# Patient Record
Sex: FEMALE | Race: WHITE | Employment: PART TIME | URBAN - METROPOLITAN AREA
[De-identification: names, ages, dates, MRNs, and addresses within clinical notes are randomized per-mention and may not be internally consistent; named-entity substitution may affect disease eponyms.]

---

## 2017-01-03 ENCOUNTER — TELEPHONE (OUTPATIENT)
Dept: FAMILY MEDICINE CLINIC | Age: 37
End: 2017-01-03

## 2017-01-04 ENCOUNTER — OFFICE VISIT (OUTPATIENT)
Dept: NEUROLOGY | Age: 37
End: 2017-01-04

## 2017-01-04 VITALS
DIASTOLIC BLOOD PRESSURE: 63 MMHG | WEIGHT: 140 LBS | RESPIRATION RATE: 18 BRPM | TEMPERATURE: 99.5 F | OXYGEN SATURATION: 98 % | HEART RATE: 93 BPM | SYSTOLIC BLOOD PRESSURE: 100 MMHG | BODY MASS INDEX: 24.03 KG/M2

## 2017-01-04 DIAGNOSIS — K31.84 GASTROPARESIS: Primary | ICD-10-CM

## 2017-01-04 DIAGNOSIS — G90.A POTS (POSTURAL ORTHOSTATIC TACHYCARDIA SYNDROME): ICD-10-CM

## 2017-01-04 RX ORDER — LANOLIN ALCOHOL/MO/W.PET/CERES
400 CREAM (GRAM) TOPICAL 2 TIMES DAILY
Qty: 60 TAB | Refills: 3 | Status: SHIPPED | OUTPATIENT
Start: 2017-01-04 | End: 2017-03-09

## 2017-01-04 NOTE — MR AVS SNAPSHOT
Visit Information Date & Time Provider Department Dept. Phone Encounter #  
 1/4/2017  3:40  Obie Morgan, [address] [facility] at [address] [id number] Follow-up Instructions Return if symptoms worsen or fail to improve. Your Appointments 1/4/2017  3:40 PM  
Follow Up with 812 Obie Morgan,  [employer] [facility] at [address] [facility] Appt Note: f/u $0cp 11/3/16 [address] [facility] [address]  
[phone]  
  
   
 [address] [place] [address] [address]  
  
    
 1/9/2017  9:00 AM  
ROUTINE CARE with Cheo Gonzalez MD  
[address] ([facility]) Appt Note: checkup discuss bp and chol, feeling shaky; physical checkup discuss bp and chol, feeling shaky [address], [address] [address] [place] [address]  
[phone]  
  
   
 [address], [place] [address] [address] Upcoming Health Maintenance Date Due INFLUENZA AGE 9 TO ADULT 8/1/2016 PAP AKA CERVICAL CYTOLOGY 10/22/2018 DTaP/Tdap/Td series (2 - Td) 11/11/2024 Allergies as of 1/4/2017  Review Complete On: 1/4/2017 By: 2 [facility] Cathy, DO Severity Noted Reaction Type Reactions Celexa [Citalopram]  12/10/2015    Nausea and Vomiting Ciprofloxacin  11/21/2012    Shortness of Breath Diflucan [Fluconazole]  05/17/2016    Rash Burning sensation to skin  
 Sulfa (Sulfonamide Antibiotics)  03/25/2010    Rash Current Immunizations  Reviewed on 9/30/2015 Name Date Influenza Vaccine 10/16/2013 Influenza Vaccine Roselynn Sanju) 11/11/2014 Influenza Vaccine (Quad) PF 9/30/2015 TB Skin Test (PPD) Intradermal 1/27/2014 Tdap 11/11/2014 Not reviewed this visit You Were Diagnosed With   
  
 Codes Comments Gastroparesis    -  Primary ICD-10-CM: Y30.97 ICD-9-CM: 536.3 POTS (postural orthostatic tachycardia syndrome)     ICD-10-CM: R00.0, I95.1 ICD-9-CM: 427.89 Vitals BP Pulse Temp Resp Weight(growth percentile) LMP  
 100/63 93 99.5 °F (37.5 °C) 18 140 lb (63.5 kg) 12/11/2016 SpO2 BMI OB Status Smoking Status 98% 24.03 kg/m2 Having regular periods Former Smoker BMI and BSA Data Body Mass Index Body Surface Area 24.03 kg/m 2 1.69 m 2 Preferred Pharmacy Pharmacy Name Phone [facility], [address] [phone] Your Updated Medication List  
  
   
This list is accurate as of: 1/4/17 11:03 AM.  Always use your most recent med list.  
  
  
  
  
 amoxicillin 500 mg capsule Commonly known as:  AMOXIL Take 1 Cap by mouth two (2) times a day for 10 days. ibuprofen 600 mg tablet Commonly known as:  MOTRIN Take 1 Tab by mouth every eight (8) hours as needed for Pain. LINZESS 290 mcg Cap capsule Generic drug:  linaclotide Take 290 mcg by mouth as needed. magnesium oxide 400 mg tablet Commonly known as:  MAG-OX Take 1 Tab by mouth two (2) times a day. metoprolol tartrate 25 mg tablet Commonly known as:  LOPRESSOR Take 0.5 Tabs by mouth two (2) times a day. multivitamin tablet Commonly known as:  ONE A DAY Take 1 Tab by mouth daily. OMEGA 3 FISH OIL PO Take 300 mg by mouth daily. OMEPRAZOLE PO Take 40 mg by mouth as needed. PEPCID PO Take  by mouth as needed. promethazine-codeine 6.25-10 mg/5 mL syrup Commonly known as:  PHENERGAN with CODEINE Take 5 mL by mouth nightly as needed for Cough. Max Daily Amount: 5 mL. VITAMIN D3 1,000 unit tablet Generic drug:  cholecalciferol Take  by mouth daily. Prescriptions Printed Refills  
 magnesium oxide (MAG-OX) 400 mg tablet 3 Sig: Take 1 Tab by mouth two (2) times a day. Class: Print  Route: Oral  
  
 Follow-up Instructions Return if symptoms worsen or fail to improve. Referral Information Referral ID Referred By Referred To  
  
 [id number] Gerry MARSHALL [address] [facility], [address] Phone: [phone] Fax: [phone] Visits Status Start Date End Date 1 New Request 1/4/17 1/4/18 If your referral has a status of pending review or denied, additional information will be sent to support the outcome of this decision. Patient Instructions PRESCRIPTION REFILL POLICY [facility] Statement to Patients April 1, 2014 In an effort to ensure the large volume of patient prescription refills is processed in the most efficient and expeditious manner, we are asking our patients to assist us by calling your Pharmacy for all prescription refills, this will include also your  Mail Order Pharmacy. The pharmacy will contact our office electronically to continue the refill process. Please do not wait until the last minute to call your pharmacy. We need at least 48 hours (2days) to fill prescriptions. We also encourage you to call your pharmacy before going to  your prescription to make sure it is ready. With regard to controlled substance prescription refill requests (narcotic refills) that need to be picked up at our office, we ask your cooperation by providing us with at least 72 hours (3days) notice that you will need a refill. We will not refill narcotic prescription refill requests after 4:00pm on any weekday, Monday through Thursday, or after 2:00pm on Fridays, or on the weekends. We encourage everyone to explore another way of getting your prescription refill request processed using [employer], our patient web portal through our electronic medical record system.  EpiEPt is an efficient and effective way to communicate your medication request directly to the office and downloadable as an basim on your smart phone . [employer] also features a review functionality that allows you to view your medication list as well as leave messages for your physician. Are you ready to get connected? If so please review the attatched instructions or speak to any of our staff to get you set up right away! Thank you so much for your cooperation. Should you have any questions please contact our Practice Administrator. The Physicians and Staff,  [facility] Thank you for choosing [facility] for your  
 
care. You may receive a survey about your visit. We appreciate you taking time  
 
to complete this survey as we use your feedback to improve our services. We  
 
realize we are not perfect, but we strive to provide excellent care. Introducing [facility] & HEALTH SERVICES! Dear Yamila Enrique: Thank you for requesting a [employer] account. Our records indicate that you already have an active [employer] account. You can access your account anytime at [email or web address] [place] [employer] Did you know that you can access your hospital and ER discharge instructions at any time in [employer]? You can also review all of your test results from your hospital stay or ER visit. Additional Information If you have questions, please visit the Frequently Asked Questions section of the [employer] website at [email or web address] Remember, [employer] is NOT to be used for urgent needs. For medical emergencies, dial 911. Now available from your iPhone and Android! Please provide this summary of care documentation to your next provider. Your primary care clinician is listed as [facility]. If you have any questions after today's visit, please call [phone].

## 2017-01-04 NOTE — PROGRESS NOTES
Chief Complaint   Patient presents with    Palpitations       HPI    Daphney Granda is a 47-year-old woman here to follow-up. I evaluated her last spring for chronic migraines and vertigo. MRI brain and MRA were normal.  She was taking magnesium for period of time. Headaches and migrainous symptoms resolved completely. She is here today because she wants to discuss new symptoms she has been struggling with for the past several months. She has been seeing a GI specialist for gastroparesis of unknown origin. She is taking Linzess for this condition. She also saw a cardiologist that gave her metoprolol for palpitations. She thinks she has POTS. She gets tachycardic with positional changes. Near syncopal.      Review of Systems   Cardiovascular: Positive for palpitations. Gastrointestinal: Positive for constipation. All other systems reviewed and are negative. Past Medical History   Diagnosis Date    Anemia      taking iron    Arrhythmia      tachycardia/palpitations    Clostridium difficile diarrhea     Gastroparesis     GERD (gastroesophageal reflux disease)      gastroparesis--h-pylori    Heart abnormality      years ago \"fast heartrate\" saw a cardiologist, not taking medication    Other ill-defined conditions(799.89) May 2012     h pylori    Other ill-defined conditions(799.89) June 2012     C-Diff    Palpitations      2010  - cardiac workup per pt.  no longer on betablockers     Family History   Problem Relation Age of Onset    Heart Disease Mother     Stroke Mother     Cancer Mother     Heart Disease Father     Stroke Father     Heart Disease Maternal Grandmother 45     Social History     Social History    Marital status: SINGLE     Spouse name: N/A    Number of children: N/A    Years of education: N/A     Occupational History    Not on file.      Social History Main Topics    Smoking status: Former Smoker     Types: Cigarettes     Quit date: 11/21/2013    Smokeless tobacco: Never Used    Alcohol use 0.6 oz/week     1 Glasses of wine, 0 Standard drinks or equivalent per week      Comment: rare1    Drug use: No    Sexual activity: Yes     Partners: Male     Birth control/ protection: None     Other Topics Concern    Not on file     Social History Narrative    3 kids (16yo - 7mo), lives with boyfriend (but he travels for work)     Allergies   Allergen Reactions    Celexa [Citalopram] Nausea and Vomiting    Ciprofloxacin Shortness of Breath    Diflucan [Fluconazole] Rash     Burning sensation to skin    Sulfa (Sulfonamide Antibiotics) Rash         Current Outpatient Prescriptions   Medication Sig    magnesium oxide (MAG-OX) 400 mg tablet Take 1 Tab by mouth two (2) times a day.  cholecalciferol (VITAMIN D3) 1,000 unit tablet Take  by mouth daily.  metoprolol tartrate (LOPRESSOR) 25 mg tablet Take 0.5 Tabs by mouth two (2) times a day.  ibuprofen (MOTRIN) 600 mg tablet Take 1 Tab by mouth every eight (8) hours as needed for Pain.  FAMOTIDINE (PEPCID PO) Take  by mouth as needed.  OMEGA-3 FATTY ACIDS/FISH OIL (OMEGA 3 FISH OIL PO) Take 300 mg by mouth daily.  OMEPRAZOLE PO Take 40 mg by mouth as needed.  multivitamin (ONE A DAY) tablet Take 1 Tab by mouth daily.  LINZESS 290 mcg cap capsule Take 290 mcg by mouth as needed.  amoxicillin (AMOXIL) 500 mg capsule Take 1 Cap by mouth two (2) times a day for 10 days.  promethazine-codeine (PHENERGAN WITH CODEINE) 6.25-10 mg/5 mL syrup Take 5 mL by mouth nightly as needed for Cough. Max Daily Amount: 5 mL. No current facility-administered medications for this visit. Neurologic Exam     Mental Status        WD/WN adult in NAD, normal grooming  VSS  A&O x 3    PERRL, nonicteric  Face is symmetric, tongue midline  Speech is fluent and clear  No limb ataxia. No abnl movements.   Moving all extemities spontaneously and symmetric  Normal gait    CVS RRR  Lungs nonlabored  Skin is warm and dry         Visit Vitals    /63    Pulse 93    Temp 99.5 °F (37.5 °C)    Resp 18    Wt 63.5 kg (140 lb)    LMP 12/11/2016    SpO2 98%    BMI 24.03 kg/m2       Assessment and Plan   Salvatore Maxwell was seen today for palpitations. Diagnoses and all orders for this visit:    Gastroparesis    POTS (postural orthostatic tachycardia syndrome)  -     REFERRAL TO CARDIOLOGY    Other orders  -     magnesium oxide (MAG-OX) 400 mg tablet; Take 1 Tab by mouth two (2) times a day. 82-year-old woman who was previously having vertiginous migraines which have fortunately improved and resolved. Now she is struggling with gastroparesis and I am going to recommend that she start taking magnesium again as this may help with slow transit constipation. Following that she can follow-up with her GI doctor for further management. She is also complaining of palpitations upon standing and tachycardia. I am going to refer her to cardiology for another opinion. Consideration for formal tilt table testing. I have no further recommendations for her at this time. She is welcome to follow-up if her migraines return. I am also referring her to a new primary care doctor who has experience with POTS.           [address] [place] [address], [facility] [address]  Diplomate KARLA

## 2017-01-04 NOTE — PATIENT INSTRUCTIONS
[facility]   Statement to Patients  April 1, 2014      In an effort to ensure the large volume of patient prescription refills is processed in the most efficient and expeditious manner, we are asking our patients to assist us by calling your Pharmacy for all prescription refills, this will include also your  Mail Order Pharmacy. The pharmacy will contact our office electronically to continue the refill process. Please do not wait until the last minute to call your pharmacy. We need at least 48 hours (2days) to fill prescriptions. We also encourage you to call your pharmacy before going to  your prescription to make sure it is ready. With regard to controlled substance prescription refill requests (narcotic refills) that need to be picked up at our office, we ask your cooperation by providing us with at least 72 hours (3days) notice that you will need a refill. We will not refill narcotic prescription refill requests after 4:00pm on any weekday, Monday through Thursday, or after 2:00pm on Fridays, or on the weekends. We encourage everyone to explore another way of getting your prescription refill request processed using [employer], our patient web portal through our electronic medical record system. [employer] is an efficient and effective way to communicate your medication request directly to the office and  downloadable as an basim on your smart phone . [employer] also features a review functionality that allows you to view your medication list as well as leave messages for your physician. Are you ready to get connected? If so please review the attatched instructions or speak to any of our staff to get you set up right away! Thank you so much for your cooperation. Should you have any questions please contact our Practice Administrator.     The Physicians and Staff,  [facility]     Thank you for choosing [facility] for your     care. You may receive a survey about your visit. We appreciate you taking time     to complete this survey as we use your feedback to improve our services. We     realize we are not perfect, but we strive to provide excellent care.

## 2017-01-09 ENCOUNTER — TELEPHONE (OUTPATIENT)
Dept: CARDIOLOGY CLINIC | Age: 37
End: 2017-01-09

## 2017-01-09 ENCOUNTER — OFFICE VISIT (OUTPATIENT)
Dept: CARDIOLOGY CLINIC | Age: 37
End: 2017-01-09

## 2017-01-09 VITALS
HEART RATE: 104 BPM | SYSTOLIC BLOOD PRESSURE: 122 MMHG | RESPIRATION RATE: 17 BRPM | HEIGHT: 64 IN | WEIGHT: 136.8 LBS | DIASTOLIC BLOOD PRESSURE: 74 MMHG | BODY MASS INDEX: 23.35 KG/M2

## 2017-01-09 DIAGNOSIS — R06.02 SHORTNESS OF BREATH: ICD-10-CM

## 2017-01-09 DIAGNOSIS — R07.9 CHEST PAIN, UNSPECIFIED TYPE: ICD-10-CM

## 2017-01-09 DIAGNOSIS — R42 DIZZINESS: ICD-10-CM

## 2017-01-09 DIAGNOSIS — R00.2 PALPITATIONS: Primary | ICD-10-CM

## 2017-01-09 RX ORDER — METOPROLOL SUCCINATE 50 MG/1
50 TABLET, EXTENDED RELEASE ORAL DAILY
Qty: 30 TAB | Refills: 2 | Status: SHIPPED | OUTPATIENT
Start: 2017-01-09 | End: 2017-01-20

## 2017-01-09 NOTE — MR AVS SNAPSHOT
Visit Information Date & Time Provider Department Dept. Phone Encounter #  
 1/9/2017 10:00 AM Neda Hughes MD [facility] [address] [facility] [phone] [id number] Your Appointments 1/11/2017 10:00 AM  
ROUTINE CARE with MD Iram CullenMireya Clements [address] ([facility]) Appt Note: checkup discuss bp and chol, feeling shaky; physical checkup discuss bp and chol, feeling shaky; checkup discuss bp and chol, feeling shaky [address], [address] [place] [address]  
[phone]  
  
   
 [address], [place] [address] [address] Upcoming Health Maintenance Date Due INFLUENZA AGE 9 TO ADULT 8/1/2016 PAP AKA CERVICAL CYTOLOGY 10/22/2018 DTaP/Tdap/Td series (2 - Td) 11/11/2024 Allergies as of 1/9/2017  Review Complete On: 1/9/2017 By: Alexandria Smith Severity Noted Reaction Type Reactions Celexa [Citalopram]  12/10/2015    Nausea and Vomiting Ciprofloxacin  11/21/2012    Shortness of Breath Diflucan [Fluconazole]  05/17/2016    Rash Burning sensation to skin  
 Sulfa (Sulfonamide Antibiotics)  03/25/2010    Rash Current Immunizations  Reviewed on 9/30/2015 Name Date Influenza Vaccine 10/16/2013 Influenza Vaccine Néstor Ross) 11/11/2014 Influenza Vaccine (Quad) PF 9/30/2015 TB Skin Test (PPD) Intradermal 1/27/2014 Tdap 11/11/2014 Not reviewed this visit You Were Diagnosed With   
  
 Codes Comments Palpitations    -  Primary ICD-10-CM: R00.2 ICD-9-CM: 785.1 Chest pain, unspecified type     ICD-10-CM: R07.9 ICD-9-CM: 786.50 Shortness of breath     ICD-10-CM: R06.02 
ICD-9-CM: 786.05 Dizziness     ICD-10-CM: R30 ICD-9-CM: 780.4 Vitals BP Pulse Resp Height(growth percentile) Weight(growth percentile) LMP  
 122/74 (BP 1 Location: Right arm, BP Patient Position: Standing) (!) 104 17 5' 4\" (1.626 m) 136 lb 12.8 oz (62.1 kg) 12/11/2016 BMI OB Status Smoking Status 23.48 kg/m2 Having regular periods Former Smoker Vitals History BMI and BSA Data Body Mass Index Body Surface Area  
 23.48 kg/m 2 1.67 m 2 Preferred Pharmacy Pharmacy Name Phone [facility], [address] [phone] Your Updated Medication List  
  
   
This list is accurate as of: 1/9/17 11:11 AM.  Always use your most recent med list.  
  
  
  
  
 ibuprofen 600 mg tablet Commonly known as:  MOTRIN Take 1 Tab by mouth every eight (8) hours as needed for Pain. LINZESS 290 mcg Cap capsule Generic drug:  linaclotide Take 290 mcg by mouth as needed. magnesium oxide 400 mg tablet Commonly known as:  MAG-OX Take 1 Tab by mouth two (2) times a day. metoprolol succinate 50 mg XL tablet Commonly known as:  TOPROL-XL Take 1 Tab by mouth daily. multivitamin tablet Commonly known as:  ONE A DAY Take 1 Tab by mouth daily. OMEGA 3 FISH OIL PO Take 300 mg by mouth daily. OMEPRAZOLE PO Take 40 mg by mouth as needed. PEPCID PO Take  by mouth as needed. VITAMIN D3 1,000 unit tablet Generic drug:  cholecalciferol Take  by mouth daily. Prescriptions Sent to Pharmacy Refills  
 metoprolol succinate (TOPROL-XL) 50 mg XL tablet 2 Sig: Take 1 Tab by mouth daily. Class: Normal  
 Pharmacy: [facility], [address] [facility] #: [phone] Route: Oral  
  
We Performed the Following AMB POC EKG ROUTINE W/ 12 LEADS, INTER & REP [58506 CPT(R)] Patient Instructions Start Toprol XL 50 mg every day STOP your Lopressor You will be scheduled for a Tilt Table test, you will be notified of instructions and a date Follow up with Dr. Nedra Ortiz in 3 months Introducing [facility] & HEALTH SERVICES! Dear Bette Tejeda: Thank you for requesting a [employer] account. Our records indicate that you already have an active [employer] account. You can access your account anytime at [email or web address]/"[employer]" Did you know that you can access your hospital and ER discharge instructions at any time in [employer]? You can also review all of your test results from your hospital stay or ER visit. Additional Information If you have questions, please visit the Frequently Asked Questions section of the [employer] website at [email or web address]"/. Remember, [employer] is NOT to be used for urgent needs. For medical emergencies, dial 911. Now available from your iPhone and Android! Please provide this summary of care documentation to your next provider. Your primary care clinician is listed as Bhupinder Walker. If you have any questions after today's visit, please call [phone].

## 2017-01-09 NOTE — PATIENT INSTRUCTIONS
Start Toprol XL 50 mg every day    STOP your Lopressor    You will be scheduled for a Tilt Table test, you will be notified of instructions and a date    Follow up with Dr. Tabitha Jacobo in 3 months

## 2017-01-09 NOTE — TELEPHONE ENCOUNTER
----- Message from Nicolas Hunt RN sent at 1/9/2017 11:13 AM EST -----  Regarding: Tilt Table  Please call patient and schedule a Tilt Table test per Dr. Miles Mccord. Please provide her with medication instructions in regards to if she needs to hold the Toprol prior.

## 2017-01-10 NOTE — TELEPHONE ENCOUNTER
Tilt table test scheduled for 1/20/17 @ 1:00pm.  Labs done 12/30/17. Notified patient of time and date. Will check to see how long or if Dr Ez Benjamin would like for her to hold her metoprolol and call her back with prep along with metoprolol orders.

## 2017-01-11 ENCOUNTER — OFFICE VISIT (OUTPATIENT)
Dept: FAMILY MEDICINE CLINIC | Age: 37
End: 2017-01-11

## 2017-01-11 VITALS
HEIGHT: 64 IN | OXYGEN SATURATION: 99 % | RESPIRATION RATE: 18 BRPM | BODY MASS INDEX: 23.63 KG/M2 | WEIGHT: 138.4 LBS | TEMPERATURE: 98 F | HEART RATE: 73 BPM | SYSTOLIC BLOOD PRESSURE: 122 MMHG | DIASTOLIC BLOOD PRESSURE: 79 MMHG

## 2017-01-11 DIAGNOSIS — F41.9 ANXIETY: Primary | ICD-10-CM

## 2017-01-11 DIAGNOSIS — F41.8 ANXIETY ABOUT HEALTH: ICD-10-CM

## 2017-01-11 DIAGNOSIS — R45.0 JITTERY: ICD-10-CM

## 2017-01-11 DIAGNOSIS — R00.2 PALPITATIONS: ICD-10-CM

## 2017-01-11 DIAGNOSIS — R53.83 FATIGUE, UNSPECIFIED TYPE: ICD-10-CM

## 2017-01-11 NOTE — PROGRESS NOTES
HPI  Domonique Rodriges is a 31 y.o. female who presents to discuss the strange feelings that she is having. Describes internal shaking of her core and her hands. Feels shaky when standing. Feels like her entire body is \"vibrating\". In general she just does not feel good and some days just wants to lay around. The vibrating sensation is been going on for the past few months. About a year ago she had migraines and vertigo for a year which she saw a neurologist for. Has been seeing a gastroenterologist for gastroparesis for the past 5 years. About 9 years ago had an EKG at this office and was diagnosed with \"sinus tachycardia\" for which she sees a cardiologist.    Cardiology is currently considering a tilt table test which will be done next week. She was asked to hold her medication (metoprolol). She is concerned that she could have a variety of things including Parkinson's, pots, sugar issues, thyroid issues    PMHx:  Past Medical History   Diagnosis Date    Anemia      taking iron    Arrhythmia      tachycardia/palpitations    Clostridium difficile diarrhea     Gastroparesis     GERD (gastroesophageal reflux disease)      gastroparesis--h-pylori    Heart abnormality      years ago \"fast heartrate\" saw a cardiologist, not taking medication    Other ill-defined conditions(799.89) May 2012     h pylori    Other ill-defined conditions(799.89) June 2012     C-Diff    Palpitations      2010  - cardiac workup per pt.  no longer on betablockers       Meds:   Current Outpatient Prescriptions   Medication Sig Dispense Refill    magnesium oxide (MAG-OX) 400 mg tablet Take 1 Tab by mouth two (2) times a day. 60 Tab 3    cholecalciferol (VITAMIN D3) 1,000 unit tablet Take  by mouth daily.  ibuprofen (MOTRIN) 600 mg tablet Take 1 Tab by mouth every eight (8) hours as needed for Pain. 30 Tab 0    FAMOTIDINE (PEPCID PO) Take  by mouth as needed.       OMEGA-3 FATTY ACIDS/FISH OIL (OMEGA 3 FISH OIL PO) Take 300 mg by mouth daily.  OMEPRAZOLE PO Take 40 mg by mouth as needed.  multivitamin (ONE A DAY) tablet Take 1 Tab by mouth daily.  LINZESS 290 mcg cap capsule Take 290 mcg by mouth as needed. 0    metoprolol succinate (TOPROL-XL) 50 mg XL tablet Take 1 Tab by mouth daily. 30 Tab 2       Allergies: Allergies   Allergen Reactions    Celexa [Citalopram] Nausea and Vomiting    Ciprofloxacin Shortness of Breath    Diflucan [Fluconazole] Rash     Burning sensation to skin    Sulfa (Sulfonamide Antibiotics) Rash       Smoker:  History   Smoking Status    Former Smoker    Types: Cigarettes    Quit date: 11/21/2013   Smokeless Tobacco    Never Used       ETOH:   History   Alcohol Use    0.6 oz/week    1 Glasses of wine, 0 Standard drinks or equivalent per week     Comment: rare1       FH:   Family History   Problem Relation Age of Onset    Heart Disease Mother     Stroke Mother     Cancer Mother     Heart Disease Father     Stroke Father     Heart Disease Maternal Grandmother 38       ROS:  As listed in HPI. In addition:  Constitutional:   No headache, fever, weight loss or weight gain      Eyes:   No redness, pruritis, pain, visual changes, swelling, or discharge      Ears:    No pain, loss or changes in hearing     Cardiac:    No chest pain      Resp:   No cough or shortness of breath      Neuro   No loss of consciousness, seizures      Physical Exam:  Blood pressure 122/79, pulse 73, temperature 98 °F (36.7 °C), resp. rate 18, height 5' 4\" (1.626 m), weight 138 lb 6.4 oz (62.8 kg), last menstrual period 12/11/2016, SpO2 99 %, currently breastfeeding. GEN: No apparent distress. Alert and oriented and responds to all questions appropriately. NEUROLOGIC:  No focal neurologic deficits. Strength and sensation grossly intact. Coordination and gait grossly intact. EXT: Well perfused. No edema. SKIN: No obvious rashes.        Assessment and Plan     She listed a whole series of concerns and the workup that has been done for them. Nothing definitive has been found. She is very anxious about her potential medical diagnoses. My impression is that she is anxious and following from this I could explain most of the somatic complaints that she is having as manifestations of anxiety (sinus tachycardia, jittery, headache, gastroparesis). I reviewed biological basis of anxiety with her. She seemed receptive to this idea. Made an effort to validate her medical concerns but tried to point out that it is unlikely she has 4 different rare conditions to explain the different somatic symptoms she is havingbetter to look for a unifying diagnosis    I would like to start on trying an exercise program of cardiovascular exercises for 2030 minutes a day the majority of days per week (she has an elliptical at home). May also try yoga and/or relaxation exercises. No medication at this point. Continue the cardiology workup. See me back in 1 month to report on improvements if any      ICD-10-CM ICD-9-CM    1. Anxiety F41.9 300.00    2. Jittery R45.0 799.21    3. Palpitations R00.2 785.1    4. Fatigue, unspecified type R53.83 780.79    5. Anxiety about health F41.8 300.09        AVS given.  Pt expressed understanding of instructions

## 2017-01-11 NOTE — TELEPHONE ENCOUNTER
Verified patient with two types of identifiers. Notified patient to continue all medications day of procedure.

## 2017-01-11 NOTE — MR AVS SNAPSHOT
Visit Information Date & Time Provider Department Dept. Phone Encounter #  
 1/11/2017 10:00 AM Mirian Grififn MD [address] [facility] [phone] [id number] Your Appointments 4/3/2017 11:40 AM  
ESTABLISHED PATIENT with Silver Turnerr, MD  
[facility] ([facility]) Appt Note: 3 months f/u  
 [address] [place] [address]  
[address] [id number] Upcoming Health Maintenance Date Due  
 PAP AKA CERVICAL CYTOLOGY 10/22/2018 DTaP/Tdap/Td series (2 - Td) 11/11/2024 Allergies as of 1/11/2017  Review Complete On: 1/11/2017 By: Mirian Griffin MD  
  
 Severity Noted Reaction Type Reactions Celexa [Citalopram]  12/10/2015    Nausea and Vomiting Ciprofloxacin  11/21/2012    Shortness of Breath Diflucan [Fluconazole]  05/17/2016    Rash Burning sensation to skin  
 Sulfa (Sulfonamide Antibiotics)  03/25/2010    Rash Current Immunizations  Reviewed on 9/30/2015 Name Date Influenza Vaccine 10/16/2013 Influenza Vaccine Loletha Ringer) 11/11/2014 Influenza Vaccine (Quad) PF 9/30/2015 TB Skin Test (PPD) Intradermal 1/27/2014 Tdap 11/11/2014 Not reviewed this visit You Were Diagnosed With   
  
 Codes Comments Anxiety    -  Primary ICD-10-CM: F41.9 ICD-9-CM: 300.00 Jittery     ICD-10-CM: R45.0 ICD-9-CM: 799.21 Palpitations     ICD-10-CM: R00.2 ICD-9-CM: 785.1 Fatigue, unspecified type     ICD-10-CM: R53.83 ICD-9-CM: 780.79 Anxiety about health     ICD-10-CM: F41.8 ICD-9-CM: 300.09 Vitals BP Pulse Temp Resp Height(growth percentile) Weight(growth percentile) 122/79 (BP 1 Location: Left arm, BP Patient Position: Sitting) 73 98 °F (36.7 °C) 18 5' 4\" (1.626 m) 138 lb 6.4 oz (62.8 kg) LMP SpO2 BMI OB Status Smoking Status 12/11/2016 99% 23.76 kg/m2 Having regular periods Former Smoker BMI and BSA Data Body Mass Index Body Surface Area  
 23.76 kg/m 2 1.68 m 2 Preferred Pharmacy Pharmacy Name Phone [facility] [phone] Your Updated Medication List  
  
   
This list is accurate as of: 1/11/17 12:51 PM.  Always use your most recent med list.  
  
  
  
  
 ibuprofen 600 mg tablet Commonly known as:  MOTRIN Take 1 Tab by mouth every eight (8) hours as needed for Pain. LINZESS 290 mcg Cap capsule Generic drug:  linaclotide Take 290 mcg by mouth as needed. magnesium oxide 400 mg tablet Commonly known as:  MAG-OX Take 1 Tab by mouth two (2) times a day. metoprolol succinate 50 mg XL tablet Commonly known as:  TOPROL-XL Take 1 Tab by mouth daily. multivitamin tablet Commonly known as:  ONE A DAY Take 1 Tab by mouth daily. OMEGA 3 FISH OIL PO Take 300 mg by mouth daily. OMEPRAZOLE PO Take 40 mg by mouth as needed. PEPCID PO Take  by mouth as needed. VITAMIN D3 1,000 unit tablet Generic drug:  cholecalciferol Take  by mouth daily. To-Do List   
 01/20/2017 1:15 PM  
  Appointment with CATH ROOM 3 [facility] at [address] [place] [address] ([phone]) NPO AFTER MIDNIGHT! ROUTINE CASES:  Please arrive 2 hour prior to your scheduled appointment time. If your scheduled appointment is for 0730, 0800, 0815, please arrive by 0645. NON ROUTINE CASES:  PATIENTS WHO REQUIRE LABS, X-RAY, EKG, or MEDS:  PLEASE ARRIVE 3 HOURS PRIOR TO YOUR SCHEDULED APPOINTMENT. If you require hydration prior to your procedure, PLEASE ARRIVE 4 HOURS PRIOR TO YOUR APPOINTMENT  **** IT IS THE OFFICE SCHEDULARS RESPONSBILITY TO NOTIFY THE CATH LAB SCHEDULAR IF THE PATIENT WILL REQUIRE ANY ADDITIONAL TIME FOR PREP FROM ROUTINE CASE ***** Introducing [facility]! Dear Geovanny Marquez: Thank you for requesting a Maxtahart account.   Our records indicate that you already have an active charming charlie account. You can access your account anytime at [email or web address] Did you know that you can access your hospital and ER discharge instructions at any time in charming charlie? You can also review all of your test results from your hospital stay or ER visit. Additional Information If you have questions, please visit the Frequently Asked Questions section of the charming charlie website at [email or web address] Remember, charming charlie is NOT to be used for urgent needs. For medical emergencies, dial 911. Now available from your iPhone and Android! Please provide this summary of care documentation to your next provider. Your primary care clinician is listed as Jono Lucero. If you have any questions after today's visit, please call [phone].

## 2017-01-12 NOTE — PROGRESS NOTES
Stacia Ibarra     1980       Seth Hopson MD, [place] [place]  Date of Visit-1/08/2017   PCP is Nathanael Trinidad MD   [address] [facility] [place]  [facility]  HPI:  Stacia Ibarra is a 31 y.o. female   Here for second opinion on her heart palpitations  And possible POTS   Subjective:  Ms. Antoinette Castelan comes to see us. She has been previously diagnosed with POTS syndrome with orthostasis and high pulse rate. She has seen her neurologist, Dr. Ephraim Moon, who has recommended cardiac evaluation. She had been seen previously for chronic migraines and vertigo with MRI of the brain and MRA. She is also having gastroparesis, which is her main problem. She says it limits her quite a bit. She has increasing palpitations and tachycardia. She takes Metoprolol and magnesium as her cardiac meds. Her EKG 01/09/17 shows sinus rhythm with an otherwise flat ST segment, nonspecific. Allergies:  Sulfa, Cipro and Macrobid. Medical History:  Gastroparesis and sinus tachycardia, seen at [facility]. She had seen a previous cardiologist, who wanted to make a change, said she's had a full workup. No prior cath. Blood transfusions, GI bleeding. Social History:  Quit smoking 2 1/2 years ago. Smoked for four years. Has two cups of caffeine a day at most.  Is a G5, P3. Not working at the current time. Is staying with her children. Is physically active taking care of her children. Family History: Mother is 64 with thyroid cancer, high cholesterol, diabetes and hypertension. Father is 61 with high cholesterol. Sister is 40 with thyroid issues. Stress echo 12/08/16. Walked 9 minutes and 0 seconds. Within normal limits. Loop monitor December of 2015 showed periods of sinus tachycardia. No other arrhythmias were seen. Echocardiogram December, 2015 within normal limits, EF 60%. Assessment/Plan:      It is not clear to me if she has fully an established diagnosis of POTS syndrome and what her response to beta blocker has been for palpitations. Some of this could be anxiety and somatization, but certainly with her history of GI issues I also wonder if there is a component that contributes to that sensation of orthostasis. I think a tilt table test may be helpful, keep hydration, continue the beta blocker. Will get the tilt table test and see whether Midodrine, Florinef, beta blocker or other therapy might be appropriate for her. Start Toprol XL 50 mg every day  STOP your Lopressor  You will be scheduled for a Tilt Table test, you will be notified of instructions and a date  Follow up with Dr. Christa Cherry in 3 months  Future Appointments  Date Time Provider Addis Velazquez   1/20/2017 1:15 PM CATH ROOM 3 [facility]   4/3/2017 11:40 AM MD SHAISTA Ambrocio SCHED         Impression:   1. Palpitations    2. Chest pain, unspecified type    3. Shortness of breath    4. Dizziness       Past Medical History   Diagnosis Date    Anemia      taking iron    Clostridium difficile diarrhea     Gastroparesis     GERD (gastroesophageal reflux disease)      gastroparesis--h-pylori    H/O Clostridium difficile infection 06/2012     C-Diff    Helicobacter pylori (H. pylori) 05/2012     h pylori    Palpitations      2010  - cardiac workup per pt. Review of Systems   Constitutional: Negative for diaphoresis, fever and malaise/fatigue. HENT: Positive for tinnitus. Negative for ear pain, hearing loss and nosebleeds. Eyes: Positive for blurred vision. Negative for double vision and pain. Respiratory: Positive for shortness of breath. Negative for cough, hemoptysis, sputum production, wheezing and stridor. Cardiovascular: Positive for chest pain and palpitations. Negative for orthopnea, claudication and leg swelling. Gastrointestinal: Positive for abdominal pain, constipation, heartburn and nausea.  Negative for blood in stool, diarrhea, melena and vomiting. Genitourinary: Negative for dysuria, frequency and urgency. Musculoskeletal: Negative for back pain, falls, joint pain, myalgias and neck pain. Skin: Negative for rash. Neurological: Positive for dizziness and tremors. Negative for sensory change, seizures, loss of consciousness, weakness and headaches. Endo/Heme/Allergies: Does not bruise/bleed easily. Psychiatric/Behavioral: Negative for depression, hallucinations and memory loss. The patient is not nervous/anxious and does not have insomnia. Exam and Labs:    Visit Vitals    /74 (BP 1 Location: Right arm, BP Patient Position: Standing)    Pulse (!) 104    Resp 17    Ht 5' 4\" (1.626 m)    Wt 136 lb 12.8 oz (62.1 kg)    LMP 12/11/2016    BMI 23.48 kg/m2      Constitutional:  NAD, comfortable  Head: NC,AT. Eyes: No scleral icterus. Neck:  Neck supple. No JVD present. Throat: moist mucous membranes. Chest: Effort normal & normal respiratory excursion . Neurological: alert, conversant and oriented . Skin: Skin is not cold. No obvious systemic rash noted. Not diaphoretic. No erythema. Psychiatric:  Grossly normal mood and affect. Behavior appears normal. Extremities:  no clubbing or cyanosis. Abdomen: non distended    Lungs:breath sounds normal. No stridor. distress, wheezes or  Rales. Heart:    normal rate, regular rhythm, normal S1, S2, no murmurs, rubs, clicks or gallops , PMI non displaced. Edema: Edema is none. Lab Results   Component Value Date/Time    Cholesterol, total 267 01/27/2014 08:41 AM    HDL Cholesterol 83 01/27/2014 08:41 AM    LDL, calculated 169 01/27/2014 08:41 AM    Triglyceride 73 01/27/2014 08:41 AM     No results found for this or any previous visit.    Lab Results   Component Value Date/Time    Sodium 140 12/30/2016 09:35 AM    Potassium 3.9 12/30/2016 09:35 AM    Chloride 102 12/30/2016 09:35 AM    CO2 24 12/30/2016 09:35 AM    Anion gap 6 10/18/2016 08:56 AM    Glucose 88 12/30/2016 09:35 AM    BUN 7 12/30/2016 09:35 AM    Creatinine 0.58 12/30/2016 09:35 AM    BUN/Creatinine ratio 12 12/30/2016 09:35 AM    GFR est  12/30/2016 09:35 AM    GFR est non- 12/30/2016 09:35 AM    Calcium 9.5 12/30/2016 09:35 AM      Wt Readings from Last 3 Encounters:   01/11/17 138 lb 6.4 oz (62.8 kg)   01/09/17 136 lb 12.8 oz (62.1 kg)   01/04/17 140 lb (63.5 kg)      BP Readings from Last 3 Encounters:   01/11/17 122/79   01/09/17 122/74   01/04/17 100/63        Current Outpatient Prescriptions   Medication Sig    metoprolol succinate (TOPROL-XL) 50 mg XL tablet Take 1 Tab by mouth daily.  magnesium oxide (MAG-OX) 400 mg tablet Take 1 Tab by mouth two (2) times a day.  cholecalciferol (VITAMIN D3) 1,000 unit tablet Take  by mouth daily.  ibuprofen (MOTRIN) 600 mg tablet Take 1 Tab by mouth every eight (8) hours as needed for Pain.  FAMOTIDINE (PEPCID PO) Take  by mouth as needed.  OMEGA-3 FATTY ACIDS/FISH OIL (OMEGA 3 FISH OIL PO) Take 300 mg by mouth daily.  OMEPRAZOLE PO Take 40 mg by mouth as needed.  multivitamin (ONE A DAY) tablet Take 1 Tab by mouth daily.  LINZESS 290 mcg cap capsule Take 290 mcg by mouth as needed. No current facility-administered medications for this visit. Impression see above.

## 2017-01-13 RX ORDER — SODIUM CHLORIDE 0.9 % (FLUSH) 0.9 %
5-10 SYRINGE (ML) INJECTION AS NEEDED
Status: CANCELLED | OUTPATIENT
Start: 2017-01-13

## 2017-01-13 RX ORDER — SODIUM CHLORIDE 0.9 % (FLUSH) 0.9 %
5-10 SYRINGE (ML) INJECTION EVERY 8 HOURS
Status: CANCELLED | OUTPATIENT
Start: 2017-01-13

## 2017-01-20 ENCOUNTER — HOSPITAL ENCOUNTER (OUTPATIENT)
Dept: NON INVASIVE DIAGNOSTICS | Age: 37
Discharge: HOME | End: 2017-01-20
Attending: INTERNAL MEDICINE | Admitting: INTERNAL MEDICINE
Payer: MEDICAID

## 2017-01-20 VITALS
TEMPERATURE: 99.2 F | HEART RATE: 93 BPM | WEIGHT: 140 LBS | OXYGEN SATURATION: 100 % | HEIGHT: 65 IN | DIASTOLIC BLOOD PRESSURE: 68 MMHG | BODY MASS INDEX: 23.32 KG/M2 | RESPIRATION RATE: 21 BRPM | SYSTOLIC BLOOD PRESSURE: 116 MMHG

## 2017-01-20 PROBLEM — G90.A POTS (POSTURAL ORTHOSTATIC TACHYCARDIA SYNDROME): Status: ACTIVE | Noted: 2017-01-20

## 2017-01-20 PROBLEM — R94.09 ABNORMAL TILT TABLE TEST: Status: ACTIVE | Noted: 2017-01-20

## 2017-01-20 PROCEDURE — 93660 TILT TABLE EVALUATION: CPT

## 2017-01-20 PROCEDURE — 74011250637 HC RX REV CODE- 250/637: Performed by: INTERNAL MEDICINE

## 2017-01-20 RX ORDER — SODIUM CHLORIDE 0.9 % (FLUSH) 0.9 %
10 SYRINGE (ML) INJECTION AS NEEDED
Status: DISCONTINUED | OUTPATIENT
Start: 2017-01-20 | End: 2017-01-20 | Stop reason: ALTCHOICE

## 2017-01-20 RX ORDER — METOPROLOL TARTRATE 25 MG/1
25 TABLET, FILM COATED ORAL 2 TIMES DAILY
Qty: 60 TAB | Refills: 5 | Status: SHIPPED | OUTPATIENT
Start: 2017-01-20 | End: 2017-03-09 | Stop reason: ALTCHOICE

## 2017-01-20 RX ORDER — NITROGLYCERIN 0.4 MG/1
0.4 TABLET SUBLINGUAL AS NEEDED
Status: DISCONTINUED | OUTPATIENT
Start: 2017-01-20 | End: 2017-01-20 | Stop reason: ALTCHOICE

## 2017-01-20 RX ORDER — ATROPINE SULFATE 0.1 MG/ML
1 INJECTION INTRAVENOUS AS NEEDED
Status: DISCONTINUED | OUTPATIENT
Start: 2017-01-20 | End: 2017-01-20 | Stop reason: ALTCHOICE

## 2017-01-20 RX ADMIN — NITROGLYCERIN 0.4 MG: 0.4 TABLET SUBLINGUAL at 14:20

## 2017-01-20 NOTE — PROGRESS NOTES
Cardiac Cath Lab Procedure Area Arrival Note:    Lidia Lewis arrived to Cardiac Cath Lab, Procedure Area. Patient identifiers verified with NAME and DATE OF BIRTH. Procedure verified with patient. Consent forms verified. Allergies verified. Patient informed of procedure and plan of care. Questions answered with review. Patient voiced understanding of procedure and plan of care. Patient on cardiac monitor, non-invasive blood pressure, SPO2 monitor. On RA. IV of NS on pump at 25 ml/hr. Patient status doing well without problems. Patient is A&Ox 4. Patient reports no pain. Patient medicated during procedure with orders obtained and verified by Dr. Gee Dominguez. Refer to patients Cardiac Cath Lab PROCEDURE REPORT for vital signs, assessment, status, and response during procedure, printed at end of case. Printed report on chart or scanned into chart. TRANSFER - OUT REPORT:    Verbal report given to JOCELINE Escobar on Lidia Lewis being transferred to cath lab recovery for routine progression of care       Report consisted of patients Situation, Background, Assessment and   Recommendations(SBAR). Information from the following report(s) Procedure Summary was reviewed with the receiving nurse. Opportunity for questions and clarification was provided.

## 2017-01-20 NOTE — DISCHARGE INSTRUCTIONS
Follow up with Dr Brennon Flores  Date Time Provider Addis Velazquez   2/3/2017 8:00 AM Qian Bower  [address]   4/3/2017 11:40 AM Tony Faye  [address]         Start metoprolol 25 mg Twice a day        Learning About Postural Orthostatic Tachycardia Syndrome (POTS)  What is POTS? Postural orthostatic tachycardia syndrome (POTS) is a fast heart rate (tachycardia) that starts after you stand up. This can suddenly happen as long as 10 minutes after you stand. What happens when you have POTS? With POTS, the body does not control blood pressure or heart rate as it should after you stand up. So for a brief time, you may not get enough blood to your brain. People with severe fatigue and dizziness may find it hard to keep up with daily living. But treatment can help. What are the symptoms? POTS can make you feel dizzy and lightheaded. You may faint. You may also feel tired. Blurred vision and feeling anxious are also symptoms. And you may have trouble with keeping your attention focused. Symptoms can range from mild to severe. Some things can make symptoms worse. These include heat, eating, exercise, showering, sitting too long, and menstrual cycle changes. When you first notice symptoms, sitting or lying down may help you feel better. What causes it? POTS may follow a viral illness, a surgery, pregnancy, bed rest, or a severe trauma. Experts don't understand what causes it, but different body systems seem to be out of balance. How is POTS diagnosed? To learn what is causing your symptoms, your doctor may:  · Ask about your symptoms, including when and how they started. · Check how your blood pressure and heart rate change when you move from lying down to sitting to standing. · Do a tilt table test. The test uses a special table that slowly tilts you to an upright position. It checks how your body responds when you change positions. How is POTS treated?   Work with your doctor to find the right mix of treatments. These treatments may include:  · Taking medicine prescribed by your doctor. For some people, taking medicine that's normally used for high blood pressure can help. Taking medicine that keeps the body's fluids balanced may also help. · Everyday self-care. These practices can be a her part of helping the body get back in balance. ¨ Drink plenty of fluids. For many people, low body fluid is part of what makes POTS symptoms worse. ¨ Eat the amount of salt your doctor tells you to. Salt helps keep up the body's fluid level. ¨ Try a special exercise program. Your doctor may give you a program of specific exercises. You start short and slow, especially if fatigue is a problem. Add a little at a time. At first, you only do exercise when you're reclined. After a few weeks, you start to add upright exercise. ¨ Keep track of your symptoms and what makes them better and worse. When should you call for help? Call 911 anytime you think you may need emergency care. For example, call if:  · You passed out (lost consciousness), and it feels different than your typical episode or you don't recover as quickly as you have in the past.  Call your doctor now or seek immediate medical care if:  · Your symptoms are getting worse. For example, you are more dizzy or lightheaded. Watch closely for changes in your health, and be sure to contact your doctor if:  · You do not get better as expected. Follow-up care is a key part of your treatment and safety. Be sure to make and go to all appointments, and call your doctor if you are having problems. It's also a good idea to know your test results and keep a list of the medicines you take. Where can you learn more? Go to http://quintin-oh.info/. Enter J198 in the search box to learn more about \"Learning About Postural Orthostatic Tachycardia Syndrome (POTS). \"  Current as of: January 27, 2016  Content Version: 11.1  © 3070-0127 [employer]wise, [employer]. Care instructions adapted under license by [employer] (which disclaims liability or warranty for this information). If you have questions about a medical condition or this instruction, always ask your healthcare professional. [address] any warranty or liability for your use of this information.

## 2017-01-20 NOTE — PROCEDURES
Cardiac Procedure Note   Patient: Huyen Abraham  MRN: [id number]  SSN: [id number]   YOB: 1980 Age: 31 y.o.   Sex: female    Date of Procedure: 1/20/2017   Pre-procedure Diagnosis: dizziness, palpitation with sinus tachycardia  Post-procedure Diagnosis: POTS  Procedure: Tilt Table Study  :  Dr. Dahlia Bridges MD    Assistant(s):  None  Anesthesia: Moderate Sedation   Estimated Blood Loss: Less than 10 mL   Specimens Removed: None  Findings: sinus tach immediately with upright posture 138 bpm and no major change in BP with SL nitro at first while sinus tach to 997 bpm  Complications: None   Implants:  None  Signed by:  Dahlia Bridges MD  1/20/2017  3:15 PM

## 2017-01-20 NOTE — PROGRESS NOTES
TRANSFER - IN REPORT:    Verbal report received from Chanell Robb RN on Eliza Mahmood, Procedure Tilt Study , from the Cardiac Cath lab, for routine progression of care. Report consisted of patients Situation, Background, Assessment and Recommendations(SBAR). Information from the following report(s) Procedure Summary, MAR and Recent Results was reviewed with the receiving clinician. Opportunity for questions and clarification was provided. Assessment completed upon patients arrival to [address] [place] [address] and care assumed. Cardiac Cath Lab Recovery Arrival Note:     Eliza Mahmood arrived to [facility] recovery area. Patient procedure= Tilt Study. Patient on cardiac monitor, non-invasive blood pressure, Patient status doing well without problems. Patient is A&Ox 4. Patient reports no pain, no chest pain, no n/v. Procedure site without any bleeding and no hematoma.

## 2017-01-20 NOTE — IP AVS SNAPSHOT
[address] 
[phone] Patient: Mary Andersen MRN: [id number] You are allergic to the following Allergen Reactions Celexa (Citalopram) Nausea and Vomiting Ciprofloxacin Shortness of Breath Diflucan (Fluconazole) Rash Burning sensation to skin  
    
 Sulfa (Sulfonamide Antibiotics) Rash Recent Documentation Height Weight Breastfeeding? BMI OB Status Smoking Status 1.651 m 63.5 kg No 23.3 kg/m2 Having regular periods Former Smoker Emergency Contacts Name Discharge Info Relation Home Work Mobile [facility] CAREGIVER [3] Mother [14] [phone] About your hospitalization You were admitted on:  2017 You last received care in the:  [facility] You were discharged on:  2017 Unit phone number:  [phone] Why you were hospitalized Your primary diagnosis was:  Not on File Your diagnoses also included:  Abnormal Tilt Table Test, Pots (Postural Orthostatic Tachycardia Syndrome) Providers Seen During Your Hospitalizations Provider Role Specialty Primary office phone Elpidio Turner MD Attending Provider Cardiology [phone] Your Primary Care Physician (PCP) Primary Care Physician Office Phone Office Fax Ran Elkins [phone] Follow-up Information Follow up With Details Comments Contact Info Elpidio Turner MD On 2/3/2017 [facility] [address] [facility] 
[phone] Marlen Lazo MD   [address] [address] [place] [address] [phone] Your Appointments 2017  8:00 AM EST  
ESTABLISHED PATIENT with Elpidio Turner MD  
[facility] ([facility]) [address] [place] [address] [address] [facility]  
[phone] Current Discharge Medication List  
  
START taking these medications Dose & Instructions Dispensing Information Comments Morning Noon Evening Bedtime  
 metoprolol tartrate 25 mg tablet Commonly known as:  LOPRESSOR Your next dose is: Today, Tomorrow Other:  _________ Dose:  25 mg Take 1 Tab by mouth two (2) times a day. Quantity:  60 Tab Refills:  5 CONTINUE these medications which have NOT CHANGED Dose & Instructions Dispensing Information Comments Morning Noon Evening Bedtime  
 ibuprofen 600 mg tablet Commonly known as:  MOTRIN Your next dose is: Today, Tomorrow Other:  _________ Dose:  600 mg Take 1 Tab by mouth every eight (8) hours as needed for Pain. Quantity:  30 Tab Refills:  0 LINZESS 290 mcg Cap capsule Generic drug:  linaclotide Your next dose is: Today, Tomorrow Other:  _________ Dose:  290 mcg Take 290 mcg by mouth as needed. Refills:  0  
     
   
   
   
  
 magnesium oxide 400 mg tablet Commonly known as:  MAG-OX Your next dose is: Today, Tomorrow Other:  _________ Dose:  400 mg Take 1 Tab by mouth two (2) times a day. Quantity:  60 Tab Refills:  3  
     
   
   
   
  
 multivitamin tablet Commonly known as:  ONE A DAY Your next dose is: Today, Tomorrow Other:  _________ Dose:  1 Tab Take 1 Tab by mouth daily. Refills:  0 OMEGA 3 FISH OIL PO Your next dose is: Today, Tomorrow Other:  _________ Dose:  300 mg Take 300 mg by mouth daily. Refills:  0  
     
   
   
   
  
 OMEPRAZOLE PO Your next dose is: Today, Tomorrow Other:  _________ Dose:  40 mg Take 40 mg by mouth as needed. Refills:  0 PEPCID PO Your next dose is: Today, Tomorrow Other:  _________ Take  by mouth as needed. Refills:  0  
     
   
   
   
  
 VITAMIN D3 1,000 unit tablet Generic drug:  cholecalciferol Your next dose is: Today, Tomorrow Other:  _________ Take  by mouth daily. Refills:  0 STOP taking these medications   
 metoprolol succinate 50 mg XL tablet Commonly known as:  TOPROL-XL [facility] [address] [place]  [address], 355 [facility] Phone:  [phone]  
  metoprolol tartrate 25 mg tablet Discharge Instructions Follow up with Dr Jg Quijano Future Appointments Date Time Provider Addis Velazquez 2/3/2017 8:00 AM Beny Malave  [address]  
4/3/2017 11:40 AM Soo Mi  [address] Start metoprolol 25 mg Twice a day Learning About Postural Orthostatic Tachycardia Syndrome (POTS) What is POTS? Postural orthostatic tachycardia syndrome (POTS) is a fast heart rate (tachycardia) that starts after you stand up. This can suddenly happen as long as 10 minutes after you stand. What happens when you have POTS? With POTS, the body does not control blood pressure or heart rate as it should after you stand up. So for a brief time, you may not get enough blood to your brain. People with severe fatigue and dizziness may find it hard to keep up with daily living. But treatment can help. What are the symptoms? POTS can make you feel dizzy and lightheaded. You may faint. You may also feel tired. Blurred vision and feeling anxious are also symptoms. And you may have trouble with keeping your attention focused. Symptoms can range from mild to severe. Some things can make symptoms worse. These include heat, eating, exercise, showering, sitting too long, and menstrual cycle changes. When you first notice symptoms, sitting or lying down may help you feel better. What causes it? POTS may follow a viral illness, a surgery, pregnancy, bed rest, or a severe trauma. Experts don't understand what causes it, but different body systems seem to be out of balance. How is POTS diagnosed? To learn what is causing your symptoms, your doctor may: · Ask about your symptoms, including when and how they started. · Check how your blood pressure and heart rate change when you move from lying down to sitting to standing. · Do a tilt table test. The test uses a special table that slowly tilts you to an upright position. It checks how your body responds when you change positions. How is POTS treated? Work with your doctor to find the right mix of treatments. These treatments may include: · Taking medicine prescribed by your doctor. For some people, taking medicine that's normally used for high blood pressure can help. Taking medicine that keeps the body's fluids balanced may also help. · Everyday self-care. These practices can be a her part of helping the body get back in balance. ¨ Drink plenty of fluids. For many people, low body fluid is part of what makes POTS symptoms worse. ¨ Eat the amount of salt your doctor tells you to. Salt helps keep up the body's fluid level. ¨ Try a special exercise program. Your doctor may give you a program of specific exercises. You start short and slow, especially if fatigue is a problem. Add a little at a time. At first, you only do exercise when you're reclined. After a few weeks, you start to add upright exercise. ¨ Keep track of your symptoms and what makes them better and worse. When should you call for help? Call 911 anytime you think you may need emergency care.  For example, call if: 
· You passed out (lost consciousness), and it feels different than your typical episode or you don't recover as quickly as you have in the past. 
 Call your doctor now or seek immediate medical care if: 
· Your symptoms are getting worse. For example, you are more dizzy or lightheaded. Watch closely for changes in your health, and be sure to contact your doctor if: 
· You do not get better as expected. Follow-up care is a key part of your treatment and safety. Be sure to make and go to all appointments, and call your doctor if you are having problems. It's also a good idea to know your test results and keep a list of the medicines you take. Where can you learn more? Go to http://quintin-oh.info/. Enter H767 in the search box to learn more about \"Learning About Postural Orthostatic Tachycardia Syndrome (POTS). \" 
Current as of: January 27, 2016 Content Version: 11.1 © 3700-8678 [employer]. Care instructions adapted under license by [employer] (which disclaims liability or warranty for this information). If you have questions about a medical condition or this instruction, always ask your healthcare professional. [place] [address] any warranty or liability for your use of this information. Discharge Orders None Introducing [facility]! Dear Humberto Given: Thank you for requesting a hi5 account. Our records indicate that you already have an active hi5 account. You can access your account anytime at [email or web address] Did you know that you can access your hospital and ER discharge instructions at any time in hi5? You can also review all of your test results from your hospital stay or ER visit. Additional Information If you have questions, please visit the Frequently Asked Questions section of the hi5 website at [email or web address] Remember, hi5 is NOT to be used for urgent needs. For medical emergencies, dial 911. Now available from your iPhone and Android! General Information Please provide this summary of care documentation to your next provider. Patient Signature:  ____________________________________________________________ Date:  ____________________________________________________________  
  
Suzon Mems Provider Signature:  ____________________________________________________________ Date:  ____________________________________________________________

## 2017-01-20 NOTE — PROGRESS NOTES
I have reviewed discharge instructions with the patient. The patient verbalized understanding. Copy of discharge instructions given to patient.

## 2017-01-20 NOTE — IP AVS SNAPSHOT
Current Discharge Medication List  
  
Take these medications at their scheduled times Dose & Instructions Dispensing Information Comments Morning Noon Evening Bedtime  
 magnesium oxide 400 mg tablet Commonly known as:  MAG-OX Your next dose is: Today, Tomorrow Other:  ____________ Dose:  400 mg Take 1 Tab by mouth two (2) times a day. Quantity:  60 Tab Refills:  3  
     
   
   
   
  
 metoprolol tartrate 25 mg tablet Commonly known as:  LOPRESSOR Your next dose is: Today, Tomorrow Other:  ____________ Dose:  25 mg Take 1 Tab by mouth two (2) times a day. Quantity:  60 Tab Refills:  5  
     
   
   
   
  
 multivitamin tablet Commonly known as:  ONE A DAY Your next dose is: Today, Tomorrow Other:  ____________ Dose:  1 Tab Take 1 Tab by mouth daily. Refills:  0 OMEGA 3 FISH OIL PO Your next dose is: Today, Tomorrow Other:  ____________ Dose:  300 mg Take 300 mg by mouth daily. Refills:  0  
     
   
   
   
  
 VITAMIN D3 1,000 unit tablet Generic drug:  cholecalciferol Your next dose is: Today, Tomorrow Other:  ____________ Take  by mouth daily. Refills:  0 Take these medications as needed Dose & Instructions Dispensing Information Comments Morning Noon Evening Bedtime  
 ibuprofen 600 mg tablet Commonly known as:  MOTRIN Your next dose is: Today, Tomorrow Other:  ____________ Dose:  600 mg Take 1 Tab by mouth every eight (8) hours as needed for Pain. Quantity:  30 Tab Refills:  0 LINZESS 290 mcg Cap capsule Generic drug:  linaclotide Your next dose is: Today, Tomorrow Other:  ____________ Dose:  290 mcg Take 290 mcg by mouth as needed.   
 Refills:  0  
     
   
   
   
  
 OMEPRAZOLE PO  
   
 Your next dose is: Today, Tomorrow Other:  ____________ Dose:  40 mg Take 40 mg by mouth as needed. Refills:  0 PEPCID PO Your next dose is: Today, Tomorrow Other:  ____________ Take  by mouth as needed. Refills:  0 Where to Get Your Medications These medications were sent to [facility], [address]  [address], [address] [facility] Phone:  [phone]  
  metoprolol tartrate 25 mg tablet

## 2017-01-20 NOTE — PROGRESS NOTES
Cardiac Cath Lab Recovery Arrival Note:      Sarwat Frias arrived to Cardiac Cath Lab, Recovery Area. Staff introduced to patient. Patient identifiers verified with NAME and DATE OF BIRTH. Procedure verified with patient. Consent forms reviewed and signed by patient or authorized representative and verified. Allergies verified. Patient informed of procedure and plan of care. Questions answered with review. Patient prepped for procedure, per orders from physician, prior to arrival.    Patient on cardiac monitor, non-invasive blood pressure, SPO2 monitor. Patient is A&Ox 4. Patient reports no pain, no chest pain,. No n/v. Patient in stretcher, in low position, with side rails up, call bell within reach, patient instructed to call of assistance as needed. Patient prep in: [facility] Recovery Area, Bed# 8. Family in: Son: Juan José Worley.    Prep by: Rolo Colbert RN

## 2017-01-20 NOTE — H&P
Cardiac Electrophysiology H&P Note     Subjective:      Sylvie Hodge is a 31 y.o. female patient who is seen for evaluation of possible POTS kindly referred to Dr. Lidia Shepard for a tilt table test.   She has been having tachycardia, palpitations & shakiness that started about 8 month ago. She has seen Dr Kira Davis in the past and now Dr Augustus Mart.  She was taking metoprolol PRN for tachycardia. Then Dr. Augustus Mart started her on Toprol, but she was unable to tolerate this. She said it made her symptoms worse. She denies hx of syncope, but she has had many near -syncopal episodes. The last one was two weeks, she was sitting and when she stood up she became very lightheaded, dizzy and SOB. She then had to sit back down for symptoms to resolve. She says activity and exercise are triggers for the tachycardia episodes. After exercising on the elliptical for about 20 minutes she is very shaky, lightheaded and feels awful. She says that she was on atenolol about 5 years ago for tachycardia, a cardiologists took her off and she did not have any problems for about 4 years. Then all of a sudden she started having these problems. Neurologists Dr Bautista Stanton  PMHx includes chronic migraines, vertigo, gastroparesis     Echo 12/2015 shows LVEF 60% no RWMA, no significant valve abnormalities. Family hx: maternal grand mother: MI at 27years old. Maternal grandfather hx of CVA. Social hx : she denies tobacco or ETOH use. She is single mother of 3 children.      Patient Active Problem List    Diagnosis Date Noted    Palpitations 12/10/2015    Abdominal adhesions 05/04/2012       Allergies   Allergen Reactions    Celexa [Citalopram] Nausea and Vomiting    Ciprofloxacin Shortness of Breath    Diflucan [Fluconazole] Rash     Burning sensation to skin    Sulfa (Sulfonamide Antibiotics) Rash     Past Medical History   Diagnosis Date    Anemia      taking iron    Gastroparesis     GERD (gastroesophageal reflux disease) gastroparesis--h-pylori    H/O Clostridium difficile infection 06/2012     C-Diff    Helicobacter pylori (H. pylori) 05/2012     h pylori    Palpitations      2010  - cardiac workup per pt. Past Surgical History   Procedure Laterality Date    Hx gi       EGD and colonoscopy    Hx other surgical       D&C x 2,  lap    Hx gyn  5/12     D&C    Hx gyn       tubal reversal     Family History   Problem Relation Age of Onset    Heart Disease Mother     Stroke Mother     Cancer Mother     Heart Disease Father     Stroke Father     Heart Disease Maternal Grandmother 45     Social History   Substance Use Topics    Smoking status: Former Smoker     Types: Cigarettes     Quit date: 11/21/2013    Smokeless tobacco: Never Used    Alcohol use 0.6 oz/week     1 Glasses of wine, 0 Standard drinks or equivalent per week      Comment: rare1        Review of Systems:   Constitutional: Negative for fever, chills, weight loss, +malaise/fatigue. HEENT: Negative for nosebleeds, vision changes. Respiratory: Negative for cough, hemoptysis, sputum production, and wheezing. Cardiovascular: Negative for chest pain, +palpitations, no orthopnea, claudication, leg swelling, +near-syncope, and no PND. +lightheadedess, dizziness & SOB  Gastrointestinal: Negative for nausea, vomiting, diarrhea, constipation, blood in stool and melena. Genitourinary: Negative for dysuria, and hematuria. Musculoskeletal: Negative for myalgias, arthralgia. Skin: Negative for rash. Heme: Does not bleed or bruise easily. Neurological: Negative for speech change and focal weakness     Objective:     Visit Vitals    /59 (BP 1 Location: Left arm, BP Patient Position: At rest)    Pulse 88    Temp 99.2 °F (37.3 °C)    Resp 29    Ht 5' 5\" (1.651 m)    Wt 140 lb (63.5 kg)    SpO2 100%    Breastfeeding No    BMI 23.3 kg/m2      Physical Exam:   Constitutional: well-developed and well-nourished. No distress.    Head: Normocephalic and atraumatic. Eyes: Pupils are equal, round  Neck: supple. No JVD present. Cardiovascular: Normal rate, regular rhythm. Exam reveals no gallop and no friction rub. No murmur heard. Pulmonary/Chest: Effort normal and breath sounds normal. No wheezes. Abdominal: Soft, no tenderness. Musculoskeletal: no edema. Neurological: alert,oriented. Skin: Skin is warm and dry  Psychiatric: normal mood and affect. Behavior is normal. Judgment and thought content normal.      EKG: normal sinus rhythm HR 94 bpm      Assessment/Plan:   Sinus Tachycardia  Palpitations  Near Syncope  Lightheadedness/Dizziness    Reviewed tilt table test and indication. She is agreeable to proceed. Pre procedure labs reviewed- WNL. Thank you for involving me in this patient's care and please call with further concerns or questions.     Amairani Barton NP  [phone]  Addendum from EP attending:   I have seen, examined patient, and discussed with nurse practitioner, registered nurse, reviewed, updated note and agree with the assessment and plan    I have talked to her and she said toprol XL did not help but felt worse whereas metoprolol PRN was  Vital signs with baseline sinus tachycardia 100 bpm  Exam shows regular rhythm and no rub   Assessment and Plan:  Inappropriate sinus tach vs POTS  She agrees to proceed with tilt test

## 2017-01-21 NOTE — PROCEDURES
DATE of PROCEDURE: 1/20/2017   Head-up tilt-table test with and without sublingual nitroglycerin . HISTORY:  This is a 31 y. o. woman with a history of palpitation and near syncope and she is referred from tilt table test.  The patient was explained the risks and benefits of the head-up tilt-table test and she agreed to proceed. PROCEDURE IN DETAIL:  After informed written consent was obtained, the patient was brought to the Electrophysiology Suite where she was laid on the tilt table. The patient was strapped down for her safety and she had the baseline blood pressure of 123/48, heart rate of 100 beats per minute. The patient was then tilted up to 70 degrees. Her heart rate rapidly increased to 134 beats per minute and blood pressure was higher 149/74 from the baseline. She was more dizzy and felt rapid rate. Nitroglycerin 0.4 mg sublingually was given and her heart rate increased more rapidly to 169 beats per minute, sinus tachycardia. BP was 143/61 mmHg. When she was back to supine position her blood pressure was 119/60 and heart rate was 134 beats per minute.   Specimen removed: NONE  Complication: none  ASSESSMENT AND PLAN:  The patient has postural orthostatic tachycardia syndrome  She is already drinking plenty of fluid and will try different version of beta blocker, metoprolol bid and follow up in office

## 2017-03-09 ENCOUNTER — OFFICE VISIT (OUTPATIENT)
Dept: CARDIOLOGY CLINIC | Age: 37
End: 2017-03-09

## 2017-03-09 VITALS
HEART RATE: 85 BPM | SYSTOLIC BLOOD PRESSURE: 132 MMHG | WEIGHT: 138 LBS | HEIGHT: 65 IN | BODY MASS INDEX: 22.99 KG/M2 | DIASTOLIC BLOOD PRESSURE: 70 MMHG

## 2017-03-09 DIAGNOSIS — G90.A POTS (POSTURAL ORTHOSTATIC TACHYCARDIA SYNDROME): Primary | ICD-10-CM

## 2017-03-09 DIAGNOSIS — R00.2 PALPITATIONS: ICD-10-CM

## 2017-03-09 RX ORDER — NADOLOL 40 MG/1
40 TABLET ORAL DAILY
Qty: 30 TAB | Refills: 5 | Status: SHIPPED | OUTPATIENT
Start: 2017-03-09 | End: 2017-04-17 | Stop reason: ALTCHOICE

## 2017-03-09 NOTE — PROGRESS NOTES
Chief Complaint   Patient presents with    Irregular Heart Beat     POTS    Follow-up     from tilt table test     Verified medications with the patient. Verified pharmacy with patient. Per Dr Tim Gould discontinued all medications not taken.

## 2017-03-09 NOTE — PROGRESS NOTES
Cardiac Electrophysiology Consultation Note     Subjective:      Bennett Kaplan is a 31 y.o. female patient who is seen s/p tilt table test.  + postural orthostatic tachycardia syndrome  She is already drinking plenty of fluid and will try different beta blocker, metoprolol bid and follow up. She says the beta blocker make her very tired. Her biggest compliant is she feels very shaky all of the time. She has good and bad weeks, she still has some episodes of heart racing and dizziness. She has an appt with the neurologists next month to follow up. She mentions that the neurologists recommended propanolol. She has been having a lot of pain lately with the gastroparesis, but she is not ready for surgery for this. She had used inderal before and did not like it  Past hx   She had the tilt  evaluation of possible POTS kindly referred to Dr. Mat Ramos for a tilt table test.   She has been having tachycardia, palpitations & shakiness that started about 8 month ago. She has seen Dr Tayler Marsh in the past and now Dr Christa Cherry.  She was taking metoprolol PRN for tachycardia. Then Dr. Christa Cherry started her on Toprol, but she was unable to tolerate this. She said it made her symptoms worse. She denies hx of syncope, but she has had many near -syncopal episodes. The last one was two weeks, she was sitting and when she stood up she became very lightheaded, dizzy and SOB. She then had to sit back down for symptoms to resolve. She says activity and exercise are triggers for the tachycardia episodes. After exercising on the elliptical for about 20 minutes she is very shaky, lightheaded and feels awful. She says that she was on atenolol about 5 years ago for tachycardia, a cardiologists took her off and she did not have any problems for about 4 years. Then all of a sudden she started having these problems.    Neurologists Dr Betty Sánchez  PMHx includes chronic migraines, vertigo, gastroparesis      Echo 12/2015 shows LVEF 60% no RWMA, no significant valve abnormalities. Family hx: maternal grand mother: MI at 27years old. Maternal grandfather hx of CVA. Social hx : she denies tobacco or ETOH use. She is single mother of 3 children      Patient Active Problem List    Diagnosis Date Noted    Abnormal tilt table test 01/20/2017    POTS (postural orthostatic tachycardia syndrome) 01/20/2017    Palpitations 12/10/2015    Abdominal adhesions 05/04/2012     Current Outpatient Prescriptions   Medication Sig Dispense Refill    nadolol (CORGARD) 40 mg tablet Take 1 Tab by mouth daily. 30 Tab 5    cholecalciferol (VITAMIN D3) 1,000 unit tablet Take  by mouth daily.  ibuprofen (MOTRIN) 600 mg tablet Take 1 Tab by mouth every eight (8) hours as needed for Pain. 30 Tab 0    FAMOTIDINE (PEPCID PO) Take  by mouth as needed.  OMEGA-3 FATTY ACIDS/FISH OIL (OMEGA 3 FISH OIL PO) Take 300 mg by mouth daily.  OMEPRAZOLE PO Take 40 mg by mouth as needed.  multivitamin (ONE A DAY) tablet Take 1 Tab by mouth daily.  LINZESS 290 mcg cap capsule Take 290 mcg by mouth as needed. 0     Allergies   Allergen Reactions    Celexa [Citalopram] Nausea and Vomiting    Ciprofloxacin Shortness of Breath    Diflucan [Fluconazole] Rash     Burning sensation to skin    Sulfa (Sulfonamide Antibiotics) Rash     Past Medical History:   Diagnosis Date    Anemia     taking iron    Gastroparesis     GERD (gastroesophageal reflux disease)     gastroparesis--h-pylori    H/O Clostridium difficile infection 06/2012    C-Diff    Helicobacter pylori (H. pylori) 05/2012    h pylori    Palpitations     2010  - cardiac workup per pt.       Past Surgical History:   Procedure Laterality Date    HX GI      EGD and colonoscopy    HX GYN  5/12    D&C    HX GYN      tubal reversal    HX OTHER SURGICAL      D&C x 2,  lap    TILT TABLE EVAL W/WO DRUG  1/21/2017          Family History   Problem Relation Age of Onset    Heart Disease Mother    Vineet Feldman Stroke Mother     Cancer Mother     Heart Disease Father     Stroke Father     Heart Disease Maternal Grandmother 45     Social History   Substance Use Topics    Smoking status: Former Smoker     Types: Cigarettes     Quit date: 11/21/2013    Smokeless tobacco: Never Used    Alcohol use 0.6 oz/week     1 Glasses of wine, 0 Standard drinks or equivalent per week      Comment: rare1        Review of Systems:   Constitutional: Negative for fever, chills, weight loss, +malaise/fatigue. + jitteriness   HEENT: Negative for nosebleeds, vision changes. Respiratory: Negative for cough, hemoptysis, sputum production, and wheezing. Cardiovascular: Negative for chest pain,+ palpitations, no orthopnea, claudication, leg swelling, syncope, and PND. + dizziness  Gastrointestinal: Negative for nausea, vomiting, diarrhea, constipation, blood in stool and melena. + gastroparesis   Genitourinary: Negative for dysuria, and hematuria. Musculoskeletal: Negative for myalgias, arthralgia. Skin: Negative for rash. Heme: Does not bleed or bruise easily. Neurological: Negative for speech change and focal weakness     Objective:     Visit Vitals    /70 (BP 1 Location: Right arm, BP Patient Position: Sitting)    Pulse 85    Ht 5' 5\" (1.651 m)    Wt 138 lb (62.6 kg)    BMI 22.96 kg/m2      Physical Exam:   Constitutional: Well-nourished. No distress. Head: Normocephalic and atraumatic. Eyes: Pupils are equal, round  Neck: supple. No JVD present. Cardiovascular: Normal rate, regular rhythm. Exam reveals no gallop and no friction rub. No murmur heard. Pulmonary/Chest: Effort normal and breath sounds normal. No wheezes. Abdominal: Soft, no tenderness. + obesity  Musculoskeletal: no edema. Neurological: alert,oriented. Skin: Skin is warm and dry  Psychiatric: normal mood and affect. Behavior is normal. Judgment and thought content normal.        Assessment/Plan:       ICD-10-CM ICD-9-CM    1.  POTS (postural orthostatic tachycardia syndrome) R00.0 427.89     I95.1     2. Palpitations R00.2 785.1      Switch to nadolol 40 mg daily, stop metoprolol. BP stable. She will check BP at home and call if need to be when BP is not right  If rate is not better controlled I will increase nadolol  Encouraged he to continue to drink at least 64 oz of water daily and continue LE exercises to strengthen her calve muscle. Follow-up Disposition:  Return in about 3 months (around 6/9/2017). Thank you for involving me in this patient's care and please call with further concerns or questions. Pedro Gee M.D.   Electrophysiology/Cardiology  [facility] [place]  [address], [facility] [address] [place] [address] [address] [place] [address]  [phone]

## 2017-03-09 NOTE — PROGRESS NOTES
Cardiac Electrophysiology Consultation Note     Subjective:      Amparo Dupont is a 31 y.o. female patient who is seen s/p tilt table test.  postural orthostatic tachycardia syndrome  She is already drinking plenty of fluid and will try different beta blocker, metoprolol bid and follow up. She says the beta blocker make her very tired. Her biggest compliant is she feels very shaky all of the time. She has good and bad weeks, she still has some episodes of heart racing and dizziness. She has an appt with the neurologists next month to follow up. She mentions that the neurologists recommended propanolol. She has been having a lot of pain lately with the gastroparesis, but she is not ready for surgery for this.       Past hx   She had the tilt  evaluation of possible POTS kindly referred to Dr. Nava Thompson for a tilt table test.   She has been having tachycardia, palpitations & shakiness that started about 8 month ago. She has seen Dr Ally Rodriguez in the past and now Dr Yeni Brock.  She was taking metoprolol PRN for tachycardia. Then Dr. Yeni Brock started her on Toprol, but she was unable to tolerate this. She said it made her symptoms worse. She denies hx of syncope, but she has had many near -syncopal episodes. The last one was two weeks, she was sitting and when she stood up she became very lightheaded, dizzy and SOB. She then had to sit back down for symptoms to resolve. She says activity and exercise are triggers for the tachycardia episodes. After exercising on the elliptical for about 20 minutes she is very shaky, lightheaded and feels awful. She says that she was on atenolol about 5 years ago for tachycardia, a cardiologists took her off and she did not have any problems for about 4 years. Then all of a sudden she started having these problems. Neurologists Dr Arin Amezcua  PMHx includes chronic migraines, vertigo, gastroparesis      Echo 12/2015 shows LVEF 60% no RWMA, no significant valve abnormalities. Family hx: maternal grand mother: MI at 27years old. Maternal grandfather hx of CVA. Social hx : she denies tobacco or ETOH use. She is single mother of 3 children      Patient Active Problem List    Diagnosis Date Noted    Abnormal tilt table test 01/20/2017    POTS (postural orthostatic tachycardia syndrome) 01/20/2017    Palpitations 12/10/2015    Abdominal adhesions 05/04/2012     Current Outpatient Prescriptions   Medication Sig Dispense Refill    metoprolol tartrate (LOPRESSOR) 25 mg tablet Take 1 Tab by mouth two (2) times a day. 60 Tab 5    cholecalciferol (VITAMIN D3) 1,000 unit tablet Take  by mouth daily.  ibuprofen (MOTRIN) 600 mg tablet Take 1 Tab by mouth every eight (8) hours as needed for Pain. 30 Tab 0    FAMOTIDINE (PEPCID PO) Take  by mouth as needed.  OMEGA-3 FATTY ACIDS/FISH OIL (OMEGA 3 FISH OIL PO) Take 300 mg by mouth daily.  OMEPRAZOLE PO Take 40 mg by mouth as needed.  multivitamin (ONE A DAY) tablet Take 1 Tab by mouth daily.  LINZESS 290 mcg cap capsule Take 290 mcg by mouth as needed. 0    magnesium oxide (MAG-OX) 400 mg tablet Take 1 Tab by mouth two (2) times a day. 60 Tab 3     Allergies   Allergen Reactions    Celexa [Citalopram] Nausea and Vomiting    Ciprofloxacin Shortness of Breath    Diflucan [Fluconazole] Rash     Burning sensation to skin    Sulfa (Sulfonamide Antibiotics) Rash     Past Medical History:   Diagnosis Date    Anemia     taking iron    Gastroparesis     GERD (gastroesophageal reflux disease)     gastroparesis--h-pylori    H/O Clostridium difficile infection 06/2012    C-Diff    Helicobacter pylori (H. pylori) 05/2012    h pylori    Palpitations     2010  - cardiac workup per pt.       Past Surgical History:   Procedure Laterality Date    HX GI      EGD and colonoscopy    HX GYN  5/12    D&C    HX GYN      tubal reversal    HX OTHER SURGICAL      D&C x 2,  lap    TILT TABLE EVAL W/WO DRUG  1/21/2017 Family History   Problem Relation Age of Onset    Heart Disease Mother     Stroke Mother     Cancer Mother     Heart Disease Father     Stroke Father     Heart Disease Maternal Grandmother 45     Social History   Substance Use Topics    Smoking status: Former Smoker     Types: Cigarettes     Quit date: 11/21/2013    Smokeless tobacco: Never Used    Alcohol use 0.6 oz/week     1 Glasses of wine, 0 Standard drinks or equivalent per week      Comment: rare1        Review of Systems:   Constitutional: Negative for fever, chills, weight loss, +malaise/fatigue. + jitteriness   HEENT: Negative for nosebleeds, vision changes. Respiratory: Negative for cough, hemoptysis, sputum production, and wheezing. Cardiovascular: Negative for chest pain,+ palpitations,no orthopnea, claudication, leg swelling, syncope, and PND. + dizziness  Gastrointestinal: Negative for nausea, vomiting, diarrhea, constipation, blood in stool and melena. + gastroparesis   Genitourinary: Negative for dysuria, and hematuria. Musculoskeletal: Negative for myalgias, arthralgia. Skin: Negative for rash. Heme: Does not bleed or bruise easily. Neurological: Negative for speech change and focal weakness     Objective:     Visit Vitals    /70 (BP 1 Location: Right arm, BP Patient Position: Sitting)    Pulse 85    Ht 5' 5\" (1.651 m)    Wt 138 lb (62.6 kg)    BMI 22.96 kg/m2      Physical Exam:   Constitutional: Well-nourished. No distress. Head: Normocephalic and atraumatic. Eyes: Pupils are equal, round  Neck: supple. No JVD present. Cardiovascular: Normal rate, regular rhythm. Exam reveals no gallop and no friction rub. No murmur heard. Pulmonary/Chest: Effort normal and breath sounds normal. No wheezes. Abdominal: Soft, no tenderness. Musculoskeletal: no edema. Neurological: alert,oriented. Skin: Skin is warm and dry  Psychiatric: normal mood and affect.  Behavior is normal. Judgment and thought content normal.        Assessment/Plan:       ICD-10-CM ICD-9-CM    1. POTS (postural orthostatic tachycardia syndrome) R00.0 427.89     I95.1     2. Palpitations R00.2 785.1      Switch to nadolol 40 mg daily, stop metoprolol. BP stable. Encouraged he to continue to drink at least 64 oz of water daily and continue LE exercises to strength her calve muscle. Thank you for involving me in this patient's care and please call with further concerns or questions. Balaji Rouse M.D.   Electrophysiology/Cardiology  [facility] [place]  [address], [place] [address] [place] [address]  [phone]

## 2017-03-09 NOTE — PATIENT INSTRUCTIONS
You will need to follow up in clinic with Dr. Augustin Nolan in 3 months. Stop metoprolol and start Nadolol 40mg every day.

## 2017-03-09 NOTE — MR AVS SNAPSHOT
Visit Information Date & Time Provider Department Dept. Phone Encounter #  
 3/9/2017  1:00 PM Beny Malave MD [facility] [place] [phone] [id number] Follow-up Instructions Return in about 3 months (around 6/9/2017). Your Appointments 4/3/2017 11:40 AM  
ESTABLISHED PATIENT with Soo Mi MD  
[facility] ([facility] SCHEDULING) Appt Note: 3 months f/u  
 [address]  
[address] [address] Upcoming Health Maintenance Date Due  
 PAP AKA CERVICAL CYTOLOGY 10/22/2018 DTaP/Tdap/Td series (2 - Td) 11/11/2024 Allergies as of 3/9/2017  Review Complete On: 3/9/2017 By: Beny Malave MD  
  
 Severity Noted Reaction Type Reactions Celexa [Citalopram]  12/10/2015    Nausea and Vomiting Ciprofloxacin  11/21/2012    Shortness of Breath Diflucan [Fluconazole]  05/17/2016    Rash Burning sensation to skin  
 Sulfa (Sulfonamide Antibiotics)  03/25/2010    Rash Current Immunizations  Reviewed on 9/30/2015 Name Date Influenza Vaccine 10/16/2013 Influenza Vaccine Karole Burows) 11/11/2014 Influenza Vaccine (Quad) PF 9/30/2015 TB Skin Test (PPD) Intradermal 1/27/2014 Tdap 11/11/2014 Not reviewed this visit You Were Diagnosed With   
  
 Codes Comments POTS (postural orthostatic tachycardia syndrome)    -  Primary ICD-10-CM: R00.0, I95.1 ICD-9-CM: 427.89 Palpitations     ICD-10-CM: R00.2 ICD-9-CM: 785.1 Vitals BP Pulse Height(growth percentile) Weight(growth percentile) BMI OB Status 132/70 (BP 1 Location: Right arm, BP Patient Position: Sitting) 85 5' 5\" (1.651 m) 138 lb (62.6 kg) 22.96 kg/m2 Having regular periods Smoking Status Former Smoker Vitals History BMI and BSA Data  Body Mass Index Body Surface Area  
 22.96 kg/m 2 1.69 m 2  
  
  
 Preferred Pharmacy Pharmacy Name Phone [address] [phone] Your Updated Medication List  
  
   
This list is accurate as of: 3/9/17  1:27 PM.  Always use your most recent med list.  
  
  
  
  
 ibuprofen 600 mg tablet Commonly known as:  MOTRIN Take 1 Tab by mouth every eight (8) hours as needed for Pain. LINZESS 290 mcg Cap capsule Generic drug:  linaclotide Take 290 mcg by mouth as needed. multivitamin tablet Commonly known as:  ONE A DAY Take 1 Tab by mouth daily. nadolol 40 mg tablet Commonly known as:  CORGARD Take 1 Tab by mouth daily. OMEGA 3 FISH OIL PO Take 300 mg by mouth daily. OMEPRAZOLE PO Take 40 mg by mouth as needed. PEPCID PO Take  by mouth as needed. VITAMIN D3 1,000 unit tablet Generic drug:  cholecalciferol Take  by mouth daily. Prescriptions Sent to Pharmacy Refills  
 nadolol (CORGARD) 40 mg tablet 5 Sig: Take 1 Tab by mouth daily. Class: Normal  
 [facility] [address] [facility] #: [phone] Route: Oral  
  
Follow-up Instructions Return in about 3 months (around 6/9/2017). Patient Instructions You will need to follow up in clinic with Dr. Adams Bhatti in 3 months. Stop metoprolol and start Nadolol 40mg every day. Introducing [facility]! Dear Yamila Enrique: Thank you for requesting a [employer] account. Our records indicate that you already have an active [employer] account. You can access your account anytime at [email or web address] Did you know that you can access your hospital and ER discharge instructions at any time in [employer]? You can also review all of your test results from your hospital stay or ER visit. Additional Information If you have questions, please visit the Frequently Asked Questions section of the [employer] website at [email or web address] Remember, [employer] is NOT to be used for urgent needs. For medical emergencies, dial 911. Now available from your iPhone and Android! Please provide this summary of care documentation to your next provider. Your primary care clinician is listed as Wesly Males. If you have any questions after today's visit, please call [phone].

## 2017-03-17 ENCOUNTER — OFFICE VISIT (OUTPATIENT)
Dept: FAMILY MEDICINE CLINIC | Age: 37
End: 2017-03-17

## 2017-03-17 VITALS
SYSTOLIC BLOOD PRESSURE: 119 MMHG | RESPIRATION RATE: 18 BRPM | HEIGHT: 65 IN | HEART RATE: 106 BPM | DIASTOLIC BLOOD PRESSURE: 86 MMHG | OXYGEN SATURATION: 99 % | TEMPERATURE: 98.2 F | BODY MASS INDEX: 22.82 KG/M2 | WEIGHT: 137 LBS

## 2017-03-17 DIAGNOSIS — R05.9 COUGH: Primary | ICD-10-CM

## 2017-03-17 DIAGNOSIS — R68.89 FLU-LIKE SYMPTOMS: ICD-10-CM

## 2017-03-17 LAB
FLUAV+FLUBV AG NOSE QL IA.RAPID: NEGATIVE POS/NEG
FLUAV+FLUBV AG NOSE QL IA.RAPID: NEGATIVE POS/NEG
VALID INTERNAL CONTROL?: YES

## 2017-03-17 RX ORDER — ALBUTEROL SULFATE 90 UG/1
2 AEROSOL, METERED RESPIRATORY (INHALATION)
Qty: 1 INHALER | Refills: 0 | Status: SHIPPED | OUTPATIENT
Start: 2017-03-17 | End: 2017-04-17 | Stop reason: ALTCHOICE

## 2017-03-17 NOTE — PROGRESS NOTES
Chief Complaint   Patient presents with    Cough    Fatigue    Nasal Congestion     Patient is here to be seen for cough and congestion.

## 2017-03-17 NOTE — PROGRESS NOTES
SUBJECTIVE:   Sarwat Frias is a 31 y.o. female who complains of congestion, sore throat, productive cough and chills for 4 days. She denies a history of chest pain, dizziness, nausea and vomiting and denies a history of asthma. Patient does not smoke cigarettes. Cough is worst part of illness for patient; states she is coughing so much that she finds it difficult to catch her breath. ROS:  Per HPI    Allergies   Allergen Reactions    Celexa [Citalopram] Nausea and Vomiting    Ciprofloxacin Shortness of Breath    Diflucan [Fluconazole] Rash     Burning sensation to skin    Sulfa (Sulfonamide Antibiotics) Rash       Outpatient Prescriptions Marked as Taking for the 3/17/17 encounter (Office Visit) with Ryanne Ch MD   Medication Sig Dispense Refill    albuterol (PROVENTIL HFA, VENTOLIN HFA, PROAIR HFA) 90 mcg/actuation inhaler Take 2 Puffs by inhalation every four (4) hours as needed for Wheezing (cough). 1 Inhaler 0    nadolol (CORGARD) 40 mg tablet Take 1 Tab by mouth daily. 30 Tab 5    cholecalciferol (VITAMIN D3) 1,000 unit tablet Take  by mouth daily.  ibuprofen (MOTRIN) 600 mg tablet Take 1 Tab by mouth every eight (8) hours as needed for Pain. 30 Tab 0    FAMOTIDINE (PEPCID PO) Take  by mouth as needed.  OMEGA-3 FATTY ACIDS/FISH OIL (OMEGA 3 FISH OIL PO) Take 300 mg by mouth daily.  OMEPRAZOLE PO Take 40 mg by mouth as needed.  multivitamin (ONE A DAY) tablet Take 1 Tab by mouth daily.  LINZESS 290 mcg cap capsule Take 290 mcg by mouth as needed. 0       Past Medical History:   Diagnosis Date    Anemia     taking iron    Gastroparesis     GERD (gastroesophageal reflux disease)     gastroparesis--h-pylori    H/O Clostridium difficile infection 06/2012    C-Diff    Helicobacter pylori (H. pylori) 05/2012    h pylori    Palpitations     2010  - cardiac workup per pt.         History   Smoking Status    Former Smoker    Types: Cigarettes    Quit date: 11/21/2013   Smokeless Tobacco    Never Used       Social History     Social History    Marital status: SINGLE     Spouse name: N/A    Number of children: N/A    Years of education: N/A     Social History Main Topics    Smoking status: Former Smoker     Types: Cigarettes     Quit date: 11/21/2013    Smokeless tobacco: Never Used    Alcohol use 0.6 oz/week     1 Glasses of wine, 0 Standard drinks or equivalent per week      Comment: rare1    Drug use: No    Sexual activity: Yes     Partners: Male     Birth control/ protection: None     Other Topics Concern    None     Social History Narrative    3 kids (16yo - 7mo), lives with boyfriend (but he travels for work)       Family History   Problem Relation Age of Onset    Heart Disease Mother     Stroke Mother     Cancer Mother     Heart Disease Father     Stroke Father     Heart Disease Maternal Grandmother 45         PE: Visit Vitals    /86 (BP 1 Location: Left arm, BP Patient Position: Sitting)    Pulse (!) 106    Temp 98.2 °F (36.8 °C) (Oral)    Resp 18    Ht 5' 5\" (1.651 m)    Wt 137 lb (62.1 kg)    LMP 03/01/2017    SpO2 99%    BMI 22.8 kg/m2     Gen: alert, oriented, no acute distress  Head: normocephalic, atraumatic  Eyes:  sclera clear, conjunctiva clear  Nose:  Normal turbinates. No nasal drainage. Oral: moist mucus membranes, no oral lesions, no pharyngeal exudate or erythema  Neck:  No LAD  Resp: Normal work of breathing, lungs CTAB, no w/r/r  CV: S1, S2 normal.  No murmurs, rubs, or gallops. Results for orders placed or performed in visit on 03/17/17   AMB POC ANTHONY INFLUENZA A/B TEST   Result Value Ref Range    VALID INTERNAL CONTROL POC Yes     Influenza A Ag POC Negative Negative Pos/Neg    Influenza B Ag POC Negative Negative Pos/Neg       ASSESSMENT:   1. Cough    2. Flu-like symptoms        PLAN:  Symptomatic therapy suggested: push fluids, rest and return office visit prn if symptoms persist or worsen.  Lack of antibiotic effectiveness discussed with her. Call or return to clinic prn if these symptoms worsen or fail to improve as anticipated. Orders Placed This Encounter    AMB POC ANTHONY INFLUENZA A/B TEST    albuterol (PROVENTIL HFA, VENTOLIN HFA, PROAIR HFA) 90 mcg/actuation inhaler     Sig: Take 2 Puffs by inhalation every four (4) hours as needed for Wheezing (cough). Dispense:  1 Inhaler     Refill:  0     Try albuterol prn cough for short term use only. Verbal and written instructions (see AVS) provided.  Patient expresses understanding of diagnosis and treatment plan.     Michelle Mahmood MD

## 2017-03-20 ENCOUNTER — TELEPHONE (OUTPATIENT)
Dept: FAMILY MEDICINE CLINIC | Age: 37
End: 2017-03-20

## 2017-03-20 DIAGNOSIS — J06.9 UPPER RESPIRATORY TRACT INFECTION, UNSPECIFIED TYPE: Primary | ICD-10-CM

## 2017-03-20 RX ORDER — BENZONATATE 100 MG/1
100 CAPSULE ORAL
Qty: 21 CAP | Refills: 0 | Status: SHIPPED | OUTPATIENT
Start: 2017-03-20 | End: 2017-03-27

## 2017-03-20 RX ORDER — AZITHROMYCIN 250 MG/1
TABLET, FILM COATED ORAL
Qty: 6 TAB | Refills: 0 | Status: SHIPPED | OUTPATIENT
Start: 2017-03-20 | End: 2017-03-25

## 2017-03-20 NOTE — TELEPHONE ENCOUNTER
Patient said Dr Nakul Pierre would be able to call something in if she wasn't feeling better.  She can be reached at [phone]

## 2017-04-17 ENCOUNTER — OFFICE VISIT (OUTPATIENT)
Dept: FAMILY MEDICINE CLINIC | Age: 37
End: 2017-04-17

## 2017-04-17 ENCOUNTER — TELEPHONE (OUTPATIENT)
Dept: FAMILY MEDICINE CLINIC | Age: 37
End: 2017-04-17

## 2017-04-17 VITALS
OXYGEN SATURATION: 99 % | RESPIRATION RATE: 17 BRPM | TEMPERATURE: 98.2 F | HEIGHT: 65 IN | HEART RATE: 96 BPM | SYSTOLIC BLOOD PRESSURE: 127 MMHG | WEIGHT: 137 LBS | BODY MASS INDEX: 22.82 KG/M2 | DIASTOLIC BLOOD PRESSURE: 83 MMHG

## 2017-04-17 DIAGNOSIS — J01.10 ACUTE NON-RECURRENT FRONTAL SINUSITIS: Primary | ICD-10-CM

## 2017-04-17 RX ORDER — METOPROLOL TARTRATE 25 MG/1
25 TABLET, FILM COATED ORAL 2 TIMES DAILY
COMMUNITY
Start: 2017-01-20 | End: 2017-04-19

## 2017-04-17 RX ORDER — AMOXICILLIN AND CLAVULANATE POTASSIUM 875; 125 MG/1; MG/1
1 TABLET, FILM COATED ORAL DAILY
Qty: 10 TAB | Refills: 0 | Status: SHIPPED | OUTPATIENT
Start: 2017-04-17 | End: 2017-04-17 | Stop reason: ALTCHOICE

## 2017-04-17 RX ORDER — CEPHALEXIN 500 MG/1
500 CAPSULE ORAL 2 TIMES DAILY
Qty: 20 CAP | Refills: 0 | Status: SHIPPED | OUTPATIENT
Start: 2017-04-17 | End: 2017-04-18 | Stop reason: ALTCHOICE

## 2017-04-17 RX ORDER — AMOXICILLIN AND CLAVULANATE POTASSIUM 875; 125 MG/1; MG/1
1 TABLET, FILM COATED ORAL DAILY
Qty: 10 TAB | Refills: 0 | Status: CANCELLED | OUTPATIENT
Start: 2017-04-17 | End: 2017-04-27

## 2017-04-17 NOTE — PATIENT INSTRUCTIONS
OK to take claritin or zyrtec, but not  claritin D or zyrtec D.    OK to take nasonex or flonase nasal sprays. Sinusitis: Care Instructions  Your Care Instructions    Sinusitis is an infection of the lining of the sinus cavities in your head. Sinusitis often follows a cold. It causes pain and pressure in your head and face. In most cases, sinusitis gets better on its own in 1 to 2 weeks. But some mild symptoms may last for several weeks. Sometimes antibiotics are needed. Follow-up care is a key part of your treatment and safety. Be sure to make and go to all appointments, and call your doctor if you are having problems. It's also a good idea to know your test results and keep a list of the medicines you take. How can you care for yourself at home? · Take an over-the-counter pain medicine, such as acetaminophen (Tylenol), ibuprofen (Advil, Motrin), or naproxen (Aleve). Read and follow all instructions on the label. · If the doctor prescribed antibiotics, take them as directed. Do not stop taking them just because you feel better. You need to take the full course of antibiotics. · Be careful when taking over-the-counter cold or flu medicines and Tylenol at the same time. Many of these medicines have acetaminophen, which is Tylenol. Read the labels to make sure that you are not taking more than the recommended dose. Too much acetaminophen (Tylenol) can be harmful. · Breathe warm, moist air from a steamy shower, a hot bath, or a sink filled with hot water. Avoid cold, dry air. Using a humidifier in your home may help. Follow the directions for cleaning the machine. · Use saline (saltwater) nasal washes to help keep your nasal passages open and wash out mucus and bacteria. You can buy saline nose drops at a grocery store or drugstore. Or you can make your own at home by adding 1 teaspoon of salt and 1 teaspoon of baking soda to 2 cups of distilled water.  If you make your own, fill a bulb syringe with the solution, insert the tip into your nostril, and squeeze gently. Alto Ode your nose. · Put a hot, wet towel or a warm gel pack on your face 3 or 4 times a day for 5 to 10 minutes each time. · Try a decongestant nasal spray like oxymetazoline (Afrin). Do not use it for more than 3 days in a row. Using it for more than 3 days can make your congestion worse. When should you call for help? Call your doctor now or seek immediate medical care if:  · You have new or worse swelling or redness in your face or around your eyes. · You have a new or higher fever. Watch closely for changes in your health, and be sure to contact your doctor if:  · You have new or worse facial pain. · The mucus from your nose becomes thicker (like pus) or has new blood in it. · You are not getting better as expected. Where can you learn more? Go to http://quintin-oh.info/. Enter B673 in the search box to learn more about \"Sinusitis: Care Instructions. \"  Current as of: July 29, 2016  Content Version: 11.2  © 0199-4111 [employer]. Care instructions adapted under license by [employer] (which disclaims liability or warranty for this information). If you have questions about a medical condition or this instruction, always ask your healthcare professional. [address] any warranty or liability for your use of this information.

## 2017-04-17 NOTE — TELEPHONE ENCOUNTER
Patient says that the pills are too big to swallow, even when she breaks them up. She would like something else to be called in to [facility] (formerly [facility])[3].  She can be reached at [phone]

## 2017-04-17 NOTE — PROGRESS NOTES
Subjective:   Antwan Andersno is a 31 y.o. female who complains of congestion and bilateral sinus pain for 3 days, gradually worsening since that time. She denies a history of shortness of breath and wheezing. Tried OTC cold remedies with temporary relief. No evaluation to date. Past Medical History:   Diagnosis Date    Anemia     taking iron    Gastroparesis     GERD (gastroesophageal reflux disease)     gastroparesis--h-pylori    H/O Clostridium difficile infection 06/2012    C-Diff    Helicobacter pylori (H. pylori) 05/2012    h pylori    Palpitations     2010  - cardiac workup per pt. Social History   Substance Use Topics    Smoking status: Former Smoker     Types: Cigarettes     Quit date: 11/21/2013    Smokeless tobacco: Never Used    Alcohol use 0.6 oz/week     1 Glasses of wine, 0 Standard drinks or equivalent per week      Comment: rare1     Outpatient Prescriptions Marked as Taking for the 4/17/17 encounter (Office Visit) with Imelda Najjar, MD   Medication Sig Dispense Refill    metoprolol tartrate (LOPRESSOR) 25 mg tablet Take 25 mg by mouth two (2) times a day.  cholecalciferol (VITAMIN D3) 1,000 unit tablet Take  by mouth daily.  FAMOTIDINE (PEPCID PO) Take  by mouth as needed.  OMEGA-3 FATTY ACIDS/FISH OIL (OMEGA 3 FISH OIL PO) Take 300 mg by mouth daily.  OMEPRAZOLE PO Take 40 mg by mouth as needed.  multivitamin (ONE A DAY) tablet Take 1 Tab by mouth daily.  LINZESS 290 mcg cap capsule Take 290 mcg by mouth as needed. 0     Allergies   Allergen Reactions    Celexa [Citalopram] Nausea and Vomiting    Ciprofloxacin Shortness of Breath    Diflucan [Fluconazole] Rash     Burning sensation to skin    Sulfa (Sulfonamide Antibiotics) Rash        Review of Systems  A comprehensive review of systems was negative except for that written in the HPI.     Objective:     Visit Vitals    /83 (BP 1 Location: Left arm, BP Patient Position: Sitting)    Pulse 96    Temp 98.2 °F (36.8 °C) (Oral)    Resp 17    Ht 5' 5\" (1.651 m)    Wt 137 lb (62.1 kg)    SpO2 99%    BMI 22.8 kg/m2     General:   alert, cooperative and no distress   Eyes: conjunctivae/scleras clear. PERRL, EOM's intact   Ears: External auditory canals clear, tympanic membranes clear   Sinuses/Nose: Bilateral maxillary or frontal tenderness. purulent rhinorrhea present. Mouth:  No oral lesions, mild pharyngeal erythema, no exudates   Neck: Supple, trachea midline   Heart: S1 and S2 normal,no murmurs noted    Lungs: Clear to auscultation bilaterally, no increased work of breathing   Abdomen: Soft, nontender. Normal bowel sounds   Extremities: No edema or cyanosis              Assessment/Plan:   1. Acute non-recurrent frontal sinusitis        Orders Placed This Encounter    metoprolol tartrate (LOPRESSOR) 25 mg tablet     Sig: Take 25 mg by mouth two (2) times a day.  amoxicillin-clavulanate (AUGMENTIN) 875-125 mg per tablet     Sig: Take 1 Tab by mouth daily for 10 days. Dispense:  10 Tab     Refill:  0       Verbal and written instructions (see AVS) provided. Patient expresses understanding of diagnosis and treatment plan.

## 2017-04-17 NOTE — PROGRESS NOTES
Chief Complaint   Patient presents with    Sinus Infection     possible     Health Maintenance reviewed

## 2017-04-17 NOTE — MR AVS SNAPSHOT
Visit Information Date & Time Provider Department Dept. Phone Encounter #  
 4/17/2017 10:45 AM Iesha Senior, [address] [facility] [phone] [id number] Your Appointments 5/11/2017  4:20 PM  
ESTABLISHED PATIENT with Kemar Cooper MD  
[facility] ([facility] SCHEDUL[facility]) Appt Note: 3 months f/u; 3 months f/u pt rs'd 4/3 to 5/11 km[address] [place] [address]  
[address] [facility] [address]  
  
    
 6/22/2017 11:20 AM  
ESTABLISHED PATIENT with Donna Gross MD  
[facility] Appt Note: 3mo fu  
 [address] [facility] [address] [place] [address]  
[id number] Upcoming Health Maintenance Date Due  
 PAP AKA CERVICAL CYTOLOGY 10/22/2018 DTaP/Tdap/Td series (2 - Td) 11/11/2024 Allergies as of 4/17/2017  Review Complete On: 4/17/2017 By: Iesha Senior MD  
  
 Severity Noted Reaction Type Reactions Celexa [Citalopram]  12/10/2015    Nausea and Vomiting Ciprofloxacin  11/21/2012    Shortness of Breath Diflucan [Fluconazole]  05/17/2016    Rash Burning sensation to skin  
 Sulfa (Sulfonamide Antibiotics)  03/25/2010    Rash Current Immunizations  Reviewed on 9/30/2015 Name Date Influenza Vaccine 10/16/2013 Influenza Vaccine Stephanie Edison) 11/11/2014 Influenza Vaccine (Quad) PF 9/30/2015 TB Skin Test (PPD) Intradermal 1/27/2014 Tdap 11/11/2014 Not reviewed this visit You Were Diagnosed With   
  
 Codes Comments Acute non-recurrent frontal sinusitis    -  Primary ICD-10-CM: J01.10 ICD-9-CM: 397.7 Vitals BP Pulse Temp Resp Height(growth percentile) Weight(growth percentile) 127/83 (BP 1 Location: Left arm, BP Patient Position: Sitting) 96 98.2 °F (36.8 °C) (Oral) 17 5' 5\" (1.651 m) 137 lb (62.1 kg) SpO2 BMI OB Status Smoking Status 99% 22.8 kg/m2 Having regular periods Former Smoker BMI and BSA Data Body Mass Index Body Surface Area  
 22.8 kg/m 2 1.69 m 2 Preferred Pharmacy Pharmacy Name Phone [facility] [phone] Your Updated Medication List  
  
   
This list is accurate as of: 4/17/17 11:50 AM.  Always use your most recent med list.  
  
  
  
  
 amoxicillin-clavulanate 875-125 mg per tablet Commonly known as:  AUGMENTIN Take 1 Tab by mouth daily for 10 days. LINZESS 290 mcg Cap capsule Generic drug:  linaclotide Take 290 mcg by mouth as needed. metoprolol tartrate 25 mg tablet Commonly known as:  LOPRESSOR Take 25 mg by mouth two (2) times a day. multivitamin tablet Commonly known as:  ONE A DAY Take 1 Tab by mouth daily. OMEGA 3 FISH OIL PO Take 300 mg by mouth daily. OMEPRAZOLE PO Take 40 mg by mouth as needed. PEPCID PO Take  by mouth as needed. VITAMIN D3 1,000 unit tablet Generic drug:  cholecalciferol Take  by mouth daily. Prescriptions Sent to Pharmacy Refills  
 amoxicillin-clavulanate (AUGMENTIN) 875-125 mg per tablet 0 Sig: Take 1 Tab by mouth daily for 10 days. Class: Normal  
 Pharmacy: [address] Ph #: [phone] Route: Oral  
  
Patient Instructions OK to take claritin or zyrtec, but not  claritin D or zyrtec D.   
OK to take nasonex or flonase nasal sprays. Sinusitis: Care Instructions Your Care Instructions Sinusitis is an infection of the lining of the sinus cavities in your head. Sinusitis often follows a cold. It causes pain and pressure in your head and face. In most cases, sinusitis gets better on its own in 1 to 2 weeks. But some mild symptoms may last for several weeks.  Sometimes antibiotics are needed. Follow-up care is a key part of your treatment and safety. Be sure to make and go to all appointments, and call your doctor if you are having problems. It's also a good idea to know your test results and keep a list of the medicines you take. How can you care for yourself at home? · Take an over-the-counter pain medicine, such as acetaminophen (Tylenol), ibuprofen (Advil, Motrin), or naproxen (Aleve). Read and follow all instructions on the label. · If the doctor prescribed antibiotics, take them as directed. Do not stop taking them just because you feel better. You need to take the full course of antibiotics. · Be careful when taking over-the-counter cold or flu medicines and Tylenol at the same time. Many of these medicines have acetaminophen, which is Tylenol. Read the labels to make sure that you are not taking more than the recommended dose. Too much acetaminophen (Tylenol) can be harmful. · Breathe warm, moist air from a steamy shower, a hot bath, or a sink filled with hot water. Avoid cold, dry air. Using a humidifier in your home may help. Follow the directions for cleaning the machine. · Use saline (saltwater) nasal washes to help keep your nasal passages open and wash out mucus and bacteria. You can buy saline nose drops at a grocery store or drugstore. Or you can make your own at home by adding 1 teaspoon of salt and 1 teaspoon of baking soda to 2 cups of distilled water. If you make your own, fill a bulb syringe with the solution, insert the tip into your nostril, and squeeze gently. Ethelda Renetta your nose. · Put a hot, wet towel or a warm gel pack on your face 3 or 4 times a day for 5 to 10 minutes each time. · Try a decongestant nasal spray like oxymetazoline (Afrin). Do not use it for more than 3 days in a row. Using it for more than 3 days can make your congestion worse. When should you call for help? Call your doctor now or seek immediate medical care if: · You have new or worse swelling or redness in your face or around your eyes. · You have a new or higher fever. Watch closely for changes in your health, and be sure to contact your doctor if: 
· You have new or worse facial pain. · The mucus from your nose becomes thicker (like pus) or has new blood in it. · You are not getting better as expected. Where can you learn more? Go to http://quintin-oh.info/. Enter L779 in the search box to learn more about \"Sinusitis: Care Instructions. \" Current as of: July 29, 2016 Content Version: 11.2 © 0934-8236 [employer]topia. Care instructions adapted under license by [employer] (which disclaims liability or warranty for this information). If you have questions about a medical condition or this instruction, always ask your healthcare professional. [address] any warranty or liability for your use of this information. Introducing [facility]! Dear Ruddy Baca: Thank you for requesting a allyDVM account. Our records indicate that you already have an active allyDVM account. You can access your account anytime at [email or web address] Did you know that you can access your hospital and ER discharge instructions at any time in allyDVM? You can also review all of your test results from your hospital stay or ER visit. Additional Information If you have questions, please visit the Frequently Asked Questions section of the allyDVM website at [email or web address] Remember, allyDVM is NOT to be used for urgent needs. For medical emergencies, dial 911. Now available from your iPhone and Android! Please provide this summary of care documentation to your next provider. Your primary care clinician is listed as Rivka Blizzard. If you have any questions after today's visit, please call [phone].

## 2017-04-18 ENCOUNTER — TELEPHONE (OUTPATIENT)
Dept: FAMILY MEDICINE CLINIC | Age: 37
End: 2017-04-18

## 2017-04-18 RX ORDER — AMOXICILLIN 500 MG/1
500 CAPSULE ORAL 3 TIMES DAILY
Qty: 21 CAP | Refills: 0 | Status: SHIPPED | OUTPATIENT
Start: 2017-04-18 | End: 2017-09-01 | Stop reason: ALTCHOICE

## 2017-04-18 NOTE — TELEPHONE ENCOUNTER
Pt states that she knows that the keflex is at the pharmacy. She has not picked it up because she says that she would rather just have Amoxicillin 500 mg because she knows that she can tolerate it and it doesn't upset her stomach.

## 2017-04-18 NOTE — TELEPHONE ENCOUNTER
She said her stomach can't tolerate the Augmentin with her stomach issues.  She said she does well with amoxicillin

## 2017-04-19 ENCOUNTER — TELEPHONE (OUTPATIENT)
Dept: CARDIOLOGY CLINIC | Age: 37
End: 2017-04-19

## 2017-04-19 RX ORDER — NADOLOL 40 MG/1
40 TABLET ORAL DAILY
COMMUNITY
End: 2017-04-19 | Stop reason: SDUPTHER

## 2017-04-19 RX ORDER — NADOLOL 40 MG/1
TABLET ORAL
Qty: 45 TAB | Refills: 0 | Status: SHIPPED | OUTPATIENT
Start: 2017-04-19 | End: 2017-09-01 | Stop reason: ALTCHOICE

## 2017-04-19 NOTE — TELEPHONE ENCOUNTER
Verified patient with two types of identifiers. Notified of change in medication and told to monitor BP. Changes made to med list and new script sent into pharmacy. Patient verbalizes understanding. And will call with any questions or concerns.

## 2017-04-19 NOTE — TELEPHONE ENCOUNTER
The patient requested a call back from a nurse regarding palp's. She can be reached at [phone]. Thanks!

## 2017-04-19 NOTE — TELEPHONE ENCOUNTER
Verified patient with two types of identifiers. C/o feeling her heart fluttering that takes her breath away. Advised her that Dr Jose Arnold wanted to increase the nadolol if she was still having these issues and she states that she is ok with this.

## 2017-04-19 NOTE — TELEPHONE ENCOUNTER
She can increase the dose to 60 mg daily   Monitor BP for a couple days, if able to tolerate this dose and she is still having palpitations she may increase the dose to 80 mg.

## 2017-06-22 ENCOUNTER — OFFICE VISIT (OUTPATIENT)
Dept: CARDIOLOGY CLINIC | Age: 37
End: 2017-06-22

## 2017-06-22 DIAGNOSIS — G90.A POTS (POSTURAL ORTHOSTATIC TACHYCARDIA SYNDROME): Primary | ICD-10-CM

## 2017-08-24 ENCOUNTER — LAB ONLY (OUTPATIENT)
Dept: FAMILY MEDICINE CLINIC | Age: 37
End: 2017-08-24

## 2017-08-24 DIAGNOSIS — R00.2 PALPITATIONS: ICD-10-CM

## 2017-08-24 DIAGNOSIS — G90.A POTS (POSTURAL ORTHOSTATIC TACHYCARDIA SYNDROME): Primary | ICD-10-CM

## 2017-08-25 LAB
ALBUMIN SERPL-MCNC: 4.5 G/DL (ref 3.5–5.5)
ALBUMIN/GLOB SERPL: 2 {RATIO} (ref 1.2–2.2)
ALP SERPL-CCNC: 39 IU/L (ref 39–117)
ALT SERPL-CCNC: 15 IU/L (ref 0–32)
AST SERPL-CCNC: 17 IU/L (ref 0–40)
BASOPHILS # BLD AUTO: 0 X10E3/UL (ref 0–0.2)
BASOPHILS NFR BLD AUTO: 1 %
BILIRUB SERPL-MCNC: 0.3 MG/DL (ref 0–1.2)
BUN SERPL-MCNC: 12 MG/DL (ref 6–20)
BUN/CREAT SERPL: 22 (ref 9–23)
CALCIUM SERPL-MCNC: 9.6 MG/DL (ref 8.7–10.2)
CHLORIDE SERPL-SCNC: 98 MMOL/L (ref 96–106)
CHOLEST SERPL-MCNC: 241 MG/DL (ref 100–199)
CO2 SERPL-SCNC: 25 MMOL/L (ref 18–29)
CREAT SERPL-MCNC: 0.54 MG/DL (ref 0.57–1)
EOSINOPHIL # BLD AUTO: 0.1 X10E3/UL (ref 0–0.4)
EOSINOPHIL NFR BLD AUTO: 1 %
ERYTHROCYTE [DISTWIDTH] IN BLOOD BY AUTOMATED COUNT: 13.2 % (ref 12.3–15.4)
GLOBULIN SER CALC-MCNC: 2.3 G/DL (ref 1.5–4.5)
GLUCOSE SERPL-MCNC: 84 MG/DL (ref 65–99)
HCT VFR BLD AUTO: 39 % (ref 34–46.6)
HDLC SERPL-MCNC: 74 MG/DL
HGB BLD-MCNC: 12.9 G/DL (ref 11.1–15.9)
IMM GRANULOCYTES # BLD: 0 X10E3/UL (ref 0–0.1)
IMM GRANULOCYTES NFR BLD: 0 %
INTERPRETATION, 910389: NORMAL
LDLC SERPL CALC-MCNC: 153 MG/DL (ref 0–99)
LYMPHOCYTES # BLD AUTO: 1.8 X10E3/UL (ref 0.7–3.1)
LYMPHOCYTES NFR BLD AUTO: 42 %
MCH RBC QN AUTO: 32.3 PG (ref 26.6–33)
MCHC RBC AUTO-ENTMCNC: 33.1 G/DL (ref 31.5–35.7)
MCV RBC AUTO: 98 FL (ref 79–97)
MONOCYTES # BLD AUTO: 0.4 X10E3/UL (ref 0.1–0.9)
MONOCYTES NFR BLD AUTO: 9 %
NEUTROPHILS # BLD AUTO: 2 X10E3/UL (ref 1.4–7)
NEUTROPHILS NFR BLD AUTO: 47 %
PLATELET # BLD AUTO: 285 X10E3/UL (ref 150–379)
POTASSIUM SERPL-SCNC: 3.7 MMOL/L (ref 3.5–5.2)
PROT SERPL-MCNC: 6.8 G/DL (ref 6–8.5)
RBC # BLD AUTO: 3.99 X10E6/UL (ref 3.77–5.28)
SODIUM SERPL-SCNC: 139 MMOL/L (ref 134–144)
TRIGL SERPL-MCNC: 69 MG/DL (ref 0–149)
TSH SERPL DL<=0.005 MIU/L-ACNC: 1.38 UIU/ML (ref 0.45–4.5)
VLDLC SERPL CALC-MCNC: 14 MG/DL (ref 5–40)
WBC # BLD AUTO: 4.3 X10E3/UL (ref 3.4–10.8)

## 2017-09-01 ENCOUNTER — OFFICE VISIT (OUTPATIENT)
Dept: FAMILY MEDICINE CLINIC | Age: 37
End: 2017-09-01

## 2017-09-01 VITALS
RESPIRATION RATE: 18 BRPM | WEIGHT: 142 LBS | TEMPERATURE: 98.5 F | SYSTOLIC BLOOD PRESSURE: 124 MMHG | BODY MASS INDEX: 23.66 KG/M2 | HEIGHT: 65 IN | OXYGEN SATURATION: 100 % | HEART RATE: 95 BPM | DIASTOLIC BLOOD PRESSURE: 82 MMHG

## 2017-09-01 DIAGNOSIS — K59.09 CHRONIC CONSTIPATION: ICD-10-CM

## 2017-09-01 DIAGNOSIS — J30.2 SEASONAL ALLERGIC RHINITIS, UNSPECIFIED ALLERGIC RHINITIS TRIGGER: ICD-10-CM

## 2017-09-01 DIAGNOSIS — Z00.00 GENERAL MEDICAL EXAM: Primary | ICD-10-CM

## 2017-09-01 DIAGNOSIS — K21.9 GASTROESOPHAGEAL REFLUX DISEASE, ESOPHAGITIS PRESENCE NOT SPECIFIED: ICD-10-CM

## 2017-09-01 DIAGNOSIS — G90.A POTS (POSTURAL ORTHOSTATIC TACHYCARDIA SYNDROME): ICD-10-CM

## 2017-09-01 PROBLEM — R94.09 ABNORMAL TILT TABLE TEST: Status: RESOLVED | Noted: 2017-01-20 | Resolved: 2017-09-01

## 2017-09-01 RX ORDER — FLUTICASONE PROPIONATE 50 MCG
SPRAY, SUSPENSION (ML) NASAL
Qty: 1 BOTTLE | Refills: 5 | Status: SHIPPED | OUTPATIENT
Start: 2017-09-01 | End: 2017-10-03

## 2017-09-01 RX ORDER — NADOLOL 40 MG/1
TABLET ORAL DAILY
COMMUNITY
End: 2017-10-03 | Stop reason: SDUPTHER

## 2017-09-01 NOTE — MR AVS SNAPSHOT
Visit Information Date & Time Provider Department Dept. Phone Encounter #  
 9/1/2017  9:30 AM [phone] [id number] Your Appointments 10/3/2017  4:00 PM  
ESTABLISHED PATIENT with Rafaela Cavazos MD  
[facility] ([facility]) Appt Note: 3mo fu; 3mo fu pt rs'd 6/22 to 8/17 kmr; 3mo fu pt r/s from 8/17  
 [address] [facility] [phone] Upcoming Health Maintenance Date Due INFLUENZA AGE 9 TO ADULT 8/1/2017 PAP AKA CERVICAL CYTOLOGY 10/22/2018 DTaP/Tdap/Td series (2 - Td) 11/11/2024 Allergies as of 9/1/2017  Review Complete On: 9/1/2017 By: Tere Mendosa MD  
  
 Severity Noted Reaction Type Reactions Celexa [Citalopram]  12/10/2015    Nausea and Vomiting Ciprofloxacin  11/21/2012    Shortness of Breath Diflucan [Fluconazole]  05/17/2016    Rash Burning sensation to skin  
 Sulfa (Sulfonamide Antibiotics)  03/25/2010    Rash Current Immunizations  Reviewed on 9/30/2015 Name Date Influenza Vaccine 10/16/2013 Influenza Vaccine Joceline Joaquin) 11/11/2014 Influenza Vaccine (Quad) PF 9/30/2015 TB Skin Test (PPD) Intradermal 1/27/2014 Tdap 11/11/2014 Not reviewed this visit You Were Diagnosed With   
  
 Codes Comments General medical exam    -  Primary ICD-10-CM: Z00.00 ICD-9-CM: V70.9 POTS (postural orthostatic tachycardia syndrome)     ICD-10-CM: R00.0, I95.1 ICD-9-CM: 427.89 Gastroesophageal reflux disease, esophagitis presence not specified     ICD-10-CM: K21.9 ICD-9-CM: 530.81 Chronic constipation     ICD-10-CM: K59.09 
ICD-9-CM: 564.00 Seasonal allergic rhinitis, unspecified allergic rhinitis trigger     ICD-10-CM: J30.2 ICD-9-CM: 477.9 Vitals BP Pulse Temp Resp Height(growth percentile) Weight(growth percentile) 124/82 (BP 1 Location: Left arm, BP Patient Position: Sitting) 95 98.5 °F (36.9 °C) (Oral) 18 5' 5\" (1.651 m) 142 lb (64.4 kg) SpO2 BMI OB Status Smoking Status 100% 23.63 kg/m2 Having regular periods Former Smoker BMI and BSA Data Body Mass Index Body Surface Area  
 23.63 kg/m 2 1.72 m 2 Preferred Pharmacy Pharmacy Name Phone [facility] [address] [place] [phone] Your Updated Medication List  
  
   
This list is accurate as of: 17 10:19 AM.  Always use your most recent med list.  
  
  
  
  
 fluticasone 50 mcg/actuation nasal spray Commonly known as:  FLONASE  
1 spray each nostril LINZESS 290 mcg Cap capsule Generic drug:  linaclotide Take 290 mcg by mouth as needed. multivitamin tablet Commonly known as:  ONE A DAY Take 1 Tab by mouth daily. nadolol 40 mg tablet Commonly known as:  CORGARD Take  by mouth daily. OMEGA 3 FISH OIL PO Take 300 mg by mouth daily. OMEPRAZOLE PO Take 40 mg by mouth as needed. PEPCID PO Take  by mouth as needed. VITAMIN D3 1,000 unit tablet Generic drug:  cholecalciferol Take  by mouth daily. Prescriptions Sent to Pharmacy Refills  
 fluticasone (FLONASE) 50 mcg/actuation nasal spray 5 Si spray each nostril Class: Normal  
 Pharmacy: [facility] [address] Ph #: [phone] Patient Instructions Results for orders placed or performed in visit on 17 METABOLIC PANEL, COMPREHENSIVE Result Value Ref Range Glucose 84 65 - 99 mg/dL BUN 12 6 - 20 mg/dL Creatinine 0.54 (L) 0.57 - 1.00 mg/dL GFR est non- >59 mL/min/1.73 GFR est  >59 mL/min/1.73  
 BUN/Creatinine ratio 22 9 - 23 Sodium 139 134 - 144 mmol/L Potassium 3.7 3.5 - 5.2 mmol/L  Chloride 98 96 - 106 mmol/L  
 CO2 25 18 - 29 mmol/L  
 Calcium 9.6 8.7 - 10.2 mg/dL Protein, total 6.8 6.0 - 8.5 g/dL Albumin 4.5 3.5 - 5.5 g/dL GLOBULIN, TOTAL 2.3 1.5 - 4.5 g/dL A-G Ratio 2.0 1.2 - 2.2 Bilirubin, total 0.3 0.0 - 1.2 mg/dL Alk. phosphatase 39 39 - 117 IU/L  
 AST (SGOT) 17 0 - 40 IU/L  
 ALT (SGPT) 15 0 - 32 IU/L  
TSH 3RD GENERATION Result Value Ref Range TSH 1.380 0.450 - 4.500 uIU/mL CBC WITH AUTOMATED DIFF Result Value Ref Range WBC 4.3 3.4 - 10.8 x10E3/uL  
 RBC 3.99 3.77 - 5.28 x10E6/uL HGB 12.9 11.1 - 15.9 g/dL HCT 39.0 34.0 - 46.6 % MCV 98 (H) 79 - 97 fL  
 MCH 32.3 26.6 - 33.0 pg  
 MCHC 33.1 31.5 - 35.7 g/dL  
 RDW 13.2 12.3 - 15.4 % PLATELET 190 635 - 471 x10E3/uL NEUTROPHILS 47 % Lymphocytes 42 % MONOCYTES 9 % EOSINOPHILS 1 % BASOPHILS 1 %  
 ABS. NEUTROPHILS 2.0 1.4 - 7.0 x10E3/uL Abs Lymphocytes 1.8 0.7 - 3.1 x10E3/uL  
 ABS. MONOCYTES 0.4 0.1 - 0.9 x10E3/uL  
 ABS. EOSINOPHILS 0.1 0.0 - 0.4 x10E3/uL  
 ABS. BASOPHILS 0.0 0.0 - 0.2 x10E3/uL IMMATURE GRANULOCYTES 0 %  
 ABS. IMM. GRANS. 0.0 0.0 - 0.1 x10E3/uL LIPID PANEL Result Value Ref Range Cholesterol, total 241 (H) 100 - 199 mg/dL Triglyceride 69 0 - 149 mg/dL HDL Cholesterol 74 >39 mg/dL VLDL, calculated 14 5 - 40 mg/dL LDL, calculated 153 (H) 0 - 99 mg/dL CVD REPORT Result Value Ref Range INTERPRETATION Note Well Visit, Ages 25 to 48: Care Instructions Your Care Instructions Physical exams can help you stay healthy. Your doctor has checked your overall health and may have suggested ways to take good care of yourself. He or she also may have recommended tests. At home, you can help prevent illness with healthy eating, regular exercise, and other steps. Follow-up care is a key part of your treatment and safety. Be sure to make and go to all appointments, and call your doctor if you are having problems.  It's also a good idea to know your test results and keep a list of the medicines you take. How can you care for yourself at home? · Reach and stay at a healthy weight. This will lower your risk for many problems, such as obesity, diabetes, heart disease, and high blood pressure. · Get at least 30 minutes of physical activity on most days of the week. Walking is a good choice. You also may want to do other activities, such as running, swimming, cycling, or playing tennis or team sports. Discuss any changes in your exercise program with your doctor. · Do not smoke or allow others to smoke around you. If you need help quitting, talk to your doctor about stop-smoking programs and medicines. These can increase your chances of quitting for good. · Talk to your doctor about whether you have any risk factors for sexually transmitted infections (STIs). Having one sex partner (who does not have STIs and does not have sex with anyone else) is a good way to avoid these infections. · Use birth control if you do not want to have children at this time. Talk with your doctor about the choices available and what might be best for you. · Protect your skin from too much sun. When you're outdoors from 10 a.m. to 4 p.m., stay in the shade or cover up with clothing and a hat with a wide brim. Wear sunglasses that block UV rays. Even when it's cloudy, put broad-spectrum sunscreen (SPF 30 or higher) on any exposed skin. · See a dentist one or two times a year for checkups and to have your teeth cleaned. · Wear a seat belt in the car. · Drink alcohol in moderation, if at all. That means no more than 2 drinks a day for men and 1 drink a day for women. Follow your doctor's advice about when to have certain tests. These tests can spot problems early. For everyone · Cholesterol. Have the fat (cholesterol) in your blood tested after age 21. Your doctor will tell you how often to have this done based on your age, family history, or other things that can increase your risk for heart disease. · Blood pressure. Have your blood pressure checked during a routine doctor visit. Your doctor will tell you how often to check your blood pressure based on your age, your blood pressure results, and other factors. · Vision. Talk with your doctor about how often to have a glaucoma test. 
· Diabetes. Ask your doctor whether you should have tests for diabetes. · Colon cancer. Have a test for colon cancer at age 48. You may have one of several tests. If you are younger than 48, you may need a test earlier if you have any risk factors. Risk factors include whether you already had a precancerous polyp removed from your colon or whether your parent, brother, sister, or child has had colon cancer. For women · Breast exam and mammogram. Talk to your doctor about when you should have a clinical breast exam and a mammogram. Medical experts differ on whether and how often women under 50 should have these tests. Your doctor can help you decide what is right for you. · Pap test and pelvic exam. Begin Pap tests at age 24. A Pap test is the best way to find cervical cancer. The test often is part of a pelvic exam. Ask how often to have this test. 
· Tests for sexually transmitted infections (STIs). Ask whether you should have tests for STIs. You may be at risk if you have sex with more than one person, especially if your partners do not wear condoms. For men · Tests for sexually transmitted infections (STIs). Ask whether you should have tests for STIs. You may be at risk if you have sex with more than one person, especially if you do not wear a condom. · Testicular cancer exam. Ask your doctor whether you should check your testicles regularly. · Prostate exam. Talk to your doctor about whether you should have a blood test (called a PSA test) for prostate cancer.  Experts differ on whether and when men should have this test. Some experts suggest it if you are older than 39 and are -American or have a father or brother who got prostate cancer when he was younger than 72. When should you call for help? Watch closely for changes in your health, and be sure to contact your doctor if you have any problems or symptoms that concern you. Where can you learn more? Go to http://quintin-oh.info/. Enter P072 in the search box to learn more about \"Well Visit, Ages 25 to 48: Care Instructions. \" Current as of: July 19, 2016 Content Version: 11.3 © 6814-1644 [employer]. Care instructions adapted under license by [employer] (which disclaims liability or warranty for this information). If you have questions about a medical condition or this instruction, always ask your healthcare professional. [address] any warranty or liability for your use of this information. Introducing [facility]! Dear Madeline Bauman: Thank you for requesting a Visedo account. Our records indicate that you already have an active Visedo account. You can access your account anytime at [email or web address] Did you know that you can access your hospital and ER discharge instructions at any time in Visedo? You can also review all of your test results from your hospital stay or ER visit. Additional Information If you have questions, please visit the Frequently Asked Questions section of the [employer] website at [email or web address] Remember, Visedo is NOT to be used for urgent needs. For medical emergencies, dial 911. Now available from your iPhone and Android! Please provide this summary of care documentation to your next provider. Your primary care clinician is listed as Seymour Hannah. If you have any questions after today's visit, please call [phone].

## 2017-09-01 NOTE — PROGRESS NOTES
30 y.o. female presents for a physical.    Since last visit diagnosed with POTS. Also cut right foot a couple of weeks ago and got cellulitis which was treated with keflex at ER. Patient Active Problem List    Diagnosis    Chronic constipation     Prn linzess works well.  GERD (gastroesophageal reflux disease)     Daily pepcid, prn prilosec.  POTS (postural orthostatic tachycardia syndrome)     Dx Tilt table test 1/20/17, intermittent tachycardia and hypotension. Trying to work on salt and water intake. Seeing Dr Sly George and Dr. Eli Mccullough (neuro [facility])      Abdominal adhesions         Past Medical History:   Diagnosis Date    Anemia     taking iron    Gastroparesis     GERD (gastroesophageal reflux disease)     gastroparesis--h-pylori    H/O Clostridium difficile infection 06/2012    C-Diff    Helicobacter pylori (H. pylori) 05/2012    h pylori    Palpitations     2010  - cardiac workup per pt.         Past Surgical History:   Procedure Laterality Date    HX GI      EGD and colonoscopy    HX GYN  5/12    D&C    HX GYN      tubal reversal    HX OTHER SURGICAL      D&C x 2,  lap    TILT TABLE EVAL W/WO DRUG  1/21/2017            Family History   Problem Relation Age of Onset    Heart Disease Mother     Stroke Mother     Cancer Mother     Heart Disease Father     Stroke Father     Heart Disease Maternal Grandmother 45       Social History   Substance Use Topics    Smoking status: Former Smoker     Types: Cigarettes     Quit date: 11/21/2013    Smokeless tobacco: Never Used    Alcohol use 0.6 oz/week     1 Glasses of wine, 0 Standard drinks or equivalent per week      Comment: rare1       Social History     Social History Narrative    3 kids (16yo - 7mo), lives with boyfriend (but he travels for work)       Health Maintenance Due   Topic Date Due    INFLUENZA AGE 9 TO ADULT  08/01/2017       Outpatient Prescriptions Marked as Taking for the 9/1/17 encounter (Office Visit) with Kandice Patel Katarina Rich MD   Medication Sig Dispense Refill    nadolol (CORGARD) 40 mg tablet Take  by mouth daily.  cholecalciferol (VITAMIN D3) 1,000 unit tablet Take  by mouth daily.  FAMOTIDINE (PEPCID PO) Take  by mouth as needed.  OMEGA-3 FATTY ACIDS/FISH OIL (OMEGA 3 FISH OIL PO) Take 300 mg by mouth daily.  OMEPRAZOLE PO Take 40 mg by mouth as needed.  multivitamin (ONE A DAY) tablet Take 1 Tab by mouth daily.  LINZESS 290 mcg cap capsule Take 290 mcg by mouth as needed.   0       Allergies   Allergen Reactions    Celexa [Citalopram] Nausea and Vomiting    Ciprofloxacin Shortness of Breath    Diflucan [Fluconazole] Rash     Burning sensation to skin    Sulfa (Sulfonamide Antibiotics) Rash       Review of Systems:  Gen: no fatigue, fever, chills, weight loss or weight gain  Eyes: no excessive tearing, itching, or discharge  Nose: no rhinorrhea, no sinus pain  Mouth: no oral lesions, no sore throat  Resp: no shortness of breath, no wheezing, no cough  CV: no chest pain, no orthopnea, no paroxysmal nocturnal dyspnea, no lower extremity edema, no palpitations  Abd: no nausea, no heartburn, no diarrhea, no constipation, no abdominal pain  Neuro: no headaches, no syncope or presyncopal episodes  Endo: no polyuria, no polydipsia  Heme: no lymphadenopathy, no easy bruising or bleeding    Visit Vitals    /82 (BP 1 Location: Left arm, BP Patient Position: Sitting)    Pulse 95    Temp 98.5 °F (36.9 °C) (Oral)    Resp 18    Ht 5' 5\" (1.651 m)    Wt 142 lb (64.4 kg)    SpO2 100%    BMI 23.63 kg/m2     Gen: alert, oriented, no acute distress  Ears: external auditory canals clear, TMs without erythema or effusion  Eyes: pupils equal round reactive to light, sclera clear, conjunctiva clear  Oral: moist mucus membranes, no oral lesions, no pharyngeal inflammation or exudate  Neck: thyroid symmetric and not enlarged, no carotid bruits, no jugular vein distention  Resp: no increase work of breathing, lungs clear to ausculation bilaterally, no wheezing, rales or rhonchi  CV: S1, S2 normal. No murmurs, rubs, or gallops. Abd: soft, not tender, not distended. No hepatosplenomegaly. Normal bowel sounds. No hernias. Neuro: cranial nerves intact, normal strength and movement in all extremities, reflexes and sensation intact and symmetric. Skin: no lesion or rash  Extremities: no cyanosis or edema    Results for orders placed or performed in visit on 34/55/47   METABOLIC PANEL, COMPREHENSIVE   Result Value Ref Range    Glucose 84 65 - 99 mg/dL    BUN 12 6 - 20 mg/dL    Creatinine 0.54 (L) 0.57 - 1.00 mg/dL    GFR est non- >59 mL/min/1.73    GFR est  >59 mL/min/1.73    BUN/Creatinine ratio 22 9 - 23    Sodium 139 134 - 144 mmol/L    Potassium 3.7 3.5 - 5.2 mmol/L    Chloride 98 96 - 106 mmol/L    CO2 25 18 - 29 mmol/L    Calcium 9.6 8.7 - 10.2 mg/dL    Protein, total 6.8 6.0 - 8.5 g/dL    Albumin 4.5 3.5 - 5.5 g/dL    GLOBULIN, TOTAL 2.3 1.5 - 4.5 g/dL    A-G Ratio 2.0 1.2 - 2.2    Bilirubin, total 0.3 0.0 - 1.2 mg/dL    Alk. phosphatase 39 39 - 117 IU/L    AST (SGOT) 17 0 - 40 IU/L    ALT (SGPT) 15 0 - 32 IU/L   TSH 3RD GENERATION   Result Value Ref Range    TSH 1.380 0.450 - 4.500 uIU/mL   CBC WITH AUTOMATED DIFF   Result Value Ref Range    WBC 4.3 3.4 - 10.8 x10E3/uL    RBC 3.99 3.77 - 5.28 x10E6/uL    HGB 12.9 11.1 - 15.9 g/dL    HCT 39.0 34.0 - 46.6 %    MCV 98 (H) 79 - 97 fL    MCH 32.3 26.6 - 33.0 pg    MCHC 33.1 31.5 - 35.7 g/dL    RDW 13.2 12.3 - 15.4 %    PLATELET 369 468 - 071 x10E3/uL    NEUTROPHILS 47 %    Lymphocytes 42 %    MONOCYTES 9 %    EOSINOPHILS 1 %    BASOPHILS 1 %    ABS. NEUTROPHILS 2.0 1.4 - 7.0 x10E3/uL    Abs Lymphocytes 1.8 0.7 - 3.1 x10E3/uL    ABS. MONOCYTES 0.4 0.1 - 0.9 x10E3/uL    ABS. EOSINOPHILS 0.1 0.0 - 0.4 x10E3/uL    ABS. BASOPHILS 0.0 0.0 - 0.2 x10E3/uL    IMMATURE GRANULOCYTES 0 %    ABS. IMM.  GRANS. 0.0 0.0 - 0.1 x10E3/uL   LIPID PANEL   Result Value Ref Range    Cholesterol, total 241 (H) 100 - 199 mg/dL    Triglyceride 69 0 - 149 mg/dL    HDL Cholesterol 74 >39 mg/dL    VLDL, calculated 14 5 - 40 mg/dL    LDL, calculated 153 (H) 0 - 99 mg/dL   CVD REPORT   Result Value Ref Range    INTERPRETATION Note          Assessment/Plan:    1. General medical exam  Age appropriate health maintenance measures discussed with patient and ordered. 2. POTS (postural orthostatic tachycardia syndrome)  Continue follow-up with specialist at [address] [place] [address]. 3. Gastroesophageal reflux disease, esophagitis presence not specified  Continue medications as needed    4. Chronic constipation  Continue as needed treatment    5. Seasonal allergic rhinitis, unspecified allergic rhinitis trigger  Advised to use topical therapy like Flonase and saline washes to avoid side effects of antihistamines. Health Maintenance reviewed - updated    Orders Placed This Encounter    nadolol (CORGARD) 40 mg tablet     Sig: Take  by mouth daily. Medications Discontinued During This Encounter   Medication Reason    amoxicillin (AMOXIL) 500 mg capsule Therapy Completed    nadolol (CORGARD) 40 mg tablet Therapy Completed       Current Outpatient Prescriptions   Medication Sig Dispense Refill    nadolol (CORGARD) 40 mg tablet Take  by mouth daily.  cholecalciferol (VITAMIN D3) 1,000 unit tablet Take  by mouth daily.  FAMOTIDINE (PEPCID PO) Take  by mouth as needed.  OMEGA-3 FATTY ACIDS/FISH OIL (OMEGA 3 FISH OIL PO) Take 300 mg by mouth daily.  OMEPRAZOLE PO Take 40 mg by mouth as needed.  multivitamin (ONE A DAY) tablet Take 1 Tab by mouth daily.  LINZESS 290 mcg cap capsule Take 290 mcg by mouth as needed. 0         Recommended healthy diet low in carbohydrates, fats, sodium and cholesterol. Recommended regular cardiovascular exercise 3-6 times per week for 30-60 minutes daily. Verbal and written instructions (see AVS) provided.   Patient expresses understanding of diagnosis and treatment plan.

## 2017-09-01 NOTE — PATIENT INSTRUCTIONS
Results for orders placed or performed in visit on 17/31/87   METABOLIC PANEL, COMPREHENSIVE   Result Value Ref Range    Glucose 84 65 - 99 mg/dL    BUN 12 6 - 20 mg/dL    Creatinine 0.54 (L) 0.57 - 1.00 mg/dL    GFR est non- >59 mL/min/1.73    GFR est  >59 mL/min/1.73    BUN/Creatinine ratio 22 9 - 23    Sodium 139 134 - 144 mmol/L    Potassium 3.7 3.5 - 5.2 mmol/L    Chloride 98 96 - 106 mmol/L    CO2 25 18 - 29 mmol/L    Calcium 9.6 8.7 - 10.2 mg/dL    Protein, total 6.8 6.0 - 8.5 g/dL    Albumin 4.5 3.5 - 5.5 g/dL    GLOBULIN, TOTAL 2.3 1.5 - 4.5 g/dL    A-G Ratio 2.0 1.2 - 2.2    Bilirubin, total 0.3 0.0 - 1.2 mg/dL    Alk. phosphatase 39 39 - 117 IU/L    AST (SGOT) 17 0 - 40 IU/L    ALT (SGPT) 15 0 - 32 IU/L   TSH 3RD GENERATION   Result Value Ref Range    TSH 1.380 0.450 - 4.500 uIU/mL   CBC WITH AUTOMATED DIFF   Result Value Ref Range    WBC 4.3 3.4 - 10.8 x10E3/uL    RBC 3.99 3.77 - 5.28 x10E6/uL    HGB 12.9 11.1 - 15.9 g/dL    HCT 39.0 34.0 - 46.6 %    MCV 98 (H) 79 - 97 fL    MCH 32.3 26.6 - 33.0 pg    MCHC 33.1 31.5 - 35.7 g/dL    RDW 13.2 12.3 - 15.4 %    PLATELET 260 795 - 376 x10E3/uL    NEUTROPHILS 47 %    Lymphocytes 42 %    MONOCYTES 9 %    EOSINOPHILS 1 %    BASOPHILS 1 %    ABS. NEUTROPHILS 2.0 1.4 - 7.0 x10E3/uL    Abs Lymphocytes 1.8 0.7 - 3.1 x10E3/uL    ABS. MONOCYTES 0.4 0.1 - 0.9 x10E3/uL    ABS. EOSINOPHILS 0.1 0.0 - 0.4 x10E3/uL    ABS. BASOPHILS 0.0 0.0 - 0.2 x10E3/uL    IMMATURE GRANULOCYTES 0 %    ABS. IMM. GRANS. 0.0 0.0 - 0.1 x10E3/uL   LIPID PANEL   Result Value Ref Range    Cholesterol, total 241 (H) 100 - 199 mg/dL    Triglyceride 69 0 - 149 mg/dL    HDL Cholesterol 74 >39 mg/dL    VLDL, calculated 14 5 - 40 mg/dL    LDL, calculated 153 (H) 0 - 99 mg/dL   CVD REPORT   Result Value Ref Range    INTERPRETATION Note             Well Visit, Ages 25 to 48: Care Instructions  Your Care Instructions  Physical exams can help you stay healthy.  Your doctor has checked your overall health and may have suggested ways to take good care of yourself. He or she also may have recommended tests. At home, you can help prevent illness with healthy eating, regular exercise, and other steps. Follow-up care is a key part of your treatment and safety. Be sure to make and go to all appointments, and call your doctor if you are having problems. It's also a good idea to know your test results and keep a list of the medicines you take. How can you care for yourself at home? · Reach and stay at a healthy weight. This will lower your risk for many problems, such as obesity, diabetes, heart disease, and high blood pressure. · Get at least 30 minutes of physical activity on most days of the week. Walking is a good choice. You also may want to do other activities, such as running, swimming, cycling, or playing tennis or team sports. Discuss any changes in your exercise program with your doctor. · Do not smoke or allow others to smoke around you. If you need help quitting, talk to your doctor about stop-smoking programs and medicines. These can increase your chances of quitting for good. · Talk to your doctor about whether you have any risk factors for sexually transmitted infections (STIs). Having one sex partner (who does not have STIs and does not have sex with anyone else) is a good way to avoid these infections. · Use birth control if you do not want to have children at this time. Talk with your doctor about the choices available and what might be best for you. · Protect your skin from too much sun. When you're outdoors from 10 a.m. to 4 p.m., stay in the shade or cover up with clothing and a hat with a wide brim. Wear sunglasses that block UV rays. Even when it's cloudy, put broad-spectrum sunscreen (SPF 30 or higher) on any exposed skin. · See a dentist one or two times a year for checkups and to have your teeth cleaned. · Wear a seat belt in the car.   · Drink alcohol in moderation, if at all. That means no more than 2 drinks a day for men and 1 drink a day for women. Follow your doctor's advice about when to have certain tests. These tests can spot problems early. For everyone  · Cholesterol. Have the fat (cholesterol) in your blood tested after age 21. Your doctor will tell you how often to have this done based on your age, family history, or other things that can increase your risk for heart disease. · Blood pressure. Have your blood pressure checked during a routine doctor visit. Your doctor will tell you how often to check your blood pressure based on your age, your blood pressure results, and other factors. · Vision. Talk with your doctor about how often to have a glaucoma test.  · Diabetes. Ask your doctor whether you should have tests for diabetes. · Colon cancer. Have a test for colon cancer at age 48. You may have one of several tests. If you are younger than 48, you may need a test earlier if you have any risk factors. Risk factors include whether you already had a precancerous polyp removed from your colon or whether your parent, brother, sister, or child has had colon cancer. For women  · Breast exam and mammogram. Talk to your doctor about when you should have a clinical breast exam and a mammogram. Medical experts differ on whether and how often women under 50 should have these tests. Your doctor can help you decide what is right for you. · Pap test and pelvic exam. Begin Pap tests at age 24. A Pap test is the best way to find cervical cancer. The test often is part of a pelvic exam. Ask how often to have this test.  · Tests for sexually transmitted infections (STIs). Ask whether you should have tests for STIs. You may be at risk if you have sex with more than one person, especially if your partners do not wear condoms. For men  · Tests for sexually transmitted infections (STIs). Ask whether you should have tests for STIs.  You may be at risk if you have sex with more than one person, especially if you do not wear a condom. · Testicular cancer exam. Ask your doctor whether you should check your testicles regularly. · Prostate exam. Talk to your doctor about whether you should have a blood test (called a PSA test) for prostate cancer. Experts differ on whether and when men should have this test. Some experts suggest it if you are older than 39 and are -American or have a father or brother who got prostate cancer when he was younger than 72. When should you call for help? Watch closely for changes in your health, and be sure to contact your doctor if you have any problems or symptoms that concern you. Where can you learn more? Go to http://quintin-ho.info/. Enter P072 in the search box to learn more about \"Well Visit, Ages 25 to 48: Care Instructions. \"  Current as of: July 19, 2016  Content Version: 11.3  © 1616-3729 [employer]. Care instructions adapted under license by [employer] (which disclaims liability or warranty for this information). If you have questions about a medical condition or this instruction, always ask your healthcare professional. [address] any warranty or liability for your use of this information.

## 2017-09-01 NOTE — PROGRESS NOTES
Chief Complaint   Patient presents with   [facility]     Health Maintenance reviewed

## 2017-09-28 RX ORDER — NADOLOL 40 MG/1
TABLET ORAL
Qty: 45 TAB | Refills: 6 | Status: SHIPPED | OUTPATIENT
Start: 2017-09-28 | End: 2017-10-03 | Stop reason: ALTCHOICE

## 2017-09-28 NOTE — TELEPHONE ENCOUNTER
Request for nadolol 40mg 1 and 1/2 every day. Last office visit 1/9/17, next office visit 10/3/17. Refills per verbal order from Dr. Albino Ibarra.

## 2017-10-03 ENCOUNTER — OFFICE VISIT (OUTPATIENT)
Dept: CARDIOLOGY CLINIC | Age: 37
End: 2017-10-03

## 2017-10-03 VITALS
HEIGHT: 65 IN | RESPIRATION RATE: 18 BRPM | HEART RATE: 92 BPM | BODY MASS INDEX: 23.66 KG/M2 | SYSTOLIC BLOOD PRESSURE: 120 MMHG | WEIGHT: 142 LBS | DIASTOLIC BLOOD PRESSURE: 74 MMHG

## 2017-10-03 DIAGNOSIS — R42 DIZZINESS: ICD-10-CM

## 2017-10-03 DIAGNOSIS — G90.A POTS (POSTURAL ORTHOSTATIC TACHYCARDIA SYNDROME): Primary | ICD-10-CM

## 2017-10-03 RX ORDER — NADOLOL 40 MG/1
40 TABLET ORAL DAILY
Qty: 90 TAB | Refills: 1 | Status: SHIPPED | OUTPATIENT
Start: 2017-10-03 | End: 2018-11-14 | Stop reason: SDUPTHER

## 2017-10-03 NOTE — PATIENT INSTRUCTIONS
[email or web address]    Thigh high compression stockings, 30-40 mmhg- If not better in 2 weeks please call our office. You will need to follow up in clinic with Dr. Riley Genao in 6 months.

## 2017-10-03 NOTE — MR AVS SNAPSHOT
Visit Information Date & Time Provider Department Dept. Phone Encounter #  
 10/3/2017  4:00 PM Dong Gold MD [facility] [phone] [id number] Follow-up Instructions Return in about 6 months (around 4/3/2018). Your Appointments 12/4/2017  1:30 PM  
New Patient with MD [facility]) Appt Note: New Pt/Pt is feeling shaky [address] [id number] [address] Upcoming Health Maintenance Date Due  
 PAP AKA CERVICAL CYTOLOGY 10/22/2018 DTaP/Tdap/Td series (2 - Td) 11/11/2024 Allergies as of 10/3/2017  Review Complete On: 10/3/2017 By: Dong Gold MD  
  
 Severity Noted Reaction Type Reactions Celexa [Citalopram]  12/10/2015    Nausea and Vomiting Ciprofloxacin  11/21/2012    Shortness of Breath Diflucan [Fluconazole]  05/17/2016    Rash Burning sensation to skin  
 Sulfa (Sulfonamide Antibiotics)  03/25/2010    Rash Current Immunizations  Reviewed on 9/30/2015 Name Date Influenza Vaccine 10/16/2013 Influenza Vaccine Lupillo Elmo) 11/11/2014 Influenza Vaccine (Quad) PF 9/30/2015 TB Skin Test (PPD) Intradermal 1/27/2014 Tdap 11/11/2014 Not reviewed this visit You Were Diagnosed With   
  
 Codes Comments POTS (postural orthostatic tachycardia syndrome)    -  Primary ICD-10-CM: R00.0, I95.1 ICD-9-CM: 427.89 Vitals BP Pulse Resp Height(growth percentile) Weight(growth percentile) LMP  
 120/74 (BP 1 Location: Left arm, BP Patient Position: Sitting) 92 18 5' 5\" (1.651 m) 142 lb (64.4 kg) 10/01/2017 BMI OB Status Smoking Status 23.63 kg/m2 Having regular periods Former Smoker Vitals History BMI and BSA Data  Body Mass Index Body Surface Area  
 23.63 kg/m 2 1.72 m 2  
  
  
 Preferred Pharmacy Pharmacy Name Phone [facility] [address] [phone] Your Updated Medication List  
  
   
This list is accurate as of: 10/3/17  4:58 PM.  Always use your most recent med list.  
  
  
  
  
 LINZESS 290 mcg Cap capsule Generic drug:  linaclotide Take 290 mcg by mouth as needed. multivitamin tablet Commonly known as:  ONE A DAY Take 1 Tab by mouth daily. nadolol 40 mg tablet Commonly known as:  CORGARD Take 1 Tab by mouth daily. OMEGA 3 FISH OIL PO Take 300 mg by mouth daily. OMEPRAZOLE PO Take 40 mg by mouth as needed. PEPCID PO Take  by mouth as needed. VITAMIN D3 1,000 unit tablet Generic drug:  cholecalciferol Take  by mouth daily. Prescriptions Sent to Pharmacy Refills  
 nadolol (CORGARD) 40 mg tablet 1 Sig: Take 1 Tab by mouth daily. Class: Normal  
 Pharmacy: [facility] [place] [address] [address] Ph #: [phone] Route: Oral  
  
Follow-up Instructions Return in about 6 months (around 4/3/2018). Patient Instructions [email or web address] Thigh high compression stockings, 30-40 mmhg- If not better in 2 weeks please call our office. You will need to follow up in clinic with Dr. Susan Erickson in 6 months. Introducing hospitals & HEALTH SERVICES! Dear Luisana BARKER: Thank you for requesting a Chat[employer] account. Our records indicate that you already have an active Chat[employer] account. You can access your account anytime at [email or web address] [email or web address] Did you know that you can access your hospital and ER discharge instructions at any time in [employer]fly? You can also review all of your test results from your hospital stay or ER visit. Additional Information If you have questions, please visit the Frequently Asked Questions section of the [email or web address] website at [email or web address] Remember, [email or web address] is NOT to be used for urgent needs. For medical emergencies, dial 911. Now available from your iPhone and Android! Please provide this summary of care documentation to your next provider. Your primary care clinician is listed as Ita Coates. If you have any questions after today's visit, please call [phone].

## 2017-10-03 NOTE — PROGRESS NOTES
Cardiac Electrophysiology Office Note     Subjective:      Donna Salas is a 30 y.o. female patient who had tilt table test.  + postural orthostatic tachycardia syndrome. She is here today for follow up. She reports she has been doing mostly well. She still has occasional dizziness, mostly when she is on her feet all day. Dizziness also associated with her menstrual cycle and when she does not digest her food  She says she is trying to drink water through out the day and add salt to her food. She still has periods of shakiness, but this has improved. In the past:  She says the metoprolol make her very tired. She had used inderal before and did not like it  Past hx   She has been having tachycardia, palpitations & shakiness that started about 8 month ago. She had seen Dr Rosa Joyce in the past and then Dr Taz Srinivasan.  She was taking metoprolol PRN for tachycardia. Then Dr. Taz Srinivasan started her on Toprol, but she was unable to tolerate this. She said it made her symptoms worse. She denies hx of syncope, but she has had many near -syncopal episodes. She says activity and exercise are triggers for the tachycardia episodes. After exercising on the elliptical for about 20 minutes she is very shaky, lightheaded and feels awful. She says that she was on atenolol about 5 years ago for tachycardia, a cardiologists took her off and she did not have any problems for about 4 years. Then all of a sudden she started having these problems. Neurologist: Dr Angelina Giles  PMHx includes chronic migraines, vertigo, gastroparesis      Echo 12/2015 shows LVEF 60% no RWMA, no significant valve abnormalities. Family hx: maternal grand mother: MI at [address] [address] old. Maternal grandfather hx of CVA. Social hx : she denies tobacco or ETOH use.  She is single mother of 3 children      Patient Active Problem List    Diagnosis Date Noted    Chronic constipation 09/01/2017    GERD (gastroesophageal reflux disease) 09/01/2017  POTS (postural orthostatic tachycardia syndrome) 01/20/2017    Abdominal adhesions 05/04/2012     Current Outpatient Prescriptions   Medication Sig Dispense Refill    nadolol (CORGARD) 40 mg tablet Take 1 Tab by mouth daily. 90 Tab 1    cholecalciferol (VITAMIN D3) 1,000 unit tablet Take  by mouth daily.  FAMOTIDINE (PEPCID PO) Take  by mouth as needed.  OMEGA-3 FATTY ACIDS/FISH OIL (OMEGA 3 FISH OIL PO) Take 300 mg by mouth daily.  OMEPRAZOLE PO Take 40 mg by mouth as needed.  multivitamin (ONE A DAY) tablet Take 1 Tab by mouth daily.  LINZESS 290 mcg cap capsule Take 290 mcg by mouth as needed. 0     Allergies   Allergen Reactions    Celexa [Citalopram] Nausea and Vomiting    Ciprofloxacin Shortness of Breath    Diflucan [Fluconazole] Rash     Burning sensation to skin    Sulfa (Sulfonamide Antibiotics) Rash     Past Medical History:   Diagnosis Date    Anemia     taking iron    Gastroparesis     GERD (gastroesophageal reflux disease)     gastroparesis--h-pylori    H/O Clostridium difficile infection 06/2012    C-Diff    Helicobacter pylori (H. pylori) 05/2012    h pylori    Palpitations     2010  - cardiac workup per pt.       Past Surgical History:   Procedure Laterality Date    HX GI      EGD and colonoscopy    HX GYN  5/12    D&C    HX GYN      tubal reversal    HX OTHER SURGICAL      D&C x 2,  lap    TILT TABLE EVAL W/WO DRUG  1/21/2017          Family History   Problem Relation Age of Onset    Heart Disease Mother     Stroke Mother     Cancer Mother     Heart Disease Father     Stroke Father     Heart Disease Maternal Grandmother 45     Social History   Substance Use Topics    Smoking status: Former Smoker     Types: Cigarettes     Quit date: 11/21/2013    Smokeless tobacco: Never Used    Alcohol use 0.6 oz/week     1 Glasses of wine, 0 Standard drinks or equivalent per week      Comment: rare1        Review of Systems:   Constitutional: Negative for fever, chills, weight loss  HEENT: Negative for nosebleeds, vision changes. Respiratory: Negative for cough, hemoptysis, sputum production, and wheezing. Cardiovascular: Negative for chest pain,+ palpitations, no orthopnea, claudication, leg swelling, syncope, and PND. + dizziness  Gastrointestinal: Negative for nausea, vomiting, diarrhea, constipation, blood in stool and melena. + gastroparesis   Genitourinary: Negative for dysuria, and hematuria. Musculoskeletal: Negative for myalgias, arthralgia. Skin: Negative for rash. Heme: Does not bleed or bruise easily. Neurological: Negative for speech change and focal weakness     Objective:     Visit Vitals    /74 (BP 1 Location: Left arm, BP Patient Position: Sitting)    Pulse 92    Resp 18    Ht 5' 5\" (1.651 m)    Wt 142 lb (64.4 kg)    LMP 10/01/2017    BMI 23.63 kg/m2      Physical Exam:   Constitutional: Well-nourished. No distress. Head: Normocephalic and atraumatic. Eyes: Pupils are equal, round  Neck: supple. No JVD present. Cardiovascular: Normal rate, regular rhythm. Exam reveals no gallop and no friction rub. No murmur heard. Pulmonary/Chest: Effort normal and breath sounds normal. No wheezes. Abdominal: Soft, no tenderness. Musculoskeletal: no edema. Neurological: alert,oriented. Skin: Skin is warm and dry  Psychiatric: normal mood and affect. Behavior is normal. Judgment and thought content normal.        Assessment/Plan:       ICD-10-CM ICD-9-CM    1. POTS (postural orthostatic tachycardia syndrome) R00.0 427.89     I95.1     2. Dizziness R42 780.4       Recommend her add compression stockings 30-40 mmHg, if symptoms of dizziness do not improve with the compression stockings, will add florinef   Encouraged her to continue to drink at least 64 oz water daily and continue to add salt to her diet. Follow-up Disposition:  Return in about 6 months (around 4/3/2018).       Thank you for involving me in this patient's care and please call with further concerns or questions. Cici Rizvi M.D.   Electrophysiology/Cardiology  [facility] [place]  [address], [place] [address] [facility], Juanjose Martinez [address] [place] [address]  [phone]

## 2017-10-03 NOTE — PROGRESS NOTES
Cardiac Electrophysiology Note     Subjective:      Bennett Kaplan is a 30 y.o. female patient who is seen s/p tilt table test.  + postural orthostatic tachycardia syndrome. She is here today for follow up. She reports she has been doing well. She still has occasional dizziness, mostly when she is on her feet all day. Dizziness also associated with her menstrual cycle and when she does not digest her food  She says she is trying to drink water through out the day and add salt to her food. She still has periods of shakiness, but this has improved. In the past:  She says the metoprolol make her very tired. She had used inderal before and did not like it  Past hx   She had the tilt  evaluation of possible POTS kindly referred to Dr. Mat Ramos for a tilt table test.   She has been having tachycardia, palpitations & shakiness that started about 8 month ago. She has seen Dr Tayler Marsh in the past and now Dr Christa Cherry.  She was taking metoprolol PRN for tachycardia. Then Dr. Christa Cheryr started her on Toprol, but she was unable to tolerate this. She said it made her symptoms worse. She denies hx of syncope, but she has had many near -syncopal episodes. She says activity and exercise are triggers for the tachycardia episodes. After exercising on the elliptical for about 20 minutes she is very shaky, lightheaded and feels awful. She says that she was on atenolol about 5 years ago for tachycardia, a cardiologists took her off and she did not have any problems for about 4 years. Then all of a sudden she started having these problems. Neurologists Dr Betty Sánchez  PMHx includes chronic migraines, vertigo, gastroparesis      Echo 12/2015 shows LVEF 60% no RWMA, no significant valve abnormalities. Family hx: maternal grand mother: MI at 27years old. Maternal grandfather hx of CVA. Social hx : she denies tobacco or ETOH use.  She is single mother of 3 children      Patient Active Problem List    Diagnosis Date Noted  Chronic constipation 09/01/2017    GERD (gastroesophageal reflux disease) 09/01/2017    POTS (postural orthostatic tachycardia syndrome) 01/20/2017    Abdominal adhesions 05/04/2012     Current Outpatient Prescriptions   Medication Sig Dispense Refill    nadolol (CORGARD) 40 mg tablet Take 1 Tab by mouth daily. 90 Tab 1    cholecalciferol (VITAMIN D3) 1,000 unit tablet Take  by mouth daily.  FAMOTIDINE (PEPCID PO) Take  by mouth as needed.  OMEGA-3 FATTY ACIDS/FISH OIL (OMEGA 3 FISH OIL PO) Take 300 mg by mouth daily.  OMEPRAZOLE PO Take 40 mg by mouth as needed.  multivitamin (ONE A DAY) tablet Take 1 Tab by mouth daily.  LINZESS 290 mcg cap capsule Take 290 mcg by mouth as needed. 0     Allergies   Allergen Reactions    Celexa [Citalopram] Nausea and Vomiting    Ciprofloxacin Shortness of Breath    Diflucan [Fluconazole] Rash     Burning sensation to skin    Sulfa (Sulfonamide Antibiotics) Rash     Past Medical History:   Diagnosis Date    Anemia     taking iron    Gastroparesis     GERD (gastroesophageal reflux disease)     gastroparesis--h-pylori    H/O Clostridium difficile infection 06/2012    C-Diff    Helicobacter pylori (H. pylori) 05/2012    h pylori    Palpitations     2010  - cardiac workup per pt.       Past Surgical History:   Procedure Laterality Date    HX GI      EGD and colonoscopy    HX GYN  5/12    D&C    HX GYN      tubal reversal    HX OTHER SURGICAL      D&C x 2,  lap    TILT TABLE EVAL W/WO DRUG  1/21/2017          Family History   Problem Relation Age of Onset    Heart Disease Mother     Stroke Mother     Cancer Mother     Heart Disease Father     Stroke Father     Heart Disease Maternal Grandmother 45     Social History   Substance Use Topics    Smoking status: Former Smoker     Types: Cigarettes     Quit date: 11/21/2013    Smokeless tobacco: Never Used    Alcohol use 0.6 oz/week     1 Glasses of wine, 0 Standard drinks or equivalent per week      Comment: rare1        Review of Systems:   Constitutional: Negative for fever, chills, weight loss, +malaise/fatigue. + jitteriness   HEENT: Negative for nosebleeds, vision changes. Respiratory: Negative for cough, hemoptysis, sputum production, and wheezing. Cardiovascular: Negative for chest pain,+ palpitations, no orthopnea, claudication, leg swelling, syncope, and PND. + dizziness  Gastrointestinal: Negative for nausea, vomiting, diarrhea, constipation, blood in stool and melena. + gastroparesis   Genitourinary: Negative for dysuria, and hematuria. Musculoskeletal: Negative for myalgias, arthralgia. Skin: Negative for rash. Heme: Does not bleed or bruise easily. Neurological: Negative for speech change and focal weakness     Objective:     Visit Vitals    /74 (BP 1 Location: Left arm, BP Patient Position: Sitting)    Pulse 92    Resp 18    Ht 5' 5\" (1.651 m)    Wt 142 lb (64.4 kg)    LMP 10/01/2017    BMI 23.63 kg/m2      Physical Exam:   Constitutional: Well-nourished. No distress. Head: Normocephalic and atraumatic. Eyes: Pupils are equal, round  Neck: supple. No JVD present. Cardiovascular: Normal rate, regular rhythm. Exam reveals no gallop and no friction rub. No murmur heard. Pulmonary/Chest: Effort normal and breath sounds normal. No wheezes. Abdominal: Soft, no tenderness. Musculoskeletal: no edema. Neurological: alert,oriented. Skin: Skin is warm and dry  Psychiatric: normal mood and affect. Behavior is normal. Judgment and thought content normal.        Assessment/Plan:       ICD-10-CM ICD-9-CM    1. POTS (postural orthostatic tachycardia syndrome) R00.0 427.89     I95.1        Recommend thigh high compression stockings 30-40 mmHg, if symptoms of dizziness do not improve with the compression stockings. Encouraged her to continue to drink at least 64 oz water daily and add salt to her diet.          Follow-up Disposition:  Return in about 6 months (around 4/3/2018). Thank you for involving me in this patient's care and please call with further concerns or questions. Norah Da Silva M.D.   Electrophysiology/Cardiology  [facility] [place]  [address] [place] [address] [place] [address]  [address] [facility], [address] [place] [address]  [phone]

## 2017-10-03 NOTE — PROGRESS NOTES
Chief Complaint   Patient presents with    Follow-up     3 month     1. Have you been to the ER, urgent care clinic since your last visit? Hospitalized since your last visit? Patient stated she went to ER in [place] and stated she was treated for dehydration    2. Have you seen or consulted any other health care providers outside of the [address] [place] [address] since your last visit? Include any pap smears or colon screening. Patient stated she saw Armen Cedeño for [facility] 9/2017.

## 2017-11-01 ENCOUNTER — OFFICE VISIT (OUTPATIENT)
Dept: FAMILY MEDICINE CLINIC | Age: 37
End: 2017-11-01

## 2017-11-01 VITALS
TEMPERATURE: 98.5 F | OXYGEN SATURATION: 100 % | BODY MASS INDEX: 24.83 KG/M2 | HEART RATE: 84 BPM | DIASTOLIC BLOOD PRESSURE: 80 MMHG | HEIGHT: 65 IN | WEIGHT: 149 LBS | RESPIRATION RATE: 14 BRPM | SYSTOLIC BLOOD PRESSURE: 128 MMHG

## 2017-11-01 DIAGNOSIS — K21.9 GASTROESOPHAGEAL REFLUX DISEASE WITHOUT ESOPHAGITIS: ICD-10-CM

## 2017-11-01 DIAGNOSIS — R06.00 DYSPNEA, UNSPECIFIED TYPE: Primary | ICD-10-CM

## 2017-11-01 RX ORDER — PANTOPRAZOLE SODIUM 40 MG/1
40 TABLET, DELAYED RELEASE ORAL DAILY
Qty: 30 TAB | Refills: 0 | Status: SHIPPED | OUTPATIENT
Start: 2017-11-01 | End: 2018-03-06

## 2017-11-01 NOTE — MR AVS SNAPSHOT
Visit Information Date & Time Provider Department Dept. Phone Encounter #  
 11/1/2017  1:30 PM Erin Juan MD [address] [place] [address] [phone] [id number] Your Appointments 12/4/2017  1:30 PM  
New Patient with Shailesh Velarde MD  
[place] [facility]) Appt Note: New Pt/Pt is feeling shaky [phone] [id number] [address] 4/12/2018  9:20 AM  
ESTABLISHED PATIENT with Mayuri Thompson MD  
[facility] ([facility]) Appt Note: Dr Hortencia Dancer UP  
 [address]  [address] [place] [address]  
[address] [address] Upcoming Health Maintenance Date Due  
 PAP AKA CERVICAL CYTOLOGY 10/22/2018 DTaP/Tdap/Td series (2 - Td) 11/11/2024 Allergies as of 11/1/2017  Review Complete On: 11/1/2017 By: Erin Juan MD  
  
 Severity Noted Reaction Type Reactions Celexa [Citalopram]  12/10/2015    Nausea and Vomiting Ciprofloxacin  11/21/2012    Shortness of Breath Diflucan [Fluconazole]  05/17/2016    Rash Burning sensation to skin  
 Sulfa (Sulfonamide Antibiotics)  03/25/2010    Rash Current Immunizations  Reviewed on 9/30/2015 Name Date Influenza Vaccine 10/16/2013 Influenza Vaccine Roselynn Sanju) 11/11/2014 Influenza Vaccine (Quad) PF 9/30/2015 TB Skin Test (PPD) Intradermal 1/27/2014 Tdap 11/11/2014 Not reviewed this visit You Were Diagnosed With   
  
 Codes Comments Dyspnea, unspecified type    -  Primary ICD-10-CM: R06.00 
ICD-9-CM: 786.09 Gastroesophageal reflux disease without esophagitis     ICD-10-CM: K21.9 ICD-9-CM: 530.81 Vitals BP Pulse Temp Resp Height(growth percentile) Weight(growth percentile) 128/80 (BP 1 Location: Left arm, BP Patient Position: Sitting) 84 98.5 °F (36.9 °C) (Oral) 14 5' 5\" (1.651 m) 149 lb (67.6 kg) LMP SpO2 BMI OB Status Smoking Status 10/30/2017 100% 24.79 kg/m2 Having regular periods Former Smoker BMI and BSA Data Body Mass Index Body Surface Area 24.79 kg/m 2 1.76 m 2 Preferred Pharmacy Pharmacy Name Phone Addis Gay, [address] [facility] [phone] Your Updated Medication List  
  
   
This list is accurate as of: 11/1/17  2:18 PM.  Always use your most recent med list.  
  
  
  
  
 LINZESS 290 mcg Cap capsule Generic drug:  linaclotide Take 290 mcg by mouth as needed. multivitamin tablet Commonly known as:  ONE A DAY Take 1 Tab by mouth daily. nadolol 40 mg tablet Commonly known as:  CORGARD Take 1 Tab by mouth daily. OMEGA 3 FISH OIL PO Take 300 mg by mouth daily. pantoprazole 40 mg tablet Commonly known as:  PROTONIX Take 1 Tab by mouth daily. VITAMIN D3 1,000 unit tablet Generic drug:  cholecalciferol Take  by mouth daily. Prescriptions Sent to Pharmacy Refills  
 pantoprazole (PROTONIX) 40 mg tablet 0 Sig: Take 1 Tab by mouth daily. Class: Normal  
 [facility]acy: [facility] [place] [address], [address] [facility] #: [phone] Route: Oral  
  
To-Do List   
 11/08/2017 Imaging:  XR CHEST PA LAT [facility] & HEALTH SERVICES! Dear Jhon Ward: Thank you for requesting a Isai account. Our records indicate that you already have an active Isai account. You can access your account anytime at [email or web address]. [email or web address] Did you know that you can access your hospital and ER discharge instructions at any time in Isai? You can also review all of your test results from your hospital stay or ER visit. Additional Information If you have questions, please visit the Frequently Asked Questions section of the Zemantahart website at [email or web address] Remember, iCrimefighter is NOT to be used for urgent needs. For medical emergencies, dial 911. Now available from your iPhone and Android! Please provide this summary of care documentation to your next provider. Your primary care clinician is listed as Amaury Monroe. If you have any questions after today's visit, please call [phone].

## 2017-11-01 NOTE — PROGRESS NOTES
Subjective:     Deacon Pimentel is a 30 y.o. female who presents today with the following:  Chief Complaint   Patient presents with    Abdominal Pain    Shortness of Breath     Patient with PMH POTS and gastroparesis. For the last several weeks has experienced shortness of breath and increased reflux. Regarding shortness of breath, patient describes that she feels like she just cannot get a deep breath in. She notices this at rest and with activity. Some cough but no wheezing. Nothing seems to help, activity makes it worse. Patient has not yet seen a lung doctor. No history of asthma. Does not smoke, no 2nd hand smoke exposure. Regarding worsening GERD, patient is experiencing worsening heart burn described as burning tight sensation in her chest, midsternal.  Also feels like it burns and is achy. Her food seems to just sit in her stomach. She is trying to eat smaller meals throughout the day. Notices that eating most any foods trigger in symptoms. Patient saw stomach doctor 1.5 months ago. Patient on omeprazole, linzess, and pepcid. ROS:  Gen: denies fever, chills, fatigue, weight loss, weight gain  HEENT:denies blurry vision, nasal congestion, sore throat  Resp: denies dypsnea, cough, wheezing  CV: denies chest pain, palpitations, lower extremity edema  Abd: denies nausea, vomiting, diarrhea, constipation  Endo: denies polyuria, polydipsia, heat/cold intolerance    Allergies   Allergen Reactions    Celexa [Citalopram] Nausea and Vomiting    Ciprofloxacin Shortness of Breath    Diflucan [Fluconazole] Rash     Burning sensation to skin    Sulfa (Sulfonamide Antibiotics) Rash         Current Outpatient Prescriptions:     pantoprazole (PROTONIX) 40 mg tablet, Take 1 Tab by mouth daily. , Disp: 30 Tab, Rfl: 0    nadolol (CORGARD) 40 mg tablet, Take 1 Tab by mouth daily. , Disp: 90 Tab, Rfl: 1    cholecalciferol (VITAMIN D3) 1,000 unit tablet, Take  by mouth daily. , Disp: , Rfl:    OMEGA-3 FATTY ACIDS/FISH OIL (OMEGA 3 FISH OIL PO), Take 300 mg by mouth daily. , Disp: , Rfl:     multivitamin (ONE A DAY) tablet, Take 1 Tab by mouth daily. , Disp: , Rfl:     LINZESS 290 mcg cap capsule, Take 290 mcg by mouth as needed. , Disp: , Rfl: 0    Past Medical History:   Diagnosis Date    Anemia     taking iron    Gastroparesis     GERD (gastroesophageal reflux disease)     gastroparesis--h-pylori    H/O Clostridium difficile infection 06/2012    C-Diff    Helicobacter pylori (H. pylori) 05/2012    h pylori    Palpitations     2010  - cardiac workup per pt.         Past Surgical History:   Procedure Laterality Date    HX GI      EGD and colonoscopy    HX GYN  5/12    D&C    HX GYN      tubal reversal    HX OTHER SURGICAL      D&C x 2,  lap    TILT TABLE EVAL W/WO DRUG  1/21/2017            History   Smoking Status    Former Smoker    Types: Cigarettes    Quit date: 11/21/2013   Smokeless Tobacco    Never Used       Social History     Social History    Marital status: SINGLE     Spouse name: N/A    Number of children: N/A    Years of education: N/A     Social History Main Topics    Smoking status: Former Smoker     Types: Cigarettes     Quit date: 11/21/2013    Smokeless tobacco: Never Used    Alcohol use 0.6 oz/week     1 Glasses of wine, 0 Standard drinks or equivalent per week      Comment: rare1    Drug use: No    Sexual activity: Yes     Partners: Male     Birth control/ protection: None     Other Topics Concern    None     Social History Narrative    3 kids (14yo - 7mo), lives with boyfriend (but he travels for work)       Family History   Problem Relation Age of Onset    Heart Disease Mother     Stroke Mother     Cancer Mother     Heart Disease Father     Stroke Father     Heart Disease Maternal Grandmother 38         Objective:     Visit Vitals    /80 (BP 1 Location: Left arm, BP Patient Position: Sitting)    Pulse 84    Temp 98.5 °F (36.9 °C) (Oral)    Resp 14    Ht 5' 5\" (1.651 m)    Wt 149 lb (67.6 kg)    LMP 10/30/2017    SpO2 100%    BMI 24.79 kg/m2     Gen: alert, oriented, no acute distress  Head: normocephalic, atraumatic  Eyes:sclera clear, conjunctiva clear  Oral: moist mucus membranes, no oral lesions, no pharyngeal exudate, no pharyngeal erythema  Neck: symmetric normal sized thyroid, no carotid bruits, no JVD  Resp: Normal work of breathing, lungs CTAB, no w/r/r  CV: S1, S2 normal.  No murmurs, rubs, or gallops. Abd:  Normal bowel sounds. Soft, not tender, not distended. No results found for this visit on 11/01/17. Assessment/ Plan:   Diagnoses and all orders for this visit:    1. Dyspnea, unspecified type  - XR CHEST PA LAT; Future  -CXR, monitor symptoms. F/u in 2-3 weeks for evaluation. If symptoms persist, EKG and consider PFTs. -could try albuterol, but pt already has issues with tachycardia and do not want to set this off. Will hold for now but can also consider if symptoms persist.      2. Gastroesophageal reflux disease without esophagitis  -     pantoprazole (PROTONIX) 40 mg tablet; Take 1 Tab by mouth daily.   -stop omeprazole/pepcid, start protonix daily  -advised patient to schedule follow up with GI; if gastroparesis is worsening then this could make GERD worse as well    Verbal and written instructions (see AVS) provided.  Patient expresses understanding of diagnosis and treatment plan. Felton Farris.  Coby Yu MD

## 2017-11-01 NOTE — PROGRESS NOTES
Chief Complaint   Patient presents with    Abdominal Pain    Shortness of Breath     Patient is here to be seen for gastric pain and sob.

## 2017-11-02 ENCOUNTER — HOSPITAL ENCOUNTER (OUTPATIENT)
Dept: GENERAL RADIOLOGY | Age: 37
Discharge: HOME | End: 2017-11-02
Payer: MEDICAID

## 2017-11-02 DIAGNOSIS — R06.02 SHORTNESS OF BREATH: ICD-10-CM

## 2017-11-02 PROCEDURE — 71020 XR CHEST PA LAT: CPT

## 2017-11-06 ENCOUNTER — TELEPHONE (OUTPATIENT)
Dept: FAMILY MEDICINE CLINIC | Age: 37
End: 2017-11-06

## 2017-11-07 ENCOUNTER — TELEPHONE (OUTPATIENT)
Dept: FAMILY MEDICINE CLINIC | Age: 37
End: 2017-11-07

## 2017-11-07 NOTE — PROGRESS NOTES
Ms. Junior Riley, your chest XR was normal.  Please follow up as we discussed in 2-3 weeks if your symptoms continue. Thanks.     Kenrick Dela Cruz

## 2017-11-15 ENCOUNTER — OFFICE VISIT (OUTPATIENT)
Dept: FAMILY MEDICINE CLINIC | Age: 37
End: 2017-11-15

## 2017-11-15 VITALS
OXYGEN SATURATION: 100 % | BODY MASS INDEX: 23.82 KG/M2 | WEIGHT: 143 LBS | HEIGHT: 65 IN | HEART RATE: 89 BPM | SYSTOLIC BLOOD PRESSURE: 128 MMHG | TEMPERATURE: 98 F | DIASTOLIC BLOOD PRESSURE: 87 MMHG | RESPIRATION RATE: 16 BRPM

## 2017-11-15 DIAGNOSIS — R06.00 DYSPNEA, UNSPECIFIED TYPE: Primary | ICD-10-CM

## 2017-11-15 NOTE — PROGRESS NOTES
Subjective:     Sarwat Frias is a 30 y.o. female who presents today with the following:  Chief Complaint   Patient presents with    Shortness of Breath     Patient here today for follow up of shortness of breath she is experiencing intermittently. Since last visit she has had a normal CXR and states that she feels her dyspnea is improving and not present as much as it was previously. Last visit: Regarding shortness of breath, patient describes that she feels like she just cannot get a deep breath in. She notices this at rest and with activity. Some cough but no wheezing. Nothing seems to help, activity makes it worse. Patient has not yet seen a lung doctor. No history of asthma. Does not smoke, no 2nd hand smoke exposure. Today she presents for follow up and would like to do EKG/schedule PFTs. She understands that dypsnea can be a symptom of POTS disease and will discuss this also with Dr. Tequila Shin when she sees him next. ROS:  Gen: denies fever, chills, fatigue, weight loss, weight gain  HEENT:denies blurry vision, nasal congestion, sore throat  Resp: denies dypsnea, cough, wheezing  CV: denies chest pain, palpitations, lower extremity edema  Abd: denies nausea, vomiting, diarrhea, constipation  Neuro: denies numbness/tingling  Endo: denies polyuria, polydipsia, heat/cold intolerance  Heme: no lymphadenopathy    Allergies   Allergen Reactions    Celexa [Citalopram] Nausea and Vomiting    Ciprofloxacin Shortness of Breath    Diflucan [Fluconazole] Rash     Burning sensation to skin    Sulfa (Sulfonamide Antibiotics) Rash         Current Outpatient Prescriptions:     pantoprazole (PROTONIX) 40 mg tablet, Take 1 Tab by mouth daily. , Disp: 30 Tab, Rfl: 0    nadolol (CORGARD) 40 mg tablet, Take 1 Tab by mouth daily. , Disp: 90 Tab, Rfl: 1    cholecalciferol (VITAMIN D3) 1,000 unit tablet, Take  by mouth daily. , Disp: , Rfl:     OMEGA-3 FATTY ACIDS/FISH OIL (OMEGA 3 FISH OIL PO), Take 300 mg by mouth daily. , Disp: , Rfl:     multivitamin (ONE A DAY) tablet, Take 1 Tab by mouth daily. , Disp: , Rfl:     LINZESS 290 mcg cap capsule, Take 290 mcg by mouth as needed. , Disp: , Rfl: 0    Past Medical History:   Diagnosis Date    Anemia     taking iron    Gastroparesis     GERD (gastroesophageal reflux disease)     gastroparesis--h-pylori    H/O Clostridium difficile infection 06/2012    C-Diff    Helicobacter pylori (H. pylori) 05/2012    h pylori    Palpitations     2010  - cardiac workup per pt.         Past Surgical History:   Procedure Laterality Date    HX GI      EGD and colonoscopy    HX GYN  5/12    D&C    HX GYN      tubal reversal    HX OTHER SURGICAL      D&C x 2,  lap    TILT TABLE EVAL W/WO DRUG  1/21/2017            History   Smoking Status    Former Smoker    Types: Cigarettes    Quit date: 11/21/2013   Smokeless Tobacco    Never Used       Social History     Social History    Marital status: SINGLE     Spouse name: N/A    Number of children: N/A    Years of education: N/A     Social History Main Topics    Smoking status: Former Smoker     Types: Cigarettes     Quit date: 11/21/2013    Smokeless tobacco: Never Used    Alcohol use 0.6 oz/week     1 Glasses of wine, 0 Standard drinks or equivalent per week      Comment: rare1    Drug use: No    Sexual activity: Yes     Partners: Male     Birth control/ protection: None     Other Topics Concern    None     Social History Narrative    3 kids (14yo - 7mo), lives with boyfriend (but he travels for work)       Family History   Problem Relation Age of Onset    Heart Disease Mother     Stroke Mother     Cancer Mother     Heart Disease Father     Stroke Father     Heart Disease Maternal Grandmother 38         Objective:     Visit Vitals    /87 (BP 1 Location: Left arm, BP Patient Position: Sitting)    Pulse 89    Temp 98 °F (36.7 °C) (Oral)    Resp 16    Ht 5' 5\" (1.651 m)    Wt 143 lb (64.9 kg)    LMP 10/30/2017    SpO2 100%    BMI 23.8 kg/m2     Gen: alert, oriented, no acute distress  Head: normocephalic, atraumatic  Eyes:sclera clear, conjunctiva clear  Oral: moist mucus membranes, no oral lesions, no pharyngeal exudate, no pharyngeal erythema  Resp: Normal work of breathing, lungs CTAB, no w/r/r  CV: S1, S2 normal.  No murmurs, rubs, or gallops. Extremities: no edema    EKG: NSR, WNL. Assessment/ Plan:   Diagnoses and all orders for this visit:    1. Dyspnea, unspecified type  -     AMB POC EKG ROUTINE W/ 12 LEADS, INTER & REP  -     PULMONARY FUNCTION TEST; Future     PFTs order, our office will call to schedule. Reassured patient of normal EKG results. Verbal and written instructions (see AVS) provided.  Patient expresses understanding of diagnosis and treatment plan. Cristal Vasquez.  Arielle Hobbs MD

## 2017-11-30 ENCOUNTER — CLINICAL SUPPORT (OUTPATIENT)
Dept: FAMILY MEDICINE CLINIC | Age: 37
End: 2017-11-30

## 2017-11-30 VITALS
SYSTOLIC BLOOD PRESSURE: 128 MMHG | BODY MASS INDEX: 23.99 KG/M2 | OXYGEN SATURATION: 99 % | DIASTOLIC BLOOD PRESSURE: 76 MMHG | HEIGHT: 65 IN | WEIGHT: 144 LBS | TEMPERATURE: 98.2 F | HEART RATE: 92 BPM | RESPIRATION RATE: 18 BRPM

## 2017-11-30 DIAGNOSIS — R06.00 DYSPNEA, UNSPECIFIED TYPE: Primary | ICD-10-CM

## 2017-11-30 RX ORDER — FLUTICASONE PROPIONATE 50 MCG
2 SPRAY, SUSPENSION (ML) NASAL AS NEEDED
COMMUNITY
End: 2018-10-11 | Stop reason: SDUPTHER

## 2017-12-04 ENCOUNTER — OFFICE VISIT (OUTPATIENT)
Dept: ENDOCRINOLOGY | Age: 37
End: 2017-12-04

## 2017-12-04 VITALS
BODY MASS INDEX: 23.72 KG/M2 | HEART RATE: 96 BPM | WEIGHT: 142.4 LBS | DIASTOLIC BLOOD PRESSURE: 84 MMHG | SYSTOLIC BLOOD PRESSURE: 120 MMHG | HEIGHT: 65 IN

## 2017-12-04 DIAGNOSIS — R29.90 MULTIPLE NEUROLOGICAL SYMPTOMS: ICD-10-CM

## 2017-12-04 DIAGNOSIS — R25.1 SHAKINESS: Primary | ICD-10-CM

## 2017-12-04 RX ORDER — OMEPRAZOLE 40 MG/1
40 CAPSULE, DELAYED RELEASE ORAL AS NEEDED
COMMUNITY
End: 2018-06-18

## 2017-12-04 RX ORDER — FAMOTIDINE 20 MG/1
20 TABLET, FILM COATED ORAL
Refills: 0 | COMMUNITY
Start: 2017-10-24 | End: 2019-07-15 | Stop reason: SDUPTHER

## 2017-12-04 RX ORDER — CLONIDINE HYDROCHLORIDE 0.1 MG/1
TABLET ORAL
Refills: 0 | COMMUNITY
Start: 2017-10-04 | End: 2018-04-12

## 2017-12-04 NOTE — MR AVS SNAPSHOT
Visit Information Date & Time Provider Department Dept. Phone Encounter #  
 12/4/2017  1:30 PM Lina Norton, [address] [facility] [phone] Your Appointments 4/12/2018  9:20 AM  
ESTABLISHED PATIENT with Celestino Weiss MD  
[facility] ([address]) Appt Note: Dr Gil Net UP  
 [address] [place] [address]  
[address] [address] Upcoming Health Maintenance Date Due  
 PAP AKA CERVICAL CYTOLOGY 10/22/2018 DTaP/Tdap/Td series (2 - Td) 11/11/2024 Allergies as of 12/4/2017  Review Complete On: 12/4/2017 By: Lina Norton MD  
  
 Severity Noted Reaction Type Reactions Celexa [Citalopram]  12/10/2015    Nausea and Vomiting Ciprofloxacin  11/21/2012    Shortness of Breath Diflucan [Fluconazole]  05/17/2016    Rash Burning sensation to skin  
 Sulfa (Sulfonamide Antibiotics)  03/25/2010    Rash Current Immunizations  Reviewed on 9/30/2015 Name Date Influenza Vaccine 10/16/2013 Influenza Vaccine Donnita Saltness) 11/11/2014 Influenza Vaccine (Quad) PF 9/30/2015 TB Skin Test (PPD) Intradermal 1/27/2014 Tdap 11/11/2014 Not reviewed this visit You Were Diagnosed With   
  
 Codes Comments Shakiness    -  Primary ICD-10-CM: R25.1 ICD-9-CM: 781.0 Multiple neurological symptoms     ICD-10-CM: R29.90 ICD-9-CM: 781.99 Vitals BP Pulse Height(growth percentile) Weight(growth percentile) LMP BMI  
 120/84 (BP 1 Location: Left arm, BP Patient Position: Sitting) 96 5' 5\" (1.651 m) 142 lb 6.4 oz (64.6 kg) 11/27/2017 23.7 kg/m2 OB Status Smoking Status Having regular periods Former Smoker BMI and BSA Data Body Mass Index Body Surface Area  
 23.7 kg/m 2 1.72 m 2 Preferred Pharmacy Pharmacy Name Phone Addis Gay, [address] [facility] [phone] Your Updated Medication List  
  
   
This list is accurate as of: 12/4/17  2:27 PM.  Always use your most recent med list.  
  
  
  
  
 cloNIDine HCl 0.1 mg tablet Commonly known as:  CATAPRES  
take 1 tablet by mouth twice a day  
  
 famotidine 20 mg tablet Commonly known as:  PEPCID FLONASE 50 mcg/actuation nasal spray Generic drug:  fluticasone 2 Sprays by Both Nostrils route daily. LINZESS 290 mcg Cap capsule Generic drug:  linaclotide Take 290 mcg by mouth as needed. multivitamin tablet Commonly known as:  ONE A DAY Take 1 Tab by mouth daily. nadolol 40 mg tablet Commonly known as:  CORGARD Take 1 Tab by mouth daily. OMEGA 3 FISH OIL PO Take 300 mg by mouth daily. omeprazole 40 mg capsule Commonly known as:  PRILOSEC Take 40 mg by mouth daily. pantoprazole 40 mg tablet Commonly known as:  PROTONIX Take 1 Tab by mouth daily. VITAMIN D3 1,000 unit tablet Generic drug:  cholecalciferol Take  by mouth daily. We Performed the Following HEMOGLOBIN A1C WITH EAG [76648 CPT(R)] LYME AB/WESTERN BLOT REFLEX [id number] CPT(R)] T4, FREE [id number] CPT(R)] THYROID PEROXIDASE (TPO) AB [19131 CPT(R)] TSH 3RD GENERATION [88761 CPT(R)] TSH RECEPTOR AB [26192 CPT(R)] [facility] [place] [facility]! Dear Diamond Lozano: Thank you for requesting a Moped account. Our records indicate that you already have an active Moped account. You can access your account anytime at [email or web address] Did you know that you can access your hospital and ER discharge instructions at any time in Moped? You can also review all of your test results from your hospital stay or ER visit. Additional Information If you have questions, please visit the Frequently Asked Questions section of the [place] website at [email or web address],3 [email or web address] Remember, Intradigm Corporationhart is NOT to be used for urgent needs. For medical emergencies, dial 911. Now available from your iPhone and Android! Please provide this summary of care documentation to your next provider. Lyme Disease Testing Disclaimer:   
 § 97.0-2104.9. (Expires July 1, 2018) Lyme disease testing information disclosure. A. Every licensee or his in-office designee who orders a laboratory test for the presence of Lyme disease shall provide to the patient or his legal representative the following written information: \"ACCORDING TO THE CENTERS FOR DISEASE CONTROL AND PREVENTION, AS OF 2011 LYME DISEASE IS THE SIXTH FASTEST GROWING DISEASE IN THE [place]. YOUR HEALTH CARE PROVIDER HAS ORDERED A LABORATORY TEST FOR THE PRESENCE OF LYME DISEASE FOR YOU. CURRENT LABORATORY TESTING FOR LYME DISEASE CAN BE PROBLEMATIC AND STANDARD LABORATORY TESTS OFTEN RESULT IN FALSE NEGATIVE AND FALSE POSITIVE RESULTS, AND IF DONE TOO EARLY, YOU MAY NOT HAVE PRODUCED ENOUGH ANTIBODIES TO BE CONSIDERED POSITIVE BECAUSE YOUR IMMUNE RESPONSE REQUIRES TIME TO DEVELOP ANTIBODIES. IF YOU ARE TESTED FOR LYME DISEASE, AND THE RESULTS ARE NEGATIVE, THIS DOES NOT NECESSARILY MEAN YOU DO NOT HAVE LYME DISEASE. IF YOU CONTINUE TO EXPERIENCE SYMPTOMS, YOU SHOULD CONTACT YOUR HEALTH CARE PROVIDER AND INQUIRE ABOUT THE APPROPRIATENESS OF RETESTING OR ADDITIONAL TREATMENT. \"  
B. Licensees shall be immune from civil liability for the provision of the written information required by this section absent gross negligence or willful misconduct. Your primary care clinician is listed as Jeannette Villarreal. If you have any questions after today's visit, please call [phone].

## 2017-12-04 NOTE — PROGRESS NOTES
CONSULTATION REQUESTED BY: Raquel Romero MD     REASON FOR CONSULT: evaluation for shakiness    CHIEF COMPLAINT: \"on and off shakiness\"    HISTORY OF PRESENT ILLNESS:   Lidia Lewis is a 30 y.o. female with a PMHx as noted below who was referred to our endocrinology clinic for evaluation of shakiness. Patient notes that for the past year, she has felt shaky on and off. She reports she feels it on a daily basis. She reports some days she feels it all throughout the day. She reports its a bit of a vibration feeling, happens all over her body but mostly in her hands. Denies muscle cramping. Sometimes with occasional pins and needles along her face. She describes a history of postural orthostatic disease and follows with a cardiologist, and also gastroparesis without diabetes. Strong family history of thyroid disorders: mother had hyperthyroidism and a thyroid cancer, sister had hypothyroidism. Patient reports her thyroid function has generally been stable over the years. She reports normal periods, just a little heavier than usual, reports also a reversal of tubal ligation. Describes hx of mildly positive Ronny mountain spotted fever test, empirical therapy with doxy cycline. No clear hx of lyme disease. Patient reports she has seen a neurologist for the tremors/shakiness, reporting no diagnosis. Blood sugar on CMP's look normal,  TSH levels look normal,    She has been taking nadolol, but also clonidine PRN but has not taken in a long time. She admits to drinking coffee 2-3 times / day, both hot and iced coffee. AM and 1-2 PM. Has been drinking coffee for years but increased in the past 2 years.        PAST MEDICAL/SURGICAL HISTORY:   Past Medical History:   Diagnosis Date    Anemia     taking iron    Gastroparesis     GERD (gastroesophageal reflux disease)     gastroparesis--h-pylori    H/O Clostridium difficile infection 06/2012    C-Diff    Helicobacter pylori (H. pylori) 05/2012    h pylori  Palpitations     2010  - cardiac workup per pt. Past Surgical History:   Procedure Laterality Date    HX GI      EGD and colonoscopy    HX GYN  5/12    D&C    HX GYN      tubal reversal    HX OTHER SURGICAL      D&C x 2,  lap    TILT TABLE EVAL W/WO DRUG  1/21/2017            ALLERGIES:   Allergies   Allergen Reactions    Celexa [Citalopram] Nausea and Vomiting    Ciprofloxacin Shortness of Breath    Diflucan [Fluconazole] Rash     Burning sensation to skin    Sulfa (Sulfonamide Antibiotics) Rash       MEDICATIONS ON ADMISSION:     Current Outpatient Prescriptions:     famotidine (PEPCID) 20 mg tablet, , Disp: , Rfl: 0    omeprazole (PRILOSEC) 40 mg capsule, Take 40 mg by mouth daily. , Disp: , Rfl:     fluticasone (FLONASE) 50 mcg/actuation nasal spray, 2 Sprays by Both Nostrils route daily. , Disp: , Rfl:     cholecalciferol (VITAMIN D3) 1,000 unit tablet, Take  by mouth daily. , Disp: , Rfl:     OMEGA-3 FATTY ACIDS/FISH OIL (OMEGA 3 FISH OIL PO), Take 300 mg by mouth daily. , Disp: , Rfl:     multivitamin (ONE A DAY) tablet, Take 1 Tab by mouth daily. , Disp: , Rfl:     cloNIDine HCl (CATAPRES) 0.1 mg tablet, take 1 tablet by mouth twice a day, Disp: , Rfl: 0    pantoprazole (PROTONIX) 40 mg tablet, Take 1 Tab by mouth daily. , Disp: 30 Tab, Rfl: 0    nadolol (CORGARD) 40 mg tablet, Take 1 Tab by mouth daily. , Disp: 90 Tab, Rfl: 1    LINZESS 290 mcg cap capsule, Take 290 mcg by mouth as needed. , Disp: , Rfl: 0    SOCIAL HISTORY:   Social History     Social History    Marital status: SINGLE     Spouse name: N/A    Number of children: N/A    Years of education: N/A     Occupational History    Not on file.      Social History Main Topics    Smoking status: Former Smoker     Types: Cigarettes     Quit date: 11/21/2013    Smokeless tobacco: Never Used    Alcohol use 0.6 oz/week     1 Glasses of wine, 0 Standard drinks or equivalent per week      Comment: rare1    Drug use: No    Sexual activity: Yes     Partners: Male     Birth control/ protection: None     Other Topics Concern    Not on file     Social History Narrative    3 kids (16yo - 7mo), lives with boyfriend (but he travels for work)       FAMILY HISTORY:  Family History   Problem Relation Age of Onset    Heart Disease Mother     Stroke Mother     Cancer Mother     Heart Disease Father     Stroke Father     Heart Disease Maternal Grandmother 45       REVIEW OF SYSTEMS: Complete ROS assessed and noted for that which is described above, all else are negative. Eyes: normal  ENT: normal  CVS: normal  Resp: normal  GI: normal  : normal  GYN: normal  Endocrine: normal  Integument: normal  Musculoskeletal: normal  Neuro: normal  Psych: normal      PHYSICAL EXAMINATION:    VITAL SIGNS:  Visit Vitals    /84 (BP 1 Location: Left arm, BP Patient Position: Sitting)    Pulse 96    Ht 5' 5\" (1.651 m)    Wt 142 lb 6.4 oz (64.6 kg)    LMP 11/27/2017    BMI 23.7 kg/m2       GENERAL: NCAT, Sitting comfortably, NAD  EYES: EOMI, non-icteric, no proptosis  Ear/Nose/Throat: NCAT, no inflammation, no masses  LYMPH NODES: No LAD  CARDIOVASCULAR: S1 S2, RRR, No murmur, 2+ radial pulses  RESPIRATORY: CTA b/l, no wheeze/rales  GASTROINTESTINAL: soft, NT, ND,  MUSCULOSKELETAL: Normal ROM, no atrophy  SKIN: warm, no edema/rash/ or other skin changes  NEUROLOGIC: 5/5 power all extremities, no tremors, AAOx3  PSYCHIATRIC: Normal affect, Normal insight and judgement         REVIEW OF LABORATORY AND RADIOLOGY DATA:   Labs and documentation have been reviewed as described above. ASSESSMENT AND PLAN:   Kymberly Hernandez is a 30 y.o. female with a PMHx as noted above who was referred to our endocrinology clinic for evaluation of her shakiness.      Shakiness    After a good discussion we have come to the conclusion that there are no immediate explanations for her shakiness other than the 2-3 cups of coffee she is drinking each day, and the best start of a workup would be to have her reduce that to only 1/2 cup once in the AM only. Caffeine intake generally would explain some of her concerns. We discussed the low probability of having a glycemic or thyroid disorder with the prior lab history but the need to recheck, and include thyroid autoantibodies considering her family history of thyroid autoimmune disease. And finally we decided together it would be a good idea to check for lyme disease. It is very strange that she has gastroparesis without any underlying cause for it, however no current endocrine diagnosis is present for her to explain it. TSH/FT4/TRAb/TPO  Lyme Ab  A1c  Reduce coffee as advised * High suspicion regarding this,    Will plan for scheduling a follow up based on results,    Caroline Noonan.  [address] [facility]

## 2017-12-05 ENCOUNTER — TELEPHONE (OUTPATIENT)
Dept: FAMILY MEDICINE CLINIC | Age: 37
End: 2017-12-05

## 2017-12-06 LAB
B BURGDOR IGG+IGM SER-ACNC: <0.91 ISR (ref 0–0.9)
B BURGDOR IGM SER IA-ACNC: <0.8 INDEX (ref 0–0.79)
EST. AVERAGE GLUCOSE BLD GHB EST-MCNC: 94 MG/DL
HBA1C MFR BLD: 4.9 % (ref 4.8–5.6)
T4 FREE SERPL-MCNC: 1.26 NG/DL (ref 0.82–1.77)
THYROPEROXIDASE AB SERPL-ACNC: 17 IU/ML (ref 0–34)
TSH RECEP AB SER-ACNC: <0.5 IU/L (ref 0–1.75)
TSH SERPL DL<=0.005 MIU/L-ACNC: 0.81 UIU/ML (ref 0.45–4.5)

## 2017-12-06 NOTE — TELEPHONE ENCOUNTER
Spirometry essentially normal, please have her discuss shortness of breath with Dr. Thurston Look. Thanks.

## 2017-12-07 ENCOUNTER — TELEPHONE (OUTPATIENT)
Dept: ENDOCRINOLOGY | Age: 37
End: 2017-12-07

## 2017-12-07 NOTE — TELEPHONE ENCOUNTER
Patient verified her name and . Patient notified of spirometry results per Dr. Sally Newman. Patient verbalized understanding.

## 2017-12-07 NOTE — TELEPHONE ENCOUNTER
----- Message from Melida Durham MD sent at 12/7/2017 11:31 AM EST -----  Patients results received and reviewed: Thyroid function looks great,   Both thyroid antibodies are negative,   Her HbA1c is 4.9 which is very normal,   Her lyme disease test is normal,    Currently we do not have any findings to suggest an endocrine cause for her symptoms, many of which seem neurological in nature. She will not be require to follow up in the clinic, but she is welcome back in the future if needed. Delores Dennis.  [address] [facility]  [address] [place] [address]

## 2017-12-07 NOTE — TELEPHONE ENCOUNTER
I called Domingo Merrill and relayed the message from Dr. Dwight Fowler. She understood the information.   Prince Deras

## 2017-12-07 NOTE — PROGRESS NOTES
Patients results received and reviewed: Thyroid function looks great,   Both thyroid antibodies are negative,   Her HbA1c is 4.9 which is very normal,   Her lyme disease test is normal,    Currently we do not have any findings to suggest an endocrine cause for her symptoms, many of which seem neurological in nature. She will not be require to follow up in the clinic, but she is welcome back in the future if needed. Sean Massey.  [facility]  [address] [place] [address]

## 2017-12-21 ENCOUNTER — HOSPITAL ENCOUNTER (EMERGENCY)
Age: 37
Discharge: HOME | End: 2017-12-21
Attending: EMERGENCY MEDICINE | Admitting: EMERGENCY MEDICINE
Payer: MEDICAID

## 2017-12-21 VITALS
BODY MASS INDEX: 23.51 KG/M2 | HEIGHT: 65 IN | SYSTOLIC BLOOD PRESSURE: 117 MMHG | WEIGHT: 141.09 LBS | TEMPERATURE: 98.2 F | HEART RATE: 102 BPM | RESPIRATION RATE: 18 BRPM | DIASTOLIC BLOOD PRESSURE: 69 MMHG | OXYGEN SATURATION: 99 %

## 2017-12-21 DIAGNOSIS — R53.1 WEAKNESS: Primary | ICD-10-CM

## 2017-12-21 DIAGNOSIS — G90.A POTS (POSTURAL ORTHOSTATIC TACHYCARDIA SYNDROME): ICD-10-CM

## 2017-12-21 DIAGNOSIS — E87.6 HYPOKALEMIA: ICD-10-CM

## 2017-12-21 LAB
ALBUMIN SERPL-MCNC: 4.1 G/DL (ref 3.5–5)
ALBUMIN/GLOB SERPL: 1.2 {RATIO} (ref 1.1–2.2)
ALP SERPL-CCNC: 46 U/L (ref 45–117)
ALT SERPL-CCNC: 19 U/L (ref 12–78)
ANION GAP SERPL CALC-SCNC: 7 MMOL/L (ref 5–15)
APPEARANCE UR: CLEAR
AST SERPL-CCNC: 15 U/L (ref 15–37)
BACTERIA URNS QL MICRO: NEGATIVE /HPF
BILIRUB SERPL-MCNC: 0.8 MG/DL (ref 0.2–1)
BILIRUB UR QL: NEGATIVE
BUN SERPL-MCNC: 7 MG/DL (ref 6–20)
BUN/CREAT SERPL: 10 (ref 12–20)
CALCIUM SERPL-MCNC: 9.2 MG/DL (ref 8.5–10.1)
CHLORIDE SERPL-SCNC: 106 MMOL/L (ref 97–108)
CO2 SERPL-SCNC: 25 MMOL/L (ref 21–32)
COLOR UR: ABNORMAL
CREAT SERPL-MCNC: 0.7 MG/DL (ref 0.55–1.02)
EPITH CASTS URNS QL MICRO: ABNORMAL /LPF
ERYTHROCYTE [DISTWIDTH] IN BLOOD BY AUTOMATED COUNT: 12.3 % (ref 11.5–14.5)
GLOBULIN SER CALC-MCNC: 3.4 G/DL (ref 2–4)
GLUCOSE SERPL-MCNC: 97 MG/DL (ref 65–100)
GLUCOSE UR STRIP.AUTO-MCNC: NEGATIVE MG/DL
HCG UR QL: NEGATIVE
HCT VFR BLD AUTO: 37.5 % (ref 35–47)
HGB BLD-MCNC: 13.2 G/DL (ref 11.5–16)
HGB UR QL STRIP: ABNORMAL
KETONES UR QL STRIP.AUTO: NEGATIVE MG/DL
LEUKOCYTE ESTERASE UR QL STRIP.AUTO: ABNORMAL
MCH RBC QN AUTO: 32 PG (ref 26–34)
MCHC RBC AUTO-ENTMCNC: 35.2 G/DL (ref 30–36.5)
MCV RBC AUTO: 91 FL (ref 80–99)
NITRITE UR QL STRIP.AUTO: NEGATIVE
PH UR STRIP: 6 [PH] (ref 5–8)
PLATELET # BLD AUTO: 262 K/UL (ref 150–400)
POTASSIUM SERPL-SCNC: 3.2 MMOL/L (ref 3.5–5.1)
PROT SERPL-MCNC: 7.5 G/DL (ref 6.4–8.2)
PROT UR STRIP-MCNC: NEGATIVE MG/DL
RBC # BLD AUTO: 4.12 M/UL (ref 3.8–5.2)
RBC #/AREA URNS HPF: ABNORMAL /HPF (ref 0–5)
SODIUM SERPL-SCNC: 138 MMOL/L (ref 136–145)
SP GR UR REFRACTOMETRY: 1.01 (ref 1–1.03)
UA: UC IF INDICATED,UAUC: ABNORMAL
UROBILINOGEN UR QL STRIP.AUTO: 0.2 EU/DL (ref 0.2–1)
WBC # BLD AUTO: 5 K/UL (ref 3.6–11)
WBC URNS QL MICRO: ABNORMAL /HPF (ref 0–4)

## 2017-12-21 PROCEDURE — 81025 URINE PREGNANCY TEST: CPT

## 2017-12-21 PROCEDURE — 99285 EMERGENCY DEPT VISIT HI MDM: CPT

## 2017-12-21 PROCEDURE — 80053 COMPREHEN METABOLIC PANEL: CPT | Performed by: EMERGENCY MEDICINE

## 2017-12-21 PROCEDURE — 74011250636 HC RX REV CODE- 250/636: Performed by: EMERGENCY MEDICINE

## 2017-12-21 PROCEDURE — 96361 HYDRATE IV INFUSION ADD-ON: CPT

## 2017-12-21 PROCEDURE — 96360 HYDRATION IV INFUSION INIT: CPT

## 2017-12-21 PROCEDURE — 74011250637 HC RX REV CODE- 250/637: Performed by: EMERGENCY MEDICINE

## 2017-12-21 PROCEDURE — 81001 URINALYSIS AUTO W/SCOPE: CPT | Performed by: EMERGENCY MEDICINE

## 2017-12-21 PROCEDURE — 85027 COMPLETE CBC AUTOMATED: CPT | Performed by: EMERGENCY MEDICINE

## 2017-12-21 PROCEDURE — 36415 COLL VENOUS BLD VENIPUNCTURE: CPT | Performed by: EMERGENCY MEDICINE

## 2017-12-21 RX ORDER — POTASSIUM CHLORIDE 20 MEQ/1
40 TABLET, EXTENDED RELEASE ORAL
Status: COMPLETED | OUTPATIENT
Start: 2017-12-21 | End: 2017-12-21

## 2017-12-21 RX ADMIN — SODIUM CHLORIDE 1000 ML: 900 INJECTION, SOLUTION INTRAVENOUS at 06:28

## 2017-12-21 RX ADMIN — POTASSIUM CHLORIDE 40 MEQ: 20 TABLET, EXTENDED RELEASE ORAL at 07:43

## 2017-12-21 NOTE — DISCHARGE INSTRUCTIONS
Fatigue: Care Instructions  Your Care Instructions    Fatigue is a feeling of tiredness, exhaustion, or lack of energy. You may feel fatigue because of too much or not enough activity. It can also come from stress, lack of sleep, boredom, and poor diet. Many medical problems, such as viral infections, can cause fatigue. Emotional problems, especially depression, are often the cause of fatigue. Fatigue is most often a symptom of another problem. Treatment for fatigue depends on the cause. For example, if you have fatigue because you have a certain health problem, treating this problem also treats your fatigue. If depression or anxiety is the cause, treatment may help. Follow-up care is a key part of your treatment and safety. Be sure to make and go to all appointments, and call your doctor if you are having problems. It's also a good idea to know your test results and keep a list of the medicines you take. How can you care for yourself at home? · Get regular exercise. But don't overdo it. Go back and forth between rest and exercise. · Get plenty of rest.  · Eat a healthy diet. Do not skip meals, especially breakfast.  · Reduce your use of caffeine, tobacco, and alcohol. Caffeine is most often found in coffee, tea, cola drinks, and chocolate. · Limit medicines that can cause fatigue. This includes tranquilizers and cold and allergy medicines. When should you call for help? Watch closely for changes in your health, and be sure to contact your doctor if:  ? · You have new symptoms such as fever or a rash. ? · Your fatigue gets worse. ? · You have been feeling down, depressed, or hopeless. Or you may have lost interest in things that you usually enjoy. ? · You are not getting better as expected. Where can you learn more? Go to http://quintin-oh.info/. Enter J537 in the search box to learn more about \"Fatigue: Care Instructions. \"  Current as of: March 20, 2017  Content Version: 11.4  © 3482-4886 Healthwise, Incorporated. Care instructions adapted under license by [employer] (which disclaims liability or warranty for this information). If you have questions about a medical condition or this instruction, always ask your healthcare professional. [address] any warranty or liability for your use of this information. [employer] Activation    Thank you for requesting access to [facility]. Please follow the instructions below to securely access and download your online medical record. USDS allows you to send messages to your doctor, view your test results, renew your prescriptions, schedule appointments, and more. How Do I Sign Up? 1. In your internet browser, go to [email or web address]  2. Click on the First Time User? Click Here link in the Sign In box. You will be redirect to the New Member Sign Up page. 3. Enter your USDS Access Code exactly as it appears below. You will not need to use this code after youve completed the sign-up process. If you do not sign up before the expiration date, you must request a new code. USDS Access Code: Activation code not generated  Current USDS Status: Active (This is the date your USDS access code will )    4. Enter the last four digits of your Social Security Number (xxxx) and Date of Birth (mm/dd/yyyy) as indicated and click Submit. You will be taken to the next sign-up page. 5. Create a USDS ID. This will be your USDS login ID and cannot be changed, so think of one that is secure and easy to remember. 6. Create a USDS password. You can change your password at any time. 7. Enter your Password Reset Question and Answer. This can be used at a later time if you forget your password. 8. Enter your e-mail address. You will receive e-mail notification when new information is available in [address] 9. Click Sign Up. You can now view and download portions of your medical record.   10. Click the [facility] link to download a portable copy of your medical information. Additional Information    If you have questions, please visit the Frequently Asked Questions section of the [employer] website at [email or web address] Remember, [employer] is NOT to be used for urgent needs. For medical emergencies, dial 911.

## 2017-12-21 NOTE — LETTER
[address] 
[facility] EMERGENCY DEPT 
[address] [place] [address] [id number] [phone] Work/School Note Date: 12/21/2017 To Whom It May concern: 
 
Titus Skiff was seen and treated today in the emergency room by the following provider(s): 
Attending Provider: Antwon Camacho MD.   
 
Titus Skiff No work till 12/23/17 Sincerely, Antwon Camacho MD

## 2017-12-21 NOTE — ED NOTES
/CORINNE Asif  at bedside to provide discharge paperwork. Vital signs stable. Pt in no apparent distress at this time. Mental status at baseline. Ambulatory to waiting room with steady gate, discharge paperwork in hand. Refuses a wheelchair to the front.

## 2017-12-21 NOTE — ED NOTES
Report off to oncoming RN, [facility].   Updated on plan of care, MD at bedside

## 2017-12-21 NOTE — ED PROVIDER NOTES
EMERGENCY DEPARTMENT HISTORY AND PHYSICAL EXAM      Date: 12/21/2017  Patient Name: Mary Andersen    History of Presenting Illness     Chief Complaint   Patient presents with    Fatigue     pt ambulatory to triage. A/Ox4. \"i just feel weak and like i have no energy\" hx of POTS and gastroparesis.  Nasal Congestion     \"i have like cold symptoms. and my family has been sick recently\"    Constipation     only small hard BM, last BM: 1 week ago       History Provided By: Patient    HPI: Mary Andersen, 30 y.o. female with PMHx significant for palpitations, anemia, GERD, H pylori, C Diff, POTS, gastroparesis, presents ambulatory to the ED with cc of complaint of a few days of constant malaise and fatigue, rated mild to moderate in severity. Patient states she recently fell ill while taking care of her children with similar URI sx, which has worsened her CC. She c/o associated constipation, palpitations that are exacerbated with standing, dehydration, decreased fluid intake and R otalgia last week. Of note, pt skipped her Nadolol dose this morning. Pt specifically denies any urinary changes. She has had her TSH checked recently. Pt denies h/o DM. PCP: Marlen Lazo MD    There are no other complaints, changes, or physical findings at this time. Current Facility-Administered Medications   Medication Dose Route Frequency Provider Last Rate Last Dose    potassium chloride (K-DUR, KLOR-CON) SR tablet 40 mEq  40 mEq Oral NOW Shaggy Vegas MD         Current Outpatient Prescriptions   Medication Sig Dispense Refill    cloNIDine HCl (CATAPRES) 0.1 mg tablet take 1 tablet by mouth twice a day  0    famotidine (PEPCID) 20 mg tablet   0    omeprazole (PRILOSEC) 40 mg capsule Take 40 mg by mouth daily.  fluticasone (FLONASE) 50 mcg/actuation nasal spray 2 Sprays by Both Nostrils route daily.  pantoprazole (PROTONIX) 40 mg tablet Take 1 Tab by mouth daily.  30 Tab 0    nadolol (CORGARD) 40 mg tablet Take 1 Tab by mouth daily. 90 Tab 1    cholecalciferol (VITAMIN D3) 1,000 unit tablet Take  by mouth daily.  OMEGA-3 FATTY ACIDS/FISH OIL (OMEGA 3 FISH OIL PO) Take 300 mg by mouth daily.  multivitamin (ONE A DAY) tablet Take 1 Tab by mouth daily.  LINZESS 290 mcg cap capsule Take 290 mcg by mouth as needed. 0       Past History     Past Medical History:  Past Medical History:   Diagnosis Date    Anemia     taking iron    Gastroparesis     GERD (gastroesophageal reflux disease)     gastroparesis--h-pylori    H/O Clostridium difficile infection 06/2012    C-Diff    Helicobacter pylori (H. pylori) 05/2012    h pylori    Palpitations     2010  - cardiac workup per pt. Past Surgical History:  Past Surgical History:   Procedure Laterality Date    HX GI      EGD and colonoscopy    HX GYN  5/12    D&C    HX GYN      tubal reversal    HX OTHER SURGICAL      D&C x 2,  lap    TILT TABLE EVAL W/WO DRUG  1/21/2017            Family History:  Family History   Problem Relation Age of Onset    Heart Disease Mother     Stroke Mother     Cancer Mother     Heart Disease Father     Stroke Father     Heart Disease Maternal Grandmother 45       Social History:  Social History   Substance Use Topics    Smoking status: Former Smoker     Types: Cigarettes     Quit date: 11/21/2013    Smokeless tobacco: Never Used    Alcohol use 0.6 oz/week     1 Glasses of wine, 0 Standard drinks or equivalent per week      Comment: rare1       Allergies: Allergies   Allergen Reactions    Celexa [Citalopram] Nausea and Vomiting    Ciprofloxacin Shortness of Breath    Diflucan [Fluconazole] Rash     Burning sensation to skin    Sulfa (Sulfonamide Antibiotics) Rash         Review of Systems   Review of Systems   Constitutional: Positive for fatigue. Negative for chills and fever. Positive for dehydration and malaise   HENT: Positive for congestion and ear pain. Negative for rhinorrhea. Respiratory: Negative. Negative for cough, chest tightness and wheezing. Cardiovascular: Positive for palpitations. Negative for chest pain. Gastrointestinal: Positive for constipation. Negative for abdominal pain, nausea and vomiting. Endocrine: Negative. Genitourinary: Negative. Negative for decreased urine volume, difficulty urinating, dysuria, flank pain, hematuria and pelvic pain. Musculoskeletal: Negative. Negative for back pain and neck pain. Skin: Negative. Negative for color change, pallor and rash. Neurological: Negative. Negative for dizziness, seizures, weakness, numbness and headaches. Hematological: Negative. Negative for adenopathy. Psychiatric/Behavioral: Negative. All other systems reviewed and are negative. Physical Exam   Physical Exam   Constitutional: She is oriented to person, place, and time. She appears well-developed and well-nourished. No distress. HENT:   Head: Normocephalic and atraumatic. Mouth/Throat: No oropharyngeal exudate. Eyes: Conjunctivae are normal. Pupils are equal, round, and reactive to light. Right eye exhibits no discharge. Left eye exhibits no discharge. No scleral icterus. Neck: Normal range of motion. Neck supple. No JVD present. Cardiovascular: Regular rhythm, normal heart sounds and intact distal pulses. Tachycardia present. Exam reveals no gallop and no friction rub. No murmur heard. Pulmonary/Chest: Effort normal and breath sounds normal. No stridor. No respiratory distress. She has no wheezes. She has no rales. She exhibits no tenderness. Abdominal: Soft. Normal aorta and bowel sounds are normal. She exhibits no distension, no abdominal bruit, no pulsatile midline mass and no mass. There is no hepatosplenomegaly. There is no tenderness. There is no rebound, no guarding and no CVA tenderness. No hernia. Neurological: She is alert and oriented to person, place, and time. She displays normal reflexes.  No cranial nerve deficit. She exhibits normal muscle tone. Coordination normal.   Skin: Skin is warm. No rash noted. She is not diaphoretic. No pallor. Vitals reviewed. Diagnostic Study Results     Labs -     Recent Results (from the past 12 hour(s))   CBC W/O DIFF    Collection Time: 12/21/17  5:55 AM   Result Value Ref Range    WBC 5.0 3.6 - 11.0 K/uL    RBC 4.12 3.80 - 5.20 M/uL    HGB 13.2 11.5 - 16.0 g/dL    HCT 37.5 35.0 - 47.0 %    MCV 91.0 80.0 - 99.0 FL    MCH 32.0 26.0 - 34.0 PG    MCHC 35.2 30.0 - 36.5 g/dL    RDW 12.3 11.5 - 14.5 %    PLATELET 065 548 - 686 K/uL   METABOLIC PANEL, COMPREHENSIVE    Collection Time: 12/21/17  5:55 AM   Result Value Ref Range    Sodium 138 136 - 145 mmol/L    Potassium 3.2 (L) 3.5 - 5.1 mmol/L    Chloride 106 97 - 108 mmol/L    CO2 25 21 - 32 mmol/L    Anion gap 7 5 - 15 mmol/L    Glucose 97 65 - 100 mg/dL    BUN 7 6 - 20 MG/DL    Creatinine 0.70 0.55 - 1.02 MG/DL    BUN/Creatinine ratio 10 (L) 12 - 20      GFR est AA >60 >60 ml/min/1.73m2    GFR est non-AA >60 >60 ml/min/1.73m2    Calcium 9.2 8.5 - 10.1 MG/DL    Bilirubin, total 0.8 0.2 - 1.0 MG/DL    ALT (SGPT) 19 12 - 78 U/L    AST (SGOT) 15 15 - 37 U/L    Alk.  phosphatase 46 45 - 117 U/L    Protein, total 7.5 6.4 - 8.2 g/dL    Albumin 4.1 3.5 - 5.0 g/dL    Globulin 3.4 2.0 - 4.0 g/dL    A-G Ratio 1.2 1.1 - 2.2     HCG URINE, QL. - POC    Collection Time: 12/21/17  6:28 AM   Result Value Ref Range    Pregnancy test,urine (POC) NEGATIVE  NEG     URINALYSIS W/ REFLEX CULTURE    Collection Time: 12/21/17  6:30 AM   Result Value Ref Range    Color YELLOW/STRAW      Appearance CLEAR CLEAR      Specific gravity 1.009 1.003 - 1.030      pH (UA) 6.0 5.0 - 8.0      Protein NEGATIVE  NEG mg/dL    Glucose NEGATIVE  NEG mg/dL    Ketone NEGATIVE  NEG mg/dL    Bilirubin NEGATIVE  NEG      Blood LARGE (A) NEG      Urobilinogen 0.2 0.2 - 1.0 EU/dL    Nitrites NEGATIVE  NEG      Leukocyte Esterase TRACE (A) NEG      WBC 0-4 0 - 4 /hpf RBC 20-50 0 - 5 /hpf    Epithelial cells FEW FEW /lpf    Bacteria NEGATIVE  NEG /hpf    UA:UC IF INDICATED CULTURE NOT INDICATED BY UA RESULT CNI         Radiologic Studies -   No orders to display     CT Results  (Last 48 hours)    None        CXR Results  (Last 48 hours)    None            Medical Decision Making   I am the first provider for this patient. I reviewed the vital signs, available nursing notes, past medical history, past surgical history, family history and social history. Vital Signs-Reviewed the patient's vital signs. Patient Vitals for the past 12 hrs:   Temp Pulse Resp BP SpO2   12/21/17 0700 - 94 18 123/70 100 %   12/21/17 0645 - 98 14 123/69 100 %   12/21/17 0615 - (!) 114 15 136/70 100 %   12/21/17 0600 - (!) 108 17 126/71 100 %   12/21/17 0545 - 99 12 131/76 100 %   12/21/17 0537 98.2 °F (36.8 °C) 100 18 138/84 100 %       Pulse Oximetry Analysis - 100% on RA    Cardiac Monitor:   Rate: 120 bpm  Rhythm: Sinus Tachycardia      Records Reviewed: Nursing Notes and Old Medical Records    Provider Notes (Medical Decision Making):   DDx: orthostatic hypotension, POTS, anemia, electrolyte abnml, viral syndrome, drug withdrawal  Imp/plan: complex pt with h/o POTS and gastroparesis, to the ER with exposure to kids who have a viral syndrome. She feels dehydrated and feels her heart race. Will give IVF, check electrolytes. She has not taken her beta blocker today. If bp will tolerate, will give her a dose of that as well. expect discharge    ED Course:   Initial assessment performed. The patients presenting problems have been discussed, and they are in agreement with the care plan formulated and outlined with them. I have encouraged them to ask questions as they arise throughout their visit. 7:14 AM  On reevaluation, patient reports feeling much improved. IVF still infusing. Updated and counseled on available results, addressed questions. Patient expresses understanding and agrees with plan. Awaiting IVF, PO K and planned discharge. Written by Nani Carreon, ED Scribe, as dictated by Renny Cesar MD.       Critical Care Time: none    Disposition:    Discharge Note:  7:27 AM  The pt is ready for discharge. The pt's signs, symptoms, diagnosis, and discharge instructions have been discussed and pt has conveyed their understanding. The pt is to follow up as recommended or return to ER should their symptoms worsen. Plan has been discussed and pt is in agreement. PLAN:  1. Current Discharge Medication List        2. Follow-up Information     Follow up With Details Comments [address], [place] Schedule an appointment as soon as possible for a visit in 1 day  [address] [place] [address] 7  [phone]      [facility] EMERGENCY DEPT  If symptoms worsen [address]  [address] [facility]  [phone]        Return to ED if worse     Diagnosis     Clinical Impression:   1. Weakness    2. POTS (postural orthostatic tachycardia syndrome)    3. Hypokalemia        Attestations: This note is prepared by Nani Carreon, acting as Scribe for Renny Cesar MD.       The scribe's documentation has been prepared under my direction and personally reviewed by me in its entirety. I confirm that the note above accurately reflects all work, treatment, procedures, and medical decision making performed by me.

## 2017-12-21 NOTE — ED NOTES
Patient drover herself to the ED today with c/o improving nasal congestion and head stuffiness and continued fatigue for approx 4 days now. States that she is also constipated. Had a small,hard MD yesterday but also states that she has gastroparesis and that she has a BM about once/week. Denies nausea, vomiting, pain, urinary symptoms, cough, fever. Does have a history of POTS and has occassional dizziness and lightheadedness with positional changes but states that this is normal for her. Appetite is good, she states that she has been drinking plenty of fluids. She is A&O and ambulatory. Reports that she has had some sick kids at home whose physician diagnosed them with viral infections and stated to [facility] the infection run its course\", states that they too are feeling better.

## 2017-12-28 ENCOUNTER — TELEPHONE (OUTPATIENT)
Dept: ENDOCRINOLOGY | Age: 37
End: 2017-12-28

## 2017-12-28 DIAGNOSIS — R25.1 TREMOR: Primary | ICD-10-CM

## 2017-12-28 NOTE — TELEPHONE ENCOUNTER
Called patient,   Although very low probability of having excess catecholamines, we will check a serum free metanephrine in order to eliminate the possibility, as she has no other explanation at the present time for her symptoms. Theoretically a pheo can cause volume depletion resulting in some postural hypotension as well, an issue she deals with now. She will use the lab tray in [facility]. Will fax them the order. Ivan Pierre.  [address] [facility]  [place]

## 2018-01-13 LAB
METANEPH FREE SERPL-MCNC: 17 PG/ML (ref 0–62)
NORMETANEPHRINE SERPL-MCNC: 60 PG/ML (ref 0–145)

## 2018-01-15 NOTE — TELEPHONE ENCOUNTER
Labs reviewed: Serum metanephrines are normal. There is no evidence of excess catecholamine secretion from the adrenal gland, thus no evidence of pheochromocytoma. Message sent to patient as a \"[employer] Result Comment\", which associates with the relevant results. Heraclio Santillan.  [address] [place] [address] Endocrinology  [address] [place] [address]

## 2018-01-19 ENCOUNTER — OFFICE VISIT (OUTPATIENT)
Dept: FAMILY MEDICINE CLINIC | Age: 38
End: 2018-01-19

## 2018-01-19 VITALS
DIASTOLIC BLOOD PRESSURE: 83 MMHG | RESPIRATION RATE: 12 BRPM | HEIGHT: 65 IN | SYSTOLIC BLOOD PRESSURE: 128 MMHG | TEMPERATURE: 98.5 F | BODY MASS INDEX: 23.82 KG/M2 | WEIGHT: 143 LBS | HEART RATE: 91 BPM | OXYGEN SATURATION: 99 %

## 2018-01-19 DIAGNOSIS — J06.9 UPPER RESPIRATORY TRACT INFECTION, UNSPECIFIED TYPE: Primary | ICD-10-CM

## 2018-01-19 RX ORDER — ALBUTEROL SULFATE 90 UG/1
1 AEROSOL, METERED RESPIRATORY (INHALATION)
Qty: 1 INHALER | Refills: 0 | Status: SHIPPED | OUTPATIENT
Start: 2018-01-19 | End: 2018-11-23 | Stop reason: SDUPTHER

## 2018-01-19 NOTE — PROGRESS NOTES
.  Chief Complaint   Patient presents with    Sore Throat     started wed    Nasal Discharge    Chills     . [address] Noble    1. Have you been to the ER, urgent care clinic since your last visit? Hospitalized since your last visit? Yes     2. Have you seen or consulted any other health care providers outside of the [address] [place] [address] since your last visit? Include any pap smears or colon screening.  No    .80Hany Noble  \

## 2018-01-19 NOTE — PROGRESS NOTES
CHENG Latham is a 30 y.o. female who complains of congestion, sneezing, sore throat, nasal blockage, productive cough, headache, chills and generalized sinus pain for 3 days. She denies a history of chest pain, fatigue and fevers and denies a history of asthma. Does feel short of breath with coughing though. Patient does not smoke cigarettes. ROS:  Per HPI    Allergies   Allergen Reactions    Celexa [Citalopram] Nausea and Vomiting    Ciprofloxacin Shortness of Breath    Diflucan [Fluconazole] Rash     Burning sensation to skin    Sulfa (Sulfonamide Antibiotics) Rash           Past Medical History:   Diagnosis Date    Anemia     taking iron    Gastroparesis     GERD (gastroesophageal reflux disease)     gastroparesis--h-pylori    H/O Clostridium difficile infection 06/2012    C-Diff    Helicobacter pylori (H. pylori) 05/2012    h pylori    Palpitations     2010  - cardiac workup per pt. History   Smoking Status    Former Smoker    Types: Cigarettes    Quit date: 11/21/2013   Smokeless Tobacco    Never Used       Social History     Social History    Marital status: SINGLE     Spouse name: N/A    Number of children: N/A    Years of education: N/A     Social History Main Topics    Smoking status: Former Smoker     Types: Cigarettes     Quit date: 11/21/2013    Smokeless tobacco: Never Used    Alcohol use 0.6 oz/week     1 Glasses of wine, 0 Standard drinks or equivalent per week      Comment: rare1    Drug use: No    Sexual activity: Yes     Partners: Male     Birth control/ protection: None     Other Topics Concern    None     Social History Narrative    3 kids (14yo - 7mo), lives with boyfriend (but he travels for work)       Family History   Problem Relation Age of Onset    Heart Disease Mother     Stroke Mother     Cancer Mother     Heart Disease Father     Stroke Father     Heart Disease Maternal Grandmother 45         PE:   Visit Vitals    /83 (BP 1 Location: Left arm, BP Patient Position: Sitting)    Pulse 91    Temp 98.5 °F (36.9 °C)    Resp 12    Ht 5' 5\" (1.651 m)    Wt 143 lb (64.9 kg)    LMP 12/20/2017 (Exact Date)    SpO2 99%    BMI 23.8 kg/m2     Gen: alert, oriented, no acute distress  Head: normocephalic, atraumatic  Ears: external auditory canals clear, TMs normal bilaterally  Eyes:  sclera clear, conjunctiva clear  Nose: Sinuses nontender to palpation. Normal turbinates. No nasal drainage. Oral: moist mucus membranes, no oral lesions, no pharyngeal exudate or erythema  Neck:  No LAD  Resp: Normal work of breathing, lungs CTAB, no w/r/r  CV: S1, S2 normal.  No murmurs, rubs, or gallops. Results for orders placed or performed in visit on 01/19/18   AMB POC ANTHONY INFLUENZA A/B TEST   Result Value Ref Range    VALID INTERNAL CONTROL POC Yes     Influenza A Ag POC Negative Negative Pos/Neg    Influenza B Ag POC Negative Negative Pos/Neg       ASSESSMENT:   1. Upper respiratory tract infection, unspecified type          PLAN:  Symptomatic therapy suggested: push fluids, rest, use acetaminophen, ibuprofen prn and return office visit prn if symptoms persist or worsen. Lack of antibiotic effectiveness discussed with her. Call or return to clinic prn if these symptoms worsen or fail to improve as anticipated. Orders Placed This Encounter    AMB POC ANTHONY INFLUENZA A/B TEST    albuterol (PROVENTIL HFA, VENTOLIN HFA, PROAIR HFA) 90 mcg/actuation inhaler     Sig: Take 1 Puff by inhalation every four (4) hours as needed for Wheezing. Dispense:  1 Inhaler     Refill:  0     Pt requested albuterol inhaler for as needed use with cough and shortness of breath related to POTS. Blanca Rajiv for use with URI symptoms. Verbal and written instructions (see AVS) provided.  Patient expresses understanding of diagnosis and treatment plan.     Akila Lobato MD

## 2018-01-19 NOTE — MR AVS SNAPSHOT
[address] [facility] [address] 
 
 
 [address] 
[phone] Patient: Jasper Ballard MRN:  CLAUDIO:[id number] Visit Information Date & Time Provider Department Dept. Phone Encounter #  
 1/19/2018 10:15 AM Elida Gomez MD [address] [phone] [id number] Your Appointments 4/12/2018  9:20 AM  
ESTABLISHED PATIENT with Danna Sommer MD  
[facility] ([address]) Appt Note: Dr Silverio Bagley UP  
 [address]  [address] [place] [address]  
[address] [address] Upcoming Health Maintenance Date Due  
 PAP AKA CERVICAL CYTOLOGY 10/22/2018 DTaP/Tdap/Td series (2 - Td) 11/11/2024 Allergies as of 1/19/2018  Review Complete On: 1/19/2018 By: Elida Gomez MD  
  
 Severity Noted Reaction Type Reactions Celexa [Citalopram]  12/10/2015    Nausea and Vomiting Ciprofloxacin  11/21/2012    Shortness of Breath Diflucan [Fluconazole]  05/17/2016    Rash Burning sensation to skin  
 Sulfa (Sulfonamide Antibiotics)  03/25/2010    Rash Current Immunizations  Reviewed on 9/30/2015 Name Date Influenza Vaccine 10/16/2013 Influenza Vaccine Christopher Belfast) 11/11/2014 Influenza Vaccine (Quad) PF 9/30/2015 TB Skin Test (PPD) Intradermal 1/27/2014 Tdap 11/11/2014 Not reviewed this visit You Were Diagnosed With   
  
 Codes Comments Upper respiratory tract infection, unspecified type    -  Primary ICD-10-CM: J06.9 ICD-9-CM: 465.9 Vitals BP Pulse Temp Resp Height(growth percentile) Weight(growth percentile) 128/83 (BP 1 Location: Left arm, BP Patient Position: Sitting) 91 98.5 °F (36.9 °C) 12 5' 5\" (1.651 m) 143 lb (64.9 kg) LMP SpO2 BMI OB Status Smoking Status 12/20/2017 (Exact Date) 99% 23.8 kg/m2 Having regular periods Former Smoker Vitals History BMI and BSA Data Body Mass Index Body Surface Area  
 23.8 kg/m 2 1.73 m 2 Preferred Pharmacy Pharmacy Name Phone [facility] [phone] Your Updated Medication List  
  
   
This list is accurate as of: 1/19/18 10:29 AM.  Always use your most recent med list.  
  
  
  
  
 albuterol 90 mcg/actuation inhaler Commonly known as:  PROVENTIL HFA, VENTOLIN HFA, PROAIR HFA Take 1 Puff by inhalation every four (4) hours as needed for Wheezing. cloNIDine HCl 0.1 mg tablet Commonly known as:  CATAPRES  
take 1 tablet by mouth twice a day  
  
 famotidine 20 mg tablet Commonly known as:  PEPCID FLONASE 50 mcg/actuation nasal spray Generic drug:  fluticasone 2 Sprays by Both Nostrils route daily. LINZESS 290 mcg Cap capsule Generic drug:  linaclotide Take 290 mcg by mouth as needed. multivitamin tablet Commonly known as:  ONE A DAY Take 1 Tab by mouth daily. nadolol 40 mg tablet Commonly known as:  CORGARD Take 1 Tab by mouth daily. OMEGA 3 FISH OIL PO Take 300 mg by mouth daily. omeprazole 40 mg capsule Commonly known as:  PRILOSEC Take 40 mg by mouth daily. pantoprazole 40 mg tablet Commonly known as:  PROTONIX Take 1 Tab by mouth daily. VITAMIN D3 1,000 unit tablet Generic drug:  cholecalciferol Take  by mouth daily. Prescriptions Sent to Pharmacy Refills  
 albuterol (PROVENTIL HFA, VENTOLIN HFA, PROAIR HFA) 90 mcg/actuation inhaler 0 Sig: Take 1 Puff by inhalation every four (4) hours as needed for Wheezing. Class: Normal  
 Pharmacy: [facility] [facility] Ph #: [phone] Route: Inhalation We Performed the Following AMB POC ANTHONY INFLUENZA A/B TEST [94875 CPT(R)] Patient Instructions [facility] [address], [address] [place], [address] 
 phone [phone] Introducing [facility]! Dear Treasure Arroyo: Thank you for requesting a [employer] account. Our records indicate that you already have an active [employer] account. You can access your account anytime at [email or web address] Did you know that you can access your hospital and ER discharge instructions at any time in [employer]? You can also review all of your test results from your hospital stay or ER visit. Additional Information If you have questions, please visit the Frequently Asked Questions section of the [employer] website at [email or web address] Remember, [employer] is NOT to be used for urgent needs. For medical emergencies, dial 911. Now available from your iPhone and Android! Please provide this summary of care documentation to your next provider. Your primary care clinician is listed as Ian Denise. If you have any questions after today's visit, please call [phone].

## 2018-01-19 NOTE — PATIENT INSTRUCTIONS
[facility]   [address] [facility], [address] [facility], [address]   phone [phone]

## 2018-01-22 ENCOUNTER — TELEPHONE (OUTPATIENT)
Dept: FAMILY MEDICINE CLINIC | Age: 38
End: 2018-01-22

## 2018-01-22 DIAGNOSIS — J06.9 UPPER RESPIRATORY TRACT INFECTION, UNSPECIFIED TYPE: Primary | ICD-10-CM

## 2018-01-22 RX ORDER — AMOXICILLIN 500 MG/1
500 CAPSULE ORAL 2 TIMES DAILY
Qty: 20 CAP | Refills: 0 | Status: SHIPPED | OUTPATIENT
Start: 2018-01-22 | End: 2018-02-01

## 2018-01-22 NOTE — TELEPHONE ENCOUNTER
Patient notified antibiotic sent in. If not better after, follow up. Patient verbalized understanding.

## 2018-01-22 NOTE — TELEPHONE ENCOUNTER
Patient calling to get a prescription for regular Amoxicillin. She is still not feeling any better and was told by Dr Josselin Gardner to give us a call if she wasn't and she would call something in.

## 2018-01-23 ENCOUNTER — OFFICE VISIT (OUTPATIENT)
Dept: FAMILY MEDICINE CLINIC | Age: 38
End: 2018-01-23

## 2018-01-23 VITALS
HEIGHT: 65 IN | RESPIRATION RATE: 16 BRPM | DIASTOLIC BLOOD PRESSURE: 87 MMHG | BODY MASS INDEX: 23.82 KG/M2 | HEART RATE: 88 BPM | TEMPERATURE: 98.1 F | OXYGEN SATURATION: 98 % | SYSTOLIC BLOOD PRESSURE: 130 MMHG | WEIGHT: 143 LBS

## 2018-01-23 DIAGNOSIS — J06.9 UPPER RESPIRATORY TRACT INFECTION, UNSPECIFIED TYPE: Primary | ICD-10-CM

## 2018-01-23 NOTE — PROGRESS NOTES
CHENG Bond is a 30 y.o. female who complains of congestion, sneezing, sore throat and productive cough for 7 days. She denies a history of chest pain, chills, fevers, nausea and vomiting and denies a history of asthma. Patient does not smoke cigarettes. Patient evaluated last week for similar symptoms on day 2 of illness. Advised was likely viral.  Since that time she has gotten worse, mainly with sinus pressure in her face. ROS:  Per HPI    Allergies   Allergen Reactions    Celexa [Citalopram] Nausea and Vomiting    Ciprofloxacin Shortness of Breath    Diflucan [Fluconazole] Rash     Burning sensation to skin    Sulfa (Sulfonamide Antibiotics) Rash       Outpatient Prescriptions Marked as Taking for the 1/23/18 encounter (Office Visit) with Oleksandr Rodriguez MD   Medication Sig Dispense Refill    amoxicillin (AMOXIL) 500 mg capsule Take 1 Cap by mouth two (2) times a day for 10 days. 20 Cap 0    albuterol (PROVENTIL HFA, VENTOLIN HFA, PROAIR HFA) 90 mcg/actuation inhaler Take 1 Puff by inhalation every four (4) hours as needed for Wheezing. 1 Inhaler 0    cloNIDine HCl (CATAPRES) 0.1 mg tablet take 1 tablet by mouth twice a day  0    famotidine (PEPCID) 20 mg tablet   0    omeprazole (PRILOSEC) 40 mg capsule Take 40 mg by mouth daily.  fluticasone (FLONASE) 50 mcg/actuation nasal spray 2 Sprays by Both Nostrils route daily.  pantoprazole (PROTONIX) 40 mg tablet Take 1 Tab by mouth daily. 30 Tab 0    nadolol (CORGARD) 40 mg tablet Take 1 Tab by mouth daily. 90 Tab 1    cholecalciferol (VITAMIN D3) 1,000 unit tablet Take  by mouth daily.  OMEGA-3 FATTY ACIDS/FISH OIL (OMEGA 3 FISH OIL PO) Take 300 mg by mouth daily.  multivitamin (ONE A DAY) tablet Take 1 Tab by mouth daily.  LINZESS 290 mcg cap capsule Take 290 mcg by mouth as needed.   0       Past Medical History:   Diagnosis Date    Anemia     taking iron    Gastroparesis     GERD (gastroesophageal reflux disease)     gastroparesis--h-pylori    H/O Clostridium difficile infection 06/2012    C-Diff    Helicobacter pylori (H. pylori) 05/2012    h pylori    Palpitations     2010  - cardiac workup per pt. History   Smoking Status    Former Smoker    Types: Cigarettes    Quit date: 11/21/2013   Smokeless Tobacco    Never Used       Social History     Social History    Marital status: SINGLE     Spouse name: N/A    Number of children: N/A    Years of education: N/A     Social History Main Topics    Smoking status: Former Smoker     Types: Cigarettes     Quit date: 11/21/2013    Smokeless tobacco: Never Used    Alcohol use 0.6 oz/week     1 Glasses of wine, 0 Standard drinks or equivalent per week      Comment: rare1    Drug use: No    Sexual activity: Yes     Partners: Male     Birth control/ protection: None     Other Topics Concern    None     Social History Narrative    3 kids (16yo - 7mo), lives with boyfriend (but he travels for work)       Family History   Problem Relation Age of Onset    Heart Disease Mother     Stroke Mother     Cancer Mother     Heart Disease Father     Stroke Father     Heart Disease Maternal Grandmother 45         PE: Visit Vitals    /87 (BP 1 Location: Left arm, BP Patient Position: Sitting)    Pulse 88    Temp 98.1 °F (36.7 °C) (Oral)    Resp 16    Ht 5' 5\" (1.651 m)    Wt 143 lb (64.9 kg)    SpO2 98%    BMI 23.8 kg/m2     Gen: alert, oriented, no acute distress  Head: normocephalic, atraumatic  Ears: external auditory canals clear, L TMs normal, R TM blocked by wax in ear canal.   Eyes:  sclera clear, conjunctiva clear  Nose: Maxillary sinuses tender to palpation. Normal turbinates. No nasal drainage. Oral: moist mucus membranes, no oral lesions, no pharyngeal exudate or erythema  Neck:  No LAD  Resp: Normal work of breathing, lungs CTAB, no w/r/r  CV: S1, S2 normal.  No murmurs, rubs, or gallops. ASSESSMENT:   1.  Upper respiratory tract infection, unspecified type          PLAN:  Symptomatic therapy suggested: push fluids, rest and return office visit prn if symptoms persist or worsen. Call or return to clinic prn if these symptoms worsen or fail to improve as anticipated. Amoxicillin sent in yesterday for symptoms. Take this through Friday, call if no improvement by Friday morning and will switch to doxycycline. Verbal instructions provided.  Patient expresses understanding of diagnosis and treatment plan.     Kyleigh Tracey MD

## 2018-01-23 NOTE — PROGRESS NOTES
Chief Complaint   Patient presents with    Cough    Nasal Congestion    Sinus Pain     Patient is here to be rechecked for worsening of symptoms.

## 2018-02-13 RX ORDER — CETIRIZINE HCL 10 MG
TABLET ORAL
Qty: 30 TAB | Refills: 5 | Status: SHIPPED | OUTPATIENT
Start: 2018-02-13 | End: 2019-03-19 | Stop reason: ALTCHOICE

## 2018-02-16 ENCOUNTER — OFFICE VISIT (OUTPATIENT)
Dept: FAMILY MEDICINE CLINIC | Age: 38
End: 2018-02-16

## 2018-02-16 VITALS
SYSTOLIC BLOOD PRESSURE: 141 MMHG | TEMPERATURE: 98 F | HEIGHT: 65 IN | HEART RATE: 96 BPM | DIASTOLIC BLOOD PRESSURE: 95 MMHG | OXYGEN SATURATION: 99 % | RESPIRATION RATE: 14 BRPM | BODY MASS INDEX: 23.82 KG/M2 | WEIGHT: 143 LBS

## 2018-02-16 DIAGNOSIS — R35.0 URINARY FREQUENCY: Primary | ICD-10-CM

## 2018-02-16 LAB
BILIRUB UR QL STRIP: NEGATIVE
GLUCOSE UR-MCNC: NEGATIVE MG/DL
KETONES P FAST UR STRIP-MCNC: NEGATIVE MG/DL
PH UR STRIP: 7.5 [PH] (ref 4.6–8)
PROT UR QL STRIP: NEGATIVE
SP GR UR STRIP: 1.01 (ref 1–1.03)
UA UROBILINOGEN AMB POC: NORMAL (ref 0.2–1)
URINALYSIS CLARITY POC: CLEAR
URINALYSIS COLOR POC: YELLOW
URINE BLOOD POC: NORMAL
URINE LEUKOCYTES POC: NEGATIVE
URINE NITRITES POC: NEGATIVE

## 2018-02-16 RX ORDER — CEPHALEXIN 500 MG/1
500 CAPSULE ORAL 2 TIMES DAILY
Qty: 14 CAP | Refills: 0 | Status: SHIPPED | OUTPATIENT
Start: 2018-02-16 | End: 2018-02-16 | Stop reason: SDUPTHER

## 2018-02-16 RX ORDER — CEPHALEXIN 500 MG/1
500 CAPSULE ORAL 2 TIMES DAILY
Qty: 14 CAP | Refills: 0 | Status: SHIPPED | OUTPATIENT
Start: 2018-02-16 | End: 2018-02-23

## 2018-02-16 NOTE — PROGRESS NOTES
Chief Complaint   Patient presents with    Bladder Infection     Patient is here to be seen for frequent urination and pressure in lower abdomen.

## 2018-02-16 NOTE — PROGRESS NOTES
Urinary Tract Infection  Patient complains of irritative voiding symptoms including  frequency, and urgency. Onset was 2 days ago, gradually worsening since that time. Patient complains of back pain, stomach ache and nausea. Patient denies fever and vaginal discharge and vomiting. Patient does not have a history of pyelonephritis, recurrent UTIs, or kidney stones.     ROS:  Constitutional: per HPI  Respiratory: negative for cough or dyspnea on exertion  Cardiovascular: negative for chest pain, dyspnea, palpitations  Gastrointestinal: negative for nausea, vomiting, change in bowel habits, diarrhea and abdominal pain  Genitourinary: see HPI      Allergies   Allergen Reactions    Celexa [Citalopram] Nausea and Vomiting    Ciprofloxacin Shortness of Breath    Diflucan [Fluconazole] Rash     Burning sensation to skin    Sulfa (Sulfonamide Antibiotics) Rash      Social History     Social History    Marital status: SINGLE     Spouse name: N/A    Number of children: N/A    Years of education: N/A     Social History Main Topics    Smoking status: Former Smoker     Types: Cigarettes     Quit date: 11/21/2013    Smokeless tobacco: Never Used    Alcohol use 0.6 oz/week     1 Glasses of wine, 0 Standard drinks or equivalent per week      Comment: rare1    Drug use: No    Sexual activity: Yes     Partners: Male     Birth control/ protection: None     Other Topics Concern    None     Social History Narrative    3 kids (14yo - 7mo), lives with boyfriend (but he travels for work)      Family History   Problem Relation Age of Onset    Heart Disease Mother     Stroke Mother     Cancer Mother     Heart Disease Father     Stroke Father     Heart Disease Maternal Grandmother 45      Past Surgical History:   Procedure Laterality Date    HX GI      EGD and colonoscopy    HX GYN  5/12    D&C    HX GYN      tubal reversal    HX OTHER SURGICAL      D&C x 2,  lap    TILT TABLE EVAL W/WO DRUG  1/21/2017           Past Medical History:   Diagnosis Date    Anemia     taking iron    Gastroparesis     GERD (gastroesophageal reflux disease)     gastroparesis--h-pylori    H/O Clostridium difficile infection 06/2012    C-Diff    Helicobacter pylori (H. pylori) 05/2012    h pylori    Palpitations     2010  - cardiac workup per pt. Current Outpatient Prescriptions   Medication Sig Dispense Refill    cephALEXin (KEFLEX) 500 mg capsule Take 1 Cap by mouth two (2) times a day for 7 days. 14 Cap 0    cetirizine (ZYRTEC) 10 mg tablet TAKE 1 TABLET BY MOUTH DAILY FOR ALLERGIES 30 Tab 5    albuterol (PROVENTIL HFA, VENTOLIN HFA, PROAIR HFA) 90 mcg/actuation inhaler Take 1 Puff by inhalation every four (4) hours as needed for Wheezing. 1 Inhaler 0    cloNIDine HCl (CATAPRES) 0.1 mg tablet take 1 tablet by mouth twice a day  0    famotidine (PEPCID) 20 mg tablet   0    omeprazole (PRILOSEC) 40 mg capsule Take 40 mg by mouth daily.  fluticasone (FLONASE) 50 mcg/actuation nasal spray 2 Sprays by Both Nostrils route daily.  pantoprazole (PROTONIX) 40 mg tablet Take 1 Tab by mouth daily. 30 Tab 0    nadolol (CORGARD) 40 mg tablet Take 1 Tab by mouth daily. 90 Tab 1    cholecalciferol (VITAMIN D3) 1,000 unit tablet Take  by mouth daily.  OMEGA-3 FATTY ACIDS/FISH OIL (OMEGA 3 FISH OIL PO) Take 300 mg by mouth daily.  multivitamin (ONE A DAY) tablet Take 1 Tab by mouth daily.  LINZESS 290 mcg cap capsule Take 290 mcg by mouth as needed. 0          Objective:     Vitals:    02/16/18 1142   BP: (!) 141/95   Pulse: 96   Resp: 14   Temp: 98 °F (36.7 °C)   TempSrc: Oral   SpO2: 99%   Weight: 143 lb (64.9 kg)   Height: 5' 5\" (1.651 m)       Physical Examination:   Gen - Appears well, in no apparent distress. CV - rrr, no m/r/g  Resp - CTAB  Abd - soft without tenderness, guarding, mass, rebound or organomegaly.  No CVA tenderness     Results for orders placed or performed in visit on 02/16/18   AMB POC URINALYSIS DIP STICK AUTO W/O MICRO   Result Value Ref Range    Color (UA POC) Yellow     Clarity (UA POC) Clear     Glucose (UA POC) Negative Negative    Bilirubin (UA POC) Negative Negative    Ketones (UA POC) Negative Negative    Specific gravity (UA POC) 1.010 1.001 - 1.035    Blood (UA POC) 2+ Negative    pH (UA POC) 7.5 4.6 - 8.0    Protein (UA POC) Negative Negative    Urobilinogen (UA POC) 0.2 mg/dL 0.2 - 1    Nitrites (UA POC) Negative Negative    Leukocyte esterase (UA POC) Negative Negative         Assessment/ Plan:   Diagnoses and all orders for this visit:    1. Urinary frequency  -     AMB POC URINALYSIS DIP STICK AUTO W/O MICRO  -     CULTURE, URINE  -     cephALEXin (KEFLEX) 500 mg capsule; Take 1 Cap by mouth two (2) times a day for 7 days. treat given symptoms. Send for culture. Pt is on menstrual cycle, so blood is likely 2/2 to this. Reviewed signs/sx to go to [facility] for over the weekend: fever, worsening back pain, nausea, vomiting. I have discussed the diagnosis with the patient and the intended plan as seen in the above orders. The patient verbalizes understanding and agreement with the plan. MD Va Britt.  MD

## 2018-02-17 LAB — BACTERIA UR CULT: NORMAL

## 2018-02-20 NOTE — PROGRESS NOTES
Ms. Anne Lees urine did not show an infection. Please follow up in office if your symptoms persist.  You may stop taking the antibiotic as there is no infection to treat.

## 2018-03-06 ENCOUNTER — OFFICE VISIT (OUTPATIENT)
Dept: FAMILY MEDICINE CLINIC | Age: 38
End: 2018-03-06

## 2018-03-06 VITALS
WEIGHT: 142 LBS | OXYGEN SATURATION: 99 % | RESPIRATION RATE: 14 BRPM | TEMPERATURE: 98.2 F | DIASTOLIC BLOOD PRESSURE: 84 MMHG | HEIGHT: 65 IN | HEART RATE: 112 BPM | BODY MASS INDEX: 23.66 KG/M2 | SYSTOLIC BLOOD PRESSURE: 134 MMHG

## 2018-03-06 DIAGNOSIS — J06.9 URI WITH COUGH AND CONGESTION: Primary | ICD-10-CM

## 2018-03-06 DIAGNOSIS — J39.2 THROAT IRRITATION: ICD-10-CM

## 2018-03-06 DIAGNOSIS — R05.3 CHRONIC COUGH: ICD-10-CM

## 2018-03-06 DIAGNOSIS — R09.89 CHEST CONGESTION: ICD-10-CM

## 2018-03-06 NOTE — PROGRESS NOTES
HPI  Lizzeth Angelo is a 30 y.o. female who presents with congestion, cough. Started 4 days ago. ok sleep, drinking fluids. Other symptoms include SOB. Denies fever, wheezing. Tried albuterol this morning with questionable relief. Taking flonase daily as a matter orf course    Has a question about more chronic issue, throat irritation, chronic cough. Has seen ENT for the throat and referred to GI. Evidently had some yeast esophagitis in past so tried on nystatin which did not provide releif. This was years ago. Since that workup has developed a more chronic daily cough and throat irritation. omeprazole not providing relief. PMHx:  Past Medical History:   Diagnosis Date    Anemia     taking iron    Gastroparesis     GERD (gastroesophageal reflux disease)     gastroparesis--h-pylori    H/O Clostridium difficile infection 06/2012    C-Diff    Helicobacter pylori (H. pylori) 05/2012    h pylori    Palpitations     2010  - cardiac workup per pt. Meds:   Current Outpatient Prescriptions   Medication Sig Dispense Refill    cetirizine (ZYRTEC) 10 mg tablet TAKE 1 TABLET BY MOUTH DAILY FOR ALLERGIES 30 Tab 5    albuterol (PROVENTIL HFA, VENTOLIN HFA, PROAIR HFA) 90 mcg/actuation inhaler Take 1 Puff by inhalation every four (4) hours as needed for Wheezing. 1 Inhaler 0    cloNIDine HCl (CATAPRES) 0.1 mg tablet take 1 tablet by mouth twice a day  0    famotidine (PEPCID) 20 mg tablet   0    omeprazole (PRILOSEC) 40 mg capsule Take 40 mg by mouth daily.  fluticasone (FLONASE) 50 mcg/actuation nasal spray 2 Sprays by Both Nostrils route daily.  nadolol (CORGARD) 40 mg tablet Take 1 Tab by mouth daily. 90 Tab 1    cholecalciferol (VITAMIN D3) 1,000 unit tablet Take  by mouth daily.  OMEGA-3 FATTY ACIDS/FISH OIL (OMEGA 3 FISH OIL PO) Take 300 mg by mouth daily.  multivitamin (ONE A DAY) tablet Take 1 Tab by mouth daily.  LINZESS 290 mcg cap capsule Take 290 mcg by mouth as needed. 0       Allergies: Allergies   Allergen Reactions    Celexa [Citalopram] Nausea and Vomiting    Ciprofloxacin Shortness of Breath    Diflucan [Fluconazole] Rash     Burning sensation to skin    Sulfa (Sulfonamide Antibiotics) Rash       Smoker:  History   Smoking Status    Former Smoker    Types: Cigarettes    Quit date: 11/21/2013   Smokeless Tobacco    Never Used       ETOH:   History   Alcohol Use    0.6 oz/week    1 Glasses of wine, 0 Standard drinks or equivalent per week     Comment: rare1       FH:   Family History   Problem Relation Age of Onset    Heart Disease Mother     Stroke Mother     Cancer Mother     Heart Disease Father     Stroke Father     Heart Disease Maternal Grandmother 38        ROS:  As listed in HPI. In addition:  Constitutional:   No headache, fever, fatigue, weight loss or weight gain      Eyes:   No redness, pruritis, pain, visual changes, swelling, or discharge      Ears:    No pain, loss or changes in hearing     Cardiac:    No chest pain      Resp:   No shortness of breath or wheeze     Neuro   No loss of consciousness, dizziness, seizure    Physical Exam:  Blood pressure 134/84, pulse (!) 112, temperature 98.2 °F (36.8 °C), resp. rate 14, height 5' 5\" (1.651 m), weight 142 lb (64.4 kg), last menstrual period 02/16/2018, SpO2 99 %, not currently breastfeeding. GEN: No apparent distress. EYES:  Conjunctiva clear  EAR: TM are clear and without effusion. NOSE: Turbinates are congested  OROPHYARYNX: No erythema or tonsilar exudates  NECK:  Submandibular LAD. Non tender         LUNGS: Respirations unlabored; clear to auscultation bilaterally. No wheeze  CARDIOVASCULAR: Regular, rate, and rhythm  ABDOMEN: Soft; nontender;nl BS  SKIN: No obvious rashes.        Assessment and Plan     Viral URI  Expect ssx to last 6-7  days  Supportive care is best, discussed OTC remedies  Nasal steroid OTC for congestion  Honey in warm water for cough    Chronic cough  Asthma on the list but seems unlikely given no wheeze today. Because of mouth and throat ssx will suggest ENT as next step working up chronic cough. If no answers there try pulm    RTC if ssx worsen or fail to improve as expected    Received consult note from ENT. She brought up tongue pain but did not mention her cough which was the reason for referral.  Seemed to fixated on the tongue pain. They are trying klax solution      ICD-10-CM ICD-9-CM    1. URI with cough and congestion J06.9 465.9    2. Chest congestion R09.89 786.9 AMB POC ANTHONY INFLUENZA A/B TEST   3. Chronic cough R05 786.2    4. Throat irritation J39.2 478.29        AVS given.  Pt expressed understanding of instructions

## 2018-03-06 NOTE — PROGRESS NOTES
.  Chief Complaint   Patient presents with    Cough    Nasal Congestion    Shortness of Breath     . Louie Campbell

## 2018-03-09 ENCOUNTER — TELEPHONE (OUTPATIENT)
Dept: FAMILY MEDICINE CLINIC | Age: 38
End: 2018-03-09

## 2018-03-09 DIAGNOSIS — J06.9 URI WITH COUGH AND CONGESTION: Primary | ICD-10-CM

## 2018-03-09 RX ORDER — AMOXICILLIN 875 MG/1
875 TABLET, FILM COATED ORAL 2 TIMES DAILY
Qty: 20 TAB | Refills: 0 | Status: SHIPPED | OUTPATIENT
Start: 2018-03-09 | End: 2018-03-09 | Stop reason: SDUPTHER

## 2018-03-09 RX ORDER — AMOXICILLIN 875 MG/1
875 TABLET, FILM COATED ORAL 2 TIMES DAILY
Qty: 20 TAB | Refills: 0 | Status: SHIPPED | OUTPATIENT
Start: 2018-03-09 | End: 2018-03-19

## 2018-03-09 NOTE — TELEPHONE ENCOUNTER
See from previous phone messages that Augmentin is too big for her to swallow. She did not take Keflex for some reason.   Will go ahead and send in  amoxicillin

## 2018-04-12 ENCOUNTER — OFFICE VISIT (OUTPATIENT)
Dept: CARDIOLOGY CLINIC | Age: 38
End: 2018-04-12

## 2018-04-12 VITALS
HEIGHT: 65 IN | BODY MASS INDEX: 24.16 KG/M2 | DIASTOLIC BLOOD PRESSURE: 80 MMHG | HEART RATE: 88 BPM | SYSTOLIC BLOOD PRESSURE: 118 MMHG | WEIGHT: 145 LBS

## 2018-04-12 DIAGNOSIS — G90.A POTS (POSTURAL ORTHOSTATIC TACHYCARDIA SYNDROME): Primary | ICD-10-CM

## 2018-04-12 NOTE — MR AVS SNAPSHOT
[address] [facility] 
[phone] Patient: Liv Malone MRN:  [id number] Visit Information Date & Time Provider Department Dept. Phone Encounter #  
 4/12/2018  9:20 AM Lauren Gonzales MD [facility] [place] [phone] [id number] Follow-up Instructions Return in about 6 months (around 10/12/2018). Upcoming Health Maintenance Date Due  
 PAP AKA CERVICAL CYTOLOGY 10/22/2018 DTaP/Tdap/Td series (2 - Td) 11/11/2024 Allergies as of 4/12/2018  Review Complete On: 4/12/2018 By: Lauren Gonzales MD  
  
 Severity Noted Reaction Type Reactions Celexa [Citalopram]  12/10/2015    Nausea and Vomiting Ciprofloxacin  11/21/2012    Shortness of Breath Diflucan [Fluconazole]  05/17/2016    Rash Burning sensation to skin  
 Sulfa (Sulfonamide Antibiotics)  03/25/2010    Rash Current Immunizations  Reviewed on 9/30/2015 Name Date Influenza Vaccine 10/16/2013 Influenza Vaccine Lazara Saber) 11/11/2014 Influenza Vaccine (Quad) PF 9/30/2015 TB Skin Test (PPD) Intradermal 1/27/2014 Tdap 11/11/2014 Not reviewed this visit You Were Diagnosed With   
  
 Codes Comments POTS (postural orthostatic tachycardia syndrome)    -  Primary ICD-10-CM: R00.0, I95.1 ICD-9-CM: 427.89 Vitals BP Pulse Height(growth percentile) Weight(growth percentile) BMI OB Status 118/80 (BP 1 Location: Left arm, BP Patient Position: Sitting) 88 5' 5\" (1.651 m) 145 lb (65.8 kg) 24.13 kg/m2 Having regular periods Smoking Status Former Smoker Vitals History BMI and BSA Data Body Mass Index Body Surface Area  
 24.13 kg/m 2 1.74 m 2 Preferred Pharmacy Pharmacy Name Phone [facility] [address] [phone] Your Updated Medication List  
  
   
 This list is accurate as of 4/12/18  9:51 AM.  Always use your most recent med list.  
  
  
  
  
 albuterol 90 mcg/actuation inhaler Commonly known as:  PROVENTIL HFA, VENTOLIN HFA, PROAIR HFA Take 1 Puff by inhalation every four (4) hours as needed for Wheezing. cetirizine 10 mg tablet Commonly known as:  ZYRTEC  
TAKE 1 TABLET BY MOUTH DAILY FOR ALLERGIES  
  
 famotidine 20 mg tablet Commonly known as:  PEPCID Take 20 mg by mouth nightly. FLONASE 50 mcg/actuation nasal spray Generic drug:  fluticasone 2 Sprays by Both Nostrils route as needed. LINZESS 290 mcg Cap capsule Generic drug:  linaclotide Take 290 mcg by mouth as needed. multivitamin tablet Commonly known as:  ONE A DAY Take 1 Tab by mouth daily. nadolol 40 mg tablet Commonly known as:  CORGARD Take 1 Tab by mouth daily. omeprazole 40 mg capsule Commonly known as:  PRILOSEC Take 40 mg by mouth as needed. VITAMIN D3 1,000 unit tablet Generic drug:  cholecalciferol Take  by mouth daily. We Performed the Following REFERRAL TO RHEUMATOLOGY [FMG23 Custom] Comments:  
 Please evaluate for joint pain and flushing Follow-up Instructions Return in about 6 months (around 10/12/2018). Referral Information Referral ID Referred By Referred To  
  
 [id number], [address] [place] [address],  [address] [place], [address] [facility] Phone: [phone] Fax: [phone] Visits Status Start Date End Date 1 New Request 4/12/18 4/12/19 If your referral has a status of pending review or denied, additional information will be sent to support the outcome of this decision. Patient Instructions You are being referred to Dr. Svitlana Rivera, Rheumatologist, for join pain. You will need to follow up in clinic with Dr. Sherri Ellsworth in 6 months. Introducing [address]! Dear Marlo Seymour: Thank you for requesting a [employer] account. Our records indicate that you already have an active [employer] account. You can access your account anytime at [email or web address] Did you know that you can access your hospital and ER discharge instructions at any time in [employer]? You can also review all of your test results from your hospital stay or ER visit. Additional Information If you have questions, please visit the Frequently Asked Questions section of the [employer] website at [email or web address] Remember, [employer] is NOT to be used for urgent needs. For medical emergencies, dial 911. Now available from your iPhone and Android! Please provide this summary of care documentation to your next provider. If you have any questions after today's visit, please call [phone].

## 2018-04-12 NOTE — PATIENT INSTRUCTIONS
You are being referred to Dr. Diego Pena, Rheumatologist, for join pain. You will need to follow up in clinic with Dr. Eliseo Morocho in 6 months.

## 2018-04-12 NOTE — PROGRESS NOTES
Cardiac Electrophysiology Office Note     Subjective:      Marilynne Epley is a 30 y.o. female patient who had tilt table test.  + postural orthostatic tachycardia syndrome. She is here today for follow up. The patient has some good and bad day. Occasionally she have facial rash, feeling hot, and joint pain and aching. She said she talked to her primary care physician who were concerned about a  immunological disorder. She has not been referred to immunologist or rheumatologist.  She wants to be seen. Sometimes she feels rapid heartbeat and she increased her nadolol. She has not used stockings    In the past:  She says the metoprolol make her very tired. She had used inderal before and did not like it  Past hx   She has been having tachycardia, palpitations & shakiness that started about 8 month ago. She had seen Dr Rae Garcia in the past and then Dr Manuel Beckwith.  She was taking metoprolol PRN for tachycardia. Then Dr. Manuel Beckwith started her on Toprol, but she was unable to tolerate this. She said it made her symptoms worse. She denies hx of syncope, but she has had many near -syncopal episodes. She says activity and exercise are triggers for the tachycardia episodes. After exercising on the elliptical for about 20 minutes she is very shaky, lightheaded and feels awful. She says that she was on atenolol about 5 years ago for tachycardia, a cardiologists took her off and she did not have any problems for about 4 years. Then all of a sudden she started having these problems. Neurologist: Dr Lenny Donaldson  PMHx includes chronic migraines, vertigo, gastroparesis      Echo 12/2015 shows LVEF 60% no RWMA, no significant valve abnormalities. Family hx: maternal grand mother: MI at 27years old. Maternal grandfather hx of CVA. Social hx : she denies tobacco or ETOH use.  She is single mother of 3 children      Patient Active Problem List    Diagnosis Date Noted    Chronic constipation 09/01/2017    GERD (gastroesophageal reflux disease) 09/01/2017    POTS (postural orthostatic tachycardia syndrome) 01/20/2017    Abdominal adhesions 05/04/2012     Current Outpatient Prescriptions   Medication Sig Dispense Refill    cetirizine (ZYRTEC) 10 mg tablet TAKE 1 TABLET BY MOUTH DAILY FOR ALLERGIES 30 Tab 5    albuterol (PROVENTIL HFA, VENTOLIN HFA, PROAIR HFA) 90 mcg/actuation inhaler Take 1 Puff by inhalation every four (4) hours as needed for Wheezing. 1 Inhaler 0    famotidine (PEPCID) 20 mg tablet Take 20 mg by mouth nightly. 0    omeprazole (PRILOSEC) 40 mg capsule Take 40 mg by mouth as needed.  fluticasone (FLONASE) 50 mcg/actuation nasal spray 2 Sprays by Both Nostrils route as needed.  nadolol (CORGARD) 40 mg tablet Take 1 Tab by mouth daily. 90 Tab 1    cholecalciferol (VITAMIN D3) 1,000 unit tablet Take  by mouth daily.  multivitamin (ONE A DAY) tablet Take 1 Tab by mouth daily.  LINZESS 290 mcg cap capsule Take 290 mcg by mouth as needed. 0    cloNIDine HCl (CATAPRES) 0.1 mg tablet take 1 tablet by mouth twice a day  0    OMEGA-3 FATTY ACIDS/FISH OIL (OMEGA 3 FISH OIL PO) Take 300 mg by mouth daily. Allergies   Allergen Reactions    Celexa [Citalopram] Nausea and Vomiting    Ciprofloxacin Shortness of Breath    Diflucan [Fluconazole] Rash     Burning sensation to skin    Sulfa (Sulfonamide Antibiotics) Rash     Past Medical History:   Diagnosis Date    Anemia     taking iron    Gastroparesis     GERD (gastroesophageal reflux disease)     gastroparesis--h-pylori    H/O Clostridium difficile infection 06/2012    C-Diff    Helicobacter pylori (H. pylori) 05/2012    h pylori    Palpitations     2010  - cardiac workup per pt.       Past Surgical History:   Procedure Laterality Date    HX GI      EGD and colonoscopy    HX GYN  5/12    D&C    HX GYN      tubal reversal    HX OTHER SURGICAL      D&C x 2,  lap    TILT TABLE EVAL W/WO DRUG  1/21/2017          Family History   Problem Relation Age of Onset    Heart Disease Mother     Stroke Mother     Cancer Mother     Heart Disease Father     Stroke Father     Heart Disease Maternal Grandmother 45     Social History   Substance Use Topics    Smoking status: Former Smoker     Types: Cigarettes     Quit date: 11/21/2013    Smokeless tobacco: Never Used    Alcohol use 0.6 oz/week     1 Glasses of wine, 0 Standard drinks or equivalent per week      Comment: rare1        Review of Systems:   Constitutional: Negative for fever, chills, weight loss  HEENT: Negative for nosebleeds, vision changes. Respiratory: Negative for cough, hemoptysis, sputum production, and wheezing. Cardiovascular: Negative for chest pain,+ palpitations, no orthopnea, claudication, leg swelling, syncope, and PND. Gastrointestinal: Negative for nausea, vomiting, diarrhea, constipation, blood in stool and melena. + gastroparesis   Genitourinary: Negative for dysuria, and hematuria. Musculoskeletal: Negative for myalgias, + arthralgia. Skin: occasional rash. Heme: Does not bleed or bruise easily. Neurological: Negative for speech change and focal weakness     Objective:     Visit Vitals    /80 (BP 1 Location: Left arm, BP Patient Position: Sitting)    Pulse 88    Ht 5' 5\" (1.651 m)    Wt 145 lb (65.8 kg)    BMI 24.13 kg/m2      Physical Exam:   Constitutional: Well-nourished. No distress. Head: Normocephalic and atraumatic. Eyes: Pupils are equal, round  Neck: supple. No JVD present. Cardiovascular: Normal rate, regular rhythm. Exam reveals no gallop and no friction rub. No murmur heard. Pulmonary/Chest: Effort normal and breath sounds normal. No wheezes. Abdominal: Soft, no tenderness. Musculoskeletal: no edema. Neurological: alert, oriented. Skin: Skin is warm and dry  Psychiatric: normal mood and affect.  Behavior is normal. Judgment and thought content normal.        Assessment/Plan:       ICD-10-CM ICD-9-CM 1. POTS (postural orthostatic tachycardia syndrome) R00.0 427.89     I95.1        Recommend her add compression stockings 30  mmHg, if symptoms of dizziness do not improve with the compression stockings, will add florinef   Encouraged her to continue to drink at least 64 oz water daily and continue to add salt to her diet. The patient said she has increased water intake but has not added salt to her diet. I think overall she is doing pretty well with the nadolol and I represcribed the compression stocking for her. She agreed to be referred to Dr. Hari Cheek rheumatologist.    Follow-up Disposition:  Return in about 6 months (around 10/12/2018). Thank you for involving me in this patient's care and please call with further concerns or questions. Leon Blankenship M.D.   Electrophysiology/Cardiology  [facility]  [facility] [address], [address]  [phone]

## 2018-05-01 ENCOUNTER — TELEPHONE (OUTPATIENT)
Dept: CARDIOLOGY CLINIC | Age: 38
End: 2018-05-01

## 2018-05-01 NOTE — TELEPHONE ENCOUNTER
[facility] needs the DX code and as much info as possible on the patient's compression stockings faxed over to them. Thanks!   Fax# [phone]  Phone [phone] Ext [phone]

## 2018-05-01 NOTE — TELEPHONE ENCOUNTER
Faxed compression stocking prescripton to [address] [place] [address] at fax number [phone]. Fax confirmation received.

## 2018-05-22 ENCOUNTER — OFFICE VISIT (OUTPATIENT)
Dept: FAMILY MEDICINE CLINIC | Age: 38
End: 2018-05-22

## 2018-05-22 ENCOUNTER — OFFICE VISIT (OUTPATIENT)
Dept: CARDIOLOGY CLINIC | Age: 38
End: 2018-05-22

## 2018-05-22 VITALS
SYSTOLIC BLOOD PRESSURE: 122 MMHG | DIASTOLIC BLOOD PRESSURE: 85 MMHG | BODY MASS INDEX: 24.32 KG/M2 | RESPIRATION RATE: 14 BRPM | TEMPERATURE: 98.3 F | OXYGEN SATURATION: 99 % | WEIGHT: 146 LBS | HEIGHT: 65 IN | HEART RATE: 75 BPM

## 2018-05-22 VITALS
HEART RATE: 89 BPM | OXYGEN SATURATION: 99 % | WEIGHT: 145.7 LBS | HEIGHT: 65 IN | SYSTOLIC BLOOD PRESSURE: 126 MMHG | DIASTOLIC BLOOD PRESSURE: 78 MMHG | BODY MASS INDEX: 24.28 KG/M2

## 2018-05-22 DIAGNOSIS — R07.9 CHEST PAIN, UNSPECIFIED TYPE: Primary | ICD-10-CM

## 2018-05-22 DIAGNOSIS — M94.0 ACUTE COSTOCHONDRITIS: Primary | ICD-10-CM

## 2018-05-22 DIAGNOSIS — G90.A POTS (POSTURAL ORTHOSTATIC TACHYCARDIA SYNDROME): ICD-10-CM

## 2018-05-22 RX ORDER — METHYLPREDNISOLONE 4 MG/1
TABLET ORAL
Qty: 1 DOSE PACK | Refills: 0 | Status: SHIPPED | OUTPATIENT
Start: 2018-05-22 | End: 2018-06-18

## 2018-05-22 RX ORDER — METHYLPREDNISOLONE 4 MG/1
TABLET ORAL
Qty: 1 DOSE PACK | Refills: 0 | Status: SHIPPED | OUTPATIENT
Start: 2018-05-22 | End: 2018-05-22 | Stop reason: ALTCHOICE

## 2018-05-22 RX ORDER — HYDROCODONE BITARTRATE AND ACETAMINOPHEN 5; 325 MG/1; MG/1
1 TABLET ORAL
Qty: 10 TAB | Refills: 0 | Status: SHIPPED | OUTPATIENT
Start: 2018-05-22 | End: 2018-06-18

## 2018-05-22 NOTE — MR AVS SNAPSHOT
[address] [facility] [address] 
 
 
 [address] 
[phone] Patient: Luna De Anda MRN:  [id number] Visit Information Date & Time Provider Department Dept. Phone Encounter #  
 5/22/2018 11:00 AM Margareth Medina MD [address] [address] [phone] [id number] Your Appointments 5/22/2018  2:30 PM  
ESTABLISHED PATIENT with Julee Peabody, NP [facility] Appt Note: Dr Ewa Garcia Perish of [facility] Mikey Waggoner Blazing Edwardo Yepez pain/ notes in [address] [facility]  
[phone] [address] [facility] [address] [address]  
  
    
 8/7/2018  9:00 AM  
New Patient with Maribell Obregon MD  
[address] ([facility]) Appt Note: Referred by Eusebio Jesus, evaluate for joint pain and flushing  
 [address] [address]  
[phone]  
  
   
 [address] [place] [id number]  
  
    
 10/11/2018  8:40 AM  
ESTABLISHED PATIENT with Eusebio Jesus MD  
[facility] ([facility]) Appt Note: 6 month f/u  
 [address]  [address] [facility] [address] [address] Upcoming Health Maintenance Date Due Influenza Age 5 to Adult 8/1/2018 PAP AKA CERVICAL CYTOLOGY 10/22/2018 DTaP/Tdap/Td series (2 - Td) 11/11/2024 Allergies as of 5/22/2018  Review Complete On: 5/22/2018 By: Margareth Medina MD  
  
 Severity Noted Reaction Type Reactions Celexa [Citalopram]  12/10/2015    Nausea and Vomiting Ciprofloxacin  11/21/2012    Shortness of Breath Diflucan [Fluconazole]  05/17/2016    Rash Burning sensation to skin  
 Sulfa (Sulfonamide Antibiotics)  03/25/2010    Rash Current Immunizations  Reviewed on 9/30/2015 Name Date Influenza Vaccine 10/16/2013 Influenza Vaccine Mayte Hane) 11/11/2014 Influenza Vaccine (Quad) PF 9/30/2015 TB Skin Test (PPD) Intradermal 1/27/2014 Tdap 11/11/2014 Not reviewed this visit You Were Diagnosed With   
  
 Codes Comments Chest pain, unspecified type    -  Primary ICD-10-CM: R07.9 ICD-9-CM: 786.50 Vitals BP Pulse Temp Resp Height(growth percentile) Weight(growth percentile) 122/85 (BP 1 Location: Left arm, BP Patient Position: Sitting) 75 98.3 °F (36.8 °C) (Oral) 14 5' 5\" (1.651 m) 146 lb (66.2 kg) LMP SpO2 BMI OB Status Smoking Status 05/05/2018 99% 24.3 kg/m2 Having regular periods Former Smoker BMI and BSA Data Body Mass Index Body Surface Area  
 24.3 kg/m 2 1.74 m 2 Preferred Pharmacy Pharmacy Name Phone Addis Gay, [facility] [phone] Your Updated Medication List  
  
   
This list is accurate as of 5/22/18 11:28 AM.  Always use your most recent med list.  
  
  
  
  
 albuterol 90 mcg/actuation inhaler Commonly known as:  PROVENTIL HFA, VENTOLIN HFA, PROAIR HFA Take 1 Puff by inhalation every four (4) hours as needed for Wheezing. cetirizine 10 mg tablet Commonly known as:  ZYRTEC  
TAKE 1 TABLET BY MOUTH DAILY FOR ALLERGIES  
  
 famotidine 20 mg tablet Commonly known as:  PEPCID Take 20 mg by mouth nightly. FLONASE 50 mcg/actuation nasal spray Generic drug:  fluticasone 2 Sprays by Both Nostrils route as needed. LINZESS 290 mcg Cap capsule Generic drug:  linaclotide Take 290 mcg by mouth as needed. multivitamin tablet Commonly known as:  ONE A DAY Take 1 Tab by mouth daily. nadolol 40 mg tablet Commonly known as:  CORGARD Take 1 Tab by mouth daily. omeprazole 40 mg capsule Commonly known as:  PRILOSEC Take 40 mg by mouth as needed. VITAMIN D3 1,000 unit tablet Generic drug:  cholecalciferol Take  by mouth daily. We Performed the Following AMB POC EKG ROUTINE W/ 12 LEADS, INTER & REP [91297 CPT(R)] Introducing [facility] SERVICES! Dear Elizabeth Shaw: Thank you for requesting a [employer] account. Our records indicate that you already have an active [employer] account. You can access your account anytime at [email or web address] Did you know that you can access your hospital and ER discharge instructions at any time in [employer]? You can also review all of your test results from your hospital stay or ER visit. Additional Information If you have questions, please visit the Frequently Asked Questions section of the [employer] website at [email or web address] Remember, [employer] is NOT to be used for urgent needs. For medical emergencies, dial 911. Now available from your iPhone and Android! Please provide this summary of care documentation to your next provider. If you have any questions after today's visit, please call [phone].

## 2018-05-22 NOTE — PROGRESS NOTES
Chief Complaint   Patient presents with    Back Pain     Patient states she has left sided chest pain that radiates to her arm and around to her back.

## 2018-05-22 NOTE — PROGRESS NOTES
1. Have you been to the ER, urgent care clinic since your last visit? Hospitalized since your last visit? YES, [facility] ON 12/21/17 FOR FATIGUE, NASAL CONGESTION, AND CONSTIPATION. 2. Have you seen or consulted any other health care providers outside of the [address] [place] [address] since your last visit?   Include any pap smears or colon screening YES, [facility] [employer] [facility] [employer]

## 2018-05-22 NOTE — PROGRESS NOTES
Roxanne Keane DNP, ANP-BC  Subjective/HPI:     Jj Arora is a 30 y.o. female is here for symptom based appointment. Patient reports she has been having waxing and waning left anterior chest discomfort feeling it also in the left scapula from time to time. She denies exertional chest pain, dyspnea on exertion or fatigue. She was seen by primary care this morning based on EKG was recommended to follow-up with cardiology. She has a family history of heart disease, she is under treatment for pots, she has maintained beta-blocker and adequate levels of hydration denies any lightheadedness dizziness syncope or presyncopal feelings. Patient reports additionally from time to time she has left arm numbness and tingling as well as left scapula discomfort. She denies nausea vomiting or diaphoresis. Patient is a non-smoker, no hypertension, no hyperlipidemia, nondiabetic. PCP Provider  Elizabeth Jacques MD  Past Medical History:   Diagnosis Date    Anemia     taking iron    Gastroparesis     GERD (gastroesophageal reflux disease)     gastroparesis--h-pylori    H/O Clostridium difficile infection 06/2012    C-Diff    Helicobacter pylori (H. pylori) 05/2012    h pylori    Palpitations     2010  - cardiac workup per pt.        Past Surgical History:   Procedure Laterality Date    HX GI      EGD and colonoscopy    HX GYN  5/12    D&C    HX GYN      tubal reversal    HX OTHER SURGICAL      D&C x 2,  lap    TILT TABLE EVAL W/WO DRUG  1/21/2017          Allergies   Allergen Reactions    Celexa [Citalopram] Nausea and Vomiting    Ciprofloxacin Shortness of Breath    Diflucan [Fluconazole] Rash     Burning sensation to skin    Sulfa (Sulfonamide Antibiotics) Rash      Family History   Problem Relation Age of Onset    Heart Disease Mother     Stroke Mother     Cancer Mother     Heart Disease Father     Stroke Father     Heart Disease Maternal Grandmother 45      Current Outpatient Prescriptions Medication Sig    HYDROcodone-acetaminophen (NORCO) 5-325 mg per tablet Take 1 Tab by mouth every eight (8) hours as needed for Pain. Max Daily Amount: 3 Tabs.  methylPREDNISolone (MEDROL DOSEPACK) 4 mg tablet As directed.  cetirizine (ZYRTEC) 10 mg tablet TAKE 1 TABLET BY MOUTH DAILY FOR ALLERGIES    albuterol (PROVENTIL HFA, VENTOLIN HFA, PROAIR HFA) 90 mcg/actuation inhaler Take 1 Puff by inhalation every four (4) hours as needed for Wheezing.  famotidine (PEPCID) 20 mg tablet Take 20 mg by mouth nightly.  omeprazole (PRILOSEC) 40 mg capsule Take 40 mg by mouth as needed.  fluticasone (FLONASE) 50 mcg/actuation nasal spray 2 Sprays by Both Nostrils route as needed.  nadolol (CORGARD) 40 mg tablet Take 1 Tab by mouth daily.  cholecalciferol (VITAMIN D3) 1,000 unit tablet Take  by mouth daily.  multivitamin (ONE A DAY) tablet Take 1 Tab by mouth daily.  LINZESS 290 mcg cap capsule Take 290 mcg by mouth as needed. No current facility-administered medications for this visit. Vitals:    05/22/18 1423 05/22/18 1436   BP: 130/80 126/78   Pulse: 89    SpO2: 99%    Weight: 145 lb 11.2 oz (66.1 kg)    Height: 5' 5\" (1.651 m)      Social History     Social History    Marital status: SINGLE     Spouse name: N/A    Number of children: N/A    Years of education: N/A     Occupational History    Not on file.      Social History Main Topics    Smoking status: Former Smoker     Types: Cigarettes     Quit date: 11/21/2013    Smokeless tobacco: Never Used    Alcohol use 0.6 oz/week     1 Glasses of wine, 0 Standard drinks or equivalent per week      Comment: rare1    Drug use: No    Sexual activity: Yes     Partners: Male     Birth control/ protection: None     Other Topics Concern    Not on file     Social History Narrative    3 kids (14yo - 7mo), lives with boyfriend (but he travels for work)       I have reviewed the nurses notes, vitals, problem list, allergy list, medical history, family, social history and medications. Review of Symptoms:    General: Pt denies excessive weight gain or loss. Pt is able to conduct ADL's  HEENT: Denies blurred vision, headaches, epistaxis and difficulty swallowing. Respiratory: Denies shortness of breath, CASTILLO, wheezing or stridor. Cardiovascular: Denies exertional chest pain denies palpitations, edema or PND  Gastrointestinal: Denies poor appetite, indigestion, abdominal pain or blood in stool  Musculoskeletal: Denies pain or swelling from muscles or joints  Neurologic: Denies tremor, paresthesias, or sensory motor disturbance  Skin: Denies rash, itching or texture change. Physical Exam:      General: Well developed, in no acute distress, cooperative and alert  HEENT: No carotid bruits, no JVD, trach is midline. Neck Supple, PEERL, EOM intact. Heart:  Normal S1/S2 negative S3 or S4. Regular, no murmur, gallop or rub. Patient has reproducible tenderness with palpation of the left upper anterior chest wall with tenderness extending into the left axilla region. I am also able to reproduce left arm discomfort and scapular discomfort with palpation and having patient abduct her left arm behind her back. Patient reports this discomfort has been the exact symptoms that she has been feeling. Respiratory: Clear bilaterally x 4, no wheezing or rales  Abdomen:   Soft, non-tender, no masses, bowel sounds are active.   Extremities:  No edema, normal cap refill, no cyanosis, atraumatic. Neuro: A&Ox3, speech clear, gait stable. Skin: Skin color is normal. No rashes or lesions. Non diaphoretic  Vascular: 2+ pulses symmetric in all extremities    Cardiographics    ECG: Normal sinus rhythm reviewing 3 EKGs from primary care no acute findings unchanged from previous tracings.   Results for orders placed or performed during the hospital encounter of 10/18/16   EKG, 12 LEAD, INITIAL   Result Value Ref Range    Ventricular Rate 81 BPM    Atrial Rate 81 BPM P-R Interval 134 ms    QRS Duration 82 ms    Q-T Interval 354 ms    QTC Calculation (Bezet) 411 ms    Calculated P Axis 65 degrees    Calculated R Axis 12 degrees    Calculated T Axis 55 degrees    Diagnosis       Normal sinus rhythm  Confirmed by Fide Gibson ([id number]) on 10/18/2016 9:39:16 PM           Cardiology Labs:  Lab Results   Component Value Date/Time    Cholesterol, total 241 (H) 08/24/2017 08:30 AM    HDL Cholesterol 74 08/24/2017 08:30 AM    LDL, calculated 153 (H) 08/24/2017 08:30 AM    Triglyceride 69 08/24/2017 08:30 AM       Lab Results   Component Value Date/Time    Sodium 138 12/21/2017 05:55 AM    Potassium 3.2 (L) 12/21/2017 05:55 AM    Chloride 106 12/21/2017 05:55 AM    CO2 25 12/21/2017 05:55 AM    Anion gap 7 12/21/2017 05:55 AM    Glucose 97 12/21/2017 05:55 AM    BUN 7 12/21/2017 05:55 AM    Creatinine 0.70 12/21/2017 05:55 AM    BUN/Creatinine ratio 10 (L) 12/21/2017 05:55 AM    GFR est AA >60 12/21/2017 05:55 AM    GFR est non-AA >60 12/21/2017 05:55 AM    Calcium 9.2 12/21/2017 05:55 AM    Bilirubin, total 0.8 12/21/2017 05:55 AM    AST (SGOT) 15 12/21/2017 05:55 AM    Alk. phosphatase 46 12/21/2017 05:55 AM    Protein, total 7.5 12/21/2017 05:55 AM    Albumin 4.1 12/21/2017 05:55 AM    Globulin 3.4 12/21/2017 05:55 AM    A-G Ratio 1.2 12/21/2017 05:55 AM    ALT (SGPT) 19 12/21/2017 05:55 AM           Assessment:     Assessment:     Diagnoses and all orders for this visit:    1. Acute costochondritis  -     HYDROcodone-acetaminophen (NORCO) 5-325 mg per tablet; Take 1 Tab by mouth every eight (8) hours as needed for Pain. Max Daily Amount: 3 Tabs. 2. POTS (postural orthostatic tachycardia syndrome)    Other orders  -     methylPREDNISolone (MEDROL DOSEPACK) 4 mg tablet; As directed. ICD-10-CM ICD-9-CM    1. Acute costochondritis M94.0 733.6 HYDROcodone-acetaminophen (NORCO) 5-325 mg per tablet   2.  POTS (postural orthostatic tachycardia syndrome) R00.0 427.89     I95.1 Orders Placed This Encounter    DISCONTD: methylPREDNISolone (MEDROL DOSEPACK) 4 mg tablet     Sig: As directed. Dispense:  1 Dose Pack     Refill:  0    HYDROcodone-acetaminophen (NORCO) 5-325 mg per tablet     Sig: Take 1 Tab by mouth every eight (8) hours as needed for Pain. Max Daily Amount: 3 Tabs. Dispense:  10 Tab     Refill:  0    methylPREDNISolone (MEDROL DOSEPACK) 4 mg tablet     Sig: As directed. Dispense:  1 Dose Pack     Refill:  0        Plan:     Patient is a 29-year-old female with reproducible anterior left chest axilla and left scapula discomfort with palpation and range of motion movements of the left upper extremity consistent with musculoskeletal pathology, costochondritis. She has been using Tylenol alternating with Motrin with some degree of improvement, will change therapy to include Medrol Dosepak, may use Tylenol for mild to moderate pain, Lortab limited supply given for moderate to severe pain advised not to drive within using within the last 8 hours. Asked patient to hold off using NSAIDs until Medrol Dosepak complete asked patient to follow-up with my office if her symptoms have not improved. Elaine Arciniega NP    This note was created using voice recognition software. Despite editing, there may be syntax errors.

## 2018-05-22 NOTE — PROGRESS NOTES
Subjective:     Radha Kim is a 30 y.o. female who presents today with the following:  Chief Complaint   Patient presents with    Back Pain      Patient with PMH POTS here today to discuss L sided chest pain that began about 5 days ago. The pain starts on the L side of her chest and moves into back and arm. Patient woke up this AM with dull ache that has not gone away, also describes it as a squeezing pain. Denies worsening of pain on activity. No recent heavy lifting. Patient had a massage yesterday which helped the pain go away for about 1/2 day, but returned at night. Pain is not responsive to icy hot or heating pad. Denies dyspnea, nausea, vomiting, diaphoresis. Does note that she felt very tired/fatigued on Saturday, but this is normal with POTS so she didn't think much of it. ROS:  Gen: denies fever, chills, fatigue, weight loss, weight gain  HEENT:denies blurry vision, nasal congestion, sore throat  Resp: denies dypsnea, cough, wheezing  CV: denies chest pain, palpitations, lower extremity edema  Abd: denies nausea, vomiting, diarrhea, constipation  Neuro: denies numbness/tingling  Endo: denies polyuria, polydipsia, heat/cold intolerance    Allergies   Allergen Reactions    Celexa [Citalopram] Nausea and Vomiting    Ciprofloxacin Shortness of Breath    Diflucan [Fluconazole] Rash     Burning sensation to skin    Sulfa (Sulfonamide Antibiotics) Rash         Current Outpatient Prescriptions:     cetirizine (ZYRTEC) 10 mg tablet, TAKE 1 TABLET BY MOUTH DAILY FOR ALLERGIES, Disp: 30 Tab, Rfl: 5    albuterol (PROVENTIL HFA, VENTOLIN HFA, PROAIR HFA) 90 mcg/actuation inhaler, Take 1 Puff by inhalation every four (4) hours as needed for Wheezing., Disp: 1 Inhaler, Rfl: 0    famotidine (PEPCID) 20 mg tablet, Take 20 mg by mouth nightly., Disp: , Rfl: 0    omeprazole (PRILOSEC) 40 mg capsule, Take 40 mg by mouth as needed. , Disp: , Rfl:     fluticasone (FLONASE) 50 mcg/actuation nasal spray, 2 Sprays by Both Nostrils route as needed. , Disp: , Rfl:     nadolol (CORGARD) 40 mg tablet, Take 1 Tab by mouth daily. , Disp: 90 Tab, Rfl: 1    cholecalciferol (VITAMIN D3) 1,000 unit tablet, Take  by mouth daily. , Disp: , Rfl:     multivitamin (ONE A DAY) tablet, Take 1 Tab by mouth daily. , Disp: , Rfl:     LINZESS 290 mcg cap capsule, Take 290 mcg by mouth as needed. , Disp: , Rfl: 0    Past Medical History:   Diagnosis Date    Anemia     taking iron    Gastroparesis     GERD (gastroesophageal reflux disease)     gastroparesis--h-pylori    H/O Clostridium difficile infection 06/2012    C-Diff    Helicobacter pylori (H. pylori) 05/2012    h pylori    Palpitations     2010  - cardiac workup per pt.         Past Surgical History:   Procedure Laterality Date    HX GI      EGD and colonoscopy    HX GYN  5/12    D&C    HX GYN      tubal reversal    HX OTHER SURGICAL      D&C x 2,  lap    TILT TABLE EVAL W/WO DRUG  1/21/2017            History   Smoking Status    Former Smoker    Types: Cigarettes    Quit date: 11/21/2013   Smokeless Tobacco    Never Used       Social History     Social History    Marital status: SINGLE     Spouse name: N/A    Number of children: N/A    Years of education: N/A     Social History Main Topics    Smoking status: Former Smoker     Types: Cigarettes     Quit date: 11/21/2013    Smokeless tobacco: Never Used    Alcohol use 0.6 oz/week     1 Glasses of wine, 0 Standard drinks or equivalent per week      Comment: rare1    Drug use: No    Sexual activity: Yes     Partners: Male     Birth control/ protection: None     Other Topics Concern    None     Social History Narrative    3 kids (14yo - 7mo), lives with boyfriend (but he travels for work)       Family History   Problem Relation Age of Onset    Heart Disease Mother     Stroke Mother     Cancer Mother     Heart Disease Father     Stroke Father     Heart Disease Maternal Grandmother 45 Objective:     Visit Vitals    /85 (BP 1 Location: Left arm, BP Patient Position: Sitting)    Pulse 75    Temp 98.3 °F (36.8 °C) (Oral)    Resp 14    Ht 5' 5\" (1.651 m)    Wt 146 lb (66.2 kg)    LMP 05/05/2018    SpO2 99%    BMI 24.3 kg/m2     Gen: alert, oriented, no acute distress  Head: normocephalic, atraumatic  Eyes:sclera clear, conjunctiva clear  Oral: moist mucus membranes, no oral lesions, no pharyngeal exudate, no pharyngeal erythema  Neck: symmetric normal sized thyroid, no carotid bruits, no JVD  Resp: Normal work of breathing, lungs CTAB, no w/r/r  CV: S1, S2 normal.  No murmurs, rubs, or gallops. Abd:  Normal bowel sounds. Soft, not tender, not distended. MSK: Chest wall slightly tender to palpation. EKG: NSR. No ST changes. Assessment/ Plan:   Diagnoses and all orders for this visit:    1. Chest pain, unspecified type  -     AMB POC EKG ROUTINE W/ 12 LEADS, INTER & REP    Most likely this is MSK pain. Patient has a cardiologist so will have her follow up with them (got appointment for later today) to address this and make sure there is no cardiac abnormality. EKG ok, but read noted low voltage in precordial leads. Patient has history of POTS as well. Advised patient to go to ER if chest pain worsens or becomes accompanied by shortness of breath, diaphoresis, etc.  She voiced understanding of plan. Verbal and written instructions (see AVS) provided.  Patient expresses understanding of diagnosis and treatment plan. Juan J Brooks.  Gregg Bañuelos MD

## 2018-05-22 NOTE — PATIENT INSTRUCTIONS
Costochondritis: Care Instructions  Your Care Instructions  You have chest pain because the cartilage of your rib cage is inflamed. This problem is called costochondritis. This type of chest wall pain may last from days to weeks. It is not a heart problem. Sometimes costochondritis occurs with a cold or the flu, and other times the exact cause is not known. Follow-up care is a key part of your treatment and safety. Be sure to make and go to all appointments, and call your doctor if you are having problems. It's also a good idea to know your test results and keep a list of the medicines you take. How can you care for yourself at home? · Take medicines for pain and inflammation exactly as directed. ¨ If the doctor gave you a prescription medicine, take it as prescribed. ¨ If you are not taking a prescription pain medicine, ask your doctor if you can take an over-the-counter medicine. ¨ Do not take two or more pain medicines at the same time unless the doctor told you to. Many pain medicines have acetaminophen, which is Tylenol. Too much acetaminophen (Tylenol) can be harmful. · It may help to use a warm compress or heating pad (set on low) on your chest. You can also try alternating heat and ice. Put ice or a cold pack on the area for 10 to 20 minutes at a time. Put a thin cloth between the ice and your skin. · Avoid any activity that strains the chest area. As your pain gets better, you can slowly return to your normal activities. · Do not use tape, an elastic bandage, a \"rib belt,\" or anything else that restricts your chest wall motion. When should you call for help? Call 911 anytime you think you may need emergency care. For example, call if:  ? · You have new or different chest pain or pressure. This may occur with:  ¨ Sweating. ¨ Shortness of breath. ¨ Nausea or vomiting. ¨ Pain that spreads from the chest to the neck, jaw, or one or both shoulders or arms. ¨ Dizziness or lightheadedness.   ¨ A fast or uneven pulse. After calling 911, chew 1 adult-strength aspirin. Wait for an ambulance. Do not try to drive yourself. ? · You have severe trouble breathing. ?Call your doctor now or seek immediate medical care if:  ? · You have a fever or cough. ? · You have any trouble breathing. ? · Your chest pain gets worse. ? Watch closely for changes in your health, and be sure to contact your doctor if:  ? · Your chest pain continues even though you are taking anti-inflammatory medicine. ? · Your chest wall pain has not improved after 5 to 7 days. Where can you learn more? Go to http://quintin-oh.info/. Enter Z587 in the search box to learn more about \"Costochondritis: Care Instructions. \"  Current as of: March 20, 2017  Content Version: 11.4  © 5036-7227 [employer]. Care instructions adapted under license by [employer] (which disclaims liability or warranty for this information). If you have questions about a medical condition or this instruction, always ask your healthcare professional. [address] any warranty or liability for your use of this information.

## 2018-05-22 NOTE — Clinical Note
Mitchell Romo, I was able to reproduce her chest and arm pain with palpation and ROM, EKG looked ok. I am treating her for costochondritis and will see how she does, if no improvement will stress ECHO, other than a [facility] [place] of CAD she has pretty low risk.  Thanks Nesha Raymundo

## 2018-05-23 NOTE — PROGRESS NOTES
Spoke with patient today, she is feeling much better, pain resolved. Advised to call me if any recurrences.

## 2018-05-31 ENCOUNTER — TELEPHONE (OUTPATIENT)
Dept: CARDIOLOGY CLINIC | Age: 38
End: 2018-05-31

## 2018-05-31 NOTE — TELEPHONE ENCOUNTER
Verified patient with two types of identifiers. Patient requesting medical release form to be mailed. Address confirmed.

## 2018-05-31 NOTE — TELEPHONE ENCOUNTER
Patient would like to know if she can be mailed a medical record release form since she lives in [facility].    Phone [phone]  Maxx Campbell

## 2018-06-18 RX ORDER — OMEPRAZOLE 40 MG/1
40 CAPSULE, DELAYED RELEASE ORAL
COMMUNITY

## 2018-06-18 NOTE — PERIOP NOTES
[facility]  Ambulatory Surgery Unit  Pre-operative Instructions for Endo Procedures    Procedure Date  Thursday, June 21, 2018            Tentative Arrival Time 6945      1. On the day of your procedure, please report to the Ambulatory Surgery Unit Registration Desk and sign in at your designated time. The Ambulatory Surgery Unit is located in [facility] ER on the [facility] side of the [facility] across from the [address] [place] [address]. Please have all of your health insurance cards and a photo ID. 2. You must have someone with you to drive you home, as you should not drive a car for 24 hours following anesthesia. Please make arrangements for a responsible adult friend or family member to stay with you for at least the first 24 hours after your procedure. 3. Do not have anything to eat or drink (including water, gum, mints, coffee, juice) after midnight, Wednesday. This may not apply to medications prescribed by your physician. (Please note below the special instructions with medications to take the morning of your procedure.)    4. If applicable, follow the clear liquid diet and bowel prep instructions provided by your physician's office. If you do not have this information, or have any questions, please contact your physician's office. 5. We recommend you do not drink any alcoholic beverages for 24 hours before and after your procedure. 6. Contact your surgeons office for instructions on the following medications: non-steroidal anti-inflammatory drugs (i.e. Advil, Aleve), vitamins, and supplements. (Some surgeons will want you to stop these medications prior to surgery and others may allow you to take them)   **If you are currently taking Plavix, Coumadin, Aspirin and/or other blood-thinning agents, contact your surgeon for instructions. ** Your surgeon will partner with the physician prescribing these medications to determine if it is safe to stop or if you need to continue taking. Please do not stop taking these medications without instructions from your surgeon. 7. In an effort to help prevent surgical site infection, we ask that you shower with an anti-bacterial soap (i.e. Dial or Safeguard) on the morning of your procedure. Do not apply any lotions, powders, or deodorants after showering. 8. Wear comfortable clothes. Wear glasses instead of contacts. Do not bring any jewelry or money (other than copays or fees as instructed). Do not wear make-up, particularly mascara, the morning of your procedure. Wear your hair loose or down, no ponytails, buns, brisa pins or clips. All body piercings must be removed. 9. You should understand that if you do not follow these instructions your procedure may be cancelled. If your physical condition changes (i.e. fever, cold or flu) please contact your surgeon as soon as possible. 10. It is important that you be on time. If a situation occurs where you may be late, or if you have any questions or problems, please call [phone]. 11. Your procedure time may be subject to change. You will receive a phone call the day prior to confirm your arrival time. Special Instructions: Take all medications and inhalers, as prescribed, on the morning of surgery with a sip of water EXCEPT: meds as usual, drink fluids up until midnight with POTS      I understand a pre-operative phone call will be made to verify my procedure time. In the event that I am not available, I give permission for a message to be left on my answering service and/or with another person?       yes     Preop instructions reviewed  Pt verbalized understanding.      ___________________      ___________________      ___________________  (Signature of Patient)          (Witness)                   (Date and Time)

## 2018-06-20 ENCOUNTER — ANESTHESIA EVENT (OUTPATIENT)
Dept: SURGERY | Age: 38
End: 2018-06-20
Payer: MEDICAID

## 2018-06-21 ENCOUNTER — ANESTHESIA (OUTPATIENT)
Dept: SURGERY | Age: 38
End: 2018-06-21
Payer: MEDICAID

## 2018-06-21 ENCOUNTER — HOSPITAL ENCOUNTER (OUTPATIENT)
Age: 38
Setting detail: OUTPATIENT SURGERY
Discharge: HOME | End: 2018-06-21
Attending: SPECIALIST | Admitting: SPECIALIST
Payer: MEDICAID

## 2018-06-21 VITALS
OXYGEN SATURATION: 100 % | HEIGHT: 65 IN | WEIGHT: 147 LBS | BODY MASS INDEX: 24.49 KG/M2 | HEART RATE: 78 BPM | TEMPERATURE: 98.6 F | SYSTOLIC BLOOD PRESSURE: 111 MMHG | DIASTOLIC BLOOD PRESSURE: 77 MMHG | RESPIRATION RATE: 16 BRPM

## 2018-06-21 LAB — HCG UR QL: NEGATIVE

## 2018-06-21 PROCEDURE — 77030031603 HC FCPS GRSP ENDOSC OCOA -B: Performed by: SPECIALIST

## 2018-06-21 PROCEDURE — 74011000250 HC RX REV CODE- 250

## 2018-06-21 PROCEDURE — 76210000046 HC AMBSU PH II REC FIRST 0.5 HR: Performed by: SPECIALIST

## 2018-06-21 PROCEDURE — 76210000040 HC AMBSU PH I REC FIRST 0.5 HR: Performed by: SPECIALIST

## 2018-06-21 PROCEDURE — 76030000002 HC AMB SURG OR TIME FIRST 0.: Performed by: SPECIALIST

## 2018-06-21 PROCEDURE — 81025 URINE PREGNANCY TEST: CPT

## 2018-06-21 PROCEDURE — 77030019988 HC FCPS ENDOSC DISP BSC -B: Performed by: SPECIALIST

## 2018-06-21 PROCEDURE — 77030018836 HC SOL IRR NACL ICUM -A: Performed by: SPECIALIST

## 2018-06-21 PROCEDURE — 76060000073 HC AMB SURG ANES FIRST 0.5 HR: Performed by: SPECIALIST

## 2018-06-21 PROCEDURE — 88305 TISSUE EXAM BY PATHOLOGIST: CPT | Performed by: SPECIALIST

## 2018-06-21 PROCEDURE — 77030021352 HC CBL LD SYS DISP COVD -B: Performed by: SPECIALIST

## 2018-06-21 PROCEDURE — 74011250636 HC RX REV CODE- 250/636

## 2018-06-21 PROCEDURE — 74011250636 HC RX REV CODE- 250/636: Performed by: ANESTHESIOLOGY

## 2018-06-21 RX ORDER — SODIUM CHLORIDE, SODIUM LACTATE, POTASSIUM CHLORIDE, CALCIUM CHLORIDE 600; 310; 30; 20 MG/100ML; MG/100ML; MG/100ML; MG/100ML
25 INJECTION, SOLUTION INTRAVENOUS CONTINUOUS
Status: DISCONTINUED | OUTPATIENT
Start: 2018-06-21 | End: 2018-06-21 | Stop reason: HOSPADM

## 2018-06-21 RX ORDER — SODIUM CHLORIDE 0.9 % (FLUSH) 0.9 %
5-10 SYRINGE (ML) INJECTION AS NEEDED
Status: DISCONTINUED | OUTPATIENT
Start: 2018-06-21 | End: 2018-06-21 | Stop reason: HOSPADM

## 2018-06-21 RX ORDER — DIPHENHYDRAMINE HYDROCHLORIDE 50 MG/ML
12.5 INJECTION, SOLUTION INTRAMUSCULAR; INTRAVENOUS AS NEEDED
Status: DISCONTINUED | OUTPATIENT
Start: 2018-06-21 | End: 2018-06-21 | Stop reason: HOSPADM

## 2018-06-21 RX ORDER — SODIUM CHLORIDE 0.9 % (FLUSH) 0.9 %
5-10 SYRINGE (ML) INJECTION EVERY 8 HOURS
Status: DISCONTINUED | OUTPATIENT
Start: 2018-06-21 | End: 2018-06-21 | Stop reason: HOSPADM

## 2018-06-21 RX ORDER — SODIUM CHLORIDE 9 MG/ML
50 INJECTION, SOLUTION INTRAVENOUS CONTINUOUS
Status: DISCONTINUED | OUTPATIENT
Start: 2018-06-21 | End: 2018-06-21 | Stop reason: HOSPADM

## 2018-06-21 RX ORDER — PROPOFOL 10 MG/ML
INJECTION, EMULSION INTRAVENOUS AS NEEDED
Status: DISCONTINUED | OUTPATIENT
Start: 2018-06-21 | End: 2018-06-21 | Stop reason: HOSPADM

## 2018-06-21 RX ORDER — DEXTROMETHORPHAN/PSEUDOEPHED 2.5-7.5/.8
1.2 DROPS ORAL
Status: DISCONTINUED | OUTPATIENT
Start: 2018-06-21 | End: 2018-06-21 | Stop reason: HOSPADM

## 2018-06-21 RX ORDER — LIDOCAINE HYDROCHLORIDE 10 MG/ML
0.1 INJECTION, SOLUTION EPIDURAL; INFILTRATION; INTRACAUDAL; PERINEURAL AS NEEDED
Status: DISCONTINUED | OUTPATIENT
Start: 2018-06-21 | End: 2018-06-21 | Stop reason: HOSPADM

## 2018-06-21 RX ORDER — LIDOCAINE HYDROCHLORIDE 20 MG/ML
INJECTION, SOLUTION EPIDURAL; INFILTRATION; INTRACAUDAL; PERINEURAL AS NEEDED
Status: DISCONTINUED | OUTPATIENT
Start: 2018-06-21 | End: 2018-06-21 | Stop reason: HOSPADM

## 2018-06-21 RX ORDER — ONDANSETRON 2 MG/ML
4 INJECTION INTRAMUSCULAR; INTRAVENOUS AS NEEDED
Status: DISCONTINUED | OUTPATIENT
Start: 2018-06-21 | End: 2018-06-21 | Stop reason: HOSPADM

## 2018-06-21 RX ORDER — FENTANYL CITRATE 50 UG/ML
25 INJECTION, SOLUTION INTRAMUSCULAR; INTRAVENOUS
Status: DISCONTINUED | OUTPATIENT
Start: 2018-06-21 | End: 2018-06-21 | Stop reason: HOSPADM

## 2018-06-21 RX ADMIN — SODIUM CHLORIDE, SODIUM LACTATE, POTASSIUM CHLORIDE, AND CALCIUM CHLORIDE 25 ML/HR: 600; 310; 30; 20 INJECTION, SOLUTION INTRAVENOUS at 06:38

## 2018-06-21 RX ADMIN — PROPOFOL 100 MG: 10 INJECTION, EMULSION INTRAVENOUS at 07:53

## 2018-06-21 RX ADMIN — LIDOCAINE HYDROCHLORIDE 100 MG: 20 INJECTION, SOLUTION EPIDURAL; INFILTRATION; INTRACAUDAL; PERINEURAL at 07:47

## 2018-06-21 RX ADMIN — PROPOFOL 200 MG: 10 INJECTION, EMULSION INTRAVENOUS at 07:47

## 2018-06-21 NOTE — PERIOP NOTES
Permission received to review discharge instructions and discuss private health information with Darcy Patel (mother).

## 2018-06-21 NOTE — PROCEDURES
Esophagogastroduodenoscopy Procedure Note      Brady Del Castillo  1980  [id number]    Indication:  Persistent dyspepsia with intermittent dysphagia     Endoscopist: Jacob Olvera MD    Referring Provider:  Terrance Hunter MD    Sedation:  MAC anesthesia Propofol    Procedure Details:  After infomed consent was obtained for the procedure, with all risks and benefits of procedure explained the patient was taken to the endoscopy suite and placed in the left lateral decubitus position. Following sequential administration of sedation as per above, the endoscope was inserted into the mouth and advanced under direct vision to second portion of the duodenum. A careful inspection was made as the gastroscope was withdrawn, including a retroflexed view of the proximal stomach; findings and interventions are described below. Findings:     Esophagus:   + There was normal esophageal mucosa throughout esophagus with no narrowing noted, no resistance to passage. No exudate or erosions seen. S/P Bx of mid esophagus.  + S/P Empiric dilation with 54 FR Savary over wire. - The squamo-columnar junction is at 39 cm from incisors where the Z-line was noted. Stomach:   + Mild salt and pepper erythema throughout stomach c/w mild nonerosive gastritis s/p Bx. Duodenum:   - The bulb and post bulbar mucosa is normal in appearance to the second portion. The duodenal folds appeared normal.  Cold forceps biopsies performed. Therapies:    esophageal dilation with savary sizes 54  biopsy of esophagus  biopsy of stomach antrum  biopsy of duodenal distal bulb, second portion    Specimen: Specimens were collected as described and send to the laboratory. Complications:   None were encountered during the procedure. EBL:  < 10 ml.           Recommendations:   -f/u path  -f/u with Dr. Aneudy Coburn MD  6/21/2018  7:57 AM

## 2018-06-21 NOTE — IP AVS SNAPSHOT
[address] [facility] 
[phone] Patient: Arnol Mcmillan MRN: [id number] KID:[id number] About your hospitalization You were admitted on:  June 21, 2018 You last received care in the:  [facility] PACU You were discharged on:  June 21, 2018 Why you were hospitalized Your primary diagnosis was:  Not on File Follow-up Information None Your Scheduled Appointments Tuesday August 07, 2018  9:00 AM EDT New Patient with Drake Sy MD  
[address] ([facility] [address]  
[phone] Discharge Orders None A check venancio indicates which time of day the medication should be taken. My Medications CHANGE how you take these medications Instructions Each Dose to Equal  
 Morning Noon Evening Bedtime  
 nadolol 40 mg tablet Commonly known as:  CORGARD What changed:   
- when to take this 
- reasons to take this Your last dose was: Your next dose is: Take 1 Tab by mouth daily. 40 mg CONTINUE taking these medications Instructions Each Dose to Equal  
 Morning Noon Evening Bedtime  
 albuterol 90 mcg/actuation inhaler Commonly known as:  PROVENTIL HFA, VENTOLIN HFA, PROAIR HFA Your last dose was: Your next dose is: Take 1 Puff by inhalation every four (4) hours as needed for Wheezing. 1 Puff  
    
   
   
   
  
 cetirizine 10 mg tablet Commonly known as:  ZYRTEC Your last dose was: Your next dose is: TAKE 1 TABLET BY MOUTH DAILY FOR ALLERGIES  
     
   
   
   
  
 famotidine 20 mg tablet Commonly known as:  PEPCID Your last dose was: Your next dose is: Take 20 mg by mouth nightly.   
 20 mg  
    
   
   
   
  
 FLONASE 50 mcg/actuation nasal spray Generic drug:  fluticasone Your last dose was: Your next dose is: 2 Sprays by Both Nostrils route as needed. 2 Spray LINZESS 290 mcg Cap capsule Generic drug:  linaclotide Your last dose was: Your next dose is: Take 290 mcg by mouth as needed. 290 mcg  
    
   
   
   
  
 multivitamin tablet Commonly known as:  ONE A DAY Your last dose was: Your next dose is: Take 1 Tab by mouth daily. 1 Tab  
    
   
   
   
  
 omeprazole 40 mg capsule Commonly known as:  PRILOSEC Your last dose was: Your next dose is: Take 40 mg by mouth as needed. 40 mg  
    
   
   
   
  
 VITAMIN D3 1,000 unit tablet Generic drug:  cholecalciferol Your last dose was: Your next dose is: Take  by mouth daily. Discharge Instructions Jj Arora [id number] 
1980 EGD DISCHARGE INSTRUCTIONS Discomfort: 
Sore throat- throat lozenges or warm salt water gargle 
redness at IV site- apply warm compress to area; if redness or soreness persist- contact your physician Gaseous discomfort- walking, belching will help relieve any discomfort You may not operate a vehicle for 24 hours You may not engage in an occupation involving machinery or appliances for rest of today. You may not drink alcoholic beverages for at least 24 hours Avoid making any critical decisions for at least 24 hour DIET You may resume your regular diet  however -  remember your colon is empty and a heavy meal will produce gas. Avoid these foods:  vegetables, fried / greasy foods, carbonated drinks MEDICATIONS Regarding Aspirin or Nonsteroidal medications specifically, please see below. ACTIVITY You may resume your normal daily activities. Spend the remainder of the day resting -  avoid any strenuous activity. CALL M.D. ANY SIGN OF Increasing pain, nausea, vomiting Abdominal distension (swelling) New increased bleeding (oral or rectal) Fever (chills) Pain in chest area Bloody discharge from nose or mouth Shortness of breath ONLY  Tylenol as needed for pain. Follow-up Instructions: 
 Call Dr. Breanna Restrepo for results of procedure / biopsy in 4-5 days at telephone #  [phone] DO NOT TAKE SLEEPING MEDICATIONS OR ANTIANXIETY MEDICATIONS WHILE TAKING NARCOTIC PAIN MEDICATIONS,  ESPECIALLY THE NIGHT OF ANESTHESIA. CPAP PATIENTS BE SURE TO WEAR MACHINE WHENEVER NAPPING OR SLEEPING. DISCHARGE SUMMARY from Nurse The following personal items collected during your admission are returned to you:  
Dental Appliance: Dental Appliances: None Vision: Visual Aid: Glasses, With patient Hearing Aid:   
Jewelry:   
Clothing:   
Other Valuables:   
Valuables sent to safe:   
 
 
PATIENT INSTRUCTIONS: 
 
 
B - Balance E - Eyes F-  Face looks uneven A-  Arms unable to move or move even S-  Speech slurred or non-existent T-  Time-call 911 as soon as signs and symptoms begin-DO NOT go Back to bed or wait to see if you get better-TIME IS BRAIN. If you have not received your influenza and/or pneumococcal vaccine, please follow up with your primary care physician. The discharge information has been reviewed with the patient and caregiver. The patient and caregiver verbalized understanding. Introducing [facility]! Dear Sandra Cintron: Thank you for requesting a SnappCloud account. Our records indicate that you already have an active SnappCloud account. You can access your account anytime at [email or web address] "[email or web address] Did you know that you can access your hospital and ER discharge instructions at any time in SnappCloud? You can also review all of your test results from your hospital stay or ER visit. Additional Information If you have questions, please visit the Frequently Asked Questions section of the SnappCloud website at [email or web address] "[email or web address] Remember, SnappCloud is NOT to be used for urgent needs. For medical emergencies, dial 911. Now available from your iPhone and Android! Introducing Kimani Zheng As a Coral Slimmer patient, I wanted to make you aware of our electronic visit tool called Kimnai Zheng. Behance 24/7 allows you to connect within minutes with a medical provider 24 hours a day, seven days a week via a mobile device or tablet or logging into a secure website from your computer. You can access Panjodaisyfin from anywhere in the [place]. A virtual visit might be right for you when you have a simple condition and feel like you just dont want to get out of bed, or cant get away from work for an appointment, when your regular [facility] provider is not available (evenings, weekends or holidays), or when youre out of town and need minor care. Electronic visits cost only $49 and if the [employer] provider determines a prescription is needed to treat your condition, one can be electronically transmitted to a nearby pharmacy*. Please take a moment to enroll today if you have not already done so. The enrollment process is free and takes just a few minutes. To enroll, please download the [employer] basim to your tablet or phone, or visit [email or web address] to enroll on your computer. And, as an [address] [place] [address] patient with a ScalingData account, the results of your visits will be scanned into your electronic medical record and your primary care provider will be able to view the scanned results. We urge you to continue to see your regular Behance provider for your ongoing medical care. And while your primary care provider may not be the one available when you seek a Kimani Hireddaisyfin virtual visit, the peace of mind you get from getting a real diagnosis real time can be priceless. For more information on Kimani Hiredmike, view our Frequently Asked Questions (FAQs) at [email or web address]. Sincerely, 
 
Deja Gutierrez MD 
Chief Medical Officer [employer] *:  certain medications cannot be prescribed via Kimani Zheng Providers Seen During Your Hospitalization Provider Specialty Primary office phone Rubio Beckham MD Gastroenterology [phone] Your Primary Care Physician (PCP) Primary Care Physician Office Phone Office Fax Yemi Mercado [phone] You are allergic to the following Allergen Reactions Celexa (Citalopram) Nausea and Vomiting Ciprofloxacin Shortness of Breath Diflucan (Fluconazole) Rash Burning sensation to skin  
    
 Sulfa (Sulfonamide Antibiotics) Rash Recent Documentation Height Weight Breastfeeding? BMI OB Status Smoking Status 1.651 m 66.7 kg No 24.46 kg/m2 Having regular periods Former Smoker Emergency Contacts Name Discharge Info Relation Home Work Mobile Michael Parra CAREGIVER [3] Mother [14] [phone] Patient Belongings The following personal items are in your possession at time of discharge: 
  Dental Appliances: None  Visual Aid: Glasses, With patient Please provide this summary of care documentation to your next provider. Signatures-by signing, you are acknowledging that this After Visit Summary has been reviewed with you and you have received a copy. Patient Signature:  ____________________________________________________________ Date:  ____________________________________________________________  
  
Karla Whipple Provider Signature:  ____________________________________________________________ Date:  ____________________________________________________________

## 2018-06-21 NOTE — ANESTHESIA POSTPROCEDURE EVALUATION
Post-Anesthesia Evaluation and Assessment    Patient: Meir Esparza MRN: [id number]  SSN: [id number]    YOB: 1980  Age: 41 y.o. Sex: female       Cardiovascular Function/Vital Signs  Visit Vitals    /77    Pulse 78    Temp 37 °C (98.6 °F)    Resp 16    Ht 5' 5\" (1.651 m)    Wt 66.7 kg (147 lb)    SpO2 100%    Breastfeeding No    BMI 24.46 kg/m2       Patient is status post total IV anesthesia anesthesia for Procedure(s):  ESOPHAGOGASTRODUODENOSCOPY (EGD)  ESOPHAGOGASTRODUODENAL (EGD) BIOPSY. Nausea/Vomiting: None    Postoperative hydration reviewed and adequate. Pain:  Pain Scale 1: Numeric (0 - 10) (06/21/18 0819)  Pain Intensity 1: 0 (06/21/18 0819)   Managed    Neurological Status:   Neuro (WDL): Exceptions to WDL (06/21/18 0801)  Neuro  Neurologic State: Alert (06/21/18 0819)  LUE Motor Response: Purposeful (06/21/18 0819)  LLE Motor Response: Purposeful (06/21/18 0819)  RUE Motor Response: Purposeful (06/21/18 0819)  RLE Motor Response: Purposeful (06/21/18 0819)   At baseline    Mental Status and Level of Consciousness: Arousable    Pulmonary Status:   O2 Device: Room air (06/21/18 0804)   Adequate oxygenation and airway patent    Complications related to anesthesia: None    Post-anesthesia assessment completed.  No concerns    Signed By: Anjana Giles MD     June 21, 2018

## 2018-06-21 NOTE — H&P
Gastroenterology Outpatient History and Physical    Patient: Leslie Giles    Physician: Charan Gardner MD    Vital Signs: Blood pressure 140/79, pulse (!) 102, temperature 98.2 °F (36.8 °C), resp. rate 17, height 5' 5\" (1.651 m), weight 66.7 kg (147 lb), last menstrual period 05/01/2018, SpO2 100 %, not currently breastfeeding. Allergies: Allergies   Allergen Reactions    Celexa [Citalopram] Nausea and Vomiting    Ciprofloxacin Shortness of Breath    Diflucan [Fluconazole] Rash     Burning sensation to skin    Sulfa (Sulfonamide Antibiotics) Rash       Chief Complaint: Dyspepsia, Dysphagia    History of Present Illness: H/O Gastroparesis, Candida esophagitis with recurrent dysphagia    Justification for Procedure: above    History:  Past Medical History:   Diagnosis Date    Anemia     taking iron    Gastroparesis     GERD (gastroesophageal reflux disease)     gastroparesis--h-pylori    H/O Clostridium difficile infection 06/2012    C-Diff    Helicobacter pylori (H. pylori) 05/2012    h pylori    Palpitations     2010  - cardiac workup per pt.      POTS (postural orthostatic tachycardia syndrome)       Past Surgical History:   Procedure Laterality Date    HX GI      EGD and colonoscopy    HX GYN  5/12    D&C    HX GYN      tubal reversal    HX OTHER SURGICAL      D&C x 2,  lap    TILT TABLE EVAL W/WO DRUG  1/21/2017           Social History     Social History    Marital status: SINGLE     Spouse name: N/A    Number of children: N/A    Years of education: N/A     Social History Main Topics    Smoking status: Former Smoker     Types: Cigarettes     Quit date: 11/21/2013    Smokeless tobacco: Never Used    Alcohol use 0.6 oz/week     1 Glasses of wine, 0 Standard drinks or equivalent per week      Comment: rare1    Drug use: No    Sexual activity: Yes     Partners: Male     Birth control/ protection: None     Other Topics Concern    None     Social History Narrative    3 kids (16yo - 7mo), lives with boyfriend (but he travels for work)      Family History   Problem Relation Age of Onset    Heart Disease Mother     Stroke Mother     Cancer Mother     Heart Disease Father     Stroke Father     Heart Disease Maternal Grandmother 38       Medications:   Prior to Admission medications    Medication Sig Start Date End Date Taking? Authorizing Provider   omeprazole (PRILOSEC) 40 mg capsule Take 40 mg by mouth as needed. Yes Historical Provider   cetirizine (ZYRTEC) 10 mg tablet TAKE 1 TABLET BY MOUTH DAILY FOR ALLERGIES 2/13/18  Yes Wiley Lema MD   famotidine (PEPCID) 20 mg tablet Take 20 mg by mouth nightly. 10/24/17  Yes Historical Provider   fluticasone (FLONASE) 50 mcg/actuation nasal spray 2 Sprays by Both Nostrils route as needed. Yes Historical Provider   nadolol (CORGARD) 40 mg tablet Take 1 Tab by mouth daily. Patient taking differently: Take 40 mg by mouth daily as needed. 10/3/17  Yes Cori Rivera NP   cholecalciferol (VITAMIN D3) 1,000 unit tablet Take  by mouth daily. Yes Historical Provider   multivitamin (ONE A DAY) tablet Take 1 Tab by mouth daily. Yes Historical Provider   LINZESS 290 mcg cap capsule Take 290 mcg by mouth as needed. 11/10/15  Yes Historical Provider   albuterol (PROVENTIL HFA, VENTOLIN HFA, PROAIR HFA) 90 mcg/actuation inhaler Take 1 Puff by inhalation every four (4) hours as needed for Wheezing. 1/19/18   Ciro Lin MD       Physical Exam:   General: alert, no distress   HEENT: Head: Normocephalic, no lesions, without obvious abnormality.    Heart: regular rate and rhythm, S1, S2 normal, no murmur, click, rub or gallop   Lungs: chest clear, no wheezing, rales, normal symmetric air entry   Abdominal: soft, NT/ND, Bowel sounds are normal, liver is not enlarged, spleen is not enlarged   Neurological: Grossly normal   Extremities: extremities normal, atraumatic, no cyanosis or edema     Findings/Diagnosis: Dyspepsia, Dysphagia    Plan of Care/Planned Procedure: EGD    Signed By: Diana Marcelo MD     June 21, 2018

## 2018-06-21 NOTE — PERIOP NOTES
Awake, talking upon admission to pACU. Mom to bedside. 0815 tlerating apple juice without diff. Instructions reviewed with pt/mom. 5090 Discharged to home via/wc,accompanied to car per RN. Skin warm and dry, awake and alert. Respirations even, unlabored. Pt and family members questions and concerns addressed prior to discharge. All belongings with pt.

## 2018-06-21 NOTE — DISCHARGE INSTRUCTIONS
Jj Ulysses  [id number]  1980    EGD DISCHARGE INSTRUCTIONS  Discomfort:  Sore throat- throat lozenges or warm salt water gargle  redness at IV site- apply warm compress to area; if redness or soreness persist- contact your physician  Gaseous discomfort- walking, belching will help relieve any discomfort  You may not operate a vehicle for 24 hours  You may not engage in an occupation involving machinery or appliances for rest of today. You may not drink alcoholic beverages for at least 24 hours  Avoid making any critical decisions for at least 24 hour  DIET  You may resume your regular diet - however -  remember your colon is empty and a heavy meal will produce gas. Avoid these foods:  vegetables, fried / greasy foods, carbonated drinks  MEDICATIONS        Regarding Aspirin or Nonsteroidal medications specifically, please see below. ACTIVITY  You may resume your normal daily activities. Spend the remainder of the day resting -  avoid any strenuous activity. CALL M.D. ANY SIGN OF   Increasing pain, nausea, vomiting  Abdominal distension (swelling)  New increased bleeding (oral or rectal)  Fever (chills)  Pain in chest area  Bloody discharge from nose or mouth  Shortness of breath    ONLY  Tylenol as needed for pain. Follow-up Instructions:   Call Dr. Michael Neri for results of procedure / biopsy in 4-5 days at telephone #  [phone]          DO NOT TAKE SLEEPING MEDICATIONS OR ANTIANXIETY MEDICATIONS WHILE TAKING NARCOTIC PAIN MEDICATIONS,  ESPECIALLY THE NIGHT OF ANESTHESIA. CPAP PATIENTS BE SURE TO WEAR MACHINE WHENEVER NAPPING OR SLEEPING.     DISCHARGE SUMMARY from Nurse    The following personal items collected during your admission are returned to you:   Dental Appliance: Dental Appliances: None  Vision: Visual Aid: Glasses, With patient  Hearing Aid:    Jewelry:    Clothing:    Other Valuables:    Valuables sent to safe:        PATIENT INSTRUCTIONS:    After General Anesthesia or Intravenous Sedation, for 24 hours or while taking prescription Narcotics:        Someone should be with you for the next 24 hours. For your own safety, a responsible adult must drive you home. · Limit your activities  · Recommended activity: Rest today, up with assistance today. Do not climb stairs or shower unattended for the next 24 hours. · Please start with a soft bland diet and advance as tolerated (no nausea) to regular diet. · If you have a sore throat you should try the following: fluids, warm salt water gargles, or throat lozenges. If it does not improve after several days please follow up with your primary physician. · Do not drive and operate hazardous machinery  · Do not make important personal or business decisions  · Do  not drink alcoholic beverages  · If you have not urinated within 8 hours after discharge, please contact your surgeon on call. Report the following to your surgeon:  · Excessive pain, swelling, redness or odor of or around the surgical area  · Temperature over 100.5  · Nausea and vomiting lasting longer than 4 hours or if unable to take medications  · Any signs of decreased circulation or nerve impairment to extremity: change in color, persistent  numbness, tingling, coldness or increase pain      · You will receive a Post Operative Call from one of the Recovery Room Nurses on the day after your surgery to check on you. It is very important for us to know how you are recovering after your surgery. If you have an issue or need to speak with someone, please call your surgeon, do not wait for the post operative call. · You may receive an e-mail or letter in the mail from [place] regarding your experience with us in the Ambulatory Surgery Unit. Your feedback is valuable to us and we appreciate your participation in the survey.        · If the above instructions are not adequate, please contact Laura Stern RN, [facility] anesthesia Nurse Manager or our Anesthesiologist, at [phone]. If you are having problems after your surgery, call the physician at his office number. · We wish you a speedy recovery ? What to do at Home:      *  Please give a list of your current medications to your Primary Care Provider. *  Please update this list whenever your medications are discontinued, doses are      changed, or new medications (including over-the-counter products) are added. *  Please carry medication information at all times in case of emergency situations. These are general instructions for a healthy lifestyle:    No smoking/ No tobacco products/ Avoid exposure to second hand smoke    Surgeon General's Warning:  Quitting smoking now greatly reduces serious risk to your health. Obesity, smoking, and sedentary lifestyle greatly increases your risk for illness    A healthy diet, regular physical exercise & weight monitoring are important for maintaining a healthy lifestyle    You may be retaining fluid if you have a history of heart failure or if you experience any of the following symptoms:  Weight gain of 3 pounds or more overnight or 5 pounds in a week, increased swelling in our hands or feet or shortness of breath while lying flat in bed. Please call your doctor as soon as you notice any of these symptoms; do not wait until your next office visit. Recognize signs and symptoms of STROKE:    B - Balance  E - Eyes    F-  Face looks uneven    A-  Arms unable to move or move even    S-  Speech slurred or non-existent    T-  Time-call 911 as soon as signs and symptoms begin-DO NOT go       Back to bed or wait to see if you get better-TIME IS BRAIN. If you have not received your influenza and/or pneumococcal vaccine, please follow up with your primary care physician. The discharge information has been reviewed with the patient and caregiver. The patient and caregiver verbalized understanding.

## 2018-06-21 NOTE — PERIOP NOTES
Brady Abundio  1980  [id number]    Situation:  Verbal report given from: ALETHEA Calles RN and YESSICA Domínguez CRNA  Procedure: Procedure(s):  ESOPHAGOGASTRODUODENOSCOPY (EGD)  ESOPHAGOGASTRODUODENAL (EGD) BIOPSY    Background:    Preoperative diagnosis: DYSPHAGIA OR ODYNOPHAGIA   DYSPEPSIA  EPIGASTRIC PAIN  GASTROESOPHAGEAL REFLUX DISEASE  CONSTIPATION  GASTROPARESIS    Postoperative diagnosis: GASTRITIS, DYSPHAGIA    :  Dr. Julia Ma    Assistant(s): Circ-1: Darcy Louis RN  Circ-2: Jess Peguero RN  Scrub Tech-1: Rody Dent    Specimens:   ID Type Source Tests Collected by Time Destination   1 : DUODENUM BIOPSY  Preservative Duodenum  Jacob Ovlera MD 6/21/2018 7182 Pathology   2 : STOMACH BIOPSY  Preservative Stomach  Jacob Olvera MD 6/21/2018 8018 Pathology   3 : MID ESOPHAGUS BIOPSY Preservative Esophagus, Mid  Jacob Olvera MD 6/21/2018 6711 Pathology       Assessment:  Intra-procedure medications   Propofol 300 mg      Anesthesia gave intra-procedure sedation and medications, see anesthesia flow sheet     Intravenous fluids: LR@ KVO     Vital signs stable     Abdominal assessment: round and soft       Recommendation:    Permission to share finding with eddie Flaherty yes    All side rails up, bed in low position, wheels locked. Nurse at bedside.

## 2018-06-21 NOTE — ANESTHESIA PREPROCEDURE EVALUATION
Anesthetic History   No history of anesthetic complications            Review of Systems / Medical History  Patient summary reviewed, nursing notes reviewed and pertinent labs reviewed    Pulmonary  Within defined limits                 Neuro/Psych   Within defined limits           Cardiovascular            Dysrhythmias       Exercise tolerance: >4 METS  Comments: POT syndrome;controlled with diet & meds    Negative Stress ECHO 2016   GI/Hepatic/Renal     GERD: poorly controlled          Comments: Gastroparesis Endo/Other  Within defined limits           Other Findings              Physical Exam    Airway  Mallampati: I  TM Distance: 4 - 6 cm  Neck ROM: normal range of motion   Mouth opening: Normal     Cardiovascular    Rhythm: regular  Rate: abnormal      Pertinent negatives: No murmur   Dental  No notable dental hx       Pulmonary  Breath sounds clear to auscultation               Abdominal  GI exam deferred       Other Findings            Anesthetic Plan    ASA: 2  Anesthesia type: total IV anesthesia          Induction: Intravenous  Anesthetic plan and risks discussed with: Patient      Took BB at 530 am (takes prn)

## 2018-07-21 ENCOUNTER — HOSPITAL ENCOUNTER (EMERGENCY)
Age: 38
Discharge: HOME | End: 2018-07-21
Attending: EMERGENCY MEDICINE
Payer: MEDICAID

## 2018-07-21 ENCOUNTER — APPOINTMENT (OUTPATIENT)
Dept: ULTRASOUND IMAGING | Age: 38
End: 2018-07-21
Attending: EMERGENCY MEDICINE
Payer: MEDICAID

## 2018-07-21 VITALS
HEART RATE: 91 BPM | OXYGEN SATURATION: 100 % | DIASTOLIC BLOOD PRESSURE: 72 MMHG | RESPIRATION RATE: 11 BRPM | BODY MASS INDEX: 24.24 KG/M2 | SYSTOLIC BLOOD PRESSURE: 123 MMHG | WEIGHT: 145.5 LBS | HEIGHT: 65 IN | TEMPERATURE: 98.1 F

## 2018-07-21 DIAGNOSIS — R19.7 DIARRHEA, UNSPECIFIED TYPE: ICD-10-CM

## 2018-07-21 DIAGNOSIS — R10.9 ACUTE ABDOMINAL PAIN: Primary | ICD-10-CM

## 2018-07-21 LAB
ALBUMIN SERPL-MCNC: 3.8 G/DL (ref 3.5–5)
ALBUMIN/GLOB SERPL: 1.2 {RATIO} (ref 1.1–2.2)
ALP SERPL-CCNC: 47 U/L (ref 45–117)
ALT SERPL-CCNC: 22 U/L (ref 12–78)
ANION GAP SERPL CALC-SCNC: 6 MMOL/L (ref 5–15)
APPEARANCE UR: CLEAR
AST SERPL-CCNC: 20 U/L (ref 15–37)
BACTERIA URNS QL MICRO: NEGATIVE /HPF
BASOPHILS # BLD: 0 K/UL (ref 0–0.1)
BASOPHILS NFR BLD: 1 % (ref 0–1)
BILIRUB SERPL-MCNC: 0.4 MG/DL (ref 0.2–1)
BILIRUB UR QL: NEGATIVE
BUN SERPL-MCNC: 8 MG/DL (ref 6–20)
BUN/CREAT SERPL: 11 (ref 12–20)
CALCIUM SERPL-MCNC: 8.4 MG/DL (ref 8.5–10.1)
CHLORIDE SERPL-SCNC: 108 MMOL/L (ref 97–108)
CO2 SERPL-SCNC: 26 MMOL/L (ref 21–32)
COLOR UR: ABNORMAL
CREAT SERPL-MCNC: 0.74 MG/DL (ref 0.55–1.02)
DIFFERENTIAL METHOD BLD: NORMAL
EOSINOPHIL # BLD: 0.1 K/UL (ref 0–0.4)
EOSINOPHIL NFR BLD: 2 % (ref 0–7)
EPITH CASTS URNS QL MICRO: ABNORMAL /LPF
ERYTHROCYTE [DISTWIDTH] IN BLOOD BY AUTOMATED COUNT: 12.1 % (ref 11.5–14.5)
GLOBULIN SER CALC-MCNC: 3.2 G/DL (ref 2–4)
GLUCOSE SERPL-MCNC: 86 MG/DL (ref 65–100)
GLUCOSE UR STRIP.AUTO-MCNC: NEGATIVE MG/DL
HCG UR QL: NEGATIVE
HCT VFR BLD AUTO: 36.5 % (ref 35–47)
HGB BLD-MCNC: 12.4 G/DL (ref 11.5–16)
HGB UR QL STRIP: ABNORMAL
IMM GRANULOCYTES # BLD: 0 K/UL (ref 0–0.04)
IMM GRANULOCYTES NFR BLD AUTO: 0 % (ref 0–0.5)
KETONES UR QL STRIP.AUTO: NEGATIVE MG/DL
LEUKOCYTE ESTERASE UR QL STRIP.AUTO: NEGATIVE
LIPASE SERPL-CCNC: 363 U/L (ref 73–393)
LYMPHOCYTES # BLD: 1.8 K/UL (ref 0.8–3.5)
LYMPHOCYTES NFR BLD: 41 % (ref 12–49)
MCH RBC QN AUTO: 32.6 PG (ref 26–34)
MCHC RBC AUTO-ENTMCNC: 34 G/DL (ref 30–36.5)
MCV RBC AUTO: 96.1 FL (ref 80–99)
MONOCYTES # BLD: 0.3 K/UL (ref 0–1)
MONOCYTES NFR BLD: 8 % (ref 5–13)
NEUTS SEG # BLD: 2.1 K/UL (ref 1.8–8)
NEUTS SEG NFR BLD: 49 % (ref 32–75)
NITRITE UR QL STRIP.AUTO: NEGATIVE
NRBC # BLD: 0 K/UL (ref 0–0.01)
NRBC BLD-RTO: 0 PER 100 WBC
PH UR STRIP: 6 [PH] (ref 5–8)
PLATELET # BLD AUTO: 198 K/UL (ref 150–400)
PMV BLD AUTO: 10.4 FL (ref 8.9–12.9)
POTASSIUM SERPL-SCNC: 3.3 MMOL/L (ref 3.5–5.1)
PROT SERPL-MCNC: 7 G/DL (ref 6.4–8.2)
PROT UR STRIP-MCNC: NEGATIVE MG/DL
RBC # BLD AUTO: 3.8 M/UL (ref 3.8–5.2)
RBC #/AREA URNS HPF: ABNORMAL /HPF (ref 0–5)
SODIUM SERPL-SCNC: 140 MMOL/L (ref 136–145)
SP GR UR REFRACTOMETRY: 1.01 (ref 1–1.03)
UA: UC IF INDICATED,UAUC: ABNORMAL
UROBILINOGEN UR QL STRIP.AUTO: 0.2 EU/DL (ref 0.2–1)
WBC # BLD AUTO: 4.3 K/UL (ref 3.6–11)
WBC URNS QL MICRO: ABNORMAL /HPF (ref 0–4)

## 2018-07-21 PROCEDURE — 87077 CULTURE AEROBIC IDENTIFY: CPT | Performed by: EMERGENCY MEDICINE

## 2018-07-21 PROCEDURE — 81001 URINALYSIS AUTO W/SCOPE: CPT | Performed by: EMERGENCY MEDICINE

## 2018-07-21 PROCEDURE — 76705 ECHO EXAM OF ABDOMEN: CPT

## 2018-07-21 PROCEDURE — 87045 FECES CULTURE AEROBIC BACT: CPT | Performed by: EMERGENCY MEDICINE

## 2018-07-21 PROCEDURE — 85025 COMPLETE CBC W/AUTO DIFF WBC: CPT | Performed by: EMERGENCY MEDICINE

## 2018-07-21 PROCEDURE — 80053 COMPREHEN METABOLIC PANEL: CPT | Performed by: EMERGENCY MEDICINE

## 2018-07-21 PROCEDURE — 83690 ASSAY OF LIPASE: CPT | Performed by: EMERGENCY MEDICINE

## 2018-07-21 PROCEDURE — 74011250636 HC RX REV CODE- 250/636: Performed by: EMERGENCY MEDICINE

## 2018-07-21 PROCEDURE — 99284 EMERGENCY DEPT VISIT MOD MDM: CPT

## 2018-07-21 PROCEDURE — 81025 URINE PREGNANCY TEST: CPT | Performed by: EMERGENCY MEDICINE

## 2018-07-21 PROCEDURE — 36415 COLL VENOUS BLD VENIPUNCTURE: CPT | Performed by: EMERGENCY MEDICINE

## 2018-07-21 PROCEDURE — 96360 HYDRATION IV INFUSION INIT: CPT

## 2018-07-21 PROCEDURE — 96361 HYDRATE IV INFUSION ADD-ON: CPT

## 2018-07-21 RX ORDER — ONDANSETRON 4 MG/1
4 TABLET, ORALLY DISINTEGRATING ORAL
Qty: 10 TAB | Refills: 0 | Status: SHIPPED | OUTPATIENT
Start: 2018-07-21 | End: 2019-02-14

## 2018-07-21 RX ORDER — METRONIDAZOLE 500 MG/1
500 TABLET ORAL 2 TIMES DAILY
Qty: 20 TAB | Refills: 0 | Status: SHIPPED | OUTPATIENT
Start: 2018-07-21 | End: 2018-07-31

## 2018-07-21 RX ADMIN — SODIUM CHLORIDE 1000 ML: 900 INJECTION, SOLUTION INTRAVENOUS at 08:57

## 2018-07-21 NOTE — ED NOTES
Received pt to exam room for abd pain since Monday w/ N/V/D. Pt has hx of gastroparesis but states this doesn't feel this same.

## 2018-07-21 NOTE — ED PROVIDER NOTES
EMERGENCY DEPARTMENT HISTORY AND PHYSICAL EXAM 
 
 
Date: 7/21/2018 Patient Name: Farzad Nash History of Presenting Illness Chief Complaint Patient presents with  Abdominal Pain Ambulatory into the ED with c/o abdominal pain, n/v/d x 7/16  Vomiting History Provided By: Patient HPI: Farzad Nash, 41 y.o. female with PMHx significant for Gastroparesis, GERD, POTS, presents ambulatory to the ED with cc of new onset abdominal pain with associated N/V/D for the past 5 days. Pt reports her pain onset 5 days ago while going to sleep. She describes that pain as a \"burning\" type pain, she though it was her gastroparesis and took an antacid. About an hour after going to sleep that night she was woken up by multiple episodes of non bloody emesis and non bloody diarrhea. She reports the vomiting and diarrhea lasted all night. The next morning she felt weak and fatigued and rested all day. 3 days ago she went to an UC for evaluation. There she had labs drawn and was started on bentyl. Pt reports feeling great 2 days ago after using the bentyl but notes her symptoms returned yesterday. Pt has been using her leftover zofran at home with relief to her nausea. She does have a Gastroenterologist (Dr. Grace Martines) who she sees. She had a recent endoscopy with him and was told she had no concerning findings. Pt denies any hx of DM, blood in her stool, blood in her vomit, CP, SOB, cough. Social Hx: - Tobacco (former smoker), + EtOH (1 glass of wine per week), - illicit drug use (-) There are no other complaints, changes, or physical findings at this time. PCP: Tyler Costa MD 
 
Current Outpatient Prescriptions Medication Sig Dispense Refill  metroNIDAZOLE (FLAGYL) 500 mg tablet Take 1 Tab by mouth two (2) times a day for 10 days. 20 Tab 0  
 ondansetron (ZOFRAN ODT) 4 mg disintegrating tablet Take 1 Tab by mouth every eight (8) hours as needed for Nausea.  10 Tab 0  
 omeprazole (PRILOSEC) 40 mg capsule Take 40 mg by mouth as needed.  cetirizine (ZYRTEC) 10 mg tablet TAKE 1 TABLET BY MOUTH DAILY FOR ALLERGIES 30 Tab 5  
 albuterol (PROVENTIL HFA, VENTOLIN HFA, PROAIR HFA) 90 mcg/actuation inhaler Take 1 Puff by inhalation every four (4) hours as needed for Wheezing. 1 Inhaler 0  
 famotidine (PEPCID) 20 mg tablet Take 20 mg by mouth nightly. 0  
 fluticasone (FLONASE) 50 mcg/actuation nasal spray 2 Sprays by Both Nostrils route as needed.  nadolol (CORGARD) 40 mg tablet Take 1 Tab by mouth daily. (Patient taking differently: Take 40 mg by mouth daily as needed.) 90 Tab 1  cholecalciferol (VITAMIN D3) 1,000 unit tablet Take  by mouth daily.  multivitamin (ONE A DAY) tablet Take 1 Tab by mouth daily.  LINZESS 290 mcg cap capsule Take 290 mcg by mouth as needed. 0 Past History Past Medical History: 
Past Medical History:  
Diagnosis Date  Anemia   
 taking iron  Gastroparesis  GERD (gastroesophageal reflux disease)   
 gastroparesis--h-pylori  H/O Clostridium difficile infection 06/2012 C-Diff  Helicobacter pylori (H. pylori) 05/2012  
 h pylori  Palpitations 2010  - cardiac workup per pt.  POTS (postural orthostatic tachycardia syndrome) Past Surgical History: 
Past Surgical History:  
Procedure Laterality Date  HX GI    
 EGD and colonoscopy  HX GYN  5/12 D&C  
 HX GYN    
 tubal reversal  
 HX OTHER SURGICAL    
 D&C x 2,  lap  TILT TABLE EVAL W/WO DRUG  1/21/2017 Family History: 
Family History Problem Relation Age of Onset  Heart Disease Mother  Stroke Mother  Cancer Mother  Heart Disease Father  Stroke Father  Heart Disease Maternal Grandmother 45 Social History: 
Social History Substance Use Topics  Smoking status: Former Smoker Types: Cigarettes Quit date: 11/21/2013  Smokeless tobacco: Never Used  Alcohol use 0.6 oz/week   1 Glasses of wine, 0 Standard drinks or equivalent per week Comment: rare1 Allergies: Allergies Allergen Reactions  Celexa [Citalopram] Nausea and Vomiting  Ciprofloxacin Shortness of Breath  Diflucan [Fluconazole] Rash Burning sensation to skin  Sulfa (Sulfonamide Antibiotics) Rash Review of Systems Review of Systems Constitutional: Negative. Negative for chills and fever. HENT: Negative. Negative for congestion and rhinorrhea. Respiratory: Negative. Negative for cough, chest tightness and wheezing. Cardiovascular: Negative. Negative for chest pain and palpitations. Gastrointestinal: Positive for abdominal pain, diarrhea, nausea and vomiting. Negative for constipation. Endocrine: Negative. Genitourinary: Negative. Negative for decreased urine volume, flank pain, hematuria and pelvic pain. Musculoskeletal: Negative. Negative for back pain and neck pain. Skin: Negative. Negative for color change, pallor and rash. Neurological: Negative. Negative for dizziness, seizures, weakness, numbness and headaches. Hematological: Negative. Negative for adenopathy. Psychiatric/Behavioral: Negative. All other systems reviewed and are negative. Physical Exam  
Physical Exam  
Constitutional: She is oriented to person, place, and time. She appears well-developed and well-nourished. No distress. HENT:  
Head: Normocephalic and atraumatic. Mouth/Throat: No oropharyngeal exudate. Eyes: Conjunctivae are normal. Pupils are equal, round, and reactive to light. Right eye exhibits no discharge. Left eye exhibits no discharge. No scleral icterus. Neck: Normal range of motion. Neck supple. No JVD present. Cardiovascular: Normal rate, regular rhythm, normal heart sounds and intact distal pulses. Exam reveals no gallop and no friction rub. No murmur heard. Pulmonary/Chest: Effort normal and breath sounds normal. No stridor. No respiratory distress.  She has no wheezes. She has no rales. She exhibits no tenderness. Abdominal: Soft. Bowel sounds are normal. She exhibits no distension and no mass. There is tenderness in the epigastric area. There is no rigidity, no rebound, no guarding, no CVA tenderness, no tenderness at McBurney's point and negative Rivas's sign. No hernia. Neurological: She is alert and oriented to person, place, and time. She displays normal reflexes. No cranial nerve deficit. She exhibits normal muscle tone. Coordination normal.  
Skin: Skin is warm. No rash noted. She is not diaphoretic. No pallor. Vitals reviewed. Diagnostic Study Results Labs - Recent Results (from the past 12 hour(s)) CBC WITH AUTOMATED DIFF Collection Time: 07/21/18  8:47 AM  
Result Value Ref Range WBC 4.3 3.6 - 11.0 K/uL  
 RBC 3.80 3.80 - 5.20 M/uL  
 HGB 12.4 11.5 - 16.0 g/dL HCT 36.5 35.0 - 47.0 % MCV 96.1 80.0 - 99.0 FL  
 MCH 32.6 26.0 - 34.0 PG  
 MCHC 34.0 30.0 - 36.5 g/dL  
 RDW 12.1 11.5 - 14.5 % PLATELET 311 778 - 607 K/uL MPV 10.4 8.9 - 12.9 FL  
 NRBC 0.0 0  WBC ABSOLUTE NRBC 0.00 0.00 - 0.01 K/uL NEUTROPHILS 49 32 - 75 % LYMPHOCYTES 41 12 - 49 % MONOCYTES 8 5 - 13 % EOSINOPHILS 2 0 - 7 % BASOPHILS 1 0 - 1 % IMMATURE GRANULOCYTES 0 0.0 - 0.5 % ABS. NEUTROPHILS 2.1 1.8 - 8.0 K/UL  
 ABS. LYMPHOCYTES 1.8 0.8 - 3.5 K/UL  
 ABS. MONOCYTES 0.3 0.0 - 1.0 K/UL  
 ABS. EOSINOPHILS 0.1 0.0 - 0.4 K/UL  
 ABS. BASOPHILS 0.0 0.0 - 0.1 K/UL  
 ABS. IMM. GRANS. 0.0 0.00 - 0.04 K/UL  
 DF AUTOMATED METABOLIC PANEL, COMPREHENSIVE Collection Time: 07/21/18  8:47 AM  
Result Value Ref Range Sodium 140 136 - 145 mmol/L Potassium 3.3 (L) 3.5 - 5.1 mmol/L Chloride 108 97 - 108 mmol/L  
 CO2 26 21 - 32 mmol/L Anion gap 6 5 - 15 mmol/L Glucose 86 65 - 100 mg/dL BUN 8 6 - 20 MG/DL Creatinine 0.74 0.55 - 1.02 MG/DL  
 BUN/Creatinine ratio 11 (L) 12 - 20 GFR est AA >60 >60 ml/min/1.73m2 GFR est non-AA >60 >60 ml/min/1.73m2 Calcium 8.4 (L) 8.5 - 10.1 MG/DL Bilirubin, total 0.4 0.2 - 1.0 MG/DL  
 ALT (SGPT) 22 12 - 78 U/L  
 AST (SGOT) 20 15 - 37 U/L Alk. phosphatase 47 45 - 117 U/L Protein, total 7.0 6.4 - 8.2 g/dL Albumin 3.8 3.5 - 5.0 g/dL Globulin 3.2 2.0 - 4.0 g/dL A-G Ratio 1.2 1.1 - 2.2 LIPASE Collection Time: 07/21/18  8:47 AM  
Result Value Ref Range Lipase 363 73 - 393 U/L  
URINALYSIS W/ REFLEX CULTURE Collection Time: 07/21/18  8:47 AM  
Result Value Ref Range Color YELLOW/STRAW Appearance CLEAR CLEAR Specific gravity 1.009 1.003 - 1.030    
 pH (UA) 6.0 5.0 - 8.0 Protein NEGATIVE  NEG mg/dL Glucose NEGATIVE  NEG mg/dL Ketone NEGATIVE  NEG mg/dL Bilirubin NEGATIVE  NEG Blood MODERATE (A) NEG Urobilinogen 0.2 0.2 - 1.0 EU/dL Nitrites NEGATIVE  NEG Leukocyte Esterase NEGATIVE  NEG    
 WBC 0-4 0 - 4 /hpf  
 RBC 0-5 0 - 5 /hpf Epithelial cells FEW FEW /lpf Bacteria NEGATIVE  NEG /hpf  
 UA:UC IF INDICATED CULTURE NOT INDICATED BY UA RESULT CNI Radiologic Studies -  
US ABD LTD Final Result Final result by Isra, Rad Results In (07/21/18 09:44:30)  
  Initial Result:  
  Impression:  
  IMPRESSION: No gallstones or biliary duct dilation. 
  
  Narrative:  
  INDICATION: RUQ pain and diarrhea over last week. COMPARISON: Abdomen ultrasound 9/16/2016 EXAM: Realtime right upper quadrant ultrasound. FINDINGS: The gallbladder is normal. There is no intrahepatic or extrahepatic 
biliary duct dilation. Common bile duct measures 3 mm. The liver is normal in 
size and echotexture without demonstration of mass lesion. Portal venous flow is 
normal. The pancreatic head appears normal and the right kidney is normal with 
length of 10.3 cm. Medical Decision Making I am the first provider for this patient.  
 
I reviewed the vital signs, available nursing notes, past medical history, past surgical history, family history and social history. Vital Signs-Reviewed the patient's vital signs. Patient Vitals for the past 12 hrs: 
 Temp Pulse Resp BP SpO2  
07/21/18 1015 - - - 123/72 100 % 07/21/18 1000 - - - 126/79 98 %  
07/21/18 0830 98.1 °F (36.7 °C) 91 11 119/86 100 % Records Reviewed: Nursing Notes, Old Medical Records, Previous Radiology Studies and Previous Laboratory Studies Provider Notes (Medical Decision Making): DDx: PUD, hepatitis, pancreatitis, biliary colic, perforated viscous, gastroparesis, bowel obstruction. Impression/plan: Hx of gastroparesis, POTS to the ER with intermittent abdominal pain with nausea, vomiting, diarrhea. Went to [facility] and was prescribed bentyl. Seemed improved but symptoms recurred last night. Will get labs, US and disposition pending those results. ED Course:  
Initial assessment performed. The patients presenting problems have been discussed, and they are in agreement with the care plan formulated and outlined with them. I have encouraged them to ask questions as they arise throughout their visit. Critical Care Time:  
None. Disposition: 
DISCHARGE NOTE 
10:47 AM 
The patient has been re-evaluated and is ready for discharge. Reviewed available results with patient. Counseled pt on diagnosis and care plan. Pt has expressed understanding, and all questions have been answered. Pt agrees with plan and agrees to F/U as recommended, or return to the ED if their sxs worsen. Discharge instructions have been provided and explained to the pt, along with reasons to return to the ED. PLAN: 
1. Discharge Medication List as of 7/21/2018 10:47 AM  
  
START taking these medications Details  
metroNIDAZOLE (FLAGYL) 500 mg tablet Take 1 Tab by mouth two (2) times a day for 10 days. , Print, Disp-20 Tab, R-0  
  
ondansetron (ZOFRAN ODT) 4 mg disintegrating tablet Take 1 Tab by mouth every eight (8) hours as needed for Nausea. , Print, Disp-10 Tab, R-0  
  
  
CONTINUE these medications which have NOT CHANGED Details  
omeprazole (PRILOSEC) 40 mg capsule Take 40 mg by mouth as needed., Historical Med  
  
cetirizine (ZYRTEC) 10 mg tablet TAKE 1 TABLET BY MOUTH DAILY FOR ALLERGIES, NormalGeneric For:ZYRTEC 10MGDisp-30 Tab, R-5  
  
albuterol (PROVENTIL HFA, VENTOLIN HFA, PROAIR HFA) 90 mcg/actuation inhaler Take 1 Puff by inhalation every four (4) hours as needed for Wheezing., Normal, Disp-1 Inhaler, R-0  
  
famotidine (PEPCID) 20 mg tablet Take 20 mg by mouth nightly., Historical Med, R-0  
  
fluticasone (FLONASE) 50 mcg/actuation nasal spray 2 Sprays by Both Nostrils route as needed., Historical Med  
  
nadolol (CORGARD) 40 mg tablet Take 1 Tab by mouth daily. , Normal, Disp-90 Tab, R-1  
  
cholecalciferol (VITAMIN D3) 1,000 unit tablet Take  by mouth daily. , Historical Med  
  
multivitamin (ONE A DAY) tablet Take 1 Tab by mouth daily. , Historical Med LINZESS 290 mcg cap capsule Take 290 mcg by mouth as needed., Historical Med, R-0  
  
  
 
2. Follow-up Information Follow up With Details Comments Contact Info Zach Ness MD Schedule an appointment as soon as possible for a visit in 2 days  [address] [facility] [address] [place] [facility] [address] [phone] [facility] EMERGENCY DEPT  If symptoms worsen [address] [place] [address] [address] [facility] 
[phone] Return to ED if worse Diagnosis Clinical Impression: 1. Acute abdominal pain 2. Diarrhea, unspecified type Attestations: This note is prepared by Hallie Snyder acting as Scribe for MD Jerilyn Vallejo MD : The scribe's documentation has been prepared under my direction and personally reviewed by me in its entirety. I confirm that the note above accurately reflects all work, treatment, procedures, and medical decision making performed by me.

## 2018-07-21 NOTE — DISCHARGE INSTRUCTIONS
Abdominal Pain: Care Instructions  Your Care Instructions    Abdominal pain has many possible causes. Some aren't serious and get better on their own in a few days. Others need more testing and treatment. If your pain continues or gets worse, you need to be rechecked and may need more tests to find out what is wrong. You may need surgery to correct the problem. Don't ignore new symptoms, such as fever, nausea and vomiting, urination problems, pain that gets worse, and dizziness. These may be signs of a more serious problem. Your doctor may have recommended a follow-up visit in the next 8 to 12 hours. If you are not getting better, you may need more tests or treatment. The doctor has checked you carefully, but problems can develop later. If you notice any problems or new symptoms, get medical treatment right away. Follow-up care is a key part of your treatment and safety. Be sure to make and go to all appointments, and call your doctor if you are having problems. It's also a good idea to know your test results and keep a list of the medicines you take. How can you care for yourself at home? · Rest until you feel better. · To prevent dehydration, drink plenty of fluids, enough so that your urine is light yellow or clear like water. Choose water and other caffeine-free clear liquids until you feel better. If you have kidney, heart, or liver disease and have to limit fluids, talk with your doctor before you increase the amount of fluids you drink. · If your stomach is upset, eat mild foods, such as rice, dry toast or crackers, bananas, and applesauce. Try eating several small meals instead of two or three large ones. · Wait until 48 hours after all symptoms have gone away before you have spicy foods, alcohol, and drinks that contain caffeine. · Do not eat foods that are high in fat. · Avoid anti-inflammatory medicines such as aspirin, ibuprofen (Advil, Motrin), and naproxen (Aleve).  These can cause stomach upset. Talk to your doctor if you take daily aspirin for another health problem. When should you call for help? Call 911 anytime you think you may need emergency care. For example, call if:    · You passed out (lost consciousness).     · You pass maroon or very bloody stools.     · You vomit blood or what looks like coffee grounds.     · You have new, severe belly pain.    Call your doctor now or seek immediate medical care if:    · Your pain gets worse, especially if it becomes focused in one area of your belly.     · You have a new or higher fever.     · Your stools are black and look like tar, or they have streaks of blood.     · You have unexpected vaginal bleeding.     · You have symptoms of a urinary tract infection. These may include:  ¨ Pain when you urinate. ¨ Urinating more often than usual.  ¨ Blood in your urine.     · You are dizzy or lightheaded, or you feel like you may faint.    Watch closely for changes in your health, and be sure to contact your doctor if:    · You are not getting better after 1 day (24 hours). Where can you learn more? Go to http://quintinInnovus Pharmaoh.info/. Enter N583 in the search box to learn more about \"Abdominal Pain: Care Instructions. \"  Current as of: November 20, 2017  Content Version: 11.7  © 7759-0464 [employer]. Care instructions adapted under license by [employer] (which disclaims liability or warranty for this information). If you have questions about a medical condition or this instruction, always ask your healthcare professional. [place] [address] any warranty or liability for your use of this information. Diarrhea: Care Instructions  Your Care Instructions    Diarrhea is loose, watery stools (bowel movements). The exact cause is often hard to find. Sometimes diarrhea is your body's way of getting rid of what caused an upset stomach.  Viruses, food poisoning, and many medicines can cause diarrhea. Some people get diarrhea in response to emotional stress, anxiety, or certain foods. Almost everyone has diarrhea now and then. It usually isn't serious, and your stools will return to normal soon. The important thing to do is replace the fluids you have lost, so you can prevent dehydration. The doctor has checked you carefully, but problems can develop later. If you notice any problems or new symptoms, get medical treatment right away. Follow-up care is a key part of your treatment and safety. Be sure to make and go to all appointments, and call your doctor if you are having problems. It's also a good idea to know your test results and keep a list of the medicines you take. How can you care for yourself at home? · Watch for signs of dehydration, which means your body has lost too much water. Dehydration is a serious condition and should be treated right away. Signs of dehydration are:  ¨ Increasing thirst and dry eyes and mouth. ¨ Feeling faint or lightheaded. ¨ Darker urine, and a smaller amount of urine than normal.  · To prevent dehydration, drink plenty of fluids, enough so that your urine is light yellow or clear like water. Choose water and other caffeine-free clear liquids until you feel better. If you have kidney, heart, or liver disease and have to limit fluids, talk with your doctor before you increase the amount of fluids you drink. · Begin eating small amounts of mild foods the next day, if you feel like it. ¨ Try yogurt that has live cultures of Lactobacillus. (Check the label.)  ¨ Avoid spicy foods, fruits, alcohol, and caffeine until 48 hours after all symptoms are gone. ¨ Avoid chewing gum that contains sorbitol. ¨ Avoid dairy products (except for yogurt with Lactobacillus) while you have diarrhea and for 3 days after symptoms are gone. · The doctor may recommend that you take over-the-counter medicine, such as loperamide (Imodium), if you still have diarrhea after 6 hours. Read and follow all instructions on the label. Do not use this medicine if you have bloody diarrhea, a high fever, or other signs of serious illness. Call your doctor if you think you are having a problem with your medicine. When should you call for help? Call 911 anytime you think you may need emergency care. For example, call if:    · You passed out (lost consciousness).     · Your stools are maroon or very bloody.    Call your doctor now or seek immediate medical care if:    · You are dizzy or lightheaded, or you feel like you may faint.     · Your stools are black and look like tar, or they have streaks of blood.     · You have new or worse belly pain.     · You have symptoms of dehydration, such as:  ¨ Dry eyes and a dry mouth. ¨ Passing only a little dark urine. ¨ Feeling thirstier than usual.     · You have a new or higher fever.    Watch closely for changes in your health, and be sure to contact your doctor if:    · Your diarrhea is getting worse.     · You see pus in the diarrhea.     · You are not getting better after 2 days (48 hours). Where can you learn more? Go to http://quintin-oh.info/. Enter A638 in the search box to learn more about \"Diarrhea: Care Instructions. \"  Current as of: November 20, 2017  Content Version: 11.7  © 1007-1317 [employer]. Care instructions adapted under license by [employer] (which disclaims liability or warranty for this information). If you have questions about a medical condition or this instruction, always ask your healthcare professional. [place] [address] any warranty or liability for your use of this information. Sun Number Activation    Thank you for requesting access to Sun Number. Please follow the instructions below to securely access and download your online medical record.  Sun Number allows you to send messages to your doctor, view your test results, renew your prescriptions, schedule appointments, and more. How Do I Sign Up? 1. In your internet browser, go to [email or web address]  2. Click on the First Time User? Click Here link in the Sign In box. You will be redirect to the New Member Sign Up page. 3. Enter your Xcelaero Access Code exactly as it appears below. You will not need to use this code after youve completed the sign-up process. If you do not sign up before the expiration date, you must request a new code. [email or web address] Access Code: Activation code not generated  Current Xcelaero Status: Active (This is the date your Syncanot access code will )    4. Enter the last four digits of your Social Security Number (xxxx) and Date of Birth (mm/dd/yyyy) as indicated and click Submit. You will be taken to the next sign-up page. 5. Create a Xcelaero ID. This will be your Xcelaero login ID and cannot be changed, so think of one that is secure and easy to remember. 6. Create a Xcelaero password. You can change your password at any time. 7. Enter your Password Reset Question and Answer. This can be used at a later time if you forget your password. 8. Enter your e-mail address. You will receive e-mail notification when new information is available in [address] 9. Click Sign Up. You can now view and download portions of your medical record. 10. Click the Download Summary menu link to download a portable copy of your medical information. Additional Information    If you have questions, please visit the Frequently Asked Questions section of the Xcelaero website at [email or web address] Remember, Xcelaero is NOT to be used for urgent needs. For medical emergencies, dial 911.

## 2018-07-21 NOTE — ED NOTES
Lab called and reported that C-diff could not be performed on formed stool sample provided during today's visit

## 2018-07-23 LAB
BACTERIA SPEC CULT: NORMAL
C JEJUNI+C COLI AG STL QL: NEGATIVE
E COLI SXT1+2 STL IA: NEGATIVE
SERVICE CMNT-IMP: NORMAL

## 2018-08-07 ENCOUNTER — OFFICE VISIT (OUTPATIENT)
Dept: RHEUMATOLOGY | Age: 38
End: 2018-08-07

## 2018-08-07 VITALS
BODY MASS INDEX: 24.16 KG/M2 | TEMPERATURE: 98.7 F | RESPIRATION RATE: 18 BRPM | SYSTOLIC BLOOD PRESSURE: 152 MMHG | HEART RATE: 97 BPM | DIASTOLIC BLOOD PRESSURE: 90 MMHG | HEIGHT: 65 IN | WEIGHT: 145 LBS

## 2018-08-07 DIAGNOSIS — M35.01 SJOGREN'S SYNDROME WITH KERATOCONJUNCTIVITIS SICCA (HCC): Primary | ICD-10-CM

## 2018-08-07 DIAGNOSIS — K58.1 IRRITABLE BOWEL SYNDROME WITH CONSTIPATION: ICD-10-CM

## 2018-08-07 DIAGNOSIS — G90.A POTS (POSTURAL ORTHOSTATIC TACHYCARDIA SYNDROME): ICD-10-CM

## 2018-08-07 DIAGNOSIS — K31.84 GASTROPARESIS: ICD-10-CM

## 2018-08-07 RX ORDER — CROMOLYN SODIUM 100 MG/5ML
100 SOLUTION, CONCENTRATE ORAL 3 TIMES DAILY
COMMUNITY
End: 2019-11-13

## 2018-08-07 RX ORDER — PILOCARPINE HYDROCHLORIDE 5 MG/1
5 TABLET, FILM COATED ORAL 3 TIMES DAILY
Qty: 90 TAB | Refills: 3 | Status: SHIPPED | OUTPATIENT
Start: 2018-08-07 | End: 2019-02-14 | Stop reason: ALTCHOICE

## 2018-08-07 NOTE — PATIENT INSTRUCTIONS
SJOGREN'S SYNDROME    Sjogren's Syndrome is an autoimmune disease that generally affects the salivary and lacrimal glands. Manifestations generally include dry mouth (xerostomia) and dry eye (xerophthalmia), also known as keratoconjunctivitis sicca, that is not secondary to medications (psychiatric, anti-hypertensive), metabolic disorders (hypothyroidism, diabetes), contact lens wearing, and/or decreased blinking (computer work, reading). It may also involve extraglandular organs. There is up to a 10% risk for development of lymphoma, which is up to 44 times higher than the normal population. The greatest risk factor for lymphoma include persistently enlarged salivary glands. Other risk factors include cutaneous vasculitis, lymphadenopathy, splenomegaly, cryoglobulinemia, and the development of glomerulonephritis. Laboratory abnormalities associated with the increased risk for lymphoma include mixed monoclonal cryoglobulinemia, low serum complement C3 or C4, an IgM kappa monoclonal protein, and neutropenia. Therefore, the need for monitoring these labs serially is necessary.     My Recommendations    I recommend at least annual ophthalmic   - ask about punctate plugs for your eyes   - ask about Restasis or Xiidris eye drops   - use Refresh Plus as needed for dry eyes     I recommend at least annual dental evaluations.    - Brushing teeth at least twice per day   - Avoid sugary lozenges, beverages (Coke/Pepsi) candies and/or gums and to avoid using alcohol based mouth wash   - Sugar-free products are preferabe   - Biotin oral products are preferable

## 2018-08-07 NOTE — MR AVS SNAPSHOT
[address] Shiv[place]ley [address] 
[phone] Patient: Lyndon Jason MRN:  [id number] Visit Information Date & Time Provider Department Dept. Phone Encounter #  
 8/7/2018  9:00 AM Theron Cordova [phone] Follow-up Instructions Return in about 6 months (around 2/7/2019). Your Appointments 10/11/2018  8:40 AM  
ESTABLISHED PATIENT with Betsy Blizzard, MD  
[facility] ([facility]) Appt Note: 6 month f/u  
 [address]  
[address] [facility] [id number] Upcoming Health Maintenance Date Due Influenza Age 5 to Adult 8/1/2018 PAP AKA CERVICAL CYTOLOGY 10/22/2018 DTaP/Tdap/Td series (2 - Td) 11/11/2024 Allergies as of 8/7/2018  Review Complete On: 8/7/2018 By: Tasia Gudino RN Severity Noted Reaction Type Reactions Celexa [Citalopram]  12/10/2015    Nausea and Vomiting Ciprofloxacin  11/21/2012    Shortness of Breath Diflucan [Fluconazole]  05/17/2016    Rash Burning sensation to skin  
 Sulfa (Sulfonamide Antibiotics)  03/25/2010    Rash Current Immunizations  Reviewed on 9/30/2015 Name Date Influenza Vaccine 10/16/2013 Influenza Vaccine Néstor Ross) 11/11/2014 Influenza Vaccine (Quad) PF 9/30/2015 TB Skin Test (PPD) Intradermal 1/27/2014 Tdap 11/11/2014 Not reviewed this visit You Were Diagnosed With   
  
 Codes Comments Sjogren's syndrome with keratoconjunctivitis sicca ([facility] [address]    -  Primary ICD-10-CM: M35.01 
ICD-9-CM: 710.2 POTS (postural orthostatic tachycardia syndrome)     ICD-10-CM: R00.0, I95.1 ICD-9-CM: 427.89 Vitals BP Pulse Temp Resp Height(growth percentile) Weight(growth percentile) 152/90 97 98.7 °F (37.1 °C) 18 5' 5\" (1.651 m) 145 lb (65.8 kg) LMP BMI OB Status Smoking Status (LMP Unknown) 24.13 kg/m2 Having regular periods Former Smoker BMI and BSA Data Body Mass Index Body Surface Area  
 24.13 kg/m 2 1.74 m 2 Preferred Pharmacy Pharmacy Name Phone [facility] [phone] Your Updated Medication List  
  
   
This list is accurate as of 8/7/18  9:47 AM.  Always use your most recent med list.  
  
  
  
  
 albuterol 90 mcg/actuation inhaler Commonly known as:  PROVENTIL HFA, VENTOLIN HFA, PROAIR HFA Take 1 Puff by inhalation every four (4) hours as needed for Wheezing. cetirizine 10 mg tablet Commonly known as:  ZYRTEC  
TAKE 1 TABLET BY MOUTH DAILY FOR ALLERGIES  
  
 cromolyn 100 mg/5 mL solution Commonly known as:  [place] Take 100 mg by mouth three (3) times daily. famotidine 20 mg tablet Commonly known as:  PEPCID Take 20 mg by mouth nightly. FLONASE 50 mcg/actuation nasal spray Generic drug:  fluticasone 2 Sprays by Both Nostrils route as needed. LINZESS 290 mcg Cap capsule Generic drug:  linaclotide Take 290 mcg by mouth as needed. multivitamin tablet Commonly known as:  ONE A DAY Take 1 Tab by mouth daily. nadolol 40 mg tablet Commonly known as:  CORGARD Take 1 Tab by mouth daily. omeprazole 40 mg capsule Commonly known as:  PRILOSEC Take 40 mg by mouth as needed. ondansetron 4 mg disintegrating tablet Commonly known as:  ZOFRAN ODT Take 1 Tab by mouth every eight (8) hours as needed for Nausea. pilocarpine 5 mg tablet Commonly known as:  Karlynn Sleet Take 1 Tab by mouth three (3) times daily. Indications: XEROSTOMIA SECONDARY TO SJOGREN'S SYNDROME  
  
 VITAMIN D3 1,000 unit tablet Generic drug:  cholecalciferol Take  by mouth daily. Prescriptions Sent to Pharmacy Refills  
 pilocarpine (SALAGEN) 5 mg tablet 3 Sig: Take 1 Tab by mouth three (3) times daily. Indications: XEROSTOMIA SECONDARY TO SJOGREN'S SYNDROME Class: Normal  
 Pharmacy: [facility] [address] #: [phone] Route: Oral  
  
We Performed the Following ANTINUCLEAR ANTIBODIES, IFA [id number] CPT(R)] CHRONIC HEPATITIS PANEL [[id number] Custom] COMPLEMENT, C3 & C4 [id number] Custom] CRYOGLOBULIN W/ REFLX HUMA [[id number] Custom] PROTEIN ELECTROPHORESIS W/ REFLX HUMA [[id number] Custom] RHEUMATOID FACTOR, QL [id number] CPT(R)] SJOGREN'S ABS, SSA AND SSB [[id number] Custom] Follow-up Instructions Return in about 6 months (around 2/7/2019). Patient Instructions SJOGREN'S SYNDROME Sjogren's Syndrome is an autoimmune disease that generally affects the salivary and lacrimal glands. Manifestations generally include dry mouth (xerostomia) and dry eye (xerophthalmia), also known as keratoconjunctivitis sicca, that is not secondary to medications (psychiatric, anti-hypertensive), metabolic disorders (hypothyroidism, diabetes), contact lens wearing, and/or decreased blinking (computer work, reading). It may also involve extraglandular organs. There is up to a 10% risk for development of lymphoma, which is up to 44 times higher than the normal population. The greatest risk factor for lymphoma include persistently enlarged salivary glands. Other risk factors include cutaneous vasculitis, lymphadenopathy, splenomegaly, cryoglobulinemia, and the development of glomerulonephritis. Laboratory abnormalities associated with the increased risk for lymphoma include mixed monoclonal cryoglobulinemia, low serum complement C3 or C4, an IgM kappa monoclonal protein, and neutropenia. Therefore, the need for monitoring these labs serially is necessary. My Recommendations I recommend at least annual ophthalmic 
 - ask about punctate plugs for your eyes 
 - ask about Restasis or Xiidris eye drops 
 - use Refresh Plus as needed for dry eyes I recommend at least annual dental evaluations.  
 - Brushing teeth at least twice per day - Avoid sugary lozenges, beverages (Coke/Pepsi) candies and/or gums and to avoid using alcohol based mouth wash - Sugar-free products are preferabe - Biotin oral products are preferable Introducing [facility]! Dear Joe Clarke: Thank you for requesting a [employer] account. Our records indicate that you already have an active [employer] account. You can access your account anytime at [email or web address] Did you know that you can access your hospital and ER discharge instructions at any time in [employer]? You can also review all of your test results from your hospital stay or ER visit. Additional Information If you have questions, please visit the Frequently Asked Questions section of the [employer] website at [email or web address] Remember, [employer] is NOT to be used for urgent needs. For medical emergencies, dial 911. Now available from your iPhone and Android! Please provide this summary of care documentation to your next provider. Your primary care clinician is listed as Tanner Salmon. If you have any questions after today's visit, please call [phone].

## 2018-08-07 NOTE — PROGRESS NOTES
REASON FOR VISIT    This is an evaluation for Ms. Mildred Eubanks a 41 y.o.  female for flushing. The patient is referred to the [facility] and [address] [place] [address] at the request of Dr. Johnna Parks. HISTORY OF PRESENT ILLNESS     I have reviewed and summarized old records from [facility]    She has a history of POTS and follows with Dr. Marta Ortega, gastroparesis, irritable bowel syndrome, chronic chest pain with negative cardiac work up, per Dr. Johnna Parks. She  and complains of skin flushing described \"like skin is burning\" without fevers. Her symptoms started after her third pregnancy    She saw Dr. Chyna Hale, endocrinology, who ruled out pheochromocytoma. In 7/25/2012, NM Gastric Emptying Study showed markedly delayed gastric emptying.      In 11/18/2015, labs showed negative CARLOS direct, anti-CCP, rheumatoid factor. In 11/02/2017, Chest radiograph showed the lungs are clear. The central airways are patent. The heart size is normal. No pneumothorax or pleural effusion. In 6/21/2018, Small bowel, duodenum, biopsy: No histopathologic abnormality. Stomach, biopsy: Mild chronic gastritis. Esophagus, mid, biopsy: No histopathologic abnormality. In 7/21/2018, US abdomen showed the gallbladder is normal. There is no intrahepatic or extrahepatic biliary duct dilation. Common bile duct measures 3 mm. The liver is normal in size and echotexture without demonstration of mass lesion. Portal venous flow is normal. The pancreatic head appears normal and the right kidney is normal with  length of 10.3 cm. Labs showed WBC 4.3, lymphocytes 1.8, Hct 36.5%, platelets 752,606, creatinine 0.74 mg/dL, eGFR >60, albumin 3.8 g/dL, ALK 47 U/L, ALT 22 U/L, AST 20 U/L. She last saw an eye doctor in 2017 which showed dry eyes who placed punctate plugs. She does not wear contact lenses. She uses artifical tears up to 3 times a day. Her mother has severe dry eyes.     She last saw a dentist in 2017 which did not show an increase in caries. She does not need water to swallow foot or a cracker. She does drink a lot of water due to dryness. She also develops sores in her mouth. She also uses topical Benadryl which helps. 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 If her mouth if inflamed, she notes swelling in her submandibular glands. She has used Nystatin for oral thrush. She also reports a history of intermittent burning sensation under her skin that is self-limited lasting several days and resolves on its own, without known exacerbating factors. REVIEW OF SYSTEMS    A 15 point review of systems was performed and summarized below. The questionnaire was reviewed with the patient and scanned into the patient's medical record.     General: endorses fatigue, weakness, denies recent weight gain, recent weight loss, fever, night sweats  Musculoskeletal: denies joint pain, joint swelling, morning stiffness, muscle pain  Ears: denies ringing in ears, loss of hearing, deafness  Eyes: endorses dryness, denies pain, redness, loss of vision, double vision, blurred vision, foreign body sensation  Mouth: endorses sore tongue, oral ulcers, dryness, denies bleeding gums, loss of taste, increased dental caries  Nose: denies nosebleeds, loss of smell, nasal ulcers  Throat: endorses difficulty in swallowing, denies frequent sore throats, hoarseness, pain in jaw while chewing  Neck: endorses swollen glands, tender glands  Cardiopulmonary: endorses sudden changes in heart beat, shortness of breath, denies irregular heart beat, difficulty breathing at night, dry cough, productive cough, coughing of blood, wheezing  Gastrointestinal: endorses nausea, heartburn, increasing constipation, denies stomach pain relieved by food, vomiting of blood/\"coffee grounds\", jaundice, persistent diarrhea, blood in stools, black stools  Genitourinary: endorses frequent urination, denies nocturia, difficult urination, pain or burning on urination, blood in urine, cloudy urine, pus in urine, genital discharge, vaginal dryness, rash/ulcers, sexual difficulties  Hematologic: endorses anemia, denies bleeding tendency, blood clots  Skin: endorses easy bruising, redness, denies sun sensitive, rash, hives, skin tightness, nodules/bumps, hair loss, color changes of hands or feet in the cold (Raynaud's)  Neurologic: endorses headaches, dizziness, intermittent numbness or tingling in hands/feet, denies muscle weakness,  memory loss  Psychiatric: denies depression, excessive worries   Sleep: endorses snoring, denies poor sleep (7-8 hours), denies apnea, daytime somnolence, difficulty falling asleep, difficulty staying asleep     PAST MEDICAL HISTORY    She has a past medical history of Anemia; Gastroparesis; GERD (gastroesophageal reflux disease); H/O Clostridium difficile infection (06/2012); Helicobacter pylori (H. pylori) (05/2012); Palpitations; and POTS (postural orthostatic tachycardia syndrome). She also has no past medical history of Abnormal Pap smear; Chlamydia; Complication of anesthesia; Genital herpes, unspecified; Gonorrhea; Herpes simplex without mention of complication; Human immunodeficiency virus (HIV) disease ([facility] [address] Infertility, female; Kidney disease; Pit[facility] disorder [facility] Polycystic disease, ovaries; Rhesus isoimmunization unspecified as to episode of care in pregnancy; Sickle-cell disease, unspecified; Syphilis; Trauma; or Unspecified breast disorder. FAMILY HISTORY    Her family history includes Cancer in her mother; Diabetes in her maternal grandfather and maternal grandmother; Heart Attack in her paternal grandfather; Heart Disease in her father, maternal grandmother, mother, and paternal grandfather; High Cholesterol in her father; Hypertension in her paternal grandmother; No Known Problems in her brother; Other in her maternal grandmother; Stroke in her maternal grandfather; Thyroid Disease in her mother and sister.     SOCIAL HISTORY    She reports that she quit smoking about 4 years ago. Her smoking use included Cigarettes. She has never used smokeless tobacco. She reports that she drinks about 0.6 oz of alcohol per week  She reports that she does not use illicit drugs. GYNECOLOGIC HISTORY     5, Para 3, Living 3, Miscarriage 2 @ 8 weeks    She denies severe pre-eclampsia, eclampsia or placental insufficiency    HEALTH MAINTENANCE    Immunizations  Immunization History   Administered Date(s) Administered    Influenza Vaccine 10/16/2013    Influenza Vaccine (Quad) 2014    Influenza Vaccine (Quad) PF 2015    TB Skin Test (PPD) Intradermal 2014    Tdap 2014     MEDICATIONS    Current Outpatient Prescriptions   Medication Sig Dispense Refill    cromolyn (GASTROCROM) 100 mg/5 mL solution Take 100 mg by mouth three (3) times daily.  pilocarpine (SALAGEN) 5 mg tablet Take 1 Tab by mouth three (3) times daily. Indications: XEROSTOMIA SECONDARY TO SJOGREN'S SYNDROME 90 Tab 3    ondansetron (ZOFRAN ODT) 4 mg disintegrating tablet Take 1 Tab by mouth every eight (8) hours as needed for Nausea. 10 Tab 0    omeprazole (PRILOSEC) 40 mg capsule Take 40 mg by mouth as needed.  cetirizine (ZYRTEC) 10 mg tablet TAKE 1 TABLET BY MOUTH DAILY FOR ALLERGIES 30 Tab 5    albuterol (PROVENTIL HFA, VENTOLIN HFA, PROAIR HFA) 90 mcg/actuation inhaler Take 1 Puff by inhalation every four (4) hours as needed for Wheezing. 1 Inhaler 0    famotidine (PEPCID) 20 mg tablet Take 20 mg by mouth nightly. 0    fluticasone (FLONASE) 50 mcg/actuation nasal spray 2 Sprays by Both Nostrils route as needed.  nadolol (CORGARD) 40 mg tablet Take 1 Tab by mouth daily. (Patient taking differently: Take 40 mg by mouth daily as needed.) 90 Tab 1    cholecalciferol (VITAMIN D3) 1,000 unit tablet Take  by mouth daily.  multivitamin (ONE A DAY) tablet Take 1 Tab by mouth daily.  LINZESS 290 mcg cap capsule Take 290 mcg by mouth as needed.   0 ALLERGIES    Allergies   Allergen Reactions    Celexa [Citalopram] Nausea and Vomiting    Ciprofloxacin Shortness of Breath    Diflucan [Fluconazole] Rash     Burning sensation to skin    Sulfa (Sulfonamide Antibiotics) Rash       PHYSICAL EXAMINATION    Visit Vitals    /90    Pulse 97    Temp 98.7 °F (37.1 °C)    Resp 18    Ht 5' 5\" (1.651 m)    Wt 145 lb (65.8 kg)    BMI 24.13 kg/m2     Body mass index is 24.13 kg/(m^2). General: Patient is alert, oriented x 3, not in acute distress    HEENT:   Conjunctiva are not injected and appear moist, oral mucous membranes are moist with poor salivary pooling, there are no ulcers present, there is no alopecia, neck is supple, there is no lymphadenopathy. Salivary glands are normal    Cardiovascular:  Heart is regular rate and rhythm, no murmurs. Chest:  Lungs are clear to auscultation bilaterally. Extremities:  Free of clubbing, cyanosis, edema, extremities well perfused. Neurological exam:  No focal sensory deficits, muscle strength is full in upper and lower extremities. Skin exam:  There are no rashes, no psoriasis, no active Raynaud's, no livedo reticularis, no periungual erythema. Musculoskeletal exam:  A comprehensive musculoskeletal exam was performed for all joints of each upper and lower extremity and assessed for swelling, tenderness and range of motion. No costochondral tenderness  0/18 tender points.     Beighton score:                                                                                                                                                   Right                Left   Passively dorsiflex the fifth metacarpophalangeal joint by at least 90 degrees                     0                    0   Oppose the thumb to the volar aspect of the ipsilateral forearm                                           0                    0  Hyperextend the elbow by at least 10 degrees 1                    1  Hyperextend the knee by at least 10 degrees                                                                        0                    0   Place the hands flat on the floor without bending the knees                                                             N/A                                                                                                                                                                                                                                                                             Score >=2    DATA REVIEW    Studies Reviewed:     Prior medical records were reviewed and are summarized as below:    Laboratory data: summarized in the HPI    Imaging: summarized in the HPI. ASSESSMENT AND PLAN    1) Sjogren's Syndrome. (xerostomia, xerophthalmia, POTS, gastroparesis) Sjogren's Syndrome is an autoimmune disease that generally affects the salivary and lacrimal glands. Manifestations generally include xerostomia and xerophthalmia, also known as keratoconjunctivitis sicca, that is not secondary to medications (psychiatric, anti-hypertensive), metabolic disorders (hypothyroidism, diabetes), contact lens wearing, and/or decreased blinking (computer work, reading). It may also involve extraglandular organs. There is up to a 10% risk for development of lymphoma, which is up to 44 times higher than the normal population. The greatest risk factor for lymphoma include persistently enlarged salivary glands. Other risk factors include cutaneous vasculitis, lymphadenopathy, splenomegaly, cryoglobulinemia, and the development of glomerulonephritis. Laboratory abnormalities associated with the increased risk for lymphoma include mixed monoclonal cryoglobulinemia, low serum complement C3 or C4, an IgM kappa monoclonal protein, and neutropenia. Therefore, the need for monitoring these labs serially is necessary.  I recommend at least annual ophthalmic and dental evaluations. Brushing teeth twice per day was suggested. I counseled the patient to avoid sugary lozenges, candies and/or gums and to avoid using alcohol based mouth wash. Sugar-free, fruits, and Biotin oral products are preferable. I prescribed her Salagen and explained to her that if she feels that she is having adverse reaction from it, to stop it. I will check labs today. 2) POTS. This may also be associated with gastroparesis and irritable bowel syndrome. She does not have hypermobility to suggest Sarah Danlos Syndrome. She is on nadolol. I defer to cardiology. 3) Gastroparesis. I defer to GI, but she may benefit form motility agents. 4) IBS with Constipation. She is on Linzess. The patient voiced understanding of the aforementioned assessment and plan. Summary of plan was provided in the After Visit Summary patient instructions. I also provided education about MyChart setup and utility.     TODAY'S ORDERS    Orders Placed This Encounter    ANTINUCLEAR ANTIBODIES, IFA    COMPLEMENT, C3 & C4    CHRONIC HEPATITIS PANEL    CRYOGLOBULIN W/ REFLX HUMA    PROTEIN ELECTROPHORESIS W/ REFLX HUMA    RHEUMATOID FACTOR, QL    SJOGREN'S ABS, SSA AND SSB    pilocarpine (SALAGEN) 5 mg tablet     Future Appointments  Date Time Provider [facility]   10/11/2018 8:40 AM Beny Malave  [address] 2/7/2019 9:40 AM Jez Viramontes  Fm Ha Hussein MD, [address] [facility]    Adult Rheumatology   Musculoskeletal Ultrasound Certified  [facility], [address]   Phone [phone]  Fax [phone]

## 2018-08-13 LAB
ALBUMIN SERPL ELPH-MCNC: 4.1 G/DL (ref 2.9–4.4)
ALBUMIN/GLOB SERPL: 1.3 {RATIO} (ref 0.7–1.7)
ALPHA1 GLOB SERPL ELPH-MCNC: 0.3 G/DL (ref 0–0.4)
ALPHA2 GLOB SERPL ELPH-MCNC: 0.7 G/DL (ref 0.4–1)
ANA TITR SER IF: NEGATIVE {TITER}
B-GLOBULIN SERPL ELPH-MCNC: 1.2 G/DL (ref 0.7–1.3)
C3 SERPL-MCNC: 150 MG/DL (ref 82–167)
C4 SERPL-MCNC: 27 MG/DL (ref 14–44)
COMMENT, 144067: NORMAL
CRYOGLOB SER QL 1D COLD INC: NORMAL
ENA SS-A AB SER-ACNC: <0.2 AI (ref 0–0.9)
ENA SS-B AB SER-ACNC: <0.2 AI (ref 0–0.9)
GAMMA GLOB SERPL ELPH-MCNC: 1 G/DL (ref 0.4–1.8)
GLOBULIN SER CALC-MCNC: 3.2 G/DL (ref 2.2–3.9)
HBV CORE AB SERPL QL IA: NEGATIVE
HBV CORE IGM SERPL QL IA: NEGATIVE
HBV E AB SERPL QL IA: NEGATIVE
HBV E AG SERPL QL IA: NEGATIVE
HBV SURFACE AB SER QL: NON REACTIVE
HBV SURFACE AG SERPL QL IA: NEGATIVE
HCV AB S/CO SERPL IA: <0.1 S/CO RATIO (ref 0–0.9)
M PROTEIN SERPL ELPH-MCNC: NORMAL G/DL
PLEASE NOTE, 011150: NORMAL
PROT PATTERN SERPL ELPH-IMP: NORMAL
PROT SERPL-MCNC: 7.3 G/DL (ref 6–8.5)
RHEUMATOID FACT SERPL-ACNC: <10 IU/ML (ref 0–13.9)

## 2018-09-05 ENCOUNTER — OFFICE VISIT (OUTPATIENT)
Dept: FAMILY MEDICINE CLINIC | Age: 38
End: 2018-09-05

## 2018-09-05 VITALS
HEART RATE: 80 BPM | SYSTOLIC BLOOD PRESSURE: 119 MMHG | OXYGEN SATURATION: 100 % | RESPIRATION RATE: 12 BRPM | TEMPERATURE: 98.5 F | WEIGHT: 145 LBS | HEIGHT: 65 IN | BODY MASS INDEX: 24.16 KG/M2 | DIASTOLIC BLOOD PRESSURE: 83 MMHG

## 2018-09-05 DIAGNOSIS — R06.02 SHORTNESS OF BREATH: ICD-10-CM

## 2018-09-05 DIAGNOSIS — R05.3 CHRONIC COUGH: ICD-10-CM

## 2018-09-05 DIAGNOSIS — G90.A POTS (POSTURAL ORTHOSTATIC TACHYCARDIA SYNDROME): ICD-10-CM

## 2018-09-05 DIAGNOSIS — M35.01 SJOGREN'S SYNDROME WITH KERATOCONJUNCTIVITIS SICCA (HCC): Primary | ICD-10-CM

## 2018-09-05 NOTE — PROGRESS NOTES
Subjective:  
 
Elroy Perez is a 41 y.o. female who presents today with the following: Chief Complaint Patient presents with  Shortness of Breath Patient here for evaluation of shortness of breath. She has had these symptoms off and on for a long time. Notes that she continues to have intermittent shortness of breath; but when she has it she notices it most in her upper abdomen. At other times she feels like her throat is so dry that it is going to close up on her. When she has felt short of breath patient has used inhaler which only helps temporarily. She has noted that nebulizers seem to work better for her symptoms in general. 
   
Of note, she was recently diagnosed with Sjogren's syndrome by rheumatology, has been taking Salagen and using alcohol free mouth free. She is wondering if her lung symptoms may be secondary to this. Lung specialist:  Has an appointment end of October with Dr. Leon Ledezma on [address] [place], [place] [address]way [facility]. Is hoping to have this appointment sooner. Patient is also wondering if she should see an ENT. Overall patient states she feels like she cant catch her breath several times a week; some weeks are worse than others. ROS: 
Gen: denies fever, chills, fatigue, weight loss, weight gain HEENT:denies blurry vision, nasal congestion, sore throat Resp: denies dypsnea, cough, wheezing CV: denies chest pain, palpitations, lower extremity edema Abd: denies nausea, vomiting, diarrhea, constipation Neuro: denies numbness/tingling Endo: denies polyuria, polydipsia, heat/cold intolerance Heme: no lymphadenopathy Allergies Allergen Reactions  Celexa [Citalopram] Nausea and Vomiting  Ciprofloxacin Shortness of Breath  Diflucan [Fluconazole] Rash Burning sensation to skin  Sulfa (Sulfonamide Antibiotics) Rash Current Outpatient Prescriptions:  
  cromolyn (GASTROCROM) 100 mg/5 mL solution, Take 100 mg by mouth three (3) times daily. , Disp: , Rfl:  
   pilocarpine (SALAGEN) 5 mg tablet, Take 1 Tab by mouth three (3) times daily. Indications: XEROSTOMIA SECONDARY TO SJOGREN'S SYNDROME, Disp: 90 Tab, Rfl: 3 
  ondansetron (ZOFRAN ODT) 4 mg disintegrating tablet, Take 1 Tab by mouth every eight (8) hours as needed for Nausea., Disp: 10 Tab, Rfl: 0 
  omeprazole (PRILOSEC) 40 mg capsule, Take 40 mg by mouth as needed. , Disp: , Rfl:  
  cetirizine (ZYRTEC) 10 mg tablet, TAKE 1 TABLET BY MOUTH DAILY FOR ALLERGIES, Disp: 30 Tab, Rfl: 5 
  albuterol (PROVENTIL HFA, VENTOLIN HFA, PROAIR HFA) 90 mcg/actuation inhaler, Take 1 Puff by inhalation every four (4) hours as needed for Wheezing., Disp: 1 Inhaler, Rfl: 0 
  famotidine (PEPCID) 20 mg tablet, Take 20 mg by mouth nightly., Disp: , Rfl: 0 
  fluticasone (FLONASE) 50 mcg/actuation nasal spray, 2 Sprays by Both Nostrils route as needed. , Disp: , Rfl:  
  nadolol (CORGARD) 40 mg tablet, Take 1 Tab by mouth daily. (Patient taking differently: Take 40 mg by mouth daily as needed.), Disp: 90 Tab, Rfl: 1   cholecalciferol (VITAMIN D3) 1,000 unit tablet, Take  by mouth daily. , Disp: , Rfl:  
  multivitamin (ONE A DAY) tablet, Take 1 Tab by mouth daily. , Disp: , Rfl:  
  LINZESS 290 mcg cap capsule, Take 290 mcg by mouth as needed. , Disp: , Rfl: 0 Past Medical History:  
Diagnosis Date  Anemia   
 taking iron  Gastroparesis  GERD (gastroesophageal reflux disease)   
 gastroparesis--h-pylori  H/O Clostridium difficile infection 06/2012 C-Diff  Helicobacter pylori (H. pylori) 05/2012  
 h pylori  Palpitations 2010  - cardiac workup per pt.  POTS (postural orthostatic tachycardia syndrome) Past Surgical History:  
Procedure Laterality Date  HX GI    
 EGD and colonoscopy  HX GYN  5/12 D&C  
 HX GYN    
 tubal reversal  
 HX OTHER SURGICAL    
 D&C x 2,  lap  TILT TABLE EVAL W/WO DRUG  1/21/2017 History Smoking Status  Former Smoker  Types: Cigarettes  Quit date: 11/21/2013 Smokeless Tobacco  
 Never Used Social History Social History  Marital status: SINGLE Spouse name: N/A  
 Number of children: N/A  
 Years of education: N/A Social History Main Topics  Smoking status: Former Smoker Types: Cigarettes Quit date: 11/21/2013  Smokeless tobacco: Never Used  Alcohol use 0.6 oz/week 1 Glasses of wine, 0 Standard drinks or equivalent per week Comment: rare1  Drug use: No  
 Sexual activity: Yes  
  Partners: Male Birth control/ protection: None Other Topics Concern  None Social History Narrative 3 kids (16yo - 7mo), lives with boyfriend (but he travels for work) Family History Problem Relation Age of Onset  Heart Disease Mother  Cancer Mother Thyroid  Thyroid Disease Mother  Heart Disease Father  High Cholesterol Father  Heart Disease Maternal Grandmother  Diabetes Maternal Grandmother  Other Maternal Grandmother Heomochromatosis  Thyroid Disease Sister  No Known Problems Brother  Stroke Maternal Grandfather  Diabetes Maternal Grandfather  Hypertension Paternal Grandmother  Heart Disease Paternal Grandfather  Heart Attack Paternal Grandfather Objective:  
 
Visit Vitals  /83 (BP 1 Location: Left arm, BP Patient Position: Sitting)  Pulse 80  Temp 98.5 °F (36.9 °C) (Oral)  Resp 12  Ht 5' 5\" (1.651 m)  Wt 145 lb (65.8 kg)  LMP 08/15/2018  SpO2 100%  Breastfeeding No  
 BMI 24.13 kg/m2 Wt Readings from Last 3 Encounters:  
09/05/18 145 lb (65.8 kg) 08/07/18 145 lb (65.8 kg) 07/21/18 145 lb 8.1 oz (66 kg) Gen: alert, oriented, no acute distress Head: normocephalic, atraumatic Eyes:sclera clear, conjunctiva clear Oral: moist mucus membranes, no oral lesions, no pharyngeal exudate, no pharyngeal erythema Neck: symmetric normal sized thyroid, no carotid bruits, no JVD Resp: Normal work of breathing, lungs CTAB, no w/r/r 
CV: S1, S2 normal.  No murmurs, rubs, or gallops. Abd:  Normal bowel sounds. Soft, not tender, not distended. Assessment/ Plan:  
Diagnoses and all orders for this visit: 1. Sjogren's syndrome with keratoconjunctivitis sicca ([facility] [address] 2. Chronic cough 3. POTS (postural orthostatic tachycardia syndrome) 4. Shortness of breath Called [facility], next soonest appt was 10/10 and pts appt is 10/26; she declined sooner appt. She is on the wait list for openings. Her lung exam is normal today and her vitals are normal as well. Sjogrens may well be affecting her lungs, as it has been noted to be a cause of ILD. Discussed this with patient. She will wait for her appt in October, she can follow up in our office in the mean time if any of her breathing issues worsen. Encouraged her to continue with Sjogrens care that her rheumatologist started. Verbal and written instructions (see AVS) provided.  Patient expresses understanding of diagnosis and treatment plan. Tracie Martines.  Ventura Brooks MD

## 2018-09-05 NOTE — PROGRESS NOTES
Chief Complaint Patient presents with  Shortness of Breath Patient is here to be seen for persistent sob.

## 2018-09-10 ENCOUNTER — HOSPITAL ENCOUNTER (OUTPATIENT)
Dept: GENERAL RADIOLOGY | Age: 38
Discharge: HOME | End: 2018-09-10
Payer: MEDICAID

## 2018-09-10 DIAGNOSIS — R06.00 DYSPNEA, UNSPECIFIED TYPE: ICD-10-CM

## 2018-09-10 PROCEDURE — 71046 X-RAY EXAM CHEST 2 VIEWS: CPT

## 2018-09-20 ENCOUNTER — TELEPHONE (OUTPATIENT)
Dept: CARDIOLOGY CLINIC | Age: 38
End: 2018-09-20

## 2018-09-20 NOTE — TELEPHONE ENCOUNTER
Faxed PA request to [facility] at fax number [phone]. Fax confirmation received.

## 2018-09-21 RX ORDER — FLUTICASONE PROPIONATE 50 MCG
SPRAY, SUSPENSION (ML) NASAL
Qty: 16 G | Refills: 5 | Status: SHIPPED | OUTPATIENT
Start: 2018-09-21 | End: 2019-11-04 | Stop reason: SDUPTHER

## 2018-09-24 ENCOUNTER — TELEPHONE (OUTPATIENT)
Dept: CARDIOLOGY CLINIC | Age: 38
End: 2018-09-24

## 2018-10-11 ENCOUNTER — OFFICE VISIT (OUTPATIENT)
Dept: CARDIOLOGY CLINIC | Age: 38
End: 2018-10-11

## 2018-10-11 VITALS
SYSTOLIC BLOOD PRESSURE: 122 MMHG | BODY MASS INDEX: 24.83 KG/M2 | DIASTOLIC BLOOD PRESSURE: 80 MMHG | HEIGHT: 65 IN | HEART RATE: 76 BPM | WEIGHT: 149 LBS

## 2018-10-11 DIAGNOSIS — M35.01 SJOGREN'S SYNDROME WITH KERATOCONJUNCTIVITIS SICCA (HCC): ICD-10-CM

## 2018-10-11 DIAGNOSIS — G90.A POTS (POSTURAL ORTHOSTATIC TACHYCARDIA SYNDROME): Primary | ICD-10-CM

## 2018-10-11 NOTE — PROGRESS NOTES
Cardiac Electrophysiology Office Note     Subjective:      Eliana Sandoval is a 41 y.o. female patient who had tilt table test.  + postural orthostatic tachycardia syndrome. Nadolol works well  She regulates her activities to avoid tachycardia  She has been diagnosed and treated for Sjogren by Dr Randolph Tipton dryness helped with restasis but costs too much  Sometimes she feels food stuck in throat   She is here today for follow up. She has used stockings PRN    In the past:  She says the metoprolol make her very tired. She had used inderal before and did not like it  Past hx   She has been having tachycardia, palpitations & shakiness that started about 8 month ago. She had seen Dr Jamison Romero in the past and then Dr Tiffanie Pacheco.  She was taking metoprolol PRN for tachycardia. Then Dr. Tiffanie Pacheco started her on Toprol, but she was unable to tolerate this. She said it made her symptoms worse. She denies hx of syncope, but she has had many near -syncopal episodes. She says activity and exercise are triggers for the tachycardia episodes. After exercising on the elliptical for about 20 minutes she is very shaky, lightheaded and feels awful. She says that she was on atenolol about 5 years ago for tachycardia, a cardiologists took her off and she did not have any problems for about 4 years. Then all of a sudden she started having these problems. Neurologist: Dr Jose Saba  PMHx includes chronic migraines, vertigo, gastroparesis      Echo 12/2015 shows LVEF 60% no RWMA, no significant valve abnormalities. Family hx: maternal grand mother: MI at 27years old. Maternal grandfather hx of CVA. Social hx : she denies tobacco or ETOH use.  She is single mother of 3 children      Patient Active Problem List    Diagnosis Date Noted    Sjogren's syndrome with keratoconjunctivitis sicca ([facility] [address] 08/07/2018    Gastroparesis 08/07/2018    Irritable bowel syndrome with constipation 08/07/2018    Chronic constipation 09/01/2017    GERD (gastroesophageal reflux disease) 09/01/2017    POTS (postural orthostatic tachycardia syndrome) 01/20/2017    Abdominal adhesions 05/04/2012     Current Outpatient Prescriptions   Medication Sig Dispense Refill    fluticasone (FLONASE) 50 mcg/actuation nasal spray instill 1 spray into each nostril 16 g 5    cromolyn (GASTROCROM) 100 mg/5 mL solution Take 100 mg by mouth three (3) times daily.  pilocarpine (SALAGEN) 5 mg tablet Take 1 Tab by mouth three (3) times daily. 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 Indications: XEROSTOMIA SECONDARY TO SJOGREN'S SYNDROME) 90 Tab 3    ondansetron (ZOFRAN ODT) 4 mg disintegrating tablet Take 1 Tab by mouth every eight (8) hours as needed for Nausea. 10 Tab 0    omeprazole (PRILOSEC) 40 mg capsule Take 40 mg by mouth as needed.  cetirizine (ZYRTEC) 10 mg tablet TAKE 1 TABLET BY MOUTH DAILY FOR ALLERGIES 30 Tab 5    albuterol (PROVENTIL HFA, VENTOLIN HFA, PROAIR HFA) 90 mcg/actuation inhaler Take 1 Puff by inhalation every four (4) hours as needed for Wheezing. 1 Inhaler 0    famotidine (PEPCID) 20 mg tablet Take 20 mg by mouth nightly. 0    nadolol (CORGARD) 40 mg tablet Take 1 Tab by mouth daily. (Patient taking differently: Take 40 mg by mouth daily as needed.) 90 Tab 1    cholecalciferol (VITAMIN D3) 1,000 unit tablet Take  by mouth daily.  multivitamin (ONE A DAY) tablet Take 1 Tab by mouth daily.  LINZESS 290 mcg cap capsule Take 290 mcg by mouth as needed. 0    fluticasone (FLONASE) 50 mcg/actuation nasal spray 2 Sprays by Both Nostrils route as needed.        Allergies   Allergen Reactions    Celexa [Citalopram] Nausea and Vomiting    Ciprofloxacin Shortness of Breath    Diflucan [Fluconazole] Rash     Burning sensation to skin    Sulfa (Sulfonamide Antibiotics) Rash     Past Medical History:   Diagnosis Date    Anemia     taking iron    Gastroparesis     GERD (gastroesophageal reflux disease)     gastroparesis--h-pylori    H/O Clostridium difficile infection 06/2012    C-Diff    Helicobacter pylori (H. pylori) 05/2012    h pylori    Palpitations     2010  - cardiac workup per pt.  POTS (postural orthostatic tachycardia syndrome)      Past Surgical History:   Procedure Laterality Date    HX GI      EGD and colonoscopy    HX GYN  5/12    D&C    HX GYN      tubal reversal    HX OTHER SURGICAL      D&C x 2,  lap    TILT TABLE EVAL W/WO DRUG  1/21/2017          Family History   Problem Relation Age of Onset    Heart Disease Mother     Cancer Mother      Thyroid    Thyroid Disease Mother     Heart Disease Father     High Cholesterol Father     Heart Disease Maternal Grandmother     Diabetes Maternal Grandmother     Other Maternal Grandmother      Heomochromatosis    Thyroid Disease Sister     No Known Problems Brother     Stroke Maternal Grandfather     Diabetes Maternal Grandfather     Hypertension Paternal Grandmother     Heart Disease Paternal Grandfather     Heart Attack Paternal Grandfather      Social History   Substance Use Topics    Smoking status: Former Smoker     Types: Cigarettes     Quit date: 11/21/2013    Smokeless tobacco: Never Used    Alcohol use 0.6 oz/week     1 Glasses of wine, 0 Standard drinks or equivalent per week      Comment: rare1        Review of Systems:   Constitutional: Negative for fever, chills, weight loss  HEENT: Negative for nosebleeds, vision changes. Respiratory: Negative for cough, hemoptysis, sputum production, and wheezing. Cardiovascular: Negative for chest pain,+ palpitations, no orthopnea, claudication, leg swelling, syncope, and PND. Gastrointestinal: Negative for nausea, vomiting, diarrhea, constipation, blood in stool and melena. + gastroparesis. Occasional dysphagia  Genitourinary: Negative for dysuria, and hematuria.    Musculoskeletal: Negative for myalgias, + arthralgia. Skin: occasional rash. Heme: Does not bleed or bruise easily. Neurological: Negative for speech change and focal weakness     Objective:     Visit Vitals    /80 (BP 1 Location: Left arm, BP Patient Position: Sitting)    Pulse 76    Ht 5' 5\" (1.651 m)    Wt 149 lb (67.6 kg)    BMI 24.79 kg/m2      Physical Exam:   Constitutional: Well-nourished. No distress. Head: Normocephalic and atraumatic. Eyes: Pupils are equal, round  Neck: supple. No JVD present. Cardiovascular: Normal rate, regular rhythm. Exam reveals no gallop and no friction rub. No murmur heard. Pulmonary/Chest: Effort normal and breath sounds normal. No wheezes. Abdominal: Soft, no tenderness. Musculoskeletal: no edema. Neurological: alert, oriented. Skin: Skin is warm and dry  Psychiatric: normal mood and affect. Behavior is normal. Judgment and thought content normal.        Assessment/Plan:       ICD-10-CM ICD-9-CM    1. POTS (postural orthostatic tachycardia syndrome) R00.0 427.89     I95.1     2. Sjogren's syndrome with keratoconjunctivitis sicca (HCC) M35.01 710.2       Recommend her to use more compression stockings during winter  The patient said she has increased water intake   She thinks nadolol helps here  She will see Dr Tayler Saenz 2 months per her  I am not aware of dysphagia with Sjogren but she will discuss with Dr Lester Files and possible GI visit    Follow-up Disposition:  Return in about 6 months (around 4/11/2019). Thank you for involving me in this patient's care and please call with further concerns or questions. Satya Garcia M.D.   Electrophysiology/Cardiology  [address] [facility] [address]  [address] [place] [address], [facility] [address]  [address]  [phone]

## 2018-10-11 NOTE — MR AVS SNAPSHOT
[address] [facility] [address] [place] [address] Road 
[phone] Patient: Jenna Collado MRN:  [id number] Visit Information Date & Time Provider Department Dept. Phone Encounter #  
 10/11/2018  8:40 AM Michael Hunt MD [facility] Efren Velazquez [phone] [id number] Follow-up Instructions Return in about 6 months (around 4/11/2019). Your Appointments 2/7/2019  9:40 AM  
ESTABLISHED PATIENT with Michelle Cortés MD  
[facility]) Appt Note: six month fu; . ... [place] [address]  
[address] [place] [id number] Upcoming Health Maintenance Date Due Influenza Age 5 to Adult 8/1/2018 PAP AKA CERVICAL CYTOLOGY 10/22/2018 DTaP/Tdap/Td series (3 - Td) 8/14/2027 Allergies as of 10/11/2018  Review Complete On: 10/11/2018 By: Michael Hunt MD  
  
 Severity Noted Reaction Type Reactions Celexa [Citalopram]  12/10/2015    Nausea and Vomiting Ciprofloxacin  11/21/2012    Shortness of Breath Diflucan [Fluconazole]  05/17/2016    Rash Burning sensation to skin  
 Sulfa (Sulfonamide Antibiotics)  03/25/2010    Rash Current Immunizations  Reviewed on 9/30/2015 Name Date Influenza Vaccine 10/16/2013 Influenza Vaccine Mindi Heman) 11/11/2014 Influenza Vaccine (Quad) PF 9/30/2015 TB Skin Test (PPD) Intradermal 1/27/2014 Tdap 11/11/2014 Not reviewed this visit You Were Diagnosed With   
  
 Codes Comments POTS (postural orthostatic tachycardia syndrome)    -  Primary ICD-10-CM: R00.0, I95.1 ICD-9-CM: 427.89 Sjogren's syndrome with keratoconjunctivitis sicca (HCC)     ICD-10-CM: M35.01 
ICD-9-CM: 710.2 Vitals BP Pulse Height(growth percentile) Weight(growth percentile) BMI OB Status  122/80 (BP 1 Location: Left arm, BP Patient Position: Sitting) 76 5' 5\" (1.651 m) 149 lb (67.6 kg) 24.79 kg/m2 Having regular periods Smoking Status Former Smoker Vitals History BMI and BSA Data Body Mass Index Body Surface Area 24.79 kg/m 2 1.76 m 2 Preferred Pharmacy Pharmacy Name Phone [facility] [phone] Your Updated Medication List  
  
   
This list is accurate as of 10/11/18  9:11 AM.  Always use your most recent med list.  
  
  
  
  
 albuterol 90 mcg/actuation inhaler Commonly known as:  PROVENTIL HFA, VENTOLIN HFA, PROAIR HFA Take 1 Puff by inhalation every four (4) hours as needed for Wheezing. cetirizine 10 mg tablet Commonly known as:  ZYRTEC  
TAKE 1 TABLET BY MOUTH DAILY FOR ALLERGIES  
  
 cromolyn 100 mg/5 mL solution Commonly known as:  Crow Drain Take 100 mg by mouth three (3) times daily. famotidine 20 mg tablet Commonly known as:  PEPCID Take 20 mg by mouth nightly. fluticasone 50 mcg/actuation nasal spray Commonly known as:  FLONASE  
instill 1 spray into each nostril LINZESS 290 mcg Cap capsule Generic drug:  linaclotide Take 290 mcg by mouth as needed. multivitamin tablet Commonly known as:  ONE A DAY Take 1 Tab by mouth daily. nadolol 40 mg tablet Commonly known as:  CORGARD Take 1 Tab by mouth daily. omeprazole 40 mg capsule Commonly known as:  PRILOSEC Take 40 mg by mouth as needed. ondansetron 4 mg disintegrating tablet Commonly known as:  ZOFRAN ODT Take 1 Tab by mouth every eight (8) hours as needed for Nausea. pilocarpine 5 mg tablet Commonly known as:  Nilam Huddle Take 1 Tab by mouth three (3) times daily. Indications: XEROSTOMIA SECONDARY TO SJOGREN'S SYNDROME  
  
 VITAMIN D3 1,000 unit tablet Generic drug:  cholecalciferol Take  by mouth daily. Follow-up Instructions Return in about 6 months (around 4/11/2019). Patient Instructions You will need to follow up in clinic with Dr. Autumn Ace in 6 months. Introducing [facility] SERVICES! Dear Ryan Millard: Thank you for requesting a "[employer] account. Our records indicate that you already have an active "[employer] account. You can access your account anytime at [email or web address] Did you know that you can access your hospital and ER discharge instructions at any time in "[employer]? You can also review all of your test results from your hospital stay or ER visit. Additional Information If you have questions, please visit the Frequently Asked Questions section of the "[employer] website at [email or web address] Remember, "[employer] is NOT to be used for urgent needs. For medical emergencies, dial 911. Now available from your iPhone and Android! Please provide this summary of care documentation to your next provider. Your primary care clinician is listed as Jeanne Harrington. If you have any questions after today's visit, please call [phone].

## 2018-10-11 NOTE — PROGRESS NOTES
Chief Complaint   Patient presents with    Dizziness    Follow-up     Verified patient with two types of identifiers. Verified medications with the patient.     Verified patient's pharmacy

## 2018-11-14 RX ORDER — NADOLOL 40 MG/1
40 TABLET ORAL DAILY
Qty: 90 TAB | Refills: 1 | Status: SHIPPED | OUTPATIENT
Start: 2018-11-14 | End: 2019-12-31 | Stop reason: SDUPTHER

## 2018-11-14 NOTE — TELEPHONE ENCOUNTER
Request for nadolol 40mg every day. Last office visit 10/11/18, next office visit 4/18/19. Refills per verbal order from Dr. Angie Bray.

## 2018-11-20 ENCOUNTER — HOSPITAL ENCOUNTER (OUTPATIENT)
Dept: ULTRASOUND IMAGING | Age: 38
Discharge: HOME | End: 2018-11-20
Attending: OTOLARYNGOLOGY
Payer: MEDICAID

## 2018-11-20 ENCOUNTER — HOSPITAL ENCOUNTER (OUTPATIENT)
Dept: GENERAL RADIOLOGY | Age: 38
Discharge: HOME | End: 2018-11-20
Attending: PHYSICIAN ASSISTANT
Payer: MEDICAID

## 2018-11-20 DIAGNOSIS — K59.00 CONSTIPATION: ICD-10-CM

## 2018-11-20 DIAGNOSIS — D34 BENIGN NEOPLASM OF THYROID GLAND: ICD-10-CM

## 2018-11-20 DIAGNOSIS — R10.13 DYSPEPSIA: ICD-10-CM

## 2018-11-20 DIAGNOSIS — K31.84 GASTROPARESIS: ICD-10-CM

## 2018-11-20 DIAGNOSIS — K21.9 GASTROESOPHAGEAL REFLUX DISEASE: ICD-10-CM

## 2018-11-20 PROCEDURE — 74220 X-RAY XM ESOPHAGUS 1CNTRST: CPT

## 2018-11-20 PROCEDURE — 76536 US EXAM OF HEAD AND NECK: CPT

## 2018-11-23 DIAGNOSIS — J06.9 UPPER RESPIRATORY TRACT INFECTION, UNSPECIFIED TYPE: ICD-10-CM

## 2018-11-23 RX ORDER — FLUTICASONE PROPIONATE 50 MCG
SPRAY, SUSPENSION (ML) NASAL
Qty: 16 G | Refills: 5 | Status: CANCELLED | OUTPATIENT
Start: 2018-11-23

## 2018-11-23 NOTE — TELEPHONE ENCOUNTER
Requested Prescriptions     Pending Prescriptions Disp Refills    fluticasone (FLONASE) 50 mcg/actuation nasal spray 16 g 5     Spoke with Jewel Moreno, pharmacist and he states that the patient has plenty of refills remaining.

## 2018-11-23 NOTE — TELEPHONE ENCOUNTER
Requested Prescriptions     Pending Prescriptions Disp Refills    albuterol (PROVENTIL HFA, VENTOLIN HFA, PROAIR HFA) 90 mcg/actuation inhaler 1 Inhaler 0     Sig: Take 1 Puff by inhalation every four (4) hours as needed for Wheezing.      Future Appointments:  No future appointments.   Last Appointment With Me:  9/5/2018   Last Filled:  01.19.2018  Changes Made to Medication on Last Visit:  None

## 2018-11-26 RX ORDER — ALBUTEROL SULFATE 90 UG/1
1 AEROSOL, METERED RESPIRATORY (INHALATION)
Qty: 1 INHALER | Refills: 5 | Status: SHIPPED | OUTPATIENT
Start: 2018-11-26 | End: 2020-03-11 | Stop reason: SDUPTHER

## 2018-11-29 ENCOUNTER — APPOINTMENT (OUTPATIENT)
Dept: GENERAL RADIOLOGY | Age: 38
End: 2018-11-29
Attending: PHYSICIAN ASSISTANT
Payer: MEDICAID

## 2018-11-29 ENCOUNTER — HOSPITAL ENCOUNTER (EMERGENCY)
Age: 38
Discharge: HOME | End: 2018-11-29
Attending: EMERGENCY MEDICINE
Payer: MEDICAID

## 2018-11-29 VITALS
RESPIRATION RATE: 16 BRPM | HEART RATE: 103 BPM | TEMPERATURE: 98.1 F | HEIGHT: 65 IN | SYSTOLIC BLOOD PRESSURE: 139 MMHG | OXYGEN SATURATION: 100 % | WEIGHT: 140.21 LBS | DIASTOLIC BLOOD PRESSURE: 76 MMHG | BODY MASS INDEX: 23.36 KG/M2

## 2018-11-29 DIAGNOSIS — K59.00 CONSTIPATION, UNSPECIFIED CONSTIPATION TYPE: Primary | ICD-10-CM

## 2018-11-29 LAB
ALBUMIN SERPL-MCNC: 4.4 G/DL (ref 3.5–5)
ALBUMIN/GLOB SERPL: 1.2 {RATIO} (ref 1.1–2.2)
ALP SERPL-CCNC: 38 U/L (ref 45–117)
ALT SERPL-CCNC: 21 U/L (ref 12–78)
ANION GAP SERPL CALC-SCNC: 7 MMOL/L (ref 5–15)
AST SERPL-CCNC: 16 U/L (ref 15–37)
BASOPHILS # BLD: 0 K/UL (ref 0–0.1)
BASOPHILS NFR BLD: 1 % (ref 0–1)
BILIRUB SERPL-MCNC: 0.7 MG/DL (ref 0.2–1)
BUN SERPL-MCNC: 8 MG/DL (ref 6–20)
BUN/CREAT SERPL: 11 (ref 12–20)
CALCIUM SERPL-MCNC: 9.3 MG/DL (ref 8.5–10.1)
CHLORIDE SERPL-SCNC: 104 MMOL/L (ref 97–108)
CO2 SERPL-SCNC: 27 MMOL/L (ref 21–32)
CREAT SERPL-MCNC: 0.71 MG/DL (ref 0.55–1.02)
DIFFERENTIAL METHOD BLD: NORMAL
EOSINOPHIL # BLD: 0 K/UL (ref 0–0.4)
EOSINOPHIL NFR BLD: 0 % (ref 0–7)
ERYTHROCYTE [DISTWIDTH] IN BLOOD BY AUTOMATED COUNT: 12.1 % (ref 11.5–14.5)
GLOBULIN SER CALC-MCNC: 3.6 G/DL (ref 2–4)
GLUCOSE SERPL-MCNC: 127 MG/DL (ref 65–100)
HCT VFR BLD AUTO: 39.9 % (ref 35–47)
HGB BLD-MCNC: 14 G/DL (ref 11.5–16)
IMM GRANULOCYTES # BLD: 0 K/UL (ref 0–0.04)
IMM GRANULOCYTES NFR BLD AUTO: 0 % (ref 0–0.5)
LYMPHOCYTES # BLD: 1.3 K/UL (ref 0.8–3.5)
LYMPHOCYTES NFR BLD: 25 % (ref 12–49)
MCH RBC QN AUTO: 33 PG (ref 26–34)
MCHC RBC AUTO-ENTMCNC: 35.1 G/DL (ref 30–36.5)
MCV RBC AUTO: 94.1 FL (ref 80–99)
MONOCYTES # BLD: 0.3 K/UL (ref 0–1)
MONOCYTES NFR BLD: 5 % (ref 5–13)
NEUTS SEG # BLD: 3.6 K/UL (ref 1.8–8)
NEUTS SEG NFR BLD: 69 % (ref 32–75)
NRBC # BLD: 0 K/UL (ref 0–0.01)
NRBC BLD-RTO: 0 PER 100 WBC
PLATELET # BLD AUTO: 296 K/UL (ref 150–400)
PMV BLD AUTO: 10.2 FL (ref 8.9–12.9)
POTASSIUM SERPL-SCNC: 3.4 MMOL/L (ref 3.5–5.1)
PROT SERPL-MCNC: 8 G/DL (ref 6.4–8.2)
RBC # BLD AUTO: 4.24 M/UL (ref 3.8–5.2)
SODIUM SERPL-SCNC: 138 MMOL/L (ref 136–145)
WBC # BLD AUTO: 5.3 K/UL (ref 3.6–11)

## 2018-11-29 PROCEDURE — 80053 COMPREHEN METABOLIC PANEL: CPT

## 2018-11-29 PROCEDURE — 85025 COMPLETE CBC W/AUTO DIFF WBC: CPT

## 2018-11-29 PROCEDURE — 74011250636 HC RX REV CODE- 250/636: Performed by: EMERGENCY MEDICINE

## 2018-11-29 PROCEDURE — 99281 EMR DPT VST MAYX REQ PHY/QHP: CPT

## 2018-11-29 PROCEDURE — 74018 RADEX ABDOMEN 1 VIEW: CPT

## 2018-11-29 PROCEDURE — 96361 HYDRATE IV INFUSION ADD-ON: CPT

## 2018-11-29 PROCEDURE — 96360 HYDRATION IV INFUSION INIT: CPT

## 2018-11-29 PROCEDURE — 36415 COLL VENOUS BLD VENIPUNCTURE: CPT

## 2018-11-29 RX ORDER — SODIUM CHLORIDE 0.9 % (FLUSH) 0.9 %
5-10 SYRINGE (ML) INJECTION EVERY 8 HOURS
Status: DISCONTINUED | OUTPATIENT
Start: 2018-11-29 | End: 2018-11-29 | Stop reason: HOSPADM

## 2018-11-29 RX ORDER — POLYETHYLENE GLYCOL 3350, SODIUM SULFATE, SODIUM CHLORIDE, POTASSIUM CHLORIDE, SODIUM ASCORBATE, AND ASCORBIC ACID 7.5-2.691G
2 KIT ORAL ONCE
Qty: 1 KIT | Refills: 0 | Status: SHIPPED | OUTPATIENT
Start: 2018-11-29 | End: 2018-11-29

## 2018-11-29 RX ORDER — POLYETHYLENE GLYCOL 3350, SODIUM SULFATE ANHYDROUS, SODIUM BICARBONATE, SODIUM CHLORIDE, POTASSIUM CHLORIDE 236; 22.74; 6.74; 5.86; 2.97 G/4L; G/4L; G/4L; G/4L; G/4L
4000 POWDER, FOR SOLUTION ORAL
Status: DISCONTINUED | OUTPATIENT
Start: 2018-11-29 | End: 2018-11-29 | Stop reason: HOSPADM

## 2018-11-29 RX ORDER — SODIUM CHLORIDE 0.9 % (FLUSH) 0.9 %
5-10 SYRINGE (ML) INJECTION AS NEEDED
Status: DISCONTINUED | OUTPATIENT
Start: 2018-11-29 | End: 2018-11-29 | Stop reason: HOSPADM

## 2018-11-29 RX ADMIN — SODIUM CHLORIDE 1000 ML: 900 INJECTION, SOLUTION INTRAVENOUS at 10:58

## 2018-11-29 NOTE — ED PROVIDER NOTES
EMERGENCY DEPARTMENT HISTORY AND PHYSICAL EXAM 
 
 
Date: 11/29/2018 Patient Name: Mirta Britton History of Presenting Illness Chief Complaint Patient presents with  Constipation  
  pt reports constipation, last normal was 1 week ago, reports history of POTS, stress or illness causes her heart rate to elevate PROVIDER IN TRIAGE NOTE: 
9:59 AM 
Luan Barger PA-C has evaluated the patient as the Provider in Triage (PIT) for constipation and tachycardia. The vital signs and the triage nurse assessment have been reviewed. The patient and any available family have been advised that the appropriate studies have been ordered to initiate the work up based on the clinical presentation during the assessment. The pt has been advised that they will be accommodated in Emergency Department as soon as possible. The pt has been requested to contact the triage nurse or PIT immediately if they experiences any changes in their condition during this brief waiting period. History Provided By: Patient HPI: Mirta Britton, 41 y.o. female with PMHx significant for gastroparesis, GERD, POTS, presents to the ED with c/o persistent constipation x 8 days. Pt reports receiving a Barium swallow on 11/20, and since she has had a sensation of a \"brick in her stomach. \" Pt states her last normal BM was 8 days ago, but does note passing small amounts of stool. Pt endorses taking Kristina lax and Linzess with no relief of her symptoms. Pt conveys feeling dehydrated. She notes hx of chronic constipation for which she has taken GoLYTELY in the past. Pt denies being on chronic opiates. She specifically denies any fevers, chills, nausea, vomiting, chest pain, shortness of breath, headache, rash, diarrhea, sweating or weight loss. Social Hx: (-) Tobacco, (-) EtOH, (-) illicit drugs There are no other complaints, changes, or physical findings at this time.  
 
PCP: Rosio Hussein MD  
GI: Mike Berumen MD 
 
 Current Facility-Administered Medications Medication Dose Route Frequency Provider Last Rate Last Dose  sodium chloride (NS) flush 5-10 mL  5-10 mL IntraVENous Q8H CORINNE Kay      
 sodium chloride (NS) flush 5-10 mL  5-10 mL IntraVENous PRN CORINNE Kay Current Outpatient Medications Medication Sig Dispense Refill  peg 3350-Electrolytes-Vit C (MOVIPREP) 100-7.5-2.691 gram pwpk oral solution Take 2,000 mL by mouth once for 1 dose. 1 Kit 0  
 albuterol (PROVENTIL HFA, VENTOLIN HFA, PROAIR HFA) 90 mcg/actuation inhaler Take 1 Puff by inhalation every four (4) hours as needed for Wheezing. 1 Inhaler 5  
 nadolol (CORGARD) 40 mg tablet Take 1 Tab by mouth daily. 90 Tab 1  
 fluticasone (FLONASE) 50 mcg/actuation nasal spray instill 1 spray into each nostril 16 g 5  
 cromolyn (GASTROCROM) 100 mg/5 mL solution Take 100 mg by mouth three (3) times daily.  pilocarpine (SALAGEN) 5 mg tablet Take 1 Tab by mouth three (3) times daily. 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 Indications: XEROSTOMIA SECONDARY TO SJOGREN'S SYNDROME) 90 Tab 3  
 ondansetron (ZOFRAN ODT) 4 mg disintegrating tablet Take 1 Tab by mouth every eight (8) hours as needed for Nausea. 10 Tab 0  
 omeprazole (PRILOSEC) 40 mg capsule Take 40 mg by mouth as needed.  cetirizine (ZYRTEC) 10 mg tablet TAKE 1 TABLET BY MOUTH DAILY FOR ALLERGIES 30 Tab 5  
 famotidine (PEPCID) 20 mg tablet Take 20 mg by mouth nightly. 0  
 cholecalciferol (VITAMIN D3) 1,000 unit tablet Take  by mouth daily.  multivitamin (ONE A DAY) tablet Take 1 Tab by mouth daily.  LINZESS 290 mcg cap capsule Take 290 mcg by mouth as needed. 0 Past History Past Medical History: 
Past Medical History:  
Diagnosis Date  Anemia   
 taking iron  Gastroparesis  GERD (gastroesophageal reflux disease)   
 gastroparesis--h-pylori  H/O Clostridium difficile infection 2012 C-Diff  Helicobacter pylori (H. pylori) 2012  
 h pylori  Palpitations   - cardiac workup per pt.  POTS (postural orthostatic tachycardia syndrome) Past Surgical History: 
Past Surgical History:  
Procedure Laterality Date  HX GI    
 EGD and colonoscopy  HX GYN   D&C  
 HX GYN    
 tubal reversal  
 HX OTHER SURGICAL    
 D&C x 2,  lap  TILT TABLE EVAL W/WO DRUG  2017 Family History: 
Family History Problem Relation Age of Onset  Heart Disease Mother  Cancer Mother Thyroid  Thyroid Disease Mother  Heart Disease Father  High Cholesterol Father  Heart Disease Maternal Grandmother  Diabetes Maternal Grandmother  Other Maternal Grandmother Heomochromatosis  Thyroid Disease Sister  No Known Problems Brother  Stroke Maternal Grandfather  Diabetes Maternal Grandfather  Hypertension Paternal Grandmother  Heart Disease Paternal Grandfather  Heart Attack Paternal Grandfather Social History: 
Social History Tobacco Use  Smoking status: Former Smoker Types: Cigarettes Last attempt to quit: 2013 Years since quittin.0  Smokeless tobacco: Never Used Substance Use Topics  Alcohol use: Yes Alcohol/week: 0.6 oz Types: 1 Glasses of wine per week Comment: rare1  Drug use: No  
 
 
Allergies: Allergies Allergen Reactions  Celexa [Citalopram] Nausea and Vomiting  Ciprofloxacin Shortness of Breath  Diflucan [Fluconazole] Rash Burning sensation to skin  Sulfa (Sulfonamide Antibiotics) Rash Review of Systems Review of Systems Constitutional: Negative. Negative for activity change, appetite change, chills, fatigue, fever and unexpected weight change. HENT: Negative. Negative for congestion, hearing loss, rhinorrhea, sneezing and voice change. Eyes: Negative. Negative for pain and visual disturbance. Respiratory: Negative. Negative for apnea, cough, choking, chest tightness and shortness of breath. Cardiovascular: Negative. Negative for chest pain and palpitations. Gastrointestinal: Positive for constipation. Negative for abdominal distention, abdominal pain, blood in stool, diarrhea, nausea and vomiting. Genitourinary: Negative. Negative for difficulty urinating, flank pain, frequency and urgency. No discharge Musculoskeletal: Negative. Negative for arthralgias, back pain, myalgias and neck stiffness. Skin: Negative. Negative for color change and rash. Neurological: Negative. Negative for dizziness, seizures, syncope, speech difficulty, weakness, numbness and headaches. Hematological: Negative for adenopathy. Psychiatric/Behavioral: Negative. Negative for agitation, behavioral problems, dysphoric mood and suicidal ideas. The patient is not nervous/anxious. Physical Exam  
Physical Exam  
Constitutional: She is oriented to person, place, and time. She appears well-developed and well-nourished. No distress. HENT:  
Head: Normocephalic and atraumatic. Mouth/Throat: Oropharynx is clear and moist. No oropharyngeal exudate. Eyes: Conjunctivae and EOM are normal. Pupils are equal, round, and reactive to light. Right eye exhibits no discharge. Left eye exhibits no discharge. Neck: Normal range of motion. Neck supple. Cardiovascular: Normal rate, regular rhythm and intact distal pulses. Exam reveals no gallop and no friction rub. No murmur heard. Pulmonary/Chest: Effort normal and breath sounds normal. No respiratory distress. She has no wheezes. She has no rales. She exhibits no tenderness. Abdominal: Soft. Bowel sounds are normal. She exhibits no distension and no mass. There is no tenderness. There is no rebound and no guarding. Musculoskeletal: Normal range of motion. She exhibits no edema. Lymphadenopathy:  
  She has no cervical adenopathy. Neurological: She is alert and oriented to person, place, and time. No cranial nerve deficit. Coordination normal.  
Skin: Skin is warm and dry. No rash noted. No erythema. Psychiatric: She has a normal mood and affect. Nursing note and vitals reviewed. Diagnostic Study Results Labs - Recent Results (from the past 12 hour(s)) METABOLIC PANEL, COMPREHENSIVE Collection Time: 11/29/18 10:06 AM  
Result Value Ref Range Sodium 138 136 - 145 mmol/L Potassium 3.4 (L) 3.5 - 5.1 mmol/L Chloride 104 97 - 108 mmol/L  
 CO2 27 21 - 32 mmol/L Anion gap 7 5 - 15 mmol/L Glucose 127 (H) 65 - 100 mg/dL BUN 8 6 - 20 MG/DL Creatinine 0.71 0.55 - 1.02 MG/DL  
 BUN/Creatinine ratio 11 (L) 12 - 20 GFR est AA >60 >60 ml/min/1.73m2 GFR est non-AA >60 >60 ml/min/1.73m2 Calcium 9.3 8.5 - 10.1 MG/DL Bilirubin, total 0.7 0.2 - 1.0 MG/DL  
 ALT (SGPT) 21 12 - 78 U/L  
 AST (SGOT) 16 15 - 37 U/L Alk. phosphatase 38 (L) 45 - 117 U/L Protein, total 8.0 6.4 - 8.2 g/dL Albumin 4.4 3.5 - 5.0 g/dL Globulin 3.6 2.0 - 4.0 g/dL A-G Ratio 1.2 1.1 - 2.2    
CBC WITH AUTOMATED DIFF Collection Time: 11/29/18 10:06 AM  
Result Value Ref Range WBC 5.3 3.6 - 11.0 K/uL  
 RBC 4.24 3.80 - 5.20 M/uL  
 HGB 14.0 11.5 - 16.0 g/dL HCT 39.9 35.0 - 47.0 % MCV 94.1 80.0 - 99.0 FL  
 MCH 33.0 26.0 - 34.0 PG  
 MCHC 35.1 30.0 - 36.5 g/dL  
 RDW 12.1 11.5 - 14.5 % PLATELET 362 191 - 755 K/uL MPV 10.2 8.9 - 12.9 FL  
 NRBC 0.0 0  WBC ABSOLUTE NRBC 0.00 0.00 - 0.01 K/uL NEUTROPHILS 69 32 - 75 % LYMPHOCYTES 25 12 - 49 % MONOCYTES 5 5 - 13 % EOSINOPHILS 0 0 - 7 % BASOPHILS 1 0 - 1 % IMMATURE GRANULOCYTES 0 0.0 - 0.5 % ABS. NEUTROPHILS 3.6 1.8 - 8.0 K/UL  
 ABS. LYMPHOCYTES 1.3 0.8 - 3.5 K/UL  
 ABS. MONOCYTES 0.3 0.0 - 1.0 K/UL  
 ABS. EOSINOPHILS 0.0 0.0 - 0.4 K/UL  
 ABS. BASOPHILS 0.0 0.0 - 0.1 K/UL ABS. IMM. GRANS. 0.0 0.00 - 0.04 K/UL  
 DF AUTOMATED Radiologic Studies -  
XR ABD (KUB) Final Result IMPRESSION: No acute process. Medical Decision Making I am the first provider for this patient. I reviewed the vital signs, available nursing notes, past medical history, past surgical history, family history and social history. Vital Signs-Reviewed the patient's vital signs. Patient Vitals for the past 12 hrs: 
 Temp Pulse Resp BP SpO2  
11/29/18 1100   16 139/76 100 % 11/29/18 0952 98.1 °F (36.7 °C) (!) 103 16 135/75 100 % Pulse Oximetry Analysis - 100% on RA Records Reviewed: Nursing Notes, Old Medical Records, Previous Radiology Studies and Previous Laboratory Studies Provider Notes (Medical Decision Making): DDx: constipation, electrolyte abnormality, dehydration ED Course:  
Initial assessment performed. The patients presenting problems have been discussed, and they are in agreement with the care plan formulated and outlined with them. I have encouraged them to ask questions as they arise throughout their visit. 10:30 AM 
Upon consulting pt on her options, pt chooses discharge with GoLYTELY over enema or physical disimpaction. 1:00 PM 
The patient states that their symptoms have resolved and they feel much better. There are no other new complaints at this time. Her questions have been answered. We are awaiting final results and those will be reviewed with them when they become available. Disposition: 
Discharge Note: 
1:21 PM 
The pt is ready for discharge. The pt's signs, symptoms, diagnosis, and discharge instructions have been discussed and pt has conveyed their understanding. The pt is to follow up as recommended or return to ER should their symptoms worsen. Plan has been discussed and pt is in agreement. PLAN: 
1. Discharge to home 2. Discharge Medication List as of 11/29/2018  1:18 PM  
  
START taking these medications Details  
peg 3350-Electrolytes-Vit C (MOVIPREP) 100-7.5-2.691 gram pwpk oral solution Take 2,000 mL by mouth once for 1 dose., Normal, Disp-1 Kit, R-0  
  
  
CONTINUE these medications which have NOT CHANGED Details  
albuterol (PROVENTIL HFA, VENTOLIN HFA, PROAIR HFA) 90 mcg/actuation inhaler Take 1 Puff by inhalation every four (4) hours as needed for Wheezing., Normal, Disp-1 Inhaler, R-5  
  
nadolol (CORGARD) 40 mg tablet Take 1 Tab by mouth daily. , Normal, Disp-90 Tab, R-1  
  
fluticasone (FLONASE) 50 mcg/actuation nasal spray instill 1 spray into each nostril, Normal, Disp-16 g, R-5  
  
cromolyn (GASTROCROM) 100 mg/5 mL solution Take 100 mg by mouth three (3) times daily. , Historical Med  
  
pilocarpine (SALAGEN) 5 mg tablet Take 1 Tab by mouth three (3) times daily. 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  
  
ondansetron (ZOFRAN ODT) 4 mg disintegrating tablet Take 1 Tab by mouth every eight (8) hours as needed for Nausea. , Print, Disp-10 Tab, R-0  
  
omeprazole (PRILOSEC) 40 mg capsule Take 40 mg by mouth as needed., Historical Med  
  
cetirizine (ZYRTEC) 10 mg tablet TAKE 1 TABLET BY MOUTH DAILY FOR ALLERGIES, NormalGeneric For:ZYRTEC 10MGDisp-30 Tab, R-5  
  
famotidine (PEPCID) 20 mg tablet Take 20 mg by mouth nightly., Historical Med, R-0  
  
cholecalciferol (VITAMIN D3) 1,000 unit tablet Take  by mouth daily. , Historical Med  
  
multivitamin (ONE A DAY) tablet Take 1 Tab by mouth daily. , Historical Med LINZESS 290 mcg cap capsule Take 290 mcg by mouth as needed., Historical Med, R-0  
  
  
 
2. Follow-up Information Follow up With Specialties Details Why Contact Info Lynette Aleman MD Family Practice Call in 2 days As needed, If symptoms worsen [address] [address] [facility] [address] [phone] Return to ED if worse Diagnosis Clinical Impression: 1. Constipation, unspecified constipation type Attestations: 
 
Attestation: This note is prepared by Arun Cam. Alcides Conway, acting as Scribe for Penny Garcia MD: The scribe's documentation has been prepared under my direction and personally reviewed by me in its entirety. I confirm that the note above accurately reflects all work, treatment, procedures, and medical decision making performed by me.

## 2018-11-29 NOTE — DISCHARGE INSTRUCTIONS
Constipation: Care Instructions  Your Care Instructions    Constipation means that you have a hard time passing stools (bowel movements). People pass stools from 3 times a day to once every 3 days. What is normal for you may be different. Constipation may occur with pain in the rectum and cramping. The pain may get worse when you try to pass stools. Sometimes there are small amounts of bright red blood on toilet paper or the surface of stools. This is because of enlarged veins near the rectum (hemorrhoids). A few changes in your diet and lifestyle may help you avoid ongoing constipation. Your doctor may also prescribe medicine to help loosen your stool. Some medicines can cause constipation. These include pain medicines and antidepressants. Tell your doctor about all the medicines you take. Your doctor may want to make a medicine change to ease your symptoms. Follow-up care is a key part of your treatment and safety. Be sure to make and go to all appointments, and call your doctor if you are having problems. It's also a good idea to know your test results and keep a list of the medicines you take. How can you care for yourself at home? · Drink plenty of fluids, enough so that your urine is light yellow or clear like water. If you have kidney, heart, or liver disease and have to limit fluids, talk with your doctor before you increase the amount of fluids you drink. · Include high-fiber foods in your diet each day. These include fruits, vegetables, beans, and whole grains. · Get at least 30 minutes of exercise on most days of the week. Walking is a good choice. You also may want to do other activities, such as running, swimming, cycling, or playing tennis or team sports. · Take a fiber supplement, such as Citrucel or Metamucil, every day. Read and follow all instructions on the label. · Schedule time each day for a bowel movement. A daily routine may help.  Take your time having your bowel movement. · Support your feet with a small step stool when you sit on the toilet. This helps flex your hips and places your pelvis in a squatting position. · Your doctor may recommend an over-the-counter laxative to relieve your constipation. Examples are Milk of Magnesia and MiraLax. Read and follow all instructions on the label. Do not use laxatives on a long-term basis. When should you call for help? Call your doctor now or seek immediate medical care if:    · You have new or worse belly pain.     · You have new or worse nausea or vomiting.     · You have blood in your stools.    Watch closely for changes in your health, and be sure to contact your doctor if:    · Your constipation is getting worse.     · You do not get better as expected. Where can you learn more? Go to http://quintin-oh.info/. Enter 21 [phone] in the search box to learn more about \"Constipation: Care Instructions. \"  Current as of: November 20, 2017  Content Version: 11.8  © 9475-2977 [employer]. Care instructions adapted under license by [employer] (which disclaims liability or warranty for this information). If you have questions about a medical condition or this instruction, always ask your healthcare professional. [address] any warranty or liability for your use of this information.

## 2018-12-17 ENCOUNTER — TELEPHONE (OUTPATIENT)
Dept: CARDIOLOGY CLINIC | Age: 38
End: 2018-12-17

## 2018-12-17 NOTE — TELEPHONE ENCOUNTER
No response thru my chart regarding mailing vs picking up stocking script. Mailed script to patient.

## 2018-12-18 ENCOUNTER — TELEPHONE (OUTPATIENT)
Dept: CARDIOLOGY CLINIC | Age: 38
End: 2018-12-18

## 2018-12-18 NOTE — TELEPHONE ENCOUNTER
Patient stated she has compression socks 20 30s and they are now lose so she would like to know how to go about getting new ones   Phone: [phone]

## 2018-12-18 NOTE — TELEPHONE ENCOUNTER
Verified patient with two types of identifiers. Notified patient that Suzan Espinoza had responded to patient via [employer] and had mailed patient a new prescription for her compression stockings. Patient verbalized understanding and will call with any other questions.

## 2018-12-20 ENCOUNTER — OFFICE VISIT (OUTPATIENT)
Dept: FAMILY MEDICINE CLINIC | Age: 38
End: 2018-12-20

## 2018-12-20 VITALS
DIASTOLIC BLOOD PRESSURE: 83 MMHG | TEMPERATURE: 98.7 F | WEIGHT: 138 LBS | HEIGHT: 65 IN | RESPIRATION RATE: 14 BRPM | OXYGEN SATURATION: 99 % | HEART RATE: 108 BPM | BODY MASS INDEX: 22.99 KG/M2 | SYSTOLIC BLOOD PRESSURE: 132 MMHG

## 2018-12-20 DIAGNOSIS — R19.8 RECTAL PRESSURE: Primary | ICD-10-CM

## 2018-12-20 NOTE — PROGRESS NOTES
Chief Complaint   Patient presents with    Vaginal Pain     pressure in vaginal and rectal area     1. Have you been to the ER, urgent care clinic since your last visit? Hospitalized since your last visit? Yes, [address]     2. Have you seen or consulted any other health care providers outside of the [address] [place] [address] since your last visit? Include any pap smears or colon screening.  No    Patient received Influenza vaccine [facility]

## 2018-12-20 NOTE — PROGRESS NOTES
Subjective:     Krystin Pretty is a 41 y.o. female who presents today with the following:  Chief Complaint   Patient presents with    Vaginal Pain     pressure in vaginal and rectal area     Patient presents today with complains of vaginal pain. For the last few days patient has felt pressure/swelling in vaginal area into rectal area. Patient does have a history of constipation but for the last month has not had any constipation. Patient feels like something might \"fall out\". Cannot see Gyn for several weeks. Has tried warm baths, tucks pads, and hemorroid medications. Sensation is not painful but more like pressure. Pt has had this pressure like feeling in the past.       No bleeding until today when patient started period. Prior to today, last LMP was Thanksgiving. No loss of bowel or bladder control. ROS:  Gen: denies fever, chills, fatigue, weight loss, weight gain  HEENT:denies blurry vision, nasal congestion, sore throat  Resp: denies dypsnea, cough, wheezing  CV: denies chest pain, palpitations, lower extremity edema  Abd: denies nausea, vomiting, diarrhea, constipation  Neuro: denies numbness/tingling      Allergies   Allergen Reactions    Celexa [Citalopram] Nausea and Vomiting    Ciprofloxacin Shortness of Breath    Diflucan [Fluconazole] Rash     Burning sensation to skin    Sulfa (Sulfonamide Antibiotics) Rash         Current Outpatient Medications:     nadolol (CORGARD) 40 mg tablet, Take 1 Tab by mouth daily. , Disp: 90 Tab, Rfl: 1    fluticasone (FLONASE) 50 mcg/actuation nasal spray, instill 1 spray into each nostril, Disp: 16 g, Rfl: 5    cromolyn (GASTROCROM) 100 mg/5 mL solution, Take 100 mg by mouth three (3) times daily. , Disp: , Rfl:     pilocarpine (SALAGEN) 5 mg tablet, Take 1 Tab by mouth three (3) times daily. 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  Indications: XEROSTOMIA SECONDARY TO SJOGREN'S SYNDROME), Disp: 90 Tab, Rfl: 3    ondansetron (ZOFRAN ODT) 4 mg disintegrating tablet, Take 1 Tab by mouth every eight (8) hours as needed for Nausea., Disp: 10 Tab, Rfl: 0    omeprazole (PRILOSEC) 40 mg capsule, Take 40 mg by mouth as needed. , Disp: , Rfl:     cetirizine (ZYRTEC) 10 mg tablet, TAKE 1 TABLET BY MOUTH DAILY FOR ALLERGIES, Disp: 30 Tab, Rfl: 5    famotidine (PEPCID) 20 mg tablet, Take 20 mg by mouth nightly., Disp: , Rfl: 0    cholecalciferol (VITAMIN D3) 1,000 unit tablet, Take  by mouth daily. , Disp: , Rfl:     multivitamin (ONE A DAY) tablet, Take 1 Tab by mouth daily. , Disp: , Rfl:     LINZESS 290 mcg cap capsule, Take 290 mcg by mouth as needed. , Disp: , Rfl: 0    albuterol (PROVENTIL HFA, VENTOLIN HFA, PROAIR HFA) 90 mcg/actuation inhaler, Take 1 Puff by inhalation every four (4) hours as needed for Wheezing., Disp: 1 Inhaler, Rfl: 5    Past Medical History:   Diagnosis Date    Anemia     taking iron    Gastroparesis     GERD (gastroesophageal reflux disease)     gastroparesis--h-pylori    H/O Clostridium difficile infection 2012    C-Diff    Helicobacter pylori (H. pylori) 2012    h pylori    Palpitations       - cardiac workup per pt.      POTS (postural orthostatic tachycardia syndrome)        Past Surgical History:   Procedure Laterality Date    HX GI      EGD and colonoscopy    HX GYN      D&C    HX GYN      tubal reversal    HX OTHER SURGICAL      D&C x 2,  lap    TILT TABLE EVAL W/WO DRUG  2017            Social History     Tobacco Use   Smoking Status Former Smoker    Types: Cigarettes    Last attempt to quit: 2013    Years since quittin.0   Smokeless Tobacco Never Used       Social History     Socioeconomic History    Marital status: SINGLE     Spouse name: Not on file    Number of children: Not on file    Years of education: Not on file    Highest education level: Not on file   Tobacco Use    Smoking status: Former Smoker     Types: Cigarettes     Last attempt to quit: 2013     Years since quittin.0    Smokeless tobacco: Never Used   Substance and Sexual Activity    Alcohol use: Yes     Alcohol/week: 0.6 oz     Types: 1 Glasses of wine per week     Comment: rare1    Drug use: No    Sexual activity: Yes     Partners: Male     Birth control/protection: None   Social History Narrative    3 kids (14yo - 7mo), lives with boyfriend (but he travels for work)       Family History   Problem Relation Age of Onset    Heart Disease Mother     Cancer Mother         Thyroid    Thyroid Disease Mother     Heart Disease Father     High Cholesterol Father     Heart Disease Maternal Grandmother     Diabetes Maternal Grandmother     Other Maternal Grandmother         Heomochromatosis    Thyroid Disease Sister     No Known Problems Brother     Stroke Maternal Grandfather     Diabetes Maternal Grandfather     Hypertension Paternal Grandmother     Heart Disease Paternal Grandfather     Heart Attack Paternal Grandfather          Objective:     Visit Vitals  /83 (BP 1 Location: Left arm, BP Patient Position: Sitting)   Pulse (!) 108   Temp 98.7 °F (37.1 °C) (Oral)   Resp 14   Ht 5' 5\" (1.651 m)   Wt 138 lb (62.6 kg)   LMP 2018   SpO2 99%   BMI 22.96 kg/m²     Gen: alert, oriented, no acute distress  Head: normocephalic, atraumatic  Eyes:sclera clear, conjunctiva clear  Oral: moist mucus membranes, no oral lesions, no pharyngeal exudate, no pharyngeal erythema  Neck: symmetric normal sized thyroid, no carotid bruits, no JVD  Resp: Normal work of breathing, lungs CTAB, no w/r/r  CV: S1, S2 normal.  No murmurs, rubs, or gallops. Abd:  Normal bowel sounds. Soft, not tender, not distended. Rectal:  Normal rectal tone. No masses observed, no masses palpated in rectal vault. Skin: no rash             : External genitalia without erythema, exudate or discharge. Vaginal vault is without discharge.  Cervix is of normal color without lesion. The os is closed. There is no bleeding noted. Uterus is noted to be of normal size and nontender. No masses are palpated. The adnexa are without masses or tenderness. No apparent pelvic organ prolapse. Extremities: no edema      Assessment/ Plan:   Diagnoses and all orders for this visit:    1. Rectal pressure  -no abnormalities found on exam  -pt to see how she feels once period is over  -also advised Sitz baths BID, increase water intake, add supplemental fiber to ensure regular bowel movements with no constipation or straining.   -if no improvement with the above, pt to see GI after she sees GYN       Verbal and written instructions (see AVS) provided.  Patient expresses understanding of diagnosis and treatment plan. Mihir Turner.  Chuy Conway MD

## 2019-01-10 ENCOUNTER — OFFICE VISIT (OUTPATIENT)
Dept: FAMILY MEDICINE CLINIC | Age: 39
End: 2019-01-10

## 2019-01-10 VITALS
SYSTOLIC BLOOD PRESSURE: 131 MMHG | DIASTOLIC BLOOD PRESSURE: 84 MMHG | TEMPERATURE: 98.6 F | OXYGEN SATURATION: 100 % | WEIGHT: 138 LBS | RESPIRATION RATE: 18 BRPM | HEART RATE: 100 BPM | HEIGHT: 65 IN | BODY MASS INDEX: 22.99 KG/M2

## 2019-01-10 DIAGNOSIS — R50.9 FEVER, UNSPECIFIED FEVER CAUSE: ICD-10-CM

## 2019-01-10 DIAGNOSIS — J02.9 SORE THROAT: ICD-10-CM

## 2019-01-10 DIAGNOSIS — J40 BRONCHITIS: Primary | ICD-10-CM

## 2019-01-10 LAB
FLUAV+FLUBV AG NOSE QL IA.RAPID: NEGATIVE POS/NEG
FLUAV+FLUBV AG NOSE QL IA.RAPID: NEGATIVE POS/NEG
S PYO AG THROAT QL: NEGATIVE
VALID INTERNAL CONTROL?: YES
VALID INTERNAL CONTROL?: YES

## 2019-01-10 RX ORDER — BENZONATATE 100 MG/1
100 CAPSULE ORAL
Qty: 30 CAP | Refills: 0 | Status: SHIPPED | OUTPATIENT
Start: 2019-01-10 | End: 2019-01-17

## 2019-01-10 RX ORDER — AMOXICILLIN 500 MG/1
500 CAPSULE ORAL 3 TIMES DAILY
Qty: 21 CAP | Refills: 0 | Status: SHIPPED | OUTPATIENT
Start: 2019-01-10 | End: 2019-01-17

## 2019-01-10 NOTE — PROGRESS NOTES
Chief Complaint   Patient presents with    Sore Throat    Cough    Nasal Congestion     Patient states \" I have had chills, sweats, body aches, sore throat, cough and fever. My friend has bronchitis. My kids have been sick too. \"  1. Have you been to the ER, urgent care clinic since your last visit? Hospitalized since your last visit? No    2. Have you seen or consulted any other health care providers outside of the [address] [place] [address] since your last visit? Include any pap smears or colon screening.  No

## 2019-01-10 NOTE — PATIENT INSTRUCTIONS
Please contact our office if your symptoms worsen or do not improve. Please call 911 or go directly to the Emergency Department if you develop shortness of breath, chest pain, difficulty breathing or worsening of your symptoms. Bronchitis: Care Instructions  Your Care Instructions    Bronchitis is inflammation of the bronchial tubes, which carry air to the lungs. The tubes swell and produce mucus, or phlegm. The mucus and inflamed bronchial tubes make you cough. You may have trouble breathing. Most cases of bronchitis are caused by viruses like those that cause colds. Antibiotics usually do not help and they may be harmful. Bronchitis usually develops rapidly and lasts about 2 to 3 weeks in otherwise healthy people. Follow-up care is a key part of your treatment and safety. Be sure to make and go to all appointments, and call your doctor if you are having problems. It's also a good idea to know your test results and keep a list of the medicines you take. How can you care for yourself at home? · Take all medicines exactly as prescribed. Call your doctor if you think you are having a problem with your medicine. · Get some extra rest.  · Take an over-the-counter pain medicine, such as acetaminophen (Tylenol), ibuprofen (Advil, Motrin), or naproxen (Aleve) to reduce fever and relieve body aches. Read and follow all instructions on the label. · Do not take two or more pain medicines at the same time unless the doctor told you to. Many pain medicines have acetaminophen, which is Tylenol. Too much acetaminophen (Tylenol) can be harmful. · Take an over-the-counter cough medicine that contains dextromethorphan to help quiet a dry, hacking cough so that you can sleep. Avoid cough medicines that have more than one active ingredient. Read and follow all instructions on the label. · Breathe moist air from a humidifier, hot shower, or sink filled with hot water.  The heat and moisture will thin mucus so you can cough it out. · Do not smoke. Smoking can make bronchitis worse. If you need help quitting, talk to your doctor about stop-smoking programs and medicines. These can increase your chances of quitting for good. When should you call for help? Call 911 anytime you think you may need emergency care. For example, call if:    · You have severe trouble breathing.    Call your doctor now or seek immediate medical care if:    · You have new or worse trouble breathing.     · You cough up dark brown or bloody mucus (sputum).     · You have a new or higher fever.     · You have a new rash.    Watch closely for changes in your health, and be sure to contact your doctor if:    · You cough more deeply or more often, especially if you notice more mucus or a change in the color of your mucus.     · You are not getting better as expected. Where can you learn more? Go to http://quintin-oh.info/. Enter H333 in the search box to learn more about \"Bronchitis: Care Instructions. \"  Current as of: December 6, 2017  Content Version: 11.8  © 9154-7248 Healthwise, Incorporated. Care instructions adapted under license by [employer] (which disclaims liability or warranty for this information). If you have questions about a medical condition or this instruction, always ask your healthcare professional. [address] any warranty or liability for your use of this information.

## 2019-01-10 NOTE — PROGRESS NOTES
DERECK Malagon is a 41y.o. year old female patient of Harrison Lindsey MD who presents with c/o cough, ha, sore throat. Pt has history of has Abdominal adhesions, POTS (postural orthostatic tachycardia syndrome), Chronic constipation, GERD (gastroesophageal reflux disease), Sjogren's syndrome with keratoconjunctivitis sicca ([facility] [address] Gastroparesis, and Irritable bowel syndrome with constipation on their problem list..    Since last Saturday started with chest cold and sore throat that won't go away. Coughing mostly dry, nasal congestion, yellow discharge from nose. Hasn't taken anything OTC, has POTS so not sure what she can take,. Had some body aches yesterday, felt clammy. No body aches. Daughter and best friend have also been sick. No hx of asthma. Pt is former smoker. Patient Active Problem List   Diagnosis Code    Abdominal adhesions K66.0    POTS (postural orthostatic tachycardia syndrome) R00.0, I95.1    Chronic constipation K59.09    GERD (gastroesophageal reflux disease) K21.9    Sjogren's syndrome with keratoconjunctivitis sicca ([facility]) M35.01    Gastroparesis K31.84    Irritable bowel syndrome with constipation K58.1     Past Medical History:   Diagnosis Date    Anemia     taking iron    Gastroparesis     GERD (gastroesophageal reflux disease)     gastroparesis--h-pylori    H/O Clostridium difficile infection 06/2012    C-Diff    Helicobacter pylori (H. pylori) 05/2012    h pylori    Palpitations     2010  - cardiac workup per pt.      POTS (postural orthostatic tachycardia syndrome)      Past Surgical History:   Procedure Laterality Date    HX GI      EGD and colonoscopy    HX GYN  5/12    D&C    HX GYN      tubal reversal    HX OTHER SURGICAL      D&C x 2,  lap    TILT TABLE EVAL W/WO DRUG  1/21/2017          Social History     Socioeconomic History    Marital status: SINGLE     Spouse name: Not on file    Number of children: Not on file    Years of education: Not on file    Highest education level: Not on file   Tobacco Use    Smoking status: Former Smoker     Types: Cigarettes     Last attempt to quit: 2013     Years since quittin.1    Smokeless tobacco: Never Used   Substance and Sexual Activity    Alcohol use: Yes     Alcohol/week: 0.6 oz     Types: 1 Glasses of wine per week     Comment: rare1    Drug use: No    Sexual activity: Yes     Partners: Male     Birth control/protection: None   Social History Narrative    3 kids (14yo - 7mo), lives with boyfriend (but he travels for work)     Family History   Problem Relation Age of Onset    Heart Disease Mother     Cancer Mother         Thyroid    Thyroid Disease Mother     Heart Disease Father     High Cholesterol Father     Heart Disease Maternal Grandmother     Diabetes Maternal Grandmother     Other Maternal Grandmother         Heomochromatosis    Thyroid Disease Sister     No Known Problems Brother     Stroke Maternal Grandfather     Diabetes Maternal Grandfather     Hypertension Paternal Grandmother     Heart Disease Paternal Grandfather     Heart Attack Paternal Grandfather      Allergies   Allergen Reactions    Celexa [Citalopram] Nausea and Vomiting    Ciprofloxacin Shortness of Breath    Diflucan [Fluconazole] Rash     Burning sensation to skin    Sulfa (Sulfonamide Antibiotics) Rash       MEDICATIONS  Current Outpatient Medications   Medication Sig    albuterol (PROVENTIL HFA, VENTOLIN HFA, PROAIR HFA) 90 mcg/actuation inhaler Take 1 Puff by inhalation every four (4) hours as needed for Wheezing.  nadolol (CORGARD) 40 mg tablet Take 1 Tab by mouth daily.  fluticasone (FLONASE) 50 mcg/actuation nasal spray instill 1 spray into each nostril    cromolyn (GASTROCROM) 100 mg/5 mL solution Take 100 mg by mouth three (3) times daily.  pilocarpine (SALAGEN) 5 mg tablet Take 1 Tab by mouth three (3) times daily.  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 Indications: XEROSTOMIA SECONDARY TO SJOGREN'S SYNDROME)    ondansetron (ZOFRAN ODT) 4 mg disintegrating tablet Take 1 Tab by mouth every eight (8) hours as needed for Nausea.  omeprazole (PRILOSEC) 40 mg capsule Take 40 mg by mouth as needed.  cetirizine (ZYRTEC) 10 mg tablet TAKE 1 TABLET BY MOUTH DAILY FOR ALLERGIES    famotidine (PEPCID) 20 mg tablet Take 20 mg by mouth nightly.  cholecalciferol (VITAMIN D3) 1,000 unit tablet Take  by mouth daily.  multivitamin (ONE A DAY) tablet Take 1 Tab by mouth daily.  LINZESS 290 mcg cap capsule Take 290 mcg by mouth as needed. No current facility-administered medications for this visit. REVIEW OF SYSTEMS  Per HPI        Visit Vitals  /84 (BP 1 Location: Left arm, BP Patient Position: Sitting)   Pulse 100   Temp 98.6 °F (37 °C) (Oral)   Resp 18   Ht 5' 5\" (1.651 m)   Wt 138 lb (62.6 kg)   LMP 12/20/2018   SpO2 100%   BMI 22.96 kg/m²         General: Well-developed, well-nourished. In no distress. A&O x 3. Head: Normocephalic, atraumatic. Eyes: Conjunctiva clear. Pupils equal, round, reactive to light. Extraocular movements intact. Ears/Nose: TM's and ear canals normal bilaterally. Nares normal. Septum midline. Normal nasal mucosa. +sinus tenderness and drainage  Mouth/Throat: Lips, mucosa, and tongue normal. Oropharynx benign. Neck: Supple, symmetrical, trachea midline, no lymphadenopathy, no carotid bruits, no JVD, thyroid: not enlarged, symmetric, no tenderness/mass/nodules. Lungs: Clear to auscultation bilaterally. No crackles or wheezes. No use of accessory muscles. Speaks in full sentences without SOB. Cough present. Chest Wall: No tenderness or deformity. Heart: RRR, normal S1 and S2, no murmur, click, rub, or gallop. Skin: No rashes or lesions. Musculoskeletal: Gait normal.   Psychiatric: Normal mood and affect.  Behavior is normal.       Results for orders placed or performed in visit on 01/10/19   AMB POC ANTHONY INFLUENZA A/B TEST   Result Value Ref Range    VALID INTERNAL CONTROL POC Yes     Influenza A Ag POC Negative Negative Pos/Neg    Influenza B Ag POC Negative Negative Pos/Neg   AMB POC RAPID STREP A   Result Value Ref Range    VALID INTERNAL CONTROL POC Yes     Group A Strep Ag Negative Negative       ASSESSMENT and PLAN  Diagnoses and all orders for this visit:    1. Bronchitis  -     amoxicillin (AMOXIL) 500 mg capsule; Take 1 Cap by mouth three (3) times daily for 7 days. -     benzonatate (TESSALON PERLES) 100 mg capsule; Take 1 Cap by mouth three (3) times daily as needed for Cough for up to 7 days.  -Treat as bacterial given duration  -push fluids, get adequate rest    2. Fever, unspecified fever cause  -     AMB POC ANTHONY INFLUENZA A/B TEST-negative    3. Sore throat  -     AMB POC RAPID STREP A- negative            Patient Instructions     Please contact our office if your symptoms worsen or do not improve. Please call 911 or go directly to the Emergency Department if you develop shortness of breath, chest pain, difficulty breathing or worsening of your symptoms. Bronchitis: Care Instructions  Your Care Instructions    Bronchitis is inflammation of the bronchial tubes, which carry air to the lungs. The tubes swell and produce mucus, or phlegm. The mucus and inflamed bronchial tubes make you cough. You may have trouble breathing. Most cases of bronchitis are caused by viruses like those that cause colds. Antibiotics usually do not help and they may be harmful. Bronchitis usually develops rapidly and lasts about 2 to 3 weeks in otherwise healthy people. Follow-up care is a key part of your treatment and safety. Be sure to make and go to all appointments, and call your doctor if you are having problems. It's also a good idea to know your test results and keep a list of the medicines you take. How can you care for yourself at home?   · Take all medicines exactly as prescribed. Call your doctor if you think you are having a problem with your medicine. · Get some extra rest.  · Take an over-the-counter pain medicine, such as acetaminophen (Tylenol), ibuprofen (Advil, Motrin), or naproxen (Aleve) to reduce fever and relieve body aches. Read and follow all instructions on the label. · Do not take two or more pain medicines at the same time unless the doctor told you to. Many pain medicines have acetaminophen, which is Tylenol. Too much acetaminophen (Tylenol) can be harmful. · Take an over-the-counter cough medicine that contains dextromethorphan to help quiet a dry, hacking cough so that you can sleep. Avoid cough medicines that have more than one active ingredient. Read and follow all instructions on the label. · Breathe moist air from a humidifier, hot shower, or sink filled with hot water. The heat and moisture will thin mucus so you can cough it out. · Do not smoke. Smoking can make bronchitis worse. If you need help quitting, talk to your doctor about stop-smoking programs and medicines. These can increase your chances of quitting for good. When should you call for help? Call 911 anytime you think you may need emergency care. For example, call if:    · You have severe trouble breathing.    Call your doctor now or seek immediate medical care if:    · You have new or worse trouble breathing.     · You cough up dark brown or bloody mucus (sputum).     · You have a new or higher fever.     · You have a new rash.    Watch closely for changes in your health, and be sure to contact your doctor if:    · You cough more deeply or more often, especially if you notice more mucus or a change in the color of your mucus.     · You are not getting better as expected. Where can you learn more? Go to http://quintin-oh.info/. Enter H333 in the search box to learn more about \"Bronchitis: Care Instructions. \"  Current as of: December 6, 2017  Content Version: 11.8  © 4811-3272 Healthwise, [employer]. Care instructions adapted under license by [employer] (which disclaims liability or warranty for this information). If you have questions about a medical condition or this instruction, always ask your healthcare professional. [address] any warranty or liability for your use of this information. Please keep your follow-up appointment with Angela Mroeau MD.     Follow-up Disposition:  Return if symptoms worsen or fail to improve. Health Maintenance Due   Topic Date Due    PAP AKA CERVICAL CYTOLOGY  10/22/2018       I have discussed the diagnosis with the patient and the intended plan as seen in the above orders. Patient is in agreement. The patient has received an after-visit summary and questions were answered concerning future plans. I have discussed medication side effects and warnings with the patient as well. Warning signs for the above conditions were discussed including when to call our office or go to the emergency room. The nurse provided the patient and/or family with advanced directive information if needed and encouraged the patient to provide a copy to the office when available.

## 2019-02-14 ENCOUNTER — OFFICE VISIT (OUTPATIENT)
Dept: RHEUMATOLOGY | Age: 39
End: 2019-02-14

## 2019-02-14 VITALS
HEIGHT: 63 IN | TEMPERATURE: 98.2 F | WEIGHT: 138.3 LBS | HEART RATE: 127 BPM | BODY MASS INDEX: 24.5 KG/M2 | SYSTOLIC BLOOD PRESSURE: 136 MMHG | DIASTOLIC BLOOD PRESSURE: 76 MMHG | OXYGEN SATURATION: 98 % | RESPIRATION RATE: 20 BRPM

## 2019-02-14 DIAGNOSIS — G90.A POTS (POSTURAL ORTHOSTATIC TACHYCARDIA SYNDROME): ICD-10-CM

## 2019-02-14 DIAGNOSIS — M35.01 SJOGREN'S SYNDROME WITH KERATOCONJUNCTIVITIS SICCA (HCC): Primary | ICD-10-CM

## 2019-02-14 DIAGNOSIS — K58.1 IRRITABLE BOWEL SYNDROME WITH CONSTIPATION: ICD-10-CM

## 2019-02-14 DIAGNOSIS — Z79.899 LONG-TERM USE OF HYDROXYCHLOROQUINE: ICD-10-CM

## 2019-02-14 RX ORDER — CEVIMELINE HYDROCHLORIDE 30 MG/1
30 CAPSULE ORAL 3 TIMES DAILY
Qty: 30 CAP | Refills: 11 | Status: SHIPPED | OUTPATIENT
Start: 2019-02-14 | End: 2020-02-09

## 2019-02-14 RX ORDER — CYCLOSPORINE 0.5 MG/ML
1 EMULSION OPHTHALMIC 2 TIMES DAILY
Qty: 1 EACH | Refills: 11 | Status: SHIPPED | OUTPATIENT
Start: 2019-02-14 | End: 2019-03-16

## 2019-02-14 NOTE — PATIENT INSTRUCTIONS
Stop hydroxychloroquine (Plaquenil). If you do not notice any difference, do not resume. I prescribed Evoxac to replace Salagen for dry mouth. If you tolerate up to 3 times a day continue. If you do not tolerate, do not take it. I prescribed Restasis and will do the prior authorization for it. If you are approved and your copay is high, you will need to apply for patient assistance through the drug company

## 2019-02-14 NOTE — PROGRESS NOTES
REASON FOR VISIT This is a follow-up visit for Ms. Manohar Israel for Sjogren's Syndrome. Active problems include: 
 
Patient Active Problem List  
Diagnosis Code  Abdominal adhesions K66.0  
 POTS (postural orthostatic tachycardia syndrome) R00.0, I95.1  Chronic constipation K59.09  
 GERD (gastroesophageal reflux disease) K21.9  Sjogren's syndrome with keratoconjunctivitis sicca ([facility]) M35.01  
 Gastroparesis K31.84  
 Irritable bowel syndrome with constipation K58.1 [facility] 
 
Ms. Manohar Israel returns for a follow-up visit. On her last visit, I ordered labs and I prescribed her Salagen and explained to her that if she feels that she is having adverse reaction from it, to stop it. She takes one as needed but she feels flushed when she takes it so avoid using it. Labs reviewed with her were negative. She endorses dry cough. She feels her throat is dry and that she feels like she has a hard time swallow. She saw ENT who noted small nodules on her thyroid without concern to biopsy due to size. She denies fever, weight loss, blurred vision, vision loss, oral ulcers, ankle swelling, dyspnea, nausea, vomiting, dysphagia, abdominal pain, black or bloody stool, fall since last visit, rash, easy bruising and increased thirst. 
 
Last ophthalmology exam was 1/2019, who confirmed xerophthalmia. He was prescribed Restasis but is having a hard time received it due to needing a prior authorization. She felt better on it. Heather [place] burned. Last dental exam was 8/2018, which did not show an increase in caries. She does not need water to swallow foot or a cracker. She does drink a lot of water due to dryness. She last saw an eye doctor in 2017 which showed dry eyes who placed punctate plugs. She does not wear contact lenses. She uses artifical tears up to 3 times a day. Her mother has severe dry eyes. 
  
The patient has not had any interval hospital admissions, infections, or surgeries. REVIEW OF SYSTEMS A comprehensive review of systems was performed and pertinent results are documented in the HPI, review of systems is otherwise non-contributory. PAST MEDICAL HISTORY She has a past medical history of Anemia, Gastroparesis, GERD (gastroesophageal reflux disease), H/O Clostridium difficile infection (96/6677), Helicobacter pylori (H. pylori) (05/2012), Palpitations, and POTS (postural orthostatic tachycardia syndrome). She also has no past medical history of Abnormal Pap smear, Chlamydia, Complication of anesthesia, Genital herpes, unspecified, Gonorrhea, Herpes simplex without mention of complication, Human immunodeficiency virus (HIV) disease ([facility] Infertility, female, Kidney disease, [facility] Polycystic disease, ovaries, Rhesus isoimmunization unspecified as to episode of care in pregnancy, Sickle-cell disease, unspecified, Syphilis, Trauma, or Unspecified breast disorder. FAMILY HISTORY Her family history includes Cancer in her mother; Diabetes in her maternal grandfather and maternal grandmother; Heart Attack in her paternal grandfather; Heart Disease in her father, maternal grandmother, mother, and paternal grandfather; High Cholesterol in her father; Hypertension in her paternal grandmother; No Known Problems in her brother; Other in her maternal grandmother; Stroke in her maternal grandfather; Thyroid Disease in her mother and sister. SOCIAL HISTORY She reports that she quit smoking about 5 years ago. Her smoking use included cigarettes. she has never used smokeless tobacco. She reports that she drinks about 0.6 oz of alcohol per week. She reports that she does not use drugs. IMMUNIZATIONS Immunization History Administered Date(s) Administered  Influenza Vaccine 10/16/2013, 11/04/2018  Influenza Vaccine [employer]) 11/11/2014  Influenza Vaccine (Quad) PF 09/30/2015  TB Skin Test (PPD) Intradermal 01/27/2014  Tdap 11/11/2014, 08/14/2017 MEDICATIONS Current Outpatient Medications Medication Sig Dispense Refill  cevimeline (EVOXAC) 30 mg capsule Take 1 Cap by mouth three (3) times daily for 360 days. 30 Cap 11  
 cycloSPORINE (RESTASIS) 0.05 % dpet Administer 1 Drop to both eyes two (2) times a day for 30 days. 1 Each 11  
 albuterol (PROVENTIL HFA, VENTOLIN HFA, PROAIR HFA) 90 mcg/actuation inhaler Take 1 Puff by inhalation every four (4) hours as needed for Wheezing. 1 Inhaler 5  
 nadolol (CORGARD) 40 mg tablet Take 1 Tab by mouth daily. 90 Tab 1  
 fluticasone (FLONASE) 50 mcg/actuation nasal spray instill 1 spray into each nostril 16 g 5  
 cromolyn (GASTROCROM) 100 mg/5 mL solution Take 100 mg by mouth three (3) times daily.  omeprazole (PRILOSEC) 40 mg capsule Take 40 mg by mouth as needed.  cetirizine (ZYRTEC) 10 mg tablet TAKE 1 TABLET BY MOUTH DAILY FOR ALLERGIES 30 Tab 5  
 famotidine (PEPCID) 20 mg tablet Take 20 mg by mouth nightly. 0  
 cholecalciferol (VITAMIN D3) 1,000 unit tablet Take  by mouth daily.  multivitamin (ONE A DAY) tablet Take 1 Tab by mouth daily.  LINZESS 290 mcg cap capsule Take 290 mcg by mouth as needed. 0 ALLERGIES Allergies Allergen Reactions  Celexa [Citalopram] Nausea and Vomiting  Ciprofloxacin Shortness of Breath  Diflucan [Fluconazole] Rash Burning sensation to skin  Sulfa (Sulfonamide Antibiotics) Rash PHYSICAL EXAMINATION Visit Vitals /76 (BP 1 Location: Right arm, BP Patient Position: Sitting) Pulse (!) 127 Temp 98.2 °F (36.8 °C) (Oral) Resp 20 Ht 5' 3\" (1.6 m) Wt 138 lb 4.8 oz (62.7 kg) SpO2 98% BMI 24.50 kg/m² Body mass index is 24.5 kg/m². General: Patient is alert, oriented x 3, not in acute distress HEENT:  
Sclerae are not injected and appear moist. 
Oral mucous membranes are moist, there are no ulcers present. Geographic tongue. There is no alopecia. Neck is supple, there is no lymphadenopathy. No salivary or lacrimal glandular enlargement Cardiovascular: 
Heart is regular rate and rhythm, no murmurs. Chest: 
Lungs are clear to auscultation bilaterally. No rhonchi, wheezes, or crackles. Extremities: 
Free of clubbing, cyanosis, edema Neurological exam: Muscle strength is full in upper and lower extremities Skin exam: 
There are no rashes, no alopecia, no discoid lesions, no active Raynaud's, no livedo reticularis, no periungual erythema. Musculoskeletal exam: No synovitis. DATA REVIEW Laboratory Recent laboratory results were reviewed, summarized, and discussed with the patient. Imaging Musculoskeletal Ultrasound None Radiographs KUB 11/29/2018: normal bowel gas pattern. The osseous structures are unremarkable. No pathologic calcification. Chest 11/02/2017: the lungs are clear. The central airways are patent. The heart size is normal. No pneumothorax or pleural effusion. CT Imaging None MR Imaging None DXA None GI Studies Barium Swallow Esophagram 11/20/2018: The swallowing mechanism is intact. There is no stricture extravasation or filling defect. Patient was unable to swallow the barium tablet. 
  
NM Gastric Emptying Study 7/25/2012: markedly delayed gastric emptying. Ultrasonography US abdomen 7/21/2018: the gallbladder is normal. There is no intrahepatic or extrahepatic biliary duct dilation. Common bile duct measures 3 mm. The liver is normal in size and echotexture without demonstration of mass lesion. Portal venous flow is normal. The pancreatic head appears normal and the right kidney is normal with length of 10.3 cm PATHOLOGY Small bowel, duodenum, biopsy 6/21/2018: No histopathologic abnormality. Stomach, biopsy: Mild chronic gastritis.  Esophagus, mid, biopsy: No histopathologic abnormality.  
  
ASSESSMENT AND PLAN 
 
 This is a follow-up visit for Ms. Kaleigh Oliveira. 1) Sjogren's Syndrome. (xerostomia, xerophthalmia, autonomic dysfunction (POTS, gastroparesis)). Her labs did not show findings today concerning for lymphoma. She denies constitutional B symptoms concerning for lymphoma. She will need every 6 month labs. I will submit for Restasis and perform her prior authorization since she could not have it done by her ophthalmologist. She does not have a clear benefit from hydroxychloroquine 400 mg daily, so I asked her to stop it and to let me know if she does feel worsening. I prescribed Evocac to replace Salagen since she did not tolerate it and explained to her that if she feels that she is having adverse reaction from it, to stop it. I will check labs today. 2) Long Term Use of Hydroxychloroquine (Plaquenil). She is up to date with her ophthalmic exam. I will discontinue. See #1. 3) Gastroparesis. I defer to GI, but she may benefit form motility agents. 4) IBS with Constipation. She is on Linzess. 5) POTS. This may also be associated with gastroparesis and irritable bowel syndrome. She does not have hypermobility to suggest Sarah Danlos Syndrome. She is on nadolol. I defer to cardiology. The patient voiced understanding of the aforementioned assessment and plan. Summary of plan was provided in the After Visit Summary patient instructions. TODAY'S ORDERS Orders Placed This Encounter  ANTINUCLEAR ANTIBODIES, IFA  CBC WITH AUTOMATED DIFF  
 COMPLEMENT, C3 & C4  
 CRYOGLOBULIN W/ REFLX HUMA  
 PROTEIN ELECTROPHORESIS W/ REFLX HUMA  RHEUMATOID FACTOR, QL  
 SJOGREN'S ABS, SSA AND SSB  cevimeline (EVOXAC) 30 mg capsule  cycloSPORINE (RESTASIS) 0.05 % dpet Future Appointments Date Time Provider Addis Baeri 4/18/2019  8:40 AM Patrick Hatch  [address] 8/14/2019  9:40 AM Masri, Cherise Hatchet, MD [address] [facility] Iveth Santigao MD, [facility] Adult Rheumatology Rheumatology Ultrasound Certified [facility] 
[address] [facility], [facility], [address] Phone [phone] Fax [phone]

## 2019-02-20 LAB
ALBUMIN SERPL ELPH-MCNC: 4 G/DL (ref 2.9–4.4)
ALBUMIN/GLOB SERPL: 1.5 {RATIO} (ref 0.7–1.7)
ALPHA1 GLOB SERPL ELPH-MCNC: 0.3 G/DL (ref 0–0.4)
ALPHA2 GLOB SERPL ELPH-MCNC: 0.6 G/DL (ref 0.4–1)
ANA TITR SER IF: NEGATIVE {TITER}
B-GLOBULIN SERPL ELPH-MCNC: 1.1 G/DL (ref 0.7–1.3)
BASOPHILS # BLD AUTO: 0.1 X10E3/UL (ref 0–0.2)
BASOPHILS NFR BLD AUTO: 2 %
C3 SERPL-MCNC: 116 MG/DL (ref 82–167)
C4 SERPL-MCNC: 23 MG/DL (ref 14–44)
CRYOGLOB SER QL 1D COLD INC: NORMAL
ENA SS-A AB SER-ACNC: <0.2 AI (ref 0–0.9)
ENA SS-B AB SER-ACNC: <0.2 AI (ref 0–0.9)
EOSINOPHIL # BLD AUTO: 0.1 X10E3/UL (ref 0–0.4)
EOSINOPHIL NFR BLD AUTO: 2 %
ERYTHROCYTE [DISTWIDTH] IN BLOOD BY AUTOMATED COUNT: 13.6 % (ref 12.3–15.4)
GAMMA GLOB SERPL ELPH-MCNC: 0.7 G/DL (ref 0.4–1.8)
GLOBULIN SER CALC-MCNC: 2.7 G/DL (ref 2.2–3.9)
HCT VFR BLD AUTO: 35.8 % (ref 34–46.6)
HGB BLD-MCNC: 12.5 G/DL (ref 11.1–15.9)
IMM GRANULOCYTES # BLD AUTO: 0 X10E3/UL (ref 0–0.1)
IMM GRANULOCYTES NFR BLD AUTO: 0 %
LYMPHOCYTES # BLD AUTO: 1.6 X10E3/UL (ref 0.7–3.1)
LYMPHOCYTES NFR BLD AUTO: 42 %
M PROTEIN SERPL ELPH-MCNC: NORMAL G/DL
MCH RBC QN AUTO: 32.6 PG (ref 26.6–33)
MCHC RBC AUTO-ENTMCNC: 34.9 G/DL (ref 31.5–35.7)
MCV RBC AUTO: 93 FL (ref 79–97)
MONOCYTES # BLD AUTO: 0.3 X10E3/UL (ref 0.1–0.9)
MONOCYTES NFR BLD AUTO: 7 %
NEUTROPHILS # BLD AUTO: 1.8 X10E3/UL (ref 1.4–7)
NEUTROPHILS NFR BLD AUTO: 47 %
PLATELET # BLD AUTO: 284 X10E3/UL (ref 150–379)
PLEASE NOTE, 011150: NORMAL
PROT PATTERN SERPL ELPH-IMP: NORMAL
PROT SERPL-MCNC: 6.7 G/DL (ref 6–8.5)
RBC # BLD AUTO: 3.84 X10E6/UL (ref 3.77–5.28)
RHEUMATOID FACT SERPL-ACNC: <10 IU/ML (ref 0–13.9)
WBC # BLD AUTO: 3.8 X10E3/UL (ref 3.4–10.8)

## 2019-03-19 ENCOUNTER — OFFICE VISIT (OUTPATIENT)
Dept: FAMILY MEDICINE CLINIC | Age: 39
End: 2019-03-19

## 2019-03-19 VITALS
BODY MASS INDEX: 24.8 KG/M2 | TEMPERATURE: 98.6 F | OXYGEN SATURATION: 100 % | RESPIRATION RATE: 17 BRPM | WEIGHT: 140 LBS | SYSTOLIC BLOOD PRESSURE: 122 MMHG | HEIGHT: 63 IN | HEART RATE: 90 BPM | DIASTOLIC BLOOD PRESSURE: 77 MMHG

## 2019-03-19 DIAGNOSIS — J30.1 ALLERGIC RHINITIS DUE TO POLLEN, UNSPECIFIED SEASONALITY: Primary | ICD-10-CM

## 2019-03-19 DIAGNOSIS — K58.1 IRRITABLE BOWEL SYNDROME WITH CONSTIPATION: ICD-10-CM

## 2019-03-19 DIAGNOSIS — M35.01 SJOGREN'S SYNDROME WITH KERATOCONJUNCTIVITIS SICCA (HCC): ICD-10-CM

## 2019-03-19 DIAGNOSIS — G90.A POTS (POSTURAL ORTHOSTATIC TACHYCARDIA SYNDROME): ICD-10-CM

## 2019-03-19 RX ORDER — MONTELUKAST SODIUM 10 MG/1
10 TABLET ORAL DAILY
Qty: 30 TAB | Refills: 3 | Status: SHIPPED | OUTPATIENT
Start: 2019-03-19 | End: 2019-11-13

## 2019-03-19 RX ORDER — MINERAL OIL
180 ENEMA (ML) RECTAL DAILY
Qty: 90 TAB | Refills: 3 | Status: SHIPPED | OUTPATIENT
Start: 2019-03-19 | End: 2019-03-22 | Stop reason: ALTCHOICE

## 2019-03-19 NOTE — PROGRESS NOTES
DERECK Neville is a 41 y.o. female who presents with congestion, cough, scratchy throat. Started 1.5 weeks ago. ok sleep, drinking fluids. Other symptoms include none. Denies fever, wheezing. Has tried zyrtec and flonase for symptoms with some relief. Medicines seem to wear off as the day goes on. Still has maxillary sinus fullness bilaterally    PMHx:  Past Medical History:   Diagnosis Date    Anemia     taking iron    Gastroparesis     GERD (gastroesophageal reflux disease)     gastroparesis--h-pylori    H/O Clostridium difficile infection 06/2012    C-Diff    Helicobacter pylori (H. pylori) 05/2012    h pylori    Palpitations     2010  - cardiac workup per pt.  POTS (postural orthostatic tachycardia syndrome)        Meds:   Current Outpatient Medications   Medication Sig Dispense Refill    fexofenadine (ALLEGRA) 180 mg tablet Take 1 Tab by mouth daily. 90 Tab 3    montelukast (SINGULAIR) 10 mg tablet Take 1 Tab by mouth daily. 30 Tab 3    cevimeline (EVOXAC) 30 mg capsule Take 1 Cap by mouth three (3) times daily for 360 days. 30 Cap 11    albuterol (PROVENTIL HFA, VENTOLIN HFA, PROAIR HFA) 90 mcg/actuation inhaler Take 1 Puff by inhalation every four (4) hours as needed for Wheezing. 1 Inhaler 5    nadolol (CORGARD) 40 mg tablet Take 1 Tab by mouth daily. 90 Tab 1    fluticasone (FLONASE) 50 mcg/actuation nasal spray instill 1 spray into each nostril 16 g 5    cromolyn (GASTROCROM) 100 mg/5 mL solution Take 100 mg by mouth three (3) times daily.  omeprazole (PRILOSEC) 40 mg capsule Take 40 mg by mouth as needed.  famotidine (PEPCID) 20 mg tablet Take 20 mg by mouth nightly. 0    cholecalciferol (VITAMIN D3) 1,000 unit tablet Take  by mouth daily.  multivitamin (ONE A DAY) tablet Take 1 Tab by mouth daily.  LINZESS 290 mcg cap capsule Take 290 mcg by mouth as needed. 0       Allergies:    Allergies   Allergen Reactions    Celexa [Citalopram] Nausea and Vomiting    Ciprofloxacin Shortness of Breath    Diflucan [Fluconazole] Rash     Burning sensation to skin    Sulfa (Sulfonamide Antibiotics) Rash       Smoker:  Social History     Tobacco Use   Smoking Status Former Smoker    Types: Cigarettes    Last attempt to quit: 2013    Years since quittin.3   Smokeless Tobacco Never Used       ETOH:   Social History     Substance and Sexual Activity   Alcohol Use Yes    Alcohol/week: 0.6 oz    Types: 1 Glasses of wine per week    Comment: rare1       FH:   Family History   Problem Relation Age of Onset    Heart Disease Mother     Cancer Mother         Thyroid    Thyroid Disease Mother     Heart Disease Father     High Cholesterol Father     Heart Disease Maternal Grandmother     Diabetes Maternal Grandmother     Other Maternal Grandmother         Heomochromatosis    Thyroid Disease Sister     No Known Problems Brother     Stroke Maternal Grandfather     Diabetes Maternal Grandfather     Hypertension Paternal Grandmother     Heart Disease Paternal Grandfather     Heart Attack Paternal Grandfather        ROS:   As listed in HPI. In addition:  Constitutional:   No headache, fever, weight loss or weight gain      Eyes:   No redness, pruritis, pain, visual changes, swelling, or discharge      Ears:    No pain, loss or changes in hearing     Cardiac:    No chest pain      Resp:   No shortness of breath or wheeze      Neuro   No loss of consciousness, dizziness, seizure    Physical Exam:  Blood pressure 122/77, pulse 90, temperature 98.6 °F (37 °C), temperature source Oral, resp. rate 17, height 5' 3\" (1.6 m), weight 140 lb (63.5 kg), SpO2 100 %, not currently breastfeeding. GEN: No apparent distress. EYES:  Conjunctiva clear  EAR: TM are clear and without effusion. NOSE: Turbinates are congested  OROPHYARYNX: No erythema or tonsilar exudates  NECK:  Submandibular LAD.  Non tender         LUNGS: Respirations unlabored; clear to auscultation bilaterally. No wheeze  CARDIOVASCULAR: Regular, rate, and rhythm  ABDOMEN: Soft; nontender;nl BS  SKIN: No obvious rashes. Assessment and Plan     Seasonal allergic rhinitis  Antihistamines upgrade from Zyrtec to Allegra  Nasal steroids adjusted her technique, she needs a second spray to hit the higher turbinates  If medicine is wearing off try twice daily dosing. Reviewed side effects  Singulair called in if she tries the above and it is not effective  Nasal saline can help  Local honey can help    Seeing rheumatology for Sjogren's    Seeing cardiology for pots    Is being evaluated by ENT and plans to have thyroid biopsy for some reason (I believe because thyroid is a little large)    Has seen pulmonology and was told lungs were fine. No asthma    RTC if ssx worsen or fail to improve as expected      ICD-10-CM ICD-9-CM    1. Allergic rhinitis due to pollen, unspecified seasonality J30.1 477.0 fexofenadine (ALLEGRA) 180 mg tablet      montelukast (SINGULAIR) 10 mg tablet   2. POTS (postural orthostatic tachycardia syndrome) R00.0 427.89     I95.1     3. Irritable bowel syndrome with constipation K58.1 564.1    4. Sjogren's syndrome with keratoconjunctivitis sicca (HCC) M35.01 710.2        AVS given.  Pt expressed understanding of instructions

## 2019-03-19 NOTE — PROGRESS NOTES
.  Chief Complaint   Patient presents with    Sore Throat     . 1. Have you been to the ER, urgent care clinic since your last visit? Hospitalized since your last visit? 2. Have you seen or consulted any other health care providers outside of the [facility] since your last visit? Include any pap smears or colon screening.

## 2019-03-19 NOTE — PROGRESS NOTES
Chief Complaint   Patient presents with    Sore Throat     itchy throat feels swollen    Ear Fullness     itchy ears    Nasal Congestion    Fatigue     Health Maintenance reviewed     1. Have you been to the ER, urgent care clinic since your last visit? Hospitalized since your last visit? No     2. Have you seen or consulted any other health care providers outside of the [address] [place] [address] since your last visit? Include any pap smears or colon screening. Yes, pt states she been to many doctors she has tons of issues.

## 2019-03-22 RX ORDER — CETIRIZINE HCL 10 MG
10 TABLET ORAL DAILY
Qty: 90 TAB | Refills: 3 | Status: SHIPPED | OUTPATIENT
Start: 2019-03-22 | End: 2020-04-21 | Stop reason: SDUPTHER

## 2019-03-22 NOTE — TELEPHONE ENCOUNTER
Spoke with Ms. Ebony Hou. She said that she picked up allegra OTC and didn't like it and wants to be back on the zyrtec.

## 2019-03-26 ENCOUNTER — HOSPITAL ENCOUNTER (OUTPATIENT)
Dept: ULTRASOUND IMAGING | Age: 39
Discharge: HOME | End: 2019-03-26
Attending: OTOLARYNGOLOGY
Payer: MEDICAID

## 2019-03-26 DIAGNOSIS — D34 THYROID ADENOMA: ICD-10-CM

## 2019-03-26 PROCEDURE — 10005 FNA BX W/US GDN 1ST LES: CPT

## 2019-03-26 PROCEDURE — 88173 CYTOPATH EVAL FNA REPORT: CPT

## 2019-03-26 PROCEDURE — 88172 CYTP DX EVAL FNA 1ST EA SITE: CPT

## 2019-03-29 RX ORDER — CETIRIZINE HCL 10 MG
10 TABLET ORAL DAILY
Qty: 90 TAB | Refills: 3 | OUTPATIENT
Start: 2019-03-29

## 2019-03-29 NOTE — TELEPHONE ENCOUNTER
ANNP is requesting a refill on med    Requested Prescriptions     Pending Prescriptions Disp Refills    cetirizine (ZYRTEC) 10 mg tablet 90 Tab 3     Sig: Take 1 Tab by mouth daily.

## 2019-04-05 ENCOUNTER — OFFICE VISIT (OUTPATIENT)
Dept: FAMILY MEDICINE CLINIC | Age: 39
End: 2019-04-05

## 2019-04-05 VITALS
HEART RATE: 94 BPM | BODY MASS INDEX: 25.02 KG/M2 | TEMPERATURE: 98 F | WEIGHT: 141.2 LBS | OXYGEN SATURATION: 100 % | SYSTOLIC BLOOD PRESSURE: 128 MMHG | HEIGHT: 63 IN | RESPIRATION RATE: 18 BRPM | DIASTOLIC BLOOD PRESSURE: 75 MMHG

## 2019-04-05 DIAGNOSIS — R05.9 COUGH: Primary | ICD-10-CM

## 2019-04-05 DIAGNOSIS — J40 BRONCHITIS: ICD-10-CM

## 2019-04-05 RX ORDER — METHYLPREDNISOLONE 4 MG/1
TABLET ORAL
Qty: 1 DOSE PACK | Refills: 0 | Status: SHIPPED | OUTPATIENT
Start: 2019-04-05 | End: 2019-06-03 | Stop reason: ALTCHOICE

## 2019-04-05 RX ORDER — BENZONATATE 200 MG/1
200 CAPSULE ORAL
Qty: 21 CAP | Refills: 0 | Status: SHIPPED | OUTPATIENT
Start: 2019-04-05 | End: 2019-04-12

## 2019-04-05 RX ORDER — ALBUTEROL SULFATE 0.83 MG/ML
2.5 SOLUTION RESPIRATORY (INHALATION)
Qty: 24 EACH | Refills: 0 | Status: SHIPPED | OUTPATIENT
Start: 2019-04-05 | End: 2021-12-13 | Stop reason: ALTCHOICE

## 2019-04-05 NOTE — PROGRESS NOTES
Subjective:     Chief Complaint   Patient presents with    Cough     4-5 days    Nasal Congestion    Ear Fullness     both    Shortness of Breath     chest tightness       Amparo Dupont is a 41 y.o. female who complains of cough and chest tightness and ear fullness. Started with URI (nasal congestion, cough, sore throat) about 1.5 week ago and went to [facility] and prescribed Amoxicillin, which she has taken for 7 days but cough and chest congestion for couple days and some shortness of breath from coughing so much. Some mucus but not much, more dry cough. Tried OTC cold remedies (Flonase and zyrtec)with temporary relief. Has albuterol inhaler at home that she was prescribed once in the past for similar symptoms. She only used once this AM which did help but she does not like to use too much because it makes her HR go faster. Patient does not smoke cigarettes. Denies cardiac complaints including chest pain or discomfort, elevated heart rate, or palpitations. Denies  difficulty or pain with breathing. Denies fever, myalgias, chills, weakness, fatigue and other systemic symptoms. No N/V/D  ROS is otherwise negative. No evaluation to date. No recent travel. Sick Contacts? friend      Chart reviewed: immunizations are up to date and documented. Past Medical History:   Diagnosis Date    Anemia     taking iron    Gastroparesis     GERD (gastroesophageal reflux disease)     gastroparesis--h-pylori    H/O Clostridium difficile infection 2012    C-Diff    Helicobacter pylori (H. pylori) 2012    h pylori    Palpitations       - cardiac workup per pt.  POTS (postural orthostatic tachycardia syndrome)      Social History     Tobacco Use    Smoking status: Former Smoker     Types: Cigarettes     Last attempt to quit: 2013     Years since quittin.3    Smokeless tobacco: Never Used   Substance Use Topics    Alcohol use:  Yes     Alcohol/week: 0.6 oz     Types: 1 Glasses of wine per week     Comment: rare1    Drug use: No     Current Outpatient Medications on File Prior to Visit   Medication Sig Dispense Refill    cetirizine (ZYRTEC) 10 mg tablet Take 1 Tab by mouth daily. 90 Tab 3    cevimeline (EVOXAC) 30 mg capsule Take 1 Cap by mouth three (3) times daily for 360 days. 30 Cap 11    albuterol (PROVENTIL HFA, VENTOLIN HFA, PROAIR HFA) 90 mcg/actuation inhaler Take 1 Puff by inhalation every four (4) hours as needed for Wheezing. 1 Inhaler 5    nadolol (CORGARD) 40 mg tablet Take 1 Tab by mouth daily. 90 Tab 1    fluticasone (FLONASE) 50 mcg/actuation nasal spray instill 1 spray into each nostril 16 g 5    cromolyn (GASTROCROM) 100 mg/5 mL solution Take 100 mg by mouth three (3) times daily.  omeprazole (PRILOSEC) 40 mg capsule Take 40 mg by mouth as needed.  famotidine (PEPCID) 20 mg tablet Take 20 mg by mouth nightly. 0    cholecalciferol (VITAMIN D3) 1,000 unit tablet Take  by mouth daily.  multivitamin (ONE A DAY) tablet Take 1 Tab by mouth daily.  LINZESS 290 mcg cap capsule Take 290 mcg by mouth as needed. 0    montelukast (SINGULAIR) 10 mg tablet Take 1 Tab by mouth daily. 30 Tab 3     No current facility-administered medications on file prior to visit. Allergies   Allergen Reactions    Celexa [Citalopram] Nausea and Vomiting    Ciprofloxacin Shortness of Breath    Diflucan [Fluconazole] Rash     Burning sensation to skin    Sulfa (Sulfonamide Antibiotics) Rash        Review of Systems  Pertinent items are noted in HPI. Agree with nurses note. OBJECTIVE:   Visit Vitals  /75 (BP 1 Location: Left arm, BP Patient Position: Sitting)   Pulse 94   Temp 98 °F (36.7 °C) (Oral)   Resp 18   Ht 5' 3\" (1.6 m)   Wt 141 lb 3.2 oz (64 kg)   SpO2 100%   BMI 25.01 kg/m²     She appears well, vital signs are as noted above   PAIN: No complaints of pain today. HEAD:  Normocephalic. Atraumatic. Non tender sinuses x 4.   EYE: PERRLA. EOMs intact. Sclera anicteric without injection. No drainage or discharge. EARS: Hearing intact bilaterally. External ear canals normal without evidence of blood or swelling. Bilateral TM's intact, pearly grey with landmarks visible. No erythema or effusion. NOSE: Patent. Nasal turbinates pink. No polyps noted. No erythema. No discharge. MOUTH: mucous membranes boggyand dry. Posterior pharynx normal with cobblestone appearance. No erythema, white exudate or obstruction. NECK: supple. Midline trachea. RESP: Breath sounds are symmetrical bilaterally. Unlabored without SOB. Speaking in full sentences. Clear to auscultation bilaterally anteriorly and posteriorly. No wheezes at this time. No rales or rhonchi. Non-productive dry cough heard. CV: normal rate. Regular rhythm. S1, S2 audible. No murmur noted. No rubs, clicks or gallops noted. HEME/LYMPH: peripheral pulses palpable 2+ x 4 extremities. No peripheral edema is noted. No cervical adenopathy noted. SKIN: clean dry and intact throughout. no rashes          Assessment/Plan:   Differential diagnosis and treatment options reviewed with patient who is in agreement with treatment plan as outlined below. ICD-10-CM ICD-9-CM    1. Cough R05 786.2 methylPREDNISolone (MEDROL DOSEPACK) 4 mg tablet      benzonatate (TESSALON) 200 mg capsule   2. Bronchitis J40 490 methylPREDNISolone (MEDROL DOSEPACK) 4 mg tablet      benzonatate (TESSALON) 200 mg capsule     Continue and complete amoxicillin. Tessalon for cough PRN. Medrol dose pack prescribed. Albuterol PRN for wheezing or bronchospastic cough. Symptomatic therapy suggested: push fluids, rest, gargle warm salt water and apply heat to sinuses prn. Alternate Ibuprofen with Tylenol every 4 hours as needed for pain and discomfort. OTC nasal saline spray  2-3 sprays per nostril 2-4 times a day to clear eustachian tube and assist with post nasal drip symptoms.    OTC antihistamines (Zyrtec or Claritin)  to reduce allergic symptoms such as sneezing, runny nose and itchy eyes. Verbal and written instructions (see AVS) provided. Patient expresses understanding and agreement of diagnosis and treatment plan.     F/u if symptoms do not improve or worsen

## 2019-04-05 NOTE — PATIENT INSTRUCTIONS
Bronchitis: Care Instructions  Your Care Instructions    Bronchitis is inflammation of the bronchial tubes, which carry air to the lungs. The tubes swell and produce mucus, or phlegm. The mucus and inflamed bronchial tubes make you cough. You may have trouble breathing. Most cases of bronchitis are caused by viruses like those that cause colds. Antibiotics usually do not help and they may be harmful. Bronchitis usually develops rapidly and lasts about 2 to 3 weeks in otherwise healthy people. Follow-up care is a key part of your treatment and safety. Be sure to make and go to all appointments, and call your doctor if you are having problems. It's also a good idea to know your test results and keep a list of the medicines you take. How can you care for yourself at home? · Take all medicines exactly as prescribed. Call your doctor if you think you are having a problem with your medicine. · Get some extra rest.  · Take an over-the-counter pain medicine, such as acetaminophen (Tylenol), ibuprofen (Advil, Motrin), or naproxen (Aleve) to reduce fever and relieve body aches. Read and follow all instructions on the label. · Do not take two or more pain medicines at the same time unless the doctor told you to. Many pain medicines have acetaminophen, which is Tylenol. Too much acetaminophen (Tylenol) can be harmful. · Take an over-the-counter cough medicine that contains dextromethorphan to help quiet a dry, hacking cough so that you can sleep. Avoid cough medicines that have more than one active ingredient. Read and follow all instructions on the label. · Breathe moist air from a humidifier, hot shower, or sink filled with hot water. The heat and moisture will thin mucus so you can cough it out. · Do not smoke. Smoking can make bronchitis worse. If you need help quitting, talk to your doctor about stop-smoking programs and medicines. These can increase your chances of quitting for good.   When should you call for help? Call 911 anytime you think you may need emergency care. For example, call if:    · You have severe trouble breathing.    Call your doctor now or seek immediate medical care if:    · You have new or worse trouble breathing.     · You cough up dark brown or bloody mucus (sputum).     · You have a new or higher fever.     · You have a new rash.    Watch closely for changes in your health, and be sure to contact your doctor if:    · You cough more deeply or more often, especially if you notice more mucus or a change in the color of your mucus.     · You are not getting better as expected. Where can you learn more? Go to http://quintin-oh.info/. Enter H333 in the search box to learn more about \"Bronchitis: Care Instructions. \"  Current as of: September 5, 2018  Content Version: 11.9  © 9825-9404 [employer]. Care instructions adapted under license by [employer] (which disclaims liability or warranty for this information). If you have questions about a medical condition or this instruction, always ask your healthcare professional. [address] any warranty or liability for your use of this information.

## 2019-04-05 NOTE — PROGRESS NOTES
Identified pt with two pt identifiers(name and ). Reviewed record in preparation for visit and have obtained necessary documentation. Chief Complaint   Patient presents with    Cough     4-5 days    Nasal Congestion    Ear Fullness     both    Shortness of Breath     chest tightness        Health Maintenance Due   Topic    PAP AKA CERVICAL CYTOLOGY        Coordination of Care Questionnaire:  :   1) Have you been to an emergency room, urgent care, or hospitalized since your last visit? If yes, where when, and reason for visit? no       2. Have seen or consulted any other health care provider since your last visit? If yes, where when, and reason for visit? NO      3) Do you have an Advanced Directive/ Living Will in place? NO  If yes, do we have a copy on file NO  If no, would you like information NO    Patient is accompanied by self I have received verbal consent from Ryley Devi to discuss any/all medical information while they are present in the room.

## 2019-05-07 ENCOUNTER — HOSPITAL ENCOUNTER (EMERGENCY)
Age: 39
Discharge: HOME | End: 2019-05-07
Attending: EMERGENCY MEDICINE
Payer: MEDICAID

## 2019-05-07 VITALS
HEIGHT: 64 IN | RESPIRATION RATE: 16 BRPM | OXYGEN SATURATION: 100 % | DIASTOLIC BLOOD PRESSURE: 40 MMHG | WEIGHT: 138.89 LBS | HEART RATE: 90 BPM | BODY MASS INDEX: 23.71 KG/M2 | TEMPERATURE: 98.3 F | SYSTOLIC BLOOD PRESSURE: 132 MMHG

## 2019-05-07 DIAGNOSIS — R35.0 URINARY FREQUENCY: Primary | ICD-10-CM

## 2019-05-07 LAB
APPEARANCE UR: CLEAR
BACTERIA URNS QL MICRO: ABNORMAL /HPF
BILIRUB UR QL: NEGATIVE
COLOR UR: ABNORMAL
EPITH CASTS URNS QL MICRO: ABNORMAL /LPF
GLUCOSE UR STRIP.AUTO-MCNC: NEGATIVE MG/DL
HCG UR QL: NEGATIVE
HGB UR QL STRIP: NEGATIVE
HYALINE CASTS URNS QL MICRO: ABNORMAL /LPF (ref 0–5)
KETONES UR QL STRIP.AUTO: NEGATIVE MG/DL
LEUKOCYTE ESTERASE UR QL STRIP.AUTO: NEGATIVE
NITRITE UR QL STRIP.AUTO: NEGATIVE
PH UR STRIP: 8 [PH] (ref 5–8)
PROT UR STRIP-MCNC: NEGATIVE MG/DL
RBC #/AREA URNS HPF: ABNORMAL /HPF (ref 0–5)
SP GR UR REFRACTOMETRY: <1.005 (ref 1–1.03)
UA: UC IF INDICATED,UAUC: ABNORMAL
UROBILINOGEN UR QL STRIP.AUTO: 0.2 EU/DL (ref 0.2–1)
WBC URNS QL MICRO: ABNORMAL /HPF (ref 0–4)

## 2019-05-07 PROCEDURE — 81001 URINALYSIS AUTO W/SCOPE: CPT

## 2019-05-07 PROCEDURE — 87086 URINE CULTURE/COLONY COUNT: CPT

## 2019-05-07 PROCEDURE — 99283 EMERGENCY DEPT VISIT LOW MDM: CPT

## 2019-05-07 PROCEDURE — 74011250637 HC RX REV CODE- 250/637: Performed by: PHYSICIAN ASSISTANT

## 2019-05-07 PROCEDURE — 81025 URINE PREGNANCY TEST: CPT

## 2019-05-07 RX ORDER — PHENAZOPYRIDINE HYDROCHLORIDE 200 MG/1
200 TABLET, FILM COATED ORAL 3 TIMES DAILY
Qty: 6 TAB | Refills: 0 | Status: SHIPPED | OUTPATIENT
Start: 2019-05-07 | End: 2019-05-09

## 2019-05-07 RX ORDER — CEPHALEXIN 500 MG/1
500 CAPSULE ORAL 4 TIMES DAILY
COMMUNITY
End: 2019-09-19 | Stop reason: ALTCHOICE

## 2019-05-07 RX ORDER — PHENAZOPYRIDINE HYDROCHLORIDE 100 MG/1
200 TABLET, FILM COATED ORAL
Status: COMPLETED | OUTPATIENT
Start: 2019-05-07 | End: 2019-05-07

## 2019-05-07 RX ADMIN — PHENAZOPYRIDINE HYDROCHLORIDE 200 MG: 100 TABLET ORAL at 11:26

## 2019-05-07 NOTE — DISCHARGE INSTRUCTIONS
Patient Education        Frequent Urination: Care Instructions  Your Care Instructions  An urge to urinate frequently but usually passing only small amounts of urine is a common symptom of urinary problems, such as urinary tract infections. The bladder may become inflamed. This can cause the urge to urinate. You may try to urinate more often than usual to try to soothe that urge. Frequent urination also may be caused by sexually transmitted infections (STIs) or kidney stones. Or it may happen when something irritates the tube that carries urine from the bladder to the outside of the body (urethra). It may also be a sign of diabetes. The cause may be hard to find. You may need tests. Follow-up care is a key part of your treatment and safety. Be sure to make and go to all appointments, and call your doctor if you are having problems. It's also a good idea to know your test results and keep a list of the medicines you take. How can you care for yourself at home? · Drink extra water for the next day or two. This will help make the urine less concentrated. (If you have kidney, heart, or liver disease and have to limit fluids, talk with your doctor before you increase the amount of fluids you drink.)  · Avoid drinks that are carbonated or have caffeine. They can irritate the bladder. For women:  · Urinate right after you have sex. · After you go to the bathroom, wipe from front to back. · Avoid douches, bubble baths, and feminine hygiene sprays. And avoid other feminine hygiene products that have deodorants. When should you call for help? Call your doctor now or seek immediate medical care if:    · You have new symptoms, such as fever, nausea, or vomiting.     · You have new or worse symptoms of a urinary problem. For example:  ? You have blood or pus in your urine. ? You have chills or body aches. ? It hurts to urinate. ? You have groin or belly pain. ?  You have pain in your back just below your rib cage (the flank area).    Watch closely for changes in your health, and be sure to contact your doctor if you feel thirstier than usual.  Where can you learn more? Go to http://quintin-oh.info/. Enter 373 9066 in the search box to learn more about \"Frequent Urination: Care Instructions. \"  Current as of: March 20, 2018  Content Version: 11.9  © 3495-9684 [employer]. Care instructions adapted under license by [employer] (which disclaims liability or warranty for this information). If you have questions about a medical condition or this instruction, always ask your healthcare professional. [address] any warranty or liability for your use of this information.

## 2019-05-07 NOTE — ED PROVIDER NOTES
EMERGENCY DEPARTMENT HISTORY AND PHYSICAL EXAM 
 
 
Date: 5/7/2019 Patient Name: Lidia Lewis History of Presenting Illness Chief Complaint Patient presents with  Urinary Frequency  
  every 5-10 min x \"a couple of days\". pt started keflex per urgent care yesterday with no relief of sx History Provided By: Patient HPI: Lidia Lewis, 41 y.o. female with PMHx significant for POTS, presents ambulatory to the ED with urinary complaints. She notes several weeks of increased urinary frequency but denies dysuria. She was evaluated at an urgent care yesterday and told that her urine looked okay but was still placed on Keflex. The symptoms are not improved after taking Keflex for 1 day. There is been no other treatment prior to arrival. 
 
There are no other complaints, changes, or physical findings at this time. Social Hx: Tobacco (denies), EtOH (social), Illicit drug use (denies) PCP: Jarad Kimbrough MD 
 
No current facility-administered medications on file prior to encounter. Current Outpatient Medications on File Prior to Encounter Medication Sig Dispense Refill  cephALEXin (KEFLEX) 500 mg capsule Take 500 mg by mouth four (4) times daily.  methylPREDNISolone (MEDROL DOSEPACK) 4 mg tablet Per pack instructions. 1 Dose Pack 0  
 albuterol (PROVENTIL VENTOLIN) 2.5 mg /3 mL (0.083 %) nebulizer solution 3 mL by Nebulization route every four (4) hours as needed for Wheezing. 24 Each 0  
 cetirizine (ZYRTEC) 10 mg tablet Take 1 Tab by mouth daily. 90 Tab 3  
 montelukast (SINGULAIR) 10 mg tablet Take 1 Tab by mouth daily. 30 Tab 3  cevimeline (EVOXAC) 30 mg capsule Take 1 Cap by mouth three (3) times daily for 360 days. 30 Cap 11  
 albuterol (PROVENTIL HFA, VENTOLIN HFA, PROAIR HFA) 90 mcg/actuation inhaler Take 1 Puff by inhalation every four (4) hours as needed for Wheezing. 1 Inhaler 5  
 nadolol (CORGARD) 40 mg tablet Take 1 Tab by mouth daily.  90 Tab 1  
  fluticasone (FLONASE) 50 mcg/actuation nasal spray instill 1 spray into each nostril 16 g 5  
 cromolyn (GASTROCROM) 100 mg/5 mL solution Take 100 mg by mouth three (3) times daily.  omeprazole (PRILOSEC) 40 mg capsule Take 40 mg by mouth as needed.  famotidine (PEPCID) 20 mg tablet Take 20 mg by mouth nightly. 0  
 cholecalciferol (VITAMIN D3) 1,000 unit tablet Take  by mouth daily.  multivitamin (ONE A DAY) tablet Take 1 Tab by mouth daily.  LINZESS 290 mcg cap capsule Take 290 mcg by mouth as needed. 0 Past History Past Medical History: 
Past Medical History:  
Diagnosis Date  Anemia   
 taking iron  Gastroparesis  GERD (gastroesophageal reflux disease)   
 gastroparesis--h-pylori  H/O Clostridium difficile infection 06/2012 C-Diff  Helicobacter pylori (H. pylori) 05/2012  
 h pylori  Palpitations 2010  - cardiac workup per pt.  POTS (postural orthostatic tachycardia syndrome) Past Surgical History: 
Past Surgical History:  
Procedure Laterality Date  HX GI    
 EGD and colonoscopy  HX GYN  5/12 D&C  
 HX GYN    
 tubal reversal  
 HX OTHER SURGICAL    
 D&C x 2,  lap  TILT TABLE EVAL W/WO DRUG  1/21/2017 Family History: 
Family History Problem Relation Age of Onset  Heart Disease Mother  Cancer Mother Thyroid  Thyroid Disease Mother  Heart Disease Father  High Cholesterol Father  Heart Disease Maternal Grandmother  Diabetes Maternal Grandmother  Other Maternal Grandmother Heomochromatosis  Thyroid Disease Sister  No Known Problems Brother  Stroke Maternal Grandfather  Diabetes Maternal Grandfather  Hypertension Paternal Grandmother  Heart Disease Paternal Grandfather  Heart Attack Paternal Grandfather Social History: 
Social History Tobacco Use  Smoking status: Former Smoker Types: Cigarettes Last attempt to quit: 2013 Years since quittin.4  Smokeless tobacco: Never Used Substance Use Topics  Alcohol use: Yes Alcohol/week: 0.6 oz Types: 1 Glasses of wine per week Comment: rare1  Drug use: No  
 
 
Allergies: Allergies Allergen Reactions  Celexa [Citalopram] Nausea and Vomiting  Ciprofloxacin Shortness of Breath  Diflucan [Fluconazole] Rash Burning sensation to skin  Sulfa (Sulfonamide Antibiotics) Rash Review of Systems Review of Systems Constitutional: Negative for chills, diaphoresis and fever. HENT: Negative for congestion, ear pain, rhinorrhea and sore throat. Respiratory: Negative for cough and shortness of breath. Cardiovascular: Negative for chest pain. Gastrointestinal: Negative for abdominal pain, constipation, diarrhea, nausea and vomiting. Genitourinary: Positive for frequency. Negative for difficulty urinating, dysuria and hematuria. Musculoskeletal: Negative for arthralgias and myalgias. Neurological: Negative for headaches. All other systems reviewed and are negative. Physical Exam  
Physical Exam  
Constitutional: She is oriented to person, place, and time. She appears well-developed and well-nourished. No distress. 41 y.o.  female HENT:  
Head: Normocephalic and atraumatic. Eyes: Conjunctivae are normal. Right eye exhibits no discharge. Left eye exhibits no discharge. Neck: Normal range of motion. Neck supple. Cardiovascular: Normal rate, regular rhythm and normal heart sounds. No murmur heard. Pulmonary/Chest: Effort normal and breath sounds normal. No respiratory distress. Abdominal: Soft. Normal appearance and bowel sounds are normal. She exhibits no distension. There is no tenderness. There is no rebound, no guarding and no CVA tenderness. Neurological: She is alert and oriented to person, place, and time. Skin: Skin is warm and dry. She is not diaphoretic. Psychiatric: She has a normal mood and affect. Her behavior is normal.  
Nursing note and vitals reviewed. Diagnostic Study Results Labs - Recent Results (from the past 12 hour(s)) URINALYSIS W/ REFLEX CULTURE Collection Time: 05/07/19 11:03 AM  
Result Value Ref Range Color YELLOW/STRAW Appearance CLEAR CLEAR Specific gravity <1.005 1.003 - 1.030  
 pH (UA) 8.0 5.0 - 8.0 Protein NEGATIVE  NEG mg/dL Glucose NEGATIVE  NEG mg/dL Ketone NEGATIVE  NEG mg/dL Bilirubin NEGATIVE  NEG Blood NEGATIVE  NEG Urobilinogen 0.2 0.2 - 1.0 EU/dL Nitrites NEGATIVE  NEG Leukocyte Esterase NEGATIVE  NEG    
 WBC 0-4 0 - 4 /hpf  
 RBC 0-5 0 - 5 /hpf Epithelial cells FEW FEW /lpf Bacteria 2+ (A) NEG /hpf  
 UA:UC IF INDICATED URINE CULTURE ORDERED (A) CNI Hyaline cast 0-2 0 - 5 /lpf  
HCG URINE, QL Collection Time: 05/07/19 11:03 AM  
Result Value Ref Range HCG urine, QL NEGATIVE  NEG Radiologic Studies - None Medical Decision Making I am the first provider for this patient. I reviewed the vital signs, available nursing notes, past medical history, past surgical history, family history and social history. Vital Signs-Reviewed the patient's vital signs. Patient Vitals for the past 12 hrs: 
 Temp Pulse Resp BP SpO2  
05/07/19 1053 98.3 °F (36.8 °C) 90 16 132/40 100 % Records Reviewed: Nursing Notes Provider Notes (Medical Decision Making): UTI, pyelonephritis, cystitis, yeast infection,  
 
ED Course:  
Initial assessment performed. The patients presenting problems have been discussed, and they are in agreement with the care plan formulated and outlined with them. I have encouraged them to ask questions as they arise throughout their visit. Critical Care Time: None Disposition: 
DISCHARGE NOTE: 
12:56 PM 
The pt is ready for discharge.  The pt's signs, symptoms, diagnosis, and discharge instructions have been discussed and pt has conveyed their understanding. The pt is to follow up as recommended or return to ER should their symptoms worsen. Plan has been discussed and pt is in agreement. PLAN: 
1. Current Discharge Medication List  
  
START taking these medications Details  
phenazopyridine (PYRIDIUM) 200 mg tablet Take 1 Tab by mouth three (3) times daily for 6 doses. Qty: 6 Tab, Refills: 0 CONTINUE these medications which have NOT CHANGED Details  
cephALEXin (KEFLEX) 500 mg capsule Take 500 mg by mouth four (4) times daily. methylPREDNISolone (MEDROL DOSEPACK) 4 mg tablet Per pack instructions. Qty: 1 Dose Pack, Refills: 0 Associated Diagnoses: Cough; Bronchitis  
  
albuterol (PROVENTIL VENTOLIN) 2.5 mg /3 mL (0.083 %) nebulizer solution 3 mL by Nebulization route every four (4) hours as needed for Wheezing. Qty: 24 Each, Refills: 0  
  
cetirizine (ZYRTEC) 10 mg tablet Take 1 Tab by mouth daily. Qty: 90 Tab, Refills: 3  
  
montelukast (SINGULAIR) 10 mg tablet Take 1 Tab by mouth daily. Qty: 30 Tab, Refills: 3 Associated Diagnoses: Allergic rhinitis due to pollen, unspecified seasonality  
  
cevimeline (EVOXAC) 30 mg capsule Take 1 Cap by mouth three (3) times daily for 360 days. Qty: 30 Cap, Refills: 11  
 Associated Diagnoses: Sjogren's syndrome with keratoconjunctivitis sicca (HCC)  
  
albuterol (PROVENTIL HFA, VENTOLIN HFA, PROAIR HFA) 90 mcg/actuation inhaler Take 1 Puff by inhalation every four (4) hours as needed for Wheezing. Qty: 1 Inhaler, Refills: 5 Associated Diagnoses: Upper respiratory tract infection, unspecified type  
  
nadolol (CORGARD) 40 mg tablet Take 1 Tab by mouth daily. Qty: 90 Tab, Refills: 1  
  
fluticasone (FLONASE) 50 mcg/actuation nasal spray instill 1 spray into each nostril 
Qty: 16 g, Refills: 5  
  
cromolyn (GASTROCROM) 100 mg/5 mL solution Take 100 mg by mouth three (3) times daily. omeprazole (PRILOSEC) 40 mg capsule Take 40 mg by mouth as needed. famotidine (PEPCID) 20 mg tablet Take 20 mg by mouth nightly. Refills: 0  
  
cholecalciferol (VITAMIN D3) 1,000 unit tablet Take  by mouth daily. multivitamin (ONE A DAY) tablet Take 1 Tab by mouth daily. Associated Diagnoses: Palpitations LINZESS 290 mcg cap capsule Take 290 mcg by mouth as needed. Refills: 0 Associated Diagnoses: Palpitations 2. Follow-up Information Follow up With Specialties Details Why Contact Info Eden Choi MD Urology In 3 days  [phone] 
[phone] Return to ED if worse Diagnosis Clinical Impression: 1. Urinary frequency Please note that this dictation was completed with [email or web address], the computer voice recognition software. Quite often unanticipated grammatical, syntax, homophones, and other interpretive errors are inadvertently transcribed by the computer software. Please disregards these errors. Please excuse any errors that have escaped final proofreading. This note will not be viewable in [address]

## 2019-05-07 NOTE — LETTER
[address] 
[facility] EMERGENCY DEPT 
[address] [place] [address] [address] [id number] [phone] Work/School Note Date: 5/7/2019 To Whom It May concern: 
 
Farzad Nash was seen and treated today in the emergency room by the following provider(s): 
Attending Provider: Елена Sanders DO Physician Assistant: Sana Bush. Please excuse Farzad Nash from work today. Sincerely, Sana Garzon

## 2019-05-09 LAB
BACTERIA SPEC CULT: NORMAL
CC UR VC: NORMAL
SERVICE CMNT-IMP: NORMAL

## 2019-06-03 ENCOUNTER — OFFICE VISIT (OUTPATIENT)
Dept: FAMILY MEDICINE CLINIC | Age: 39
End: 2019-06-03

## 2019-06-03 VITALS
DIASTOLIC BLOOD PRESSURE: 77 MMHG | SYSTOLIC BLOOD PRESSURE: 124 MMHG | HEIGHT: 64 IN | TEMPERATURE: 98.7 F | HEART RATE: 88 BPM | OXYGEN SATURATION: 100 % | RESPIRATION RATE: 17 BRPM | BODY MASS INDEX: 24.41 KG/M2 | WEIGHT: 143 LBS

## 2019-06-03 DIAGNOSIS — M94.0 COSTOCHONDRITIS: Primary | ICD-10-CM

## 2019-06-03 RX ORDER — PREDNISONE 10 MG/1
TABLET ORAL
Qty: 21 TAB | Refills: 0 | Status: SHIPPED | OUTPATIENT
Start: 2019-06-03 | End: 2019-06-04 | Stop reason: DRUGHIGH

## 2019-06-03 NOTE — PROGRESS NOTES
Chief Complaint   Patient presents with    Rib Pain    Back Pain    Arm Pain     under left arm      Pt reports that pain started in her LUQ, thought she pulled something while cleaning. Pt reports that this morning the pain spread up to her chest.     Pt reports that pain feels like costochondritis that she had a year ago. Subjective: (As above and below)     Chief Complaint   Patient presents with    Rib Pain    Back Pain    Arm Pain     under left arm      she is a 41y.o. year old female who presents for evaluation. Reviewed PmHx, RxHx, FmHx, SocHx, AllgHx and updated in chart. Review of Systems - negative except as listed above    Objective:     Vitals:    06/03/19 1441   BP: 124/77   Pulse: 88   Resp: 17   Temp: 98.7 °F (37.1 °C)   TempSrc: Oral   SpO2: 100%   Weight: 143 lb (64.9 kg)   Height: 5' 4\" (1.626 m)     Physical Examination: General appearance - alert, well appearing, and in no distress  Mental status - normal mood, behavior, speech, dress, motor activity, and thought processes  Mouth - mucous membranes moist, pharynx normal without lesions  Chest - clear to auscultation, no wheezes, rales or rhonchi, symmetric air entry  Heart - normal rate, regular rhythm, normal S1, S2, no murmurs, rubs, clicks or gallops  Musculoskeletal - chest pain reproducible with ant-pos pressure    Assessment/ Plan:   1. Costochondritis  -EKG unchanged, take prednisone as written  - AMB POC EKG ROUTINE W/ 12 LEADS, INTER & REP  - predniSONE (STERAPRED DS) 10 mg dose pack; See administration instruction per 10mg dose pack  Dispense: 21 Tab; Refill: 0     Follow up as needed    I have discussed the diagnosis with the patient and the intended plan as seen in the above orders. The patient has received an after-visit summary and questions were answered concerning future plans.      Medication Side Effects and Warnings were discussed with patient: yes  Patient Labs were reviewed: yes  Patient Past Records were reviewed:  yes    Ángela Cabrera M.D.

## 2019-06-03 NOTE — PROGRESS NOTES
Chief Complaint   Patient presents with    Rib Pain    Back Pain    Arm Pain     under left arm      Health Maintenance reviewed     1. Have you been to the ER, urgent care clinic since your last visit? Hospitalized since your last visit? No    2. Have you seen or consulted any other health care providers outside of the [address] [place] [address] since your last visit? Include any pap smears or colon screening.  No

## 2019-06-04 ENCOUNTER — TELEPHONE (OUTPATIENT)
Dept: FAMILY MEDICINE CLINIC | Age: 39
End: 2019-06-04

## 2019-06-04 RX ORDER — METHYLPREDNISOLONE 4 MG/1
TABLET ORAL
Qty: 1 DOSE PACK | Refills: 0 | Status: SHIPPED | OUTPATIENT
Start: 2019-06-04 | End: 2019-09-19 | Stop reason: ALTCHOICE

## 2019-06-04 NOTE — TELEPHONE ENCOUNTER
Pt is calling wanting to see if Dr Corley can change med to metho prednisone vandana not what was prescribed states the way it is it causes her to have heart palpitation

## 2019-06-11 ENCOUNTER — OFFICE VISIT (OUTPATIENT)
Dept: CARDIOLOGY CLINIC | Age: 39
End: 2019-06-11

## 2019-06-11 VITALS
BODY MASS INDEX: 24.24 KG/M2 | HEIGHT: 64 IN | HEART RATE: 99 BPM | WEIGHT: 142 LBS | RESPIRATION RATE: 14 BRPM | SYSTOLIC BLOOD PRESSURE: 116 MMHG | DIASTOLIC BLOOD PRESSURE: 70 MMHG | OXYGEN SATURATION: 99 %

## 2019-06-11 DIAGNOSIS — G90.A POTS (POSTURAL ORTHOSTATIC TACHYCARDIA SYNDROME): Primary | ICD-10-CM

## 2019-06-11 DIAGNOSIS — K31.84 GASTROPARESIS: ICD-10-CM

## 2019-06-11 DIAGNOSIS — R53.83 FATIGUE, UNSPECIFIED TYPE: ICD-10-CM

## 2019-06-11 DIAGNOSIS — R06.00 DYSPNEA, UNSPECIFIED TYPE: ICD-10-CM

## 2019-06-11 DIAGNOSIS — M35.01 SJOGREN'S SYNDROME WITH KERATOCONJUNCTIVITIS SICCA (HCC): ICD-10-CM

## 2019-06-11 DIAGNOSIS — R06.02 SOB (SHORTNESS OF BREATH): ICD-10-CM

## 2019-06-11 NOTE — PROGRESS NOTES
Cardiac Electrophysiology Office Note     Subjective:      Saida Yañez is a 41 y.o. female patient who had tilt table test.  + postural orthostatic tachycardia syndrome. Nadolol works well  She has been diagnosed and treated for Sjogren by Dr Laura Heaton dryness helped with restasis but costs too much  Sometimes she feels food stuck in throat and abdominal fullness  She is here today for follow up. She has been dyspneic, fatigued and could not feel like she could take deep breaths  Nov 2018 abdominal and chest xray and barium swallowing tests normal  She denied bleeding  She is worried about her thyroid issue, said she had normal biopsy before  Last thyroid lab 2017  CBC in Feb 2019 normal    Echo and stress echo in 2016 normal    In the past:  She says the metoprolol make her very tired. She had used inderal before and did not like it  Past hx   She has been having tachycardia, palpitations & shakiness that started about 8 month ago. She had seen Dr Aguila Cuellar in the past and then Dr Acosta Willson.  She was taking metoprolol PRN for tachycardia. Then Dr. Acosta Willson started her on Toprol, but she was unable to tolerate this. She said it made her symptoms worse. She denies hx of syncope, but she has had many near -syncopal episodes. She says activity and exercise are triggers for the tachycardia episodes. After exercising on the elliptical for about 20 minutes she is very shaky, lightheaded and feels awful. She says that she was on atenolol about 5 years ago for tachycardia, a cardiologists took her off and she did not have any problems for about 4 years. Then all of a sudden she started having these problems. Neurologist: Dr Srinivas Corona  PMHx includes chronic migraines, vertigo, gastroparesis      Echo 12/2015 shows LVEF 60% no RWMA, no significant valve abnormalities. Family hx: maternal grand mother: MI at 27years old. Maternal grandfather hx of CVA. Social hx : she denies tobacco or ETOH use.  She is single mother of 3 children      Patient Active Problem List    Diagnosis Date Noted    Long-term use of hydroxychloroquine 02/14/2019    Sjogren's syndrome with keratoconjunctivitis sicca ([facility] [address] 08/07/2018    Gastroparesis 08/07/2018    Irritable bowel syndrome with constipation 08/07/2018    Chronic constipation 09/01/2017    GERD (gastroesophageal reflux disease) 09/01/2017    POTS (postural orthostatic tachycardia syndrome) 01/20/2017    Abdominal adhesions 05/04/2012     Current Outpatient Medications   Medication Sig Dispense Refill    cetirizine (ZYRTEC) 10 mg tablet Take 1 Tab by mouth daily. 90 Tab 3    nadolol (CORGARD) 40 mg tablet Take 1 Tab by mouth daily. 90 Tab 1    fluticasone (FLONASE) 50 mcg/actuation nasal spray instill 1 spray into each nostril 16 g 5    omeprazole (PRILOSEC) 40 mg capsule Take 40 mg by mouth as needed.  famotidine (PEPCID) 20 mg tablet Take 20 mg by mouth nightly. 0    cholecalciferol (VITAMIN D3) 1,000 unit tablet Take  by mouth daily.  multivitamin (ONE A DAY) tablet Take 1 Tab by mouth daily.  LINZESS 290 mcg cap capsule Take 290 mcg by mouth as needed. 0    methylPREDNISolone (MEDROL DOSEPACK) 4 mg tablet Please take as directed. 1 Dose Pack 0    cephALEXin (KEFLEX) 500 mg capsule Take 500 mg by mouth four (4) times daily.  albuterol (PROVENTIL VENTOLIN) 2.5 mg /3 mL (0.083 %) nebulizer solution 3 mL by Nebulization route every four (4) hours as needed for Wheezing. 24 Each 0    montelukast (SINGULAIR) 10 mg tablet Take 1 Tab by mouth daily. 30 Tab 3    cevimeline (EVOXAC) 30 mg capsule Take 1 Cap by mouth three (3) times daily for 360 days. 30 Cap 11    albuterol (PROVENTIL HFA, VENTOLIN HFA, PROAIR HFA) 90 mcg/actuation inhaler Take 1 Puff by inhalation every four (4) hours as needed for Wheezing. 1 Inhaler 5    cromolyn (GASTROCROM) 100 mg/5 mL solution Take 100 mg by mouth three (3) times daily.        Allergies   Allergen Reactions    Celexa [Citalopram] Nausea and Vomiting    Ciprofloxacin Shortness of Breath    Diflucan [Fluconazole] Rash     Burning sensation to skin    Sulfa (Sulfonamide Antibiotics) Rash     Past Medical History:   Diagnosis Date    Anemia     taking iron    Gastroparesis     GERD (gastroesophageal reflux disease)     gastroparesis--h-pylori    H/O Clostridium difficile infection 2012    C-Diff    Helicobacter pylori (H. pylori) 2012    h pylori    Palpitations       - cardiac workup per pt.  POTS (postural orthostatic tachycardia syndrome)      Past Surgical History:   Procedure Laterality Date    HX GI      EGD and colonoscopy    HX GYN      D&C    HX GYN      tubal reversal    HX OTHER SURGICAL      D&C x 2,  lap    TILT TABLE EVAL W/WO DRUG  2017          Family History   Problem Relation Age of Onset    Heart Disease Mother     Cancer Mother         Thyroid    Thyroid Disease Mother     Heart Disease Father     High Cholesterol Father     Heart Disease Maternal Grandmother     Diabetes Maternal Grandmother     Other Maternal Grandmother         Heomochromatosis    Thyroid Disease Sister     No Known Problems Brother     Stroke Maternal Grandfather     Diabetes Maternal Grandfather     Hypertension Paternal Grandmother     Heart Disease Paternal Grandfather     Heart Attack Paternal Grandfather      Social History     Tobacco Use    Smoking status: Former Smoker     Types: Cigarettes     Last attempt to quit: 2013     Years since quittin.5    Smokeless tobacco: Never Used   Substance Use Topics    Alcohol use: Yes     Alcohol/week: 0.6 oz     Types: 1 Glasses of wine per week     Comment: rare1        Review of Systems:   Constitutional: Negative for fever, chills, weight loss + fatigue  HEENT: Negative for nosebleeds, vision changes. Respiratory: Negative for cough, hemoptysis, sputum production, and wheezing.    Cardiovascular: Negative for chest pain palpitations, no orthopnea, claudication, leg swelling, syncope, and PND. + SOB  Gastrointestinal: Negative for nausea, vomiting, diarrhea, constipation, blood in stool and melena. + gastroparesis. Occasional dysphagia  Genitourinary: Negative for dysuria, and hematuria. Musculoskeletal: Negative for myalgias, + arthralgia. Skin: occasional rash. Heme: Does not bleed or bruise easily. Neurological: Negative for speech change and focal weakness     Objective:     Visit Vitals  /70 (BP 1 Location: Left arm, BP Patient Position: Sitting)   Pulse 99   Resp 14   Ht 5' 4\" (1.626 m)   Wt 142 lb (64.4 kg)   SpO2 99%   BMI 24.37 kg/m²      Physical Exam:   Constitutional: Well-nourished. No distress. Head: Normocephalic and atraumatic. Eyes: Pupils are equal, round  Neck: supple. No JVD present. Cardiovascular: Normal rate, regular rhythm. Exam reveals no gallop and no friction rub. No murmur heard. Pulmonary/Chest: Effort normal and breath sounds normal. No wheezes. Abdominal: Soft, no tenderness. Musculoskeletal: no edema. Neurological: alert, oriented. Skin: Skin is warm and dry  Psychiatric: normal mood and affect. Behavior is normal. Judgment and thought content normal.        Assessment/Plan:       ICD-10-CM ICD-9-CM    1. POTS (postural orthostatic tachycardia syndrome) R00.0 427.89     I95.1     2. Sjogren's syndrome with keratoconjunctivitis sicca (HCC) M35.01 710.2    3. Gastroparesis K31.84 536.3    4. Fatigue, unspecified type R53.83 780.79    5.  Dyspnea, unspecified type R06.00 786.09    She thinks nadolol helps her POTS but she has concerns about fatigue and dyspnea  She has Sjogren and no imaging abnormality in the past 1/2 year  I do not think this is cardiac related  CXR was normal this year  She will repeat CBC CMP and TSH first  Echo for pulmonary hypertension if labs normal       Future Appointments   Date Time Provider Addis Velazquez   8/14/2019  9:40 AM Isac Stein MD [address] [facility]   12/17/2019  8:00 AM Heidi Celaya  [address]           Thank you for involving me in this patient's care and please call with further concerns or questions. Mortimer Hug, M.D.   Electrophysiology/Cardiology  [facility]  [address], [place] [address], [address]  [place] [address]                             [address] [place] [address]  [phone]

## 2019-06-20 LAB
ALBUMIN SERPL-MCNC: 4.6 G/DL (ref 3.5–5.5)
ALBUMIN/GLOB SERPL: 2.1 {RATIO} (ref 1.2–2.2)
ALP SERPL-CCNC: 38 IU/L (ref 39–117)
ALT SERPL-CCNC: 11 IU/L (ref 0–32)
AST SERPL-CCNC: 16 IU/L (ref 0–40)
BASOPHILS # BLD AUTO: 0 X10E3/UL (ref 0–0.2)
BASOPHILS NFR BLD AUTO: 1 %
BILIRUB SERPL-MCNC: 0.5 MG/DL (ref 0–1.2)
BUN SERPL-MCNC: 13 MG/DL (ref 6–20)
BUN/CREAT SERPL: 21 (ref 9–23)
CALCIUM SERPL-MCNC: 9.6 MG/DL (ref 8.7–10.2)
CHLORIDE SERPL-SCNC: 103 MMOL/L (ref 96–106)
CO2 SERPL-SCNC: 24 MMOL/L (ref 20–29)
CREAT SERPL-MCNC: 0.63 MG/DL (ref 0.57–1)
EOSINOPHIL # BLD AUTO: 0 X10E3/UL (ref 0–0.4)
EOSINOPHIL NFR BLD AUTO: 1 %
ERYTHROCYTE [DISTWIDTH] IN BLOOD BY AUTOMATED COUNT: 13.7 % (ref 12.3–15.4)
GLOBULIN SER CALC-MCNC: 2.2 G/DL (ref 1.5–4.5)
GLUCOSE SERPL-MCNC: 89 MG/DL (ref 65–99)
HCT VFR BLD AUTO: 36.6 % (ref 34–46.6)
HGB BLD-MCNC: 12.4 G/DL (ref 11.1–15.9)
IMM GRANULOCYTES # BLD AUTO: 0 X10E3/UL (ref 0–0.1)
IMM GRANULOCYTES NFR BLD AUTO: 0 %
LYMPHOCYTES # BLD AUTO: 1.6 X10E3/UL (ref 0.7–3.1)
LYMPHOCYTES NFR BLD AUTO: 39 %
MCH RBC QN AUTO: 32 PG (ref 26.6–33)
MCHC RBC AUTO-ENTMCNC: 33.9 G/DL (ref 31.5–35.7)
MCV RBC AUTO: 94 FL (ref 79–97)
MONOCYTES # BLD AUTO: 0.3 X10E3/UL (ref 0.1–0.9)
MONOCYTES NFR BLD AUTO: 8 %
NEUTROPHILS # BLD AUTO: 2.1 X10E3/UL (ref 1.4–7)
NEUTROPHILS NFR BLD AUTO: 51 %
PLATELET # BLD AUTO: 299 X10E3/UL (ref 150–450)
POTASSIUM SERPL-SCNC: 4.1 MMOL/L (ref 3.5–5.2)
PROT SERPL-MCNC: 6.8 G/DL (ref 6–8.5)
RBC # BLD AUTO: 3.88 X10E6/UL (ref 3.77–5.28)
SODIUM SERPL-SCNC: 141 MMOL/L (ref 134–144)
TSH SERPL DL<=0.005 MIU/L-ACNC: 1.26 UIU/ML (ref 0.45–4.5)
WBC # BLD AUTO: 4 X10E3/UL (ref 3.4–10.8)

## 2019-06-21 NOTE — PROGRESS NOTES
Labs without significant acute abnormality. Per Dr. Cindy Magaña last office note, obtain echo to check for pulmonary HTN. She complained of dyspnea & fatigue in clinic on 06/11/2019.

## 2019-06-24 DIAGNOSIS — G90.A POTS (POSTURAL ORTHOSTATIC TACHYCARDIA SYNDROME): ICD-10-CM

## 2019-06-24 DIAGNOSIS — R06.02 SOB (SHORTNESS OF BREATH): ICD-10-CM

## 2019-06-24 DIAGNOSIS — R53.83 FATIGUE, UNSPECIFIED TYPE: Primary | ICD-10-CM

## 2019-06-24 NOTE — PROGRESS NOTES
Verified patient with two types of identifiers. Notified patient of results. Scheduled patient for echo. Patient verbalized understanding and will call with any other questions.

## 2019-07-15 RX ORDER — FAMOTIDINE 20 MG/1
20 TABLET, FILM COATED ORAL
Qty: 90 TAB | Refills: 3 | Status: SHIPPED | OUTPATIENT
Start: 2019-07-15 | End: 2020-08-05 | Stop reason: SDUPTHER

## 2019-07-15 NOTE — TELEPHONE ENCOUNTER
Requested Prescriptions     Pending Prescriptions Disp Refills    famotidine (PEPCID) 20 mg tablet  0     Sig: Take 1 Tab by mouth nightly. Requested by pharmacy.

## 2019-08-16 ENCOUNTER — TELEPHONE (OUTPATIENT)
Dept: RHEUMATOLOGY | Age: 39
End: 2019-08-16

## 2019-08-16 DIAGNOSIS — K13.79 SORE IN MOUTH: Primary | ICD-10-CM

## 2019-08-16 NOTE — TELEPHONE ENCOUNTER
Patient was unable to come to appt today due to doctor out sick. Patient stated she is having severe pain in mouth with small sore like bumps in back of throat. Requesting Methylprednisone to be called in. The tapered one.     [facility]

## 2019-08-16 NOTE — TELEPHONE ENCOUNTER
Spoke with pt and told her that Dr. Zack Benoit will not prescribe steroids for the mouth pain/sores, but that she needs to see an ENT doctor. She stated that she already sees Dr. Genny Clifford for other issues and she has an upcoming appt so she will speak with her about it then.

## 2019-08-29 ENCOUNTER — HOSPITAL ENCOUNTER (OUTPATIENT)
Age: 39
Setting detail: OUTPATIENT SURGERY
End: 2019-08-29
Attending: SPECIALIST | Admitting: SPECIALIST
Payer: MEDICAID

## 2019-08-29 DIAGNOSIS — J32.9 CHRONIC CONGESTION OF PARANASAL SINUS: ICD-10-CM

## 2019-09-19 ENCOUNTER — OFFICE VISIT (OUTPATIENT)
Dept: FAMILY MEDICINE CLINIC | Age: 39
End: 2019-09-19

## 2019-09-19 VITALS
OXYGEN SATURATION: 98 % | TEMPERATURE: 98.3 F | WEIGHT: 144 LBS | BODY MASS INDEX: 24.59 KG/M2 | DIASTOLIC BLOOD PRESSURE: 78 MMHG | RESPIRATION RATE: 14 BRPM | HEART RATE: 68 BPM | SYSTOLIC BLOOD PRESSURE: 112 MMHG | HEIGHT: 64 IN

## 2019-09-19 DIAGNOSIS — J32.9 CHRONIC CONGESTION OF PARANASAL SINUS: Primary | ICD-10-CM

## 2019-09-19 RX ORDER — CYCLOSPORINE 0.5 MG/ML
1 EMULSION OPHTHALMIC EVERY 12 HOURS
COMMUNITY
Start: 2019-08-29 | End: 2019-12-23

## 2019-09-19 NOTE — PROGRESS NOTES
Chief Complaint   Patient presents with    Sinus Pain    Hoarse     thoat fells tight when she swallows    Neck Pain     Pt reports that she has had years of stomach issues, is scheduled for manometry and esophageal dilation with Dr. Regina Patel. Pt reports that she has been struggling with chronic facial tightness and difficulty swallowing, feels like she gets stuck when swallowing, muscles feel tight in her throat and lower face. Pt reports that she has POTS, gastroparesis and Sjogren's, feels like all of her symptoms get attributed to these conditions. Pt sees a neurologist, rheumatologist, GI provider and endocrinologist.     Pt is followed by christine for a thyroid nodule, had a normal swallowing study in 11/2018. Subjective: (As above and below)     Chief Complaint   Patient presents with    Sinus Pain    Hoarse     thoat fells tight when she swallows    Neck Pain     she is a 42y.o. year old female who presents for evaluation. Reviewed PmHx, RxHx, FmHx, SocHx, AllgHx and updated in chart.     Review of Systems - negative except as listed above    Objective:     Vitals:    09/19/19 0844   BP: 112/78   Pulse: 68   Resp: 14   Temp: 98.3 °F (36.8 °C)   TempSrc: Oral   SpO2: 98%   Weight: 144 lb (65.3 kg)   Height: 5' 4\" (1.626 m)     Physical Examination: General appearance - alert, well appearing, and in no distress  Mental status - normal mood, behavior, speech, dress, motor activity, and thought processes  Nose - normal and patent, no erythema, discharge or polyps  Mouth - mucous membranes moist, pharynx normal without lesions and sinus tenderness bilateral maxillary  and facial sinus areas   Chest - clear to auscultation, no wheezes, rales or rhonchi, symmetric air entry  Heart - normal rate, regular rhythm, normal S1, S2, no murmurs, rubs, clicks or gallops  Musculoskeletal - no joint tenderness, deformity or swelling  Extremities - peripheral pulses normal, no pedal edema, no clubbing or cyanosis    Assessment/ Plan:   1. Chronic congestion of paranasal sinus  -check CT due to chronic sinus pressure and pain, other work up negative so far  - CT SINUSES WO CONT; Future     Pt declined flu vaccine    Follow up as needed    I have discussed the diagnosis with the patient and the intended plan as seen in the above orders. The patient has received an after-visit summary and questions were answered concerning future plans.      Medication Side Effects and Warnings were discussed with patient: yes  Patient Labs were reviewed: yes  Patient Past Records were reviewed:  yes    Tyree Serrano M.D.

## 2019-09-19 NOTE — PROGRESS NOTES
Chief Complaint   Patient presents with    Sinus Pain    Hoarse     thoat fells tight when she swallows    Neck Pain     1. Have you been to the ER, urgent care clinic since your last visit? Hospitalized since your last visit? Yes [facility] about a week ago    2. Have you seen or consulted any other health care providers outside of the [address] [place] [address] since your last visit? Include any pap smears or colon screening.  no    Is due for a Pap and will schedule

## 2019-09-23 ENCOUNTER — HOSPITAL ENCOUNTER (OUTPATIENT)
Dept: ULTRASOUND IMAGING | Age: 39
Discharge: HOME | End: 2019-09-23
Attending: OTOLARYNGOLOGY
Payer: MEDICAID

## 2019-09-23 DIAGNOSIS — D34 THYROID ADENOMA: ICD-10-CM

## 2019-09-23 PROCEDURE — 76536 US EXAM OF HEAD AND NECK: CPT

## 2019-09-25 ENCOUNTER — OFFICE VISIT (OUTPATIENT)
Dept: ENDOCRINOLOGY | Age: 39
End: 2019-09-25

## 2019-09-25 VITALS
HEART RATE: 87 BPM | HEIGHT: 64 IN | SYSTOLIC BLOOD PRESSURE: 122 MMHG | DIASTOLIC BLOOD PRESSURE: 81 MMHG | WEIGHT: 148 LBS | BODY MASS INDEX: 25.27 KG/M2

## 2019-09-25 DIAGNOSIS — E04.1 THYROID NODULE: Primary | ICD-10-CM

## 2019-09-25 NOTE — PROGRESS NOTES
CHIEF COMPLAINT: f/u for  \"on and off shakiness\"    HISTORY OF PRESENT ILLNESS:   Sherren Love is a 42 y.o. female with a PMHx as noted below who presents for f/u evaluation of shakiness, with new concern of thyroid nodule. See initial visit note for full history. We had evaluated for endocrine causes of her symptoms, all of which were negative. Thyroid function looked great,   Both thyroid antibodies are negative,   Her HbA1c is 4.9 which is very normal,   Her lyme disease test is normal,  Serum metanephrines were normal,  Patient had admitted to drinking coffee 2-3 times / day, both hot and iced coffee. AM and 1-2 PM. Has been drinking coffee for years but increased in the past 2 years. I had recommended she cut back on her coffee gradually. Still having some tremors but better after cutting back on her coffee. She was advised she is not required to return for visit but welcome back as needed. Patient reports she is back today to evaluate her thyroid gland.    Reports mother had hx of thyroid cancer, s/p surgery,  9/23/19: Thyroid ultrasound:    Thyroid gland is only mildly enlarged   Right 1.2 x 0.8 x 0.8 cm solid-cystic nodule, slight increase in cystic portion   Right 3x5 mm stable nodule  3/26/19: Thyroid FNA biopsy: Right dominant nodule: suggestively benign  Reports feeling her tongue is thick at times with difficulty swallowing,  She admits to chronic reflux with gastroparesis,     Review of most recent thyroid function:  Lab Results   Component Value Date    TSH 1.260 06/19/2019    TSH 0.811 12/05/2017    TSH 1.380 08/24/2017    FT4 1.26 12/05/2017    FT4 1.4 05/19/2016    FT4 1.44 11/13/2015    T3LT 147 05/19/2016    T3LT 126 11/13/2015    TRALT <0.50 12/05/2017    TMCLT 17 12/05/2017      Thyroid Lab Key:  TSILT = Thyroid stimulating antibodies  TRALT = TSH Receptor Antibodies  TMCLT = TPO antibodies  T3LT = Total T3 levels  327472 = Direct FT4  [id number] = Free T3        PAST MEDICAL/SURGICAL HISTORY:   Past Medical History:   Diagnosis Date    Anemia     taking iron    Gastroparesis     GERD (gastroesophageal reflux disease)     gastroparesis--h-pylori    H/O Clostridium difficile infection 06/2012    C-Diff    Helicobacter pylori (H. pylori) 05/2012    h pylori    Palpitations     2010  - cardiac workup per pt.  POTS (postural orthostatic tachycardia syndrome)      Past Surgical History:   Procedure Laterality Date    HX GI      EGD and colonoscopy    HX GYN  5/12    D&C    HX GYN      tubal reversal    HX OTHER SURGICAL      D&C x 2,  lap    TILT TABLE EVAL W/WO DRUG  1/21/2017            ALLERGIES:   Allergies   Allergen Reactions    Celexa [Citalopram] Nausea and Vomiting    Ciprofloxacin Shortness of Breath    Diflucan [Fluconazole] Rash     Burning sensation to skin    Sulfa (Sulfonamide Antibiotics) Rash       MEDICATIONS ON ADMISSION:     Current Outpatient Medications:     vit B complex no.12/niacin,B3, (VITAMIN B COMPLEX NO.12-NIACIN PO), Take 1 Tab by mouth daily. , Disp: , Rfl:     RESTASIS 0.05 % dpet, , Disp: , Rfl:     famotidine (PEPCID) 20 mg tablet, Take 1 Tab by mouth nightly., Disp: 90 Tab, Rfl: 3    albuterol (PROVENTIL VENTOLIN) 2.5 mg /3 mL (0.083 %) nebulizer solution, 3 mL by Nebulization route every four (4) hours as needed for Wheezing., Disp: 24 Each, Rfl: 0    cetirizine (ZYRTEC) 10 mg tablet, Take 1 Tab by mouth daily. , Disp: 90 Tab, Rfl: 3    montelukast (SINGULAIR) 10 mg tablet, Take 1 Tab by mouth daily. , Disp: 30 Tab, Rfl: 3    cevimeline (EVOXAC) 30 mg capsule, Take 1 Cap by mouth three (3) times daily for 360 days. , Disp: 30 Cap, Rfl: 11    albuterol (PROVENTIL HFA, VENTOLIN HFA, PROAIR HFA) 90 mcg/actuation inhaler, Take 1 Puff by inhalation every four (4) hours as needed for Wheezing., Disp: 1 Inhaler, Rfl: 5    nadolol (CORGARD) 40 mg tablet, Take 1 Tab by mouth daily. , Disp: 90 Tab, Rfl: 1    fluticasone (FLONASE) 50 mcg/actuation nasal spray, instill 1 spray into each nostril, Disp: 16 g, Rfl: 5    cromolyn (GASTROCROM) 100 mg/5 mL solution, Take 100 mg by mouth three (3) times daily. , Disp: , Rfl:     omeprazole (PRILOSEC) 40 mg capsule, Take 40 mg by mouth as needed. , Disp: , Rfl:     cholecalciferol (VITAMIN D3) 1,000 unit tablet, Take  by mouth daily. , Disp: , Rfl:     multivitamin (ONE A DAY) tablet, Take 1 Tab by mouth daily. , Disp: , Rfl:     LINZESS 290 mcg cap capsule, Take 290 mcg by mouth as needed. , Disp: , Rfl: 0    SOCIAL HISTORY:   Social History     Socioeconomic History    Marital status: SINGLE     Spouse name: Not on file    Number of children: Not on file    Years of education: Not on file    Highest education level: Not on file   Occupational History    Not on file   Social Needs    Financial resource strain: Not on file    Food insecurity:     Worry: Not on file     Inability: Not on file    Transportation needs:     Medical: Not on file     Non-medical: Not on file   Tobacco Use    Smoking status: Former Smoker     Types: Cigarettes     Last attempt to quit: 2013     Years since quittin.8    Smokeless tobacco: Never Used   Substance and Sexual Activity    Alcohol use:  Yes     Alcohol/week: 1.0 standard drinks     Types: 1 Glasses of wine per week     Comment: rare1    Drug use: No    Sexual activity: Yes     Partners: Male     Birth control/protection: None   Lifestyle    Physical activity:     Days per week: Not on file     Minutes per session: Not on file    Stress: Not on file   Relationships    Social connections:     Talks on phone: Not on file     Gets together: Not on file     Attends Denominational service: Not on file     Active member of club or organization: Not on file     Attends meetings of clubs or organizations: Not on file     Relationship status: Not on file    Intimate partner violence:     Fear of current or ex partner: Not on file     Emotionally abused: Not on file     Physically abused: Not on file     Forced sexual activity: Not on file   Other Topics Concern    Not on file   Social History Narrative    3 kids (14yo - 7mo), lives with boyfriend (but he travels for work)       FAMILY HISTORY:  Family History   Problem Relation Age of Onset    Heart Disease Mother     Cancer Mother         Thyroid    Thyroid Disease Mother     Heart Disease Father     High Cholesterol Father     Heart Disease Maternal Grandmother     Diabetes Maternal Grandmother     Other Maternal Grandmother         Heomochromatosis    Thyroid Disease Sister     No Known Problems Brother     Stroke Maternal Grandfather     Diabetes Maternal Grandfather     Hypertension Paternal Grandmother     Heart Disease Paternal Grandfather     Heart Attack Paternal Grandfather        REVIEW OF SYSTEMS: Complete ROS assessed and noted for that which is described above, all else are negative. Eyes: normal  ENT: normal  CVS: normal  Resp: normal  GI: normal  : normal  GYN: normal  Endocrine: normal  Integument: normal  Musculoskeletal: normal  Neuro: normal  Psych: normal      PHYSICAL EXAMINATION:    VITAL SIGNS:  Visit Vitals  /81 (BP 1 Location: Left arm, BP Patient Position: Sitting)   Pulse 87   Ht 5' 4\" (1.626 m)   Wt 148 lb (67.1 kg)   BMI 25.40 kg/m²       GENERAL: NCAT, Sitting comfortably, NAD  EYES: EOMI, non-icteric, no proptosis  Ear/Nose/Throat: NCAT, no inflammation  LYMPH NODES: No LAD  CARDIOVASCULAR: S1 S2, RRR, No murmur, 2+ radial pulses  RESPIRATORY: CTA b/l, no wheeze/rales  GASTROINTESTINAL: soft, NT, ND,  MUSCULOSKELETAL: Normal ROM, no atrophy  SKIN: warm, no edema/rash/ or other skin changes  NEUROLOGIC: 5/5 power all extremities, no tremors, AAOx3  PSYCHIATRIC: Normal affect, Normal insight and judgement      REVIEW OF LABORATORY AND RADIOLOGY DATA:   Labs and documentation have been reviewed as described above.              ASSESSMENT AND PLAN:   Sherren Love is a 42 y.o. female with a PMHx as noted above who presents for f/u evaluation of her shakiness. Shakiness  Thyroid nodule    Previously we noted that patient had no evidence of endocrine dysfunction as a cause for her symptom of feeling shaky at times. She had presented today with concern about her thyroid gland, noting that she has had some difficulty swallowing and was concerned whether her thyroid had anything to do with it. I reviewed the images of her thyroid ultrasound and inserted select images above. Of note she has had a biopsy suggestive of benign tissue, and interval changes in her US findings were mostly related to changes in the cystic/fluid portion of her thyroid nodule. These are very reassuring. In light of her diagnosis of Sjogren's syndrome, I advised her that this is the most likely reason that she feels changes in her tongue and difficulty swallowing. Her thyroid gland is not appreciably large enough to interfere with her swallowing, and her dominant thyroid nodule is located anteriorly in the right lobe near the isthmus and not in close proximity to the esophagus. I recommended that she sip on some water before she attempt to take any tablets or eat any foods. She does follow with Dr. Lavelle Paul and plans to keep her follow up with him. Typically with a benign biopsy and rather stable findings on ultrasound I would recommend no need for further routine thyroid ultrasounds, however due to her family history of thyroid cancer in her mother, I recommended to her a 2 year follow up thyroid ultrasound. She had been following with Dr. Namita Webster for this and so I welcomed her to resume her thyroid care with her, alternatively she was also welcomed to return back here as needed as I would be happy to help with any of her endocrine concerns as needed. Karuna Spain. [address] Endocrinology  [address] [facility] [address]  [address] [facility]

## 2019-09-25 NOTE — PATIENT INSTRUCTIONS
Welcome back as needed,       Carmen Blevins.  [address] [place] [address] [facility]

## 2019-10-02 ENCOUNTER — APPOINTMENT (OUTPATIENT)
Dept: CT IMAGING | Age: 39
End: 2019-10-02
Attending: FAMILY MEDICINE
Payer: MEDICAID

## 2019-10-02 PROCEDURE — 70486 CT MAXILLOFACIAL W/O DYE: CPT

## 2019-10-07 ENCOUNTER — OFFICE VISIT (OUTPATIENT)
Dept: DERMATOLOGY | Facility: AMBULATORY SURGERY CENTER | Age: 39
End: 2019-10-07

## 2019-10-07 VITALS
HEART RATE: 102 BPM | SYSTOLIC BLOOD PRESSURE: 114 MMHG | HEIGHT: 64 IN | TEMPERATURE: 98.5 F | OXYGEN SATURATION: 99 % | WEIGHT: 145 LBS | RESPIRATION RATE: 16 BRPM | DIASTOLIC BLOOD PRESSURE: 80 MMHG | BODY MASS INDEX: 24.75 KG/M2

## 2019-10-07 DIAGNOSIS — D22.9 MULTIPLE BENIGN NEVI: ICD-10-CM

## 2019-10-07 DIAGNOSIS — L57.0 ACTINIC KERATOSIS: Primary | ICD-10-CM

## 2019-10-07 DIAGNOSIS — L81.4 LENTIGINES: ICD-10-CM

## 2019-10-07 DIAGNOSIS — Z80.8 FAMILY HISTORY OF MELANOMA: ICD-10-CM

## 2019-10-07 NOTE — PROGRESS NOTES
Written by Rudolph Hickman, as dictated by Mauricio Dudley, [address]. Name: Karla Murphy       Age: 42 y.o. Date: 10/7/2019    Chief Complaint:   Chief Complaint   Patient presents with    Skin Exam     Pt is c/o spot on his face. Subjective:    HPI  Ms. Karla Murphy is a 42 y.o. female who presents as a new patient to [facility] for a full skin exam. The patient has had a skin exam in the past and she does have current complaints to her skin. The patient is concerned about a spot on her right cheek that appears intermittently, scaly, and nonhealing. This lesion has been present for the past year. She denies any bleeding from this lesion. The patient is feeling well and in her usual state of health today. She has no current illnesses, no other skin concerns. Her allergies, medications, medical, and social history are reviewed by me today. The patient's pertinent skin history includes : No personal history of skin cancer. Family history of melanoma in her sister    ROS: Constitutional: Negative. Dermatological :   positive for - skin lesion changes      Social History     Socioeconomic History    Marital status: SINGLE     Spouse name: Not on file    Number of children: Not on file    Years of education: Not on file    Highest education level: Not on file   Occupational History    Not on file   Social Needs    Financial resource strain: Not on file    Food insecurity:     Worry: Not on file     Inability: Not on file    Transportation needs:     Medical: Not on file     Non-medical: Not on file   Tobacco Use    Smoking status: Former Smoker     Types: Cigarettes     Last attempt to quit: 2013     Years since quittin.8    Smokeless tobacco: Never Used   Substance and Sexual Activity    Alcohol use:  Yes     Alcohol/week: 1.0 standard drinks     Types: 1 Glasses of wine per week     Comment: rarely    Drug use: No    Sexual activity: Yes Partners: Male     Birth control/protection: None   Lifestyle    Physical activity:     Days per week: Not on file     Minutes per session: Not on file    Stress: Not on file   Relationships    Social connections:     Talks on phone: Not on file     Gets together: Not on file     Attends Yarsanism service: Not on file     Active member of club or organization: Not on file     Attends meetings of clubs or organizations: Not on file     Relationship status: Not on file    Intimate partner violence:     Fear of current or ex partner: Not on file     Emotionally abused: Not on file     Physically abused: Not on file     Forced sexual activity: Not on file   Other Topics Concern    Not on file   Social History Narrative    3 kids (14yo - 7mo), lives with boyfriend (but he travels for work)       Family History   Problem Relation Age of Onset    Heart Disease Mother     Cancer Mother         Thyroid    Thyroid Disease Mother     Heart Disease Father     High Cholesterol Father     Heart Disease Maternal Grandmother     Diabetes Maternal Grandmother     Other Maternal Grandmother         Heomochromatosis    Thyroid Disease Sister     No Known Problems Brother     Stroke Maternal Grandfather     Diabetes Maternal Grandfather     Hypertension Paternal Grandmother     Heart Disease Paternal Grandfather     Heart Attack Paternal Grandfather        Past Medical History:   Diagnosis Date    Anemia     taking iron    Family history of skin cancer     sister has melanoma,     Gastroparesis     GERD (gastroesophageal reflux disease)     gastroparesis--h-pylori    H/O Clostridium difficile infection 06/2012    C-Diff    Helicobacter pylori (H. pylori) 05/2012    h pylori    Palpitations     2010  - cardiac workup per pt.      POTS (postural orthostatic tachycardia syndrome)     Sun-damaged skin     20's    Sunburn, blistering     8-9     Tanning bed exposure     11 years ago        Past Surgical History:   Procedure Laterality Date    HX GI      EGD and colonoscopy    HX GYN  5/12    D&C    HX GYN      tubal reversal    HX OTHER SURGICAL      D&C x 2,  lap    TILT TABLE EVAL W/WO DRUG  1/21/2017            Current Outpatient Medications   Medication Sig Dispense Refill    vit B complex no.12/niacin,B3, (VITAMIN B COMPLEX NO.12-NIACIN PO) Take 1 Tab by mouth daily.  RESTASIS 0.05 % dpet       famotidine (PEPCID) 20 mg tablet Take 1 Tab by mouth nightly. 90 Tab 3    albuterol (PROVENTIL VENTOLIN) 2.5 mg /3 mL (0.083 %) nebulizer solution 3 mL by Nebulization route every four (4) hours as needed for Wheezing. 24 Each 0    cetirizine (ZYRTEC) 10 mg tablet Take 1 Tab by mouth daily. 90 Tab 3    cevimeline (EVOXAC) 30 mg capsule Take 1 Cap by mouth three (3) times daily for 360 days. 30 Cap 11    albuterol (PROVENTIL HFA, VENTOLIN HFA, PROAIR HFA) 90 mcg/actuation inhaler Take 1 Puff by inhalation every four (4) hours as needed for Wheezing. 1 Inhaler 5    nadolol (CORGARD) 40 mg tablet Take 1 Tab by mouth daily. 90 Tab 1    fluticasone (FLONASE) 50 mcg/actuation nasal spray instill 1 spray into each nostril 16 g 5    omeprazole (PRILOSEC) 40 mg capsule Take 40 mg by mouth as needed.  cholecalciferol (VITAMIN D3) 1,000 unit tablet Take  by mouth daily.  multivitamin (ONE A DAY) tablet Take 1 Tab by mouth daily.  LINZESS 290 mcg cap capsule Take 290 mcg by mouth as needed. 0    montelukast (SINGULAIR) 10 mg tablet Take 1 Tab by mouth daily. 30 Tab 3    cromolyn (GASTROCROM) 100 mg/5 mL solution Take 100 mg by mouth three (3) times daily.          Allergies   Allergen Reactions    Celexa [Citalopram] Nausea and Vomiting    Ciprofloxacin Shortness of Breath    Diflucan [Fluconazole] Rash     Burning sensation to skin    Sulfa (Sulfonamide Antibiotics) Rash         Objective:    Visit Vitals  /80 (BP 1 Location: Left arm, BP Patient Position: Sitting)   Pulse (!) 102 Temp 98.5 °F (36.9 °C) (Oral)   Resp 16   Ht 5' 4\" (1.626 m)   Wt 145 lb (65.8 kg)   LMP  (Within Weeks)   SpO2 99%   BMI 24.89 kg/m²       Yina Hernandez is a 42 y.o. female who appears well and in no distress. She is awake, alert, and oriented. A skin examination was performed including her scalp, face (including eyelids), ears, neck, chest, back, abdomen, upper extremities (including digits/nails), lower extremities, breasts, buttocks; genital skin was not examined. She has thin scaled actinic keratoses on the right cheek, including the lesion of her concern. She has lentigines on sun exposed areas. she has few pink nevi without concerning features. Inflammatory appearing papules on the face are noted consistent with acne. She has tattoos. Assessment/Plan:  1. Actinic Keratoses. The diagnosis of this precancerous lesion related to sun exposure was reviewed. Verbal consent was obtained. I treated 1 lesions with cryotherapy and post-cryotherapy care was reviewed. 2. Solar lentigos. The diagnosis and relationship to sun exposure was reviewed. Sun protection advised. 3. Family history of melanoma skin cancer. I discussed sun protection, sunscreen use, the warning signs of skin cancer, the need for self-skin examinations, and the need for regular practitioner exams. The patient should follow up sooner as needed if new, changing, or symptomatic skin lesions arise. 4.Normal nevi. The diagnosis of normal nevi was reviewed. I discussed sun protection, sunscreen use, the warning signs of skin cancer, mole monitoring, the need for self-skin examinations, and the need for regular practitioner exams. The patient should follow up sooner as needed if new, changing, or symptomatic skin lesions arise. Next skin exam:  1 year      This plan was reviewed with the patient and patient agrees. All questions were answered.     This scribe documentation was reviewed by me and accurately reflects the examination and decisions made by me. JOHN [facility]   OFFICE PROCEDURE PROGRESS NOTE   Chart reviewed for the following:   Xu MORALES, have reviewed the History, Physical and updated the Allergic reactions for Katelynn Larsen. TIME OUT performed immediately prior to start of procedure:   Judy MORALES, have performed the following reviews on Katelynn Larsen   prior to the start of the procedure:     * Patient was identified by name and date of birth   * Agreement on procedure being performed was verified   * Risks and Benefits explained to the patient   * Procedure site verified and marked as necessary   * Patient was positioned for comfort   * Consent was signed and verified     Time: 1:25 PM  Date of procedure: 10/7/2019  Procedure performed by: Nitesh Valle.  Shona Louis DNP  Provider assisted by: self  Patient assisted by: self   How tolerated by patient: tolerated the procedure well with no complications   Comments: none

## 2019-10-07 NOTE — PROGRESS NOTES
Room 6    Identified pt with two pt identifiers(name and ). Reviewed record in preparation for visit and have obtained necessary documentation. All patient medications has been reviewed. Chief Complaint   Patient presents with    Skin Exam     Pt is c/o spot on his face. There are no preventive care reminders to display for this patient. Vitals:    10/07/19 1316   BP: 114/80   Pulse: (!) 102   Resp: 16   Temp: 98.5 °F (36.9 °C)   TempSrc: Oral   SpO2: 99%   Weight: 65.8 kg (145 lb)   Height: 5' 4\" (1.626 m)   PainSc:   0 - No pain       Coordination of Care Questionnaire:   1) Have you been to an emergency room, urgent care, or hospitalized since your last visit?   no       2. Have seen or consulted any other health care provider since your last visit? NO      Patient is accompanied by self I have received verbal consent from Saturnino Stoll to discuss any/all medical information while they are present in the room.

## 2019-10-23 ENCOUNTER — APPOINTMENT (OUTPATIENT)
Dept: ULTRASOUND IMAGING | Age: 39
End: 2019-10-23
Attending: OBSTETRICS & GYNECOLOGY
Payer: MEDICAID

## 2019-10-23 ENCOUNTER — HOSPITAL ENCOUNTER (OUTPATIENT)
Age: 39
Setting detail: OUTPATIENT SURGERY
Discharge: HOME | End: 2019-10-23
Attending: SPECIALIST | Admitting: SPECIALIST
Payer: MEDICAID

## 2019-10-23 VITALS
DIASTOLIC BLOOD PRESSURE: 84 MMHG | BODY MASS INDEX: 24.75 KG/M2 | RESPIRATION RATE: 14 BRPM | SYSTOLIC BLOOD PRESSURE: 133 MMHG | HEART RATE: 109 BPM | HEIGHT: 64 IN | WEIGHT: 145 LBS | OXYGEN SATURATION: 100 %

## 2019-10-23 PROCEDURE — 76040000007: Performed by: SPECIALIST

## 2019-10-23 RX ORDER — LIDOCAINE HYDROCHLORIDE 20 MG/ML
JELLY TOPICAL ONCE
Status: DISCONTINUED | OUTPATIENT
Start: 2019-10-23 | End: 2019-10-23 | Stop reason: HOSPADM

## 2019-10-23 NOTE — DISCHARGE INSTRUCTIONS
Priscilla Chris  [id number]  1980      MANOMETRY DISCHARGE INSTRUCTION    You may resume your regular diet as tolerated. You may resume your normal daily activities. If you develop a sore throat- throat lozenges or warm salt water gargles will help. Call your Physician if you have any complications or questions. "[employer] Activation    Thank you for requesting access to "[employer]. Please follow the instructions below to securely access and download your online medical record. "[employer] allows you to send messages to your doctor, view your test results, renew your prescriptions, schedule appointments, and more. How Do I Sign Up? 1. In your internet browser, go to [email or web address]  2. Click on the First Time User? Click Here link in the Sign In box. You will be redirect to the New Member Sign Up page. 3. Enter your "[employer] Access Code exactly as it appears below. You will not need to use this code after youve completed the sign-up process. If you do not sign up before the expiration date, you must request a new code. "[employer] Access Code: Activation code not generated  Current "[employer] Status: Active (This is the date your "[employer] access code will )    4. Enter the last four digits of your Social Security Number (xxxx) and Date of Birth (mm/dd/yyyy) as indicated and click Submit. You will be taken to the next sign-up page. 5. Create a "[employer] ID. This will be your "[employer] login ID and cannot be changed, so think of one that is secure and easy to remember. 6. Create a "[employer] password. You can change your password at any time. 7. Enter your Password Reset Question and Answer. This can be used at a later time if you forget your password. 8. Enter your e-mail address. You will receive e-mail notification when new information is available in [address] 9. Click Sign Up. You can now view and download portions of your medical record.   10. Click the Download Summary menu link to download a portable copy of your medical information. Additional Information    If you have questions, please visit the Frequently Asked Questions section of the [employer] website at [email or web address] Remember, [employer] is NOT to be used for urgent needs. For medical emergencies, dial 911.

## 2019-10-23 NOTE — PROGRESS NOTES
4cc viscous lidocaine inhaled into left nare per MD orders. Probe inserted into  left nare without difficulty. Pt tolerated procedure well.

## 2019-11-01 ENCOUNTER — HOSPITAL ENCOUNTER (OUTPATIENT)
Dept: ULTRASOUND IMAGING | Age: 39
Discharge: HOME | End: 2019-11-01
Attending: OBSTETRICS & GYNECOLOGY
Payer: MEDICAID

## 2019-11-01 DIAGNOSIS — N93.9 ABNORMAL VAGINAL BLEEDING: ICD-10-CM

## 2019-11-01 PROCEDURE — 76856 US EXAM PELVIC COMPLETE: CPT

## 2019-11-01 PROCEDURE — 76830 TRANSVAGINAL US NON-OB: CPT

## 2019-11-04 RX ORDER — FLUTICASONE PROPIONATE 50 MCG
SPRAY, SUSPENSION (ML) NASAL
Qty: 16 G | Refills: 5 | Status: SHIPPED | OUTPATIENT
Start: 2019-11-04 | End: 2020-08-05 | Stop reason: SDUPTHER

## 2019-11-04 NOTE — TELEPHONE ENCOUNTER
Requested Prescriptions     Pending Prescriptions Disp Refills    fluticasone propionate (FLONASE) 50 mcg/actuation nasal spray 16 g 5

## 2019-11-05 ENCOUNTER — OFFICE VISIT (OUTPATIENT)
Dept: NEUROLOGY | Age: 39
End: 2019-11-05

## 2019-11-05 VITALS
RESPIRATION RATE: 16 BRPM | WEIGHT: 144 LBS | HEIGHT: 64 IN | OXYGEN SATURATION: 98 % | SYSTOLIC BLOOD PRESSURE: 110 MMHG | BODY MASS INDEX: 24.59 KG/M2 | DIASTOLIC BLOOD PRESSURE: 70 MMHG | HEART RATE: 76 BPM

## 2019-11-05 DIAGNOSIS — R29.2 HYPERREFLEXIA: ICD-10-CM

## 2019-11-05 DIAGNOSIS — R13.10 DYSPHAGIA, UNSPECIFIED TYPE: ICD-10-CM

## 2019-11-05 DIAGNOSIS — R13.10 DYSPHAGIA, UNSPECIFIED TYPE: Primary | ICD-10-CM

## 2019-11-05 NOTE — PROGRESS NOTES
Chief Complaint   Patient presents with    Neurologic Problem     Difficulty swallowing       HPI    Sarah Grady is a 42-year-old woman I last saw in 2017 for migraine and vertigo. Normal brain imaging. She is here to follow-up for a new concern. Since I saw her last she has been formally diagnosed with pots and Sjogren's syndrome. She has not seen rheumatology in a very long time. She manages her pots with lifestyle modification. She is here because she continues to feel difficulty swallowing. She feels neck tightness when she attempts to swallow almost like there is an obstruction. She has had testing all unrevealing she tells me. No double vision. She does feel body tremors when her pots is active. Review of Systems   HENT:        Difficulty swallowing and neck tightness   Eyes: Negative for double vision. Neurological: Negative for speech change and headaches. All other systems reviewed and are negative. Past Medical History:   Diagnosis Date    Anemia     taking iron    Family history of skin cancer     sister has melanoma,     Gastroparesis     GERD (gastroesophageal reflux disease)     gastroparesis--h-pylori    H/O Clostridium difficile infection 06/2012    C-Diff    Helicobacter pylori (H. pylori) 05/2012    h pylori    Palpitations     2010  - cardiac workup per pt.      POTS (postural orthostatic tachycardia syndrome)     Sun-damaged skin     20's    Sunburn, blistering     8-9     Tanning bed exposure     11 years ago      Family History   Problem Relation Age of Onset    Heart Disease Mother     Cancer Mother         Thyroid    Thyroid Disease Mother     Heart Disease Father     High Cholesterol Father     Heart Disease Maternal Grandmother     Diabetes Maternal Grandmother     Other Maternal Grandmother         Heomochromatosis    Thyroid Disease Sister     No Known Problems Brother     Stroke Maternal Grandfather     Diabetes Maternal Grandfather     Hypertension Paternal Grandmother     Heart Disease Paternal Grandfather     Heart Attack Paternal Grandfather      Social History     Socioeconomic History    Marital status: SINGLE     Spouse name: Not on file    Number of children: Not on file    Years of education: Not on file    Highest education level: Not on file   Occupational History    Not on file   Social Needs    Financial resource strain: Not on file    Food insecurity:     Worry: Not on file     Inability: Not on file    Transportation needs:     Medical: Not on file     Non-medical: Not on file   Tobacco Use    Smoking status: Former Smoker     Types: Cigarettes     Last attempt to quit: 2013     Years since quittin.9    Smokeless tobacco: Never Used   Substance and Sexual Activity    Alcohol use:  Yes     Alcohol/week: 1.0 standard drinks     Types: 1 Glasses of wine per week     Comment: rarely    Drug use: No    Sexual activity: Yes     Partners: Male     Birth control/protection: None   Lifestyle    Physical activity:     Days per week: Not on file     Minutes per session: Not on file    Stress: Not on file   Relationships    Social connections:     Talks on phone: Not on file     Gets together: Not on file     Attends Orthodox service: Not on file     Active member of club or organization: Not on file     Attends meetings of clubs or organizations: Not on file     Relationship status: Not on file    Intimate partner violence:     Fear of current or ex partner: Not on file     Emotionally abused: Not on file     Physically abused: Not on file     Forced sexual activity: Not on file   Other Topics Concern    Not on file   Social History Narrative    3 kids (14yo - 7mo), lives with boyfriend (but he travels for work)     Allergies   Allergen Reactions    Celexa [Citalopram] Nausea and Vomiting    Ciprofloxacin Shortness of Breath    Diflucan [Fluconazole] Rash     Burning sensation to skin    Sulfa (Sulfonamide Antibiotics) Rash         Current Outpatient Medications   Medication Sig    fluticasone propionate (FLONASE) 50 mcg/actuation nasal spray instill 1 spray into each nostril    vit B complex no.12/niacin,B3, (VITAMIN B COMPLEX NO.12-NIACIN PO) Take 1 Tab by mouth daily.  RESTASIS 0.05 % dpet     famotidine (PEPCID) 20 mg tablet Take 1 Tab by mouth nightly.  cetirizine (ZYRTEC) 10 mg tablet Take 1 Tab by mouth daily.  cevimeline (EVOXAC) 30 mg capsule Take 1 Cap by mouth three (3) times daily for 360 days.  nadolol (CORGARD) 40 mg tablet Take 1 Tab by mouth daily.  omeprazole (PRILOSEC) 40 mg capsule Take 40 mg by mouth as needed.  cholecalciferol (VITAMIN D3) 1,000 unit tablet Take  by mouth daily.  multivitamin (ONE A DAY) tablet Take 1 Tab by mouth daily.  albuterol (PROVENTIL VENTOLIN) 2.5 mg /3 mL (0.083 %) nebulizer solution 3 mL by Nebulization route every four (4) hours as needed for Wheezing.  montelukast (SINGULAIR) 10 mg tablet Take 1 Tab by mouth daily.  albuterol (PROVENTIL HFA, VENTOLIN HFA, PROAIR HFA) 90 mcg/actuation inhaler Take 1 Puff by inhalation every four (4) hours as needed for Wheezing.  cromolyn (GASTROCROM) 100 mg/5 mL solution Take 100 mg by mouth three (3) times daily.  LINZESS 290 mcg cap capsule Take 290 mcg by mouth as needed. No current facility-administered medications for this visit. Neurologic Exam     Mental Status   WD/WN adult in NAD, normal grooming  VSS  A&O x 3    PERRL, nonicteric  Face is symmetric, tongue midline  Speech is fluent and clear  No limb ataxia. No abnl movements.   Moving all extemities spontaneously and symmetric  5/5 strength throughout without fasciculations or atrophy  Very brisk DTRs 3+ throughout symmetric  Normal gait    CVS RRR  Lungs nonlabored  Skin is warm and dry         Visit Vitals  /70 (BP 1 Location: Left arm, BP Patient Position: Sitting)   Pulse 76   Resp 16   Ht 5' 4\" (1.626 m)   Wt 65.3 kg (144 lb)   SpO2 98%   BMI 24.72 kg/m²       Assessment and Plan   Diagnoses and all orders for this visit:    1. Dysphagia, unspecified type  -     MRI CERV SPINE WO CONT; Future  -     VITAMIN D, 25 HYDROXY; Future  -     VITAMIN B12 & FOLATE    2. Hyperreflexia  -     MRI CERV SPINE WO CONT; Future  -     VITAMIN D, 25 HYDROXY; Future  -     VITAMIN B12 & FOLATE      46-year-old woman who has pots and Sjogren's who is complaining of persistent difficulty swallowing. No choking. No weakness on exam to suggest motor neuron disease. She is very hyperreflexic throughout. I will check an MRI of the cervical spine in this case. Check vitamin D and B12 as well. I would like her to call me after the imaging is completed. If we rule out issues in the C-spine she will need to continue follow-up with primary care and rheumatology. I reviewed and decided to continue the current medications. This clinical note was dictated with an electronic dictation software that can make unintentional errors. If there are any questions, please contact me directly for clarification.       2 [facility] [address]  Diplomate ABPN

## 2019-11-13 ENCOUNTER — OFFICE VISIT (OUTPATIENT)
Dept: RHEUMATOLOGY | Age: 39
End: 2019-11-13

## 2019-11-13 VITALS
WEIGHT: 142 LBS | BODY MASS INDEX: 24.24 KG/M2 | TEMPERATURE: 98.5 F | HEIGHT: 64 IN | SYSTOLIC BLOOD PRESSURE: 139 MMHG | DIASTOLIC BLOOD PRESSURE: 90 MMHG | HEART RATE: 102 BPM | RESPIRATION RATE: 18 BRPM

## 2019-11-13 DIAGNOSIS — B37.0 ORAL THRUSH: ICD-10-CM

## 2019-11-13 DIAGNOSIS — M35.01 SJOGREN'S SYNDROME WITH KERATOCONJUNCTIVITIS SICCA (HCC): Primary | ICD-10-CM

## 2019-11-13 DIAGNOSIS — Z79.899 LONG-TERM USE OF HYDROXYCHLOROQUINE: ICD-10-CM

## 2019-11-13 NOTE — OP NOTES
[facility]  OPERATIVE REPORT    Name:  Neeraj Winters  MR#:  [id number]  :  1980  ACCOUNT #:  [de-identified]  DATE OF SERVICE:  10/23/2019    PREPROCEDURE DIAGNOSES:  1.  Epigastric pain. 2.  Gastroesophageal reflux. 3.  Gastroparesis. POSTPROCEDURE DIAGNOSIS:  Esophagogastric junction outflow obstruction. COMMENT:  The elevation of LES pressure/residual EGJ pressure is low, just above the upper limit of normal.  There are no secondary changes in the esophageal body and I would not make too much out of this. The DCI is on the weak side, although in the normal range. This may represent a reflux or other related motor disorder. PROCEDURES PLANNED:  1. High-resolution esophageal manometry. 2.  Impedance esophageal manometry. PROCEDURES PERFORMED:  1. High-resolution esophageal manometry. 2.  Impedance esophageal manometry. SURGEON:  Shelly Pina MD    ASSISTANT:  Jonny Alatorre RN    ANESTHESIA:  Topical.    COMPLICATIONS:  None. SPECIMENS REMOVED:  None. IMPLANTS:  None. ESTIMATED BLOOD LOSS:  None. DESCRIPTION OF PROCEDURE:  High-resolution esophageal manometry was performed by the nursing staff with subsequent interpretation by Dr. Tova Milligan. The lower esophageal sphincter is at 38.2 cm and for a distance of 2.7 cm distally. Lower esophageal sphincter pressures are as follows:  Respiratory mean normal 35.5. Respiratory minimum normal at 24.1, residual after swallowing 15.6, normal less than 15. Wet swallows were administered. Nine are peristaltic by standard criteria, one is simultaneous. The mean amplitude in the distal esophagus is normal at 68.2 mmHg. The wave duration is short at 2.3 seconds (2.7-5.4). There are no double and no triple peaked waves.   High-resolution scoring is normal and is as follows:  .0, contractile front velocity 5.2, intrabolus pressure at LES 3.7, intrabolus pressure average max body of the esophagus 11.1.    [place] scores are as follows:  Distal latency 5%, failed 0%, panesophageal pressurization 0%, premature 10%, rapid 0%, large break 0%, small breaks 10. Impedance manometry is performed with a flavored electrolyte solution. All impedance boluses emptied completely. COMMENT:  Esophagogastric junction outflow obstruction can be seen in infiltrative disorders, including achalasia, eosinophilic esophagitis and in malignancy. Often I get CT chest, abdomen and endoscopic ultrasound. Recommend clinical and endoscopic correlation in this patient. Deyanira Dao MD      [id number]  D:  11/13/2019 7:20  T:  11/13/2019 11:52  JOB #:  [id number]  CC:   Blanchard Moor, MD Anastacia Copper, MD Gust Hoit, MD Jeris Rede, MD Marylen Christen, MD

## 2019-11-13 NOTE — PROGRESS NOTES
REASON FOR VISIT    This is a follow-up visit for Ms. Beryl Kim for     ICD-10-CM   1. Sjogren's syndrome with keratoconjunctivitis sicca ([facility]) M35.01     Active problems include:    Patient Active Problem List   Diagnosis Code    Abdominal adhesions K66.0    POTS (postural orthostatic tachycardia syndrome) R00.0, I95.1    Chronic constipation K59.09    GERD (gastroesophageal reflux disease) K21.9    Sjogren's syndrome with keratoconjunctivitis sicca ([facility]) M35.01    Gastroparesis K31.84    Irritable bowel syndrome with constipation K58.1    Long-term use of hydroxychloroquine Z79.899     HISTORY OF PRESENT ILLNESS    Ms. Beryl Kim returns for a follow-up visit. On her last visit, I asked her to stop hydroxychloroquine due to unknown benefit. I prescribed Evocac to replace Salagen since she did not tolerate it and explained to her that if she feels that she is having adverse reaction from it, to stop it. I ordered labs which were negative. She endorses oral ulcers on her tongues and avoid acidic food that waxes and wanes. She also chronic dry cough. Evoxac did not help. She feels her throat is dry and that she feels like she has a hard time swallow. She saw ENT who noted small nodules on her thyroid without concern to biopsy due to size. She endorses dysphagia. She had a manometry by her GI and will have an EGD in 12/2019. She denies fever, weight loss, blurred vision, vision loss, ankle swelling, dyspnea, nausea, vomiting, abdominal pain, black or bloody stool, fall since last visit, rash, easy bruising and increased thirst.    Last ophthalmology exam was 1/2019, who confirmed xerophthalmia. He is on Restasis. She felt better on it. Dayana Butler burned. She has punctate plugs but reports they were dissolvable. She does not wear contact lenses. She uses artifical tears up to 3 times a day. Last dental exam was 8/2018, which did not show an increase in caries.  She does not need water to swallow foot or a cracker. She does drink a lot of water due to dryness.      The patient has not had any interval hospital admissions, infections, or surgeries. REVIEW OF SYSTEMS    A comprehensive review of systems was performed and pertinent results are documented in the HPI, review of systems is otherwise non-contributory. PAST MEDICAL HISTORY    She has a past medical history of Anemia, Family history of skin cancer, Gastroparesis, GERD (gastroesophageal reflux disease), H/O Clostridium difficile infection (31/5771), Helicobacter pylori (H. pylori) (05/2012), Palpitations, POTS (postural orthostatic tachycardia syndrome), Sun-damaged skin, Sunburn, blistering, and Tanning bed exposure. She also has no past medical history of Abnormal Pap smear, Arsenic suspected exposure, Chlamydia, Complication of anesthesia, Genital herpes, unspecified, Gonorrhea, Herpes simplex without mention of complication, Human immunodeficiency virus (HIV) disease ([facility] [address] Infertility, female, Kidney disease, Pituitary disorder ([facility] [place] [address] Polycystic disease, ovaries, Rhesus isoimmunization unspecified as to episode of care in pregnancy, Sickle-cell disease, unspecified, Skin cancer, Syphilis, Trauma, or Unspecified breast disorder. FAMILY HISTORY    Her family history includes Cancer in her mother; Diabetes in her maternal grandfather and maternal grandmother; Heart Attack in her paternal grandfather; Heart Disease in her father, maternal grandmother, mother, and paternal grandfather; High Cholesterol in her father; Hypertension in her paternal grandmother; No Known Problems in her brother; Other in her maternal grandmother; Stroke in her maternal grandfather; Thyroid Disease in her mother and sister. SOCIAL HISTORY    She reports that she quit smoking about 5 years ago. Her smoking use included cigarettes. She has never used smokeless tobacco. She reports that she drinks about 1.0 standard drinks of alcohol per week.  She reports that she does not use drugs. IMMUNIZATIONS  Immunization History   Administered Date(s) Administered    Influenza Vaccine 10/16/2013, 11/04/2018    Influenza Vaccine (Quad) 11/11/2014    Influenza Vaccine (Quad) PF 09/30/2015, 11/15/2018    TB Skin Test (PPD) Intradermal 01/27/2014    Tdap 11/11/2014, 08/14/2017       MEDICATIONS    Current Outpatient Medications   Medication Sig Dispense Refill    Miconazole (ORAVIG) 50 mg mabt 1 Lozenge by Buccal route daily. Indications: candidiasis fungal infection of the oropharynx 39 Each 5    fluticasone propionate (FLONASE) 50 mcg/actuation nasal spray instill 1 spray into each nostril 16 g 5    vit B complex no.12/niacin,B3, (VITAMIN B COMPLEX NO.12-NIACIN PO) Take 1 Tab by mouth daily.  RESTASIS 0.05 % dpet       famotidine (PEPCID) 20 mg tablet Take 1 Tab by mouth nightly. 90 Tab 3    albuterol (PROVENTIL VENTOLIN) 2.5 mg /3 mL (0.083 %) nebulizer solution 3 mL by Nebulization route every four (4) hours as needed for Wheezing. 24 Each 0    cetirizine (ZYRTEC) 10 mg tablet Take 1 Tab by mouth daily. (Patient taking differently: Take 10 mg by mouth as needed.) 90 Tab 3    cevimeline (EVOXAC) 30 mg capsule Take 1 Cap by mouth three (3) times daily for 360 days. 30 Cap 11    albuterol (PROVENTIL HFA, VENTOLIN HFA, PROAIR HFA) 90 mcg/actuation inhaler Take 1 Puff by inhalation every four (4) hours as needed for Wheezing. 1 Inhaler 5    nadolol (CORGARD) 40 mg tablet Take 1 Tab by mouth daily. 90 Tab 1    omeprazole (PRILOSEC) 40 mg capsule Take 40 mg by mouth as needed.  cholecalciferol (VITAMIN D3) 1,000 unit tablet Take  by mouth daily.  multivitamin (ONE A DAY) tablet Take 1 Tab by mouth daily.  LINZESS 290 mcg cap capsule Take 290 mcg by mouth as needed.   0        ALLERGIES    Allergies   Allergen Reactions    Celexa [Citalopram] Nausea and Vomiting    Ciprofloxacin Shortness of Breath    Diflucan [Fluconazole] Rash     Burning sensation to skin    Sulfa (Sulfonamide Antibiotics) Rash       PHYSICAL EXAMINATION    Visit Vitals  /90   Pulse (!) 102   Temp 98.5 °F (36.9 °C)   Resp 18   Ht 5' 4\" (1.626 m)   Wt 142 lb (64.4 kg)   BMI 24.37 kg/m²     Body mass index is 24.37 kg/m². General: Patient is alert, oriented x 3, not in acute distress    HEENT:   Sclerae are not injected and appear moist.  Oral mucous membranes are moist, there are no ulcers present. Geographic tongue. There is no alopecia. Neck is supple, there is no lymphadenopathy. No salivary or lacrimal glandular enlargement  Oral thrush on base of tongue and buccal mucosa    Cardiovascular:  Heart is regular rate and rhythm, no murmurs. Chest:  Lungs are clear to auscultation bilaterally. No rhonchi, wheezes, or crackles. Extremities:  Free of clubbing, cyanosis, edema    Neurological exam:  Muscle strength is full in upper and lower extremities     Skin exam:  There are no rashes, no alopecia, no discoid lesions, no active Raynaud's, no livedo reticularis, no periungual erythema. Musculoskeletal exam:    No synovitis. DATA REVIEW    Laboratory     Recent laboratory results were reviewed, summarized, and discussed with the patient. Imaging    Musculoskeletal Ultrasound    None    Radiographs    KUB 11/29/2018: normal bowel gas pattern. The osseous structures are unremarkable. No pathologic calcification. Chest 11/02/2017: the lungs are clear. The central airways are patent. The heart size is normal. No pneumothorax or pleural effusion. CT Imaging    CT Sinuses without contrast showed The frontal sinuses and frontoethmoidal recesses are clear. The anterior ethmoid air cells are clear. The maxillary sinuses are clear. The maxillary sinus infundibula and ostiomeatal units are patent. The posterior ethmoid air cells and sphenoid sinus are clear. The nasal cavity is clear. The nasal septum is midline.  There is no bone destruction or bone dehiscence of the paranasal sinuses. MR Imaging    None    DXA     None    GI Studies    Barium Swallow Esophagram 11/20/2018: The swallowing mechanism is intact. There is no stricture extravasation or filling defect. Patient was unable to swallow the barium tablet.     NM Gastric Emptying Study 7/25/2012: markedly delayed gastric emptying. Ultrasonography    US abdomen 7/21/2018: the gallbladder is normal. There is no intrahepatic or extrahepatic biliary duct dilation. Common bile duct measures 3 mm. The liver is normal in size and echotexture without demonstration of mass lesion. Portal venous flow is normal. The pancreatic head appears normal and the right kidney is normal with length of 10.3 cm    PATHOLOGY    Small bowel, duodenum, biopsy 6/21/2018: No histopathologic abnormality. Stomach, biopsy: Mild chronic gastritis. Esophagus, mid, biopsy: No histopathologic abnormality.      ASSESSMENT AND PLAN    This is a follow-up visit for Ms. Rosio Elias. 1) Sjogren's Syndrome. (xerostomia, xerophthalmia, autonomic dysfunction (POTS, gastroparesis)). Her labs did not show findings today concerning for lymphoma. She denies constitutional B symptoms concerning for lymphoma. She will need every 6 month labs. She has been tolerating Evoxac but continues to have oral sores, likely candida related, which was appreciated on exam today. I prescribed Oravig lozenges. I counseled oral hygiene. She is using Restasis with benefit. She is no longer on hydroxychloroquine due to unclear benefit and has not noticed worsening. Labs today. 2) Oral Thrush. I prescribed Oravig. 3) Gastroparesis. I defer to GI, but she may benefit form motility agents. 4) UGI Issues, IBS with Constipation. She is on Linzess. She follows GI and will have an EGD soon. 5) POTS. This may also be associated with gastroparesis and irritable bowel syndrome. She does not have hypermobility to suggest Sarah Danlos Syndrome. She is on nadolol.   I defer to cardiology. The patient voiced understanding of the aforementioned assessment and plan. Summary of plan was provided in the After Visit Summary patient instructions.      TODAY'S ORDERS    Orders Placed This Encounter    ANTINUCLEAR ANTIBODIES, IFA    COMPLEMENT, C3 & C4    PROTEIN ELECTROPHORESIS W/ REFLX HUMA    CRYOGLOBULIN W/ REFLX HUMA    SJOGREN'S ABS, SSA AND SSB    RHEUMATOID FACTOR, QL    Miconazole (ORAVIG) 50 mg mabt     Future Appointments   Date Time Provider Addis Baeri   11/18/2019  8:30 AM [facility]   12/17/2019  8:00 AM Irena Macias  [address]   5/7/2020 10:00 AM Selvin Christian MD [place], MD, [address] [facility]    Adult Rheumatology   Rheumatology Ultrasound Certified  [id number] [facility] [address]  [facility], [address] [place] [address]   Phone [phone]  Fax [phone]

## 2019-11-13 NOTE — LETTER
11/13/19 Patient: Kamron Tracey YOB: 1980 Date of Visit: 11/13/2019 Bevely Handler MD [place] 
[address] [facility] [address] VIA In Basket Dear Nathanael Hermosillo MD, Thank you for referring Ms. Judith Sahu to [facility] for evaluation. My notes for this consultation are attached. If you have questions, please do not hesitate to call me. I look forward to following your patient along with you.  
 
 
Sincerely, 
 
Malka Simon MD

## 2019-11-13 NOTE — PROGRESS NOTES
Chief Complaint   Patient presents with    Other     Sjogrens     1. Have you been to the ER, urgent care clinic since your last visit? Hospitalized since your last visit? No    2. Have you seen or consulted any other health care providers outside of the [address] [place] [address] since your last visit? Include any pap smears or colon screening.  No

## 2019-11-17 LAB
ALBUMIN SERPL ELPH-MCNC: 4 G/DL (ref 2.9–4.4)
ALBUMIN/GLOB SERPL: 1.7 {RATIO} (ref 0.7–1.7)
ALPHA1 GLOB SERPL ELPH-MCNC: 0.2 G/DL (ref 0–0.4)
ALPHA2 GLOB SERPL ELPH-MCNC: 0.5 G/DL (ref 0.4–1)
ANA TITR SER IF: NEGATIVE {TITER}
B-GLOBULIN SERPL ELPH-MCNC: 1 G/DL (ref 0.7–1.3)
C3 SERPL-MCNC: 104 MG/DL (ref 82–167)
C4 SERPL-MCNC: 21 MG/DL (ref 14–44)
CRYOGLOB SER QL 1D COLD INC: NORMAL
ENA SS-A AB SER-ACNC: <0.2 AI (ref 0–0.9)
ENA SS-B AB SER-ACNC: <0.2 AI (ref 0–0.9)
GAMMA GLOB SERPL ELPH-MCNC: 0.6 G/DL (ref 0.4–1.8)
GLOBULIN SER CALC-MCNC: 2.4 G/DL (ref 2.2–3.9)
M PROTEIN SERPL ELPH-MCNC: NORMAL G/DL
PLEASE NOTE, 011150: NORMAL
PROT PATTERN SERPL ELPH-IMP: NORMAL
PROT SERPL-MCNC: 6.4 G/DL (ref 6–8.5)
RHEUMATOID FACT SERPL-ACNC: <10 IU/ML (ref 0–13.9)

## 2019-11-18 ENCOUNTER — HOSPITAL ENCOUNTER (OUTPATIENT)
Dept: MRI IMAGING | Age: 39
Discharge: HOME | End: 2019-11-18
Attending: PSYCHIATRY & NEUROLOGY
Payer: MEDICAID

## 2019-11-18 DIAGNOSIS — R29.2 HYPERREFLEXIA: ICD-10-CM

## 2019-11-18 DIAGNOSIS — R13.10 DYSPHAGIA, UNSPECIFIED TYPE: ICD-10-CM

## 2019-11-18 PROCEDURE — 72141 MRI NECK SPINE W/O DYE: CPT

## 2019-11-20 LAB
25(OH)D3+25(OH)D2 SERPL-MCNC: 45.3 NG/ML (ref 30–100)
FOLATE SERPL-MCNC: 19.7 NG/ML
VIT B12 SERPL-MCNC: >2000 PG/ML (ref 232–1245)

## 2019-11-26 RX ORDER — NYSTATIN 100000 [USP'U]/ML
1 SUSPENSION ORAL
COMMUNITY
End: 2019-12-17

## 2019-11-26 NOTE — PERIOP NOTES
[facility]  Ambulatory Surgery Unit  Pre-operative Instructions for Endo Procedures    Procedure Date  12/5            Tentative Arrival Time 09:15am      1. On the day of your procedure, please report to the Ambulatory Surgery Unit Registration Desk and sign in at your designated time. The Ambulatory Surgery Unit is located in [facility] on the [facility] side of the [facility] across from the [address] [place] [address]. Please have all of your health insurance cards and a photo ID. 2. You must have someone with you to drive you home, as you should not drive a car for 24 hours following anesthesia. Please make arrangements for a responsible adult friend or family member to stay with you for at least the first 24 hours after your procedure. 3. Do not have anything to eat or drink (including water, gum, mints, coffee, juice) after 11:59 PM 12/4. This may not apply to medications prescribed by your physician. (Please note below the special instructions with medications to take the morning of your procedure.)    4. If applicable, follow the clear liquid diet and bowel prep instructions provided by your physician's office. If you do not have this information, or have any questions, please contact your physician's office. 5. We recommend you do not drink any alcoholic beverages for 24 hours before and after your procedure. 6. Contact your surgeons office for instructions on the following medications: non-steroidal anti-inflammatory drugs (i.e. Advil, Aleve), vitamins, and supplements. (Some surgeons will want you to stop these medications prior to surgery and others may allow you to take them)   **If you are currently taking Plavix, Coumadin, Aspirin and/or other blood-thinning agents, contact your surgeon for instructions. ** Your surgeon will partner with the physician prescribing these medications to determine if it is safe to stop or if you need to continue taking.  Please do not stop taking these medications without instructions from your surgeon. 7. In an effort to help prevent surgical site infection, we ask that you shower with an anti-bacterial soap (i.e. Dial or Safeguard) on the morning of your procedure. Do not apply any lotions, powders, or deodorants after showering. 8. Wear comfortable clothes. Wear glasses instead of contacts. Do not bring any jewelry or money (other than copays or fees as instructed). Do not wear make-up, particularly mascara, the morning of your procedure. Wear your hair loose or down, no ponytails, buns, brisa pins or clips. All body piercings must be removed. 9. You should understand that if you do not follow these instructions your procedure may be cancelled. If your physical condition changes (i.e. fever, cold or flu) please contact your surgeon as soon as possible. 10. It is important that you be on time. If a situation occurs where you may be late, or if you have any questions or problems, please call [phone]. 11. Your procedure time may be subject to change. You will receive a phone call the day prior to confirm your arrival time. Special Instructions: Take all medications and inhalers, as prescribed, on the morning of surgery with a sip of water EXCEPT: none    I understand a pre-operative phone call will be made to verify my procedure time. In the event that I am not available, I give permission for a message to be left on my answering service and/or with another person?       yes    Reviewed by phone with pt, verbalized understanding     ___________________      ___________________      ___________________  (Signature of Patient)          (Witness)                   (Date and Time)

## 2019-12-04 ENCOUNTER — TELEPHONE (OUTPATIENT)
Dept: NEUROLOGY | Age: 39
End: 2019-12-04

## 2019-12-04 ENCOUNTER — OFFICE VISIT (OUTPATIENT)
Dept: FAMILY MEDICINE CLINIC | Age: 39
End: 2019-12-04

## 2019-12-04 VITALS
WEIGHT: 143 LBS | TEMPERATURE: 98 F | SYSTOLIC BLOOD PRESSURE: 136 MMHG | HEIGHT: 64 IN | DIASTOLIC BLOOD PRESSURE: 89 MMHG | OXYGEN SATURATION: 100 % | BODY MASS INDEX: 24.41 KG/M2 | RESPIRATION RATE: 16 BRPM | HEART RATE: 81 BPM

## 2019-12-04 DIAGNOSIS — G90.A POTS (POSTURAL ORTHOSTATIC TACHYCARDIA SYNDROME): Primary | ICD-10-CM

## 2019-12-04 DIAGNOSIS — K31.84 GASTROPARESIS: ICD-10-CM

## 2019-12-04 DIAGNOSIS — K59.09 CHRONIC CONSTIPATION: ICD-10-CM

## 2019-12-04 NOTE — PROGRESS NOTES
Chief Complaint   Patient presents with    Breathing Problem     1. Have you been to the ER, urgent care clinic since your last visit? Hospitalized since your last visit? No    2. Have you seen or consulted any other health care providers outside of the [address] [place] [address] since your last visit? Include any pap smears or colon screening. No    There are no preventive care reminders to display for this patient.

## 2019-12-04 NOTE — PROGRESS NOTES
Chief Complaint   Patient presents with    Breathing Problem     Pt reports that she was scheduled for an endoscopy tomorrow, called and advised her Gi doctor that she had been having some difficulty getting a full breath. Pt reports that this feeling comes and goes, comes every few months. Pt has had PFTs, echo in the past, n o cause found. Pt has a history of POTS, gastroparesis. Subjective: (As above and below)     Chief Complaint   Patient presents with    Breathing Problem     she is a 42y.o. year old female who presents for evaluation. Reviewed PmHx, RxHx, FmHx, SocHx, AllgHx and updated in chart. Review of Systems - negative except as listed above    Objective:     Vitals:    12/04/19 1537   BP: 136/89   Pulse: 81   Resp: 16   Temp: 98 °F (36.7 °C)   TempSrc: Oral   SpO2: 100%   Weight: 143 lb (64.9 kg)   Height: 5' 4\" (1.626 m)     Physical Examination: General appearance - alert, well appearing, and in no distress  Mental status - normal mood, behavior, speech, dress, motor activity, and thought processes  Mouth - mucous membranes moist, pharynx normal without lesions  Chest - clear to auscultation, no wheezes, rales or rhonchi, symmetric air entry  Heart - normal rate, regular rhythm, normal S1, S2, no murmurs, rubs, clicks or gallops  Musculoskeletal - no joint tenderness, deformity or swelling  Extremities - peripheral pulses normal, no pedal edema, no clubbing or cyanosis    Assessment/ Plan:   1. POTS (postural orthostatic tachycardia syndrome)  -check labs, no clear SOB during visit or changes on exam  - METABOLIC PANEL, COMPREHENSIVE  - CBC WITH AUTOMATED DIFF  - IRON PROFILE  - D DIMER    2. Gastroparesis  -follow up with GI    3. Chronic constipation  -continue on current medication       I have discussed the diagnosis with the patient and the intended plan as seen in the above orders.   The patient has received an after-visit summary and questions were answered concerning future plans.     Medication Side Effects and Warnings were discussed with patient: yes  Patient Labs were reviewed: yes  Patient Past Records were reviewed:  yes    Alice Cruz M.D.

## 2019-12-05 LAB
ALBUMIN SERPL-MCNC: 4.9 G/DL (ref 3.5–5.5)
ALBUMIN/GLOB SERPL: 1.9 {RATIO} (ref 1.2–2.2)
ALP SERPL-CCNC: 37 IU/L (ref 39–117)
ALT SERPL-CCNC: 17 IU/L (ref 0–32)
AST SERPL-CCNC: 23 IU/L (ref 0–40)
BASOPHILS # BLD AUTO: 0 X10E3/UL (ref 0–0.2)
BASOPHILS NFR BLD AUTO: 1 %
BILIRUB SERPL-MCNC: 0.3 MG/DL (ref 0–1.2)
BUN SERPL-MCNC: 10 MG/DL (ref 6–20)
BUN/CREAT SERPL: 14 (ref 9–23)
CALCIUM SERPL-MCNC: 10.4 MG/DL (ref 8.7–10.2)
CHLORIDE SERPL-SCNC: 103 MMOL/L (ref 96–106)
CO2 SERPL-SCNC: 21 MMOL/L (ref 20–29)
CREAT SERPL-MCNC: 0.7 MG/DL (ref 0.57–1)
D DIMER PPP FEU-MCNC: 0.44 MG/L FEU (ref 0–0.49)
EOSINOPHIL # BLD AUTO: 0 X10E3/UL (ref 0–0.4)
EOSINOPHIL NFR BLD AUTO: 1 %
ERYTHROCYTE [DISTWIDTH] IN BLOOD BY AUTOMATED COUNT: 12.6 % (ref 12.3–15.4)
GLOBULIN SER CALC-MCNC: 2.6 G/DL (ref 1.5–4.5)
GLUCOSE SERPL-MCNC: 74 MG/DL (ref 65–99)
HCT VFR BLD AUTO: 38.6 % (ref 34–46.6)
HGB BLD-MCNC: 13.3 G/DL (ref 11.1–15.9)
IMM GRANULOCYTES # BLD AUTO: 0 X10E3/UL (ref 0–0.1)
IMM GRANULOCYTES NFR BLD AUTO: 0 %
IRON SATN MFR SERPL: 24 % (ref 15–55)
IRON SERPL-MCNC: 78 UG/DL (ref 27–159)
LYMPHOCYTES # BLD AUTO: 2.5 X10E3/UL (ref 0.7–3.1)
LYMPHOCYTES NFR BLD AUTO: 36 %
MCH RBC QN AUTO: 31.7 PG (ref 26.6–33)
MCHC RBC AUTO-ENTMCNC: 34.5 G/DL (ref 31.5–35.7)
MCV RBC AUTO: 92 FL (ref 79–97)
MONOCYTES # BLD AUTO: 0.5 X10E3/UL (ref 0.1–0.9)
MONOCYTES NFR BLD AUTO: 8 %
NEUTROPHILS # BLD AUTO: 3.9 X10E3/UL (ref 1.4–7)
NEUTROPHILS NFR BLD AUTO: 54 %
PLATELET # BLD AUTO: 270 X10E3/UL (ref 150–450)
POTASSIUM SERPL-SCNC: 4 MMOL/L (ref 3.5–5.2)
PROT SERPL-MCNC: 7.5 G/DL (ref 6–8.5)
RBC # BLD AUTO: 4.19 X10E6/UL (ref 3.77–5.28)
SODIUM SERPL-SCNC: 142 MMOL/L (ref 134–144)
TIBC SERPL-MCNC: 327 UG/DL (ref 250–450)
UIBC SERPL-MCNC: 249 UG/DL (ref 131–425)
WBC # BLD AUTO: 7 X10E3/UL (ref 3.4–10.8)

## 2019-12-11 NOTE — PROGRESS NOTES
Calcium is slightly increased, all other labs are within normal limits. A message has been sent in [employer] and the lab work released to the patient.

## 2019-12-17 ENCOUNTER — OFFICE VISIT (OUTPATIENT)
Dept: CARDIOLOGY CLINIC | Age: 39
End: 2019-12-17

## 2019-12-17 VITALS
BODY MASS INDEX: 24.59 KG/M2 | DIASTOLIC BLOOD PRESSURE: 62 MMHG | HEIGHT: 64 IN | WEIGHT: 144 LBS | HEART RATE: 93 BPM | OXYGEN SATURATION: 99 % | SYSTOLIC BLOOD PRESSURE: 122 MMHG | RESPIRATION RATE: 14 BRPM

## 2019-12-17 DIAGNOSIS — M35.01 SJOGREN'S SYNDROME WITH KERATOCONJUNCTIVITIS SICCA (HCC): ICD-10-CM

## 2019-12-17 DIAGNOSIS — G90.A POTS (POSTURAL ORTHOSTATIC TACHYCARDIA SYNDROME): Primary | ICD-10-CM

## 2019-12-17 DIAGNOSIS — R06.02 SOB (SHORTNESS OF BREATH): ICD-10-CM

## 2019-12-17 NOTE — PROGRESS NOTES
Cardiac Electrophysiology Office Note Subjective:  
  
Bhavya Dunlap is a 42 y.o. female patient who presents for follow up, has history of tilt table test positive for POTS. She has noted dyspnea & fatigue for quite a while, but states this has worsened noticeably in the past 3 weeks with no known cause. States it is constant, worse with any movement, feels like she can't get enough air in. Improves a bit when laying down. Uses albuterol occasionally, doesn't have much improvement, feels it worsens her palpitations. She continues with occasional palpitations, not overall nadolol has worked well. She is compliant with compression stockings. She denies lightheadedness or syncope. No chest pain, PND, orthopnea, or edema. She continues to note \"dryness\" throughout, associates this with Sjogren's. Oxygen saturation 99% today. Previous: 
Echo (11/04/2019): LVEF 57%, no RWMA. Trace TR, PASP 25.8 mmHg. Stress echo (09/08/2016): Normal LV function, RWMA. No previous syncope, but many near syncopal episodes. Activity & exercise are triggers for tachy episodes. Metoprolol caused fatigue. Disliked Inderal. 
 
Previously saw Dr. Annie Cabrera & Dr. Tyree Valenzuela. 
 
Abdominal & CXR, barium swallow normal in 11/2018. Diagnosed & treated for Sjogren's by Dr. Blu Morris. Neurologist is Dr. Lion Valera. Normal thyroid biopsy in 2017. Patient Active Problem List  
 Diagnosis Date Noted  Long-term use of hydroxychloroquine 02/14/2019  Sjogren's syndrome with keratoconjunctivitis sicca ([facility] [address] 08/07/2018  Gastroparesis 08/07/2018  Irritable bowel syndrome with constipation 08/07/2018  Chronic constipation 09/01/2017  GERD (gastroesophageal reflux disease) 09/01/2017  POTS (postural orthostatic tachycardia syndrome) 01/20/2017  Abdominal adhesions 05/04/2012 Current Outpatient Medications Medication Sig Dispense Refill  fluticasone propionate (FLONASE) 50 mcg/actuation nasal spray instill 1 spray into each nostril (Patient taking differently: daily as needed. instill 1 spray into each nostril) 16 g 5  
 vit B complex no.12/niacin,B3, (VITAMIN B COMPLEX NO.12-NIACIN PO) Take 1 Tab by mouth daily.  RESTASIS 0.05 % dpet 1 Drop every twelve (12) hours.  famotidine (PEPCID) 20 mg tablet Take 1 Tab by mouth nightly. 90 Tab 3  
 albuterol (PROVENTIL VENTOLIN) 2.5 mg /3 mL (0.083 %) nebulizer solution 3 mL by Nebulization route every four (4) hours as needed for Wheezing. 24 Each 0  
 cetirizine (ZYRTEC) 10 mg tablet Take 1 Tab by mouth daily. (Patient taking differently: Take 10 mg by mouth as needed.) 90 Tab 3  cevimeline (EVOXAC) 30 mg capsule Take 1 Cap by mouth three (3) times daily for 360 days. (Patient taking differently: Take 30 mg by mouth three (3) times daily as needed. Indications: dry mouth secondary to Sjogren's syndrome) 30 Cap 11  
 albuterol (PROVENTIL HFA, VENTOLIN HFA, PROAIR HFA) 90 mcg/actuation inhaler Take 1 Puff by inhalation every four (4) hours as needed for Wheezing. 1 Inhaler 5  
 nadolol (CORGARD) 40 mg tablet Take 1 Tab by mouth daily. 90 Tab 1  
 omeprazole (PRILOSEC) 40 mg capsule Take 40 mg by mouth daily as needed.  cholecalciferol (VITAMIN D3) 1,000 unit tablet Take 1,000 Units by mouth daily.  multivitamin (ONE A DAY) tablet Take 1 Tab by mouth daily.  LINZESS 290 mcg cap capsule Take 290 mcg by mouth as needed. 0  
 nystatin (MYCOSTATIN) 100,000 unit/mL suspension Take 1 tsp by mouth four (4) times daily as needed. swish and spit  Miconazole (ORAVIG) 50 mg mabt 1 Lozenge by Buccal route daily. Indications: candidiasis fungal infection of the oropharynx 39 Each 5 Allergies Allergen Reactions  Celexa [Citalopram] Nausea and Vomiting  Ciprofloxacin Shortness of Breath  Diflucan [Fluconazole] Rash Burning sensation to skin  Sulfa (Sulfonamide Antibiotics) Rash Past Medical History:  
Diagnosis Date  Anemia   
 taking iron  Family history of skin cancer   
 sister has melanoma,   
 Gastroparesis  GERD (gastroesophageal reflux disease)   
 gastroparesis--h-pylori  
 H. pylori infection  H/O Clostridium difficile infection 2012  Helicobacter pylori (H. pylori) 2012  
 h pylori  Palpitations   - cardiac workup per pt.  POTS (postural orthostatic tachycardia syndrome)   
 19 Dr. Gentry Khoury. Reports tx increased fluid and NA intake, betablocker  Sjogren's syndrome ([address]  Sun-damaged skin 20's  Sunburn, blistering 8-9  Tanning bed exposure 11 years ago Past Surgical History:  
Procedure Laterality Date  HX COLONOSCOPY    
 HX DILATION AND CURETTAGE  2012  HX ENDOSCOPY    
 HX GYN    
 tubal reversal laparoscopic  HX TUBAL LIGATION    
 TILT TABLE EVAL W/WO DRUG  2017 Family History Problem Relation Age of Onset  Heart Disease Mother  Cancer Mother Thyroid  Thyroid Disease Mother  Heart Disease Father  High Cholesterol Father  Heart Disease Maternal Grandmother  Diabetes Maternal Grandmother  Other Maternal Grandmother Heomochromatosis  Thyroid Disease Sister  No Known Problems Brother  Stroke Maternal Grandfather  Diabetes Maternal Grandfather  Hypertension Paternal Grandmother  Heart Disease Paternal Grandfather  Heart Attack Paternal Grandfather Social History Tobacco Use  Smoking status: Former Smoker Types: Cigarettes Last attempt to quit: 2013 Years since quittin.0  Smokeless tobacco: Never Used Substance Use Topics  Alcohol use: Yes Alcohol/week: 0.0 standard drinks Comment: not monthly Review of Systems:  
Constitutional: Negative for fever, chills, weight loss + fatigue HEENT: Negative for nosebleeds, vision changes. Respiratory: Negative for cough, hemoptysis, sputum production, and wheezing. Cardiovascular: Negative for chest pain palpitations, no orthopnea, claudication, leg swelling, syncope, and PND. + SOB, worse over past 3 weeks Gastrointestinal: Negative for nausea, vomiting, diarrhea, constipation, blood in stool and melena. + gastroparesis. Occasional dysphagia. Genitourinary: Negative for dysuria, and hematuria. Musculoskeletal: Negative for myalgias, + arthralgia. Skin: occasional rash. Heme: Does not bleed or bruise easily. Neurological: Negative for speech change and focal weakness Objective:  
 
Visit Vitals /62 (BP 1 Location: Left arm, BP Patient Position: Sitting) Pulse 93 Resp 14 Ht 5' 4\" (1.626 m) Wt 144 lb (65.3 kg) SpO2 99% BMI 24.72 kg/m² Physical Exam:  
Constitutional: Well-nourished. No distress. Head: Normocephalic and atraumatic. Eyes: Pupils are equal, round. Neck: Supple. No JVD present. Cardiovascular: Normal rate, regular rhythm. Exam reveals no gallop and no friction rub. No murmur heard. Pulmonary/Chest: Effort normal and breath sounds normal. No wheezes. Abdominal: Soft, no tenderness. Musculoskeletal: No edema. Wearing compression stockings. Neurological: Alert, oriented. Skin: Skin is warm and dry. Psychiatric: Normal mood and affect. Behavior is normal. Judgment and thought content normal.   
 
 
Assessment/Plan: ICD-10-CM ICD-9-CM 1. POTS (postural orthostatic tachycardia syndrome) R00.0 427.89   
 I95.1 2. SOB (shortness of breath) R06.02 786.05   
 
Ms. Cooper Shearing states palpitations associated with POTS continue, but feels that they are adequately controlled with nadolol & compression stockings. Recently increased SOB, had normal echo last month. Last stress echo was in 2016.    
 
Will obtain treadmill nuclear stress test to evaluate for ischemia, HR response with stress. If normal, would then suggest high resolution chest CT & referral to pulmonologist. 
 
Known Sjogren's syndrome, could be affecting lung tissue. Follow up in EP clinic in 6 months. Future Appointments Date Time Provider Addis Baeri 5/7/2020 10:00 AM Mg Christian MD [address] [facility] Thank you for involving me in this patient's care and please call with further concerns or questions. Jodi Rice M.D. Electrophysiology/Cardiology [facility] [address], [place] [address] [facility], [place]elvina Hipolito [address] 
[phone]

## 2019-12-17 NOTE — PATIENT INSTRUCTIONS
You will be scheduled for a Nuclear cardiac stress test after your appointment today. Please wear comfortable clothing (shorts or pants with a shirt or blouse) and walking/athletic shoes. Do not eat or drink anything, except water, for at least 2 hours prior to your test.    Do not take your scheduled medication, Nadolol, 2 days prior to your test.    You will need to follow up in clinic with Dr. Moraima Mijares in 6 months.

## 2019-12-17 NOTE — PROGRESS NOTES
Cardiac Electrophysiology Office Note     Subjective:      Amanda Thornton is a 42 y.o. female patient who presents for follow up, has history of tilt table test positive for POTS. She has noted dyspnea & fatigue for quite a while, but states this has worsened noticeably in the past 3 weeks with no known cause. States it is constant, worse with any movement, feels like she can't get enough air in. Improves a bit when laying down. Uses albuterol occasionally, doesn't have much improvement, feels it worsens her palpitations. She continues with occasional palpitations, not overall nadolol has worked well. She is compliant with compression stockings. She denies lightheadedness or syncope. No chest pain, PND, orthopnea, or edema. She continues to note \"dryness\" throughout, associates this with Sjogren's. Oxygen saturation 99% today. Previous:  Echo (11/04/2019): LVEF 57%, no RWMA. Trace TR, PASP 25.8 mmHg. Stress echo (09/08/2016): Normal LV function, RWMA. No previous syncope, but many near syncopal episodes. Activity & exercise are triggers for tachy episodes. Metoprolol caused fatigue. Disliked Inderal.    Abdominal & CXR, barium swallow normal in 11/2018. Diagnosed & treated for Sjogren's by Dr. Ashu Neville. Neurologist is Dr. Pacheco Holland. Normal thyroid biopsy in 2017.         Patient Active Problem List    Diagnosis Date Noted    Long-term use of hydroxychloroquine 02/14/2019    Sjogren's syndrome with keratoconjunctivitis sicca ([facility] [address] 08/07/2018    Gastroparesis 08/07/2018    Irritable bowel syndrome with constipation 08/07/2018    Chronic constipation 09/01/2017    GERD (gastroesophageal reflux disease) 09/01/2017    POTS (postural orthostatic tachycardia syndrome) 01/20/2017    Abdominal adhesions 05/04/2012     Current Outpatient Medications   Medication Sig Dispense Refill    fluticasone propionate (FLONASE) 50 mcg/actuation nasal spray instill 1 spray into each nostril (Patient taking differently: daily as needed. instill 1 spray into each nostril) 16 g 5    vit B complex no.12/niacin,B3, (VITAMIN B COMPLEX NO.12-NIACIN PO) Take 1 Tab by mouth daily.  RESTASIS 0.05 % dpet 1 Drop every twelve (12) hours.  famotidine (PEPCID) 20 mg tablet Take 1 Tab by mouth nightly. 90 Tab 3    albuterol (PROVENTIL VENTOLIN) 2.5 mg /3 mL (0.083 %) nebulizer solution 3 mL by Nebulization route every four (4) hours as needed for Wheezing. 24 Each 0    cetirizine (ZYRTEC) 10 mg tablet Take 1 Tab by mouth daily. (Patient taking differently: Take 10 mg by mouth as needed.) 90 Tab 3    cevimeline (EVOXAC) 30 mg capsule Take 1 Cap by mouth three (3) times daily for 360 days. (Patient taking differently: Take 30 mg by mouth three (3) times daily as needed. Indications: dry mouth secondary to Sjogren's syndrome) 30 Cap 11    albuterol (PROVENTIL HFA, VENTOLIN HFA, PROAIR HFA) 90 mcg/actuation inhaler Take 1 Puff by inhalation every four (4) hours as needed for Wheezing. 1 Inhaler 5    nadolol (CORGARD) 40 mg tablet Take 1 Tab by mouth daily. 90 Tab 1    omeprazole (PRILOSEC) 40 mg capsule Take 40 mg by mouth daily as needed.  cholecalciferol (VITAMIN D3) 1,000 unit tablet Take 1,000 Units by mouth daily.  multivitamin (ONE A DAY) tablet Take 1 Tab by mouth daily.  LINZESS 290 mcg cap capsule Take 290 mcg by mouth as needed.   0     Allergies   Allergen Reactions    Celexa [Citalopram] Nausea and Vomiting    Ciprofloxacin Shortness of Breath    Diflucan [Fluconazole] Rash     Burning sensation to skin    Sulfa (Sulfonamide Antibiotics) Rash     Past Medical History:   Diagnosis Date    Anemia     taking iron    Family history of skin cancer     sister has melanoma,     Gastroparesis     GERD (gastroesophageal reflux disease)     gastroparesis--h-pylori    H. pylori infection     H/O Clostridium difficile infection 89/4787    Helicobacter pylori (H. pylori) 2012    h pylori    Palpitations       - cardiac workup per pt.  POTS (postural orthostatic tachycardia syndrome)     19 Dr. Maris Matute. Reports tx increased fluid and NA intake, betablocker    Sjogren's syndrome (HCC)     Sun-damaged skin     20's    Sunburn, blistering     8-9     Tanning bed exposure     11 years ago      Past Surgical History:   Procedure Laterality Date    HX COLONOSCOPY      HX DILATION AND CURETTAGE  2012    HX ENDOSCOPY      HX GYN      tubal reversal laparoscopic    HX TUBAL LIGATION      TILT TABLE EVAL W/WO DRUG  2017          Family History   Problem Relation Age of Onset    Heart Disease Mother     Cancer Mother         Thyroid    Thyroid Disease Mother     Heart Disease Father     High Cholesterol Father     Heart Disease Maternal Grandmother     Diabetes Maternal Grandmother     Other Maternal Grandmother         Heomochromatosis    Thyroid Disease Sister     No Known Problems Brother     Stroke Maternal Grandfather     Diabetes Maternal Grandfather     Hypertension Paternal Grandmother     Heart Disease Paternal Grandfather     Heart Attack Paternal Grandfather      Social History     Tobacco Use    Smoking status: Former Smoker     Types: Cigarettes     Last attempt to quit: 2013     Years since quittin.0    Smokeless tobacco: Never Used   Substance Use Topics    Alcohol use: Yes     Alcohol/week: 0.0 standard drinks     Comment: not monthly        Review of Systems:   Constitutional: Negative for fever, chills, weight loss + fatigue  HEENT: Negative for nosebleeds, vision changes. Respiratory: Negative for cough, hemoptysis, sputum production, and wheezing. Cardiovascular: Negative for chest pain palpitations, no orthopnea, claudication, leg swelling, syncope, and PND. + SOB, worse over past 3 weeks  Gastrointestinal: Negative for nausea, vomiting, diarrhea, constipation, blood in stool and melena. + gastroparesis. Occasional dysphagia. Genitourinary: Negative for dysuria, and hematuria. Musculoskeletal: Negative for myalgias, + arthralgia. Skin: occasional rash. Heme: Does not bleed or bruise easily. Neurological: Negative for speech change and focal weakness     Objective:     Visit Vitals  /62 (BP 1 Location: Left arm, BP Patient Position: Sitting)   Pulse 93   Resp 14   Ht 5' 4\" (1.626 m)   Wt 144 lb (65.3 kg)   SpO2 99%   BMI 24.72 kg/m²      Physical Exam:   Constitutional: Well-nourished. No distress. Head: Normocephalic and atraumatic. Eyes: Pupils are equal, round. Neck: Supple. No JVD present. Cardiovascular: Normal rate, regular rhythm. Exam reveals no gallop and no friction rub. No murmur heard. Pulmonary/Chest: Effort normal and breath sounds normal. No wheezes. Abdominal: Soft, no tenderness. Musculoskeletal: No edema. Wearing compression stockings. Neurological: Alert, oriented. Skin: Skin is warm and dry. Psychiatric: Normal mood and affect. Behavior is normal. Judgment and thought content normal.        Assessment/Plan:       ICD-10-CM ICD-9-CM    1. POTS (postural orthostatic tachycardia syndrome) R00.0 427.89 NUCLEAR CARDIAC STRESS TEST    I95.1     2. SOB (shortness of breath) R06.02 786.05 NUCLEAR CARDIAC STRESS TEST   3. Sjogren's syndrome with keratoconjunctivitis sicca ([facility] [address] M35.01 710.2 NUCLEAR CARDIAC STRESS TEST     Ms. Latisha Kenyon states palpitations associated with POTS continue, but feels that they are adequately controlled with nadolol & compression stockings. Recently increased SOB, had normal echo last month. There was no pulmonary hypertension  last stress echo was in 2016.   This was done at [facility] and it was normal    I have recommended treadmill nuclear stress test to evaluate for ischemia, HR response with stress and her functional capacity  if normal, would then suggest high resolution chest CT & referral to pulmonologist.  She may have interstitial lung disease    Follow up in EP clinic in 6 months. Follow-up and Dispositions    · Return in about 6 months (around 6/17/2020). Future Appointments   Date Time Provider Addis Velazquez   5/7/2020 10:00 AM Elise Christian MD 00[address] [facility]     Thank you for involving me in this patient's care and please call with further concerns or questions. Darryn Lara M.D.   Electrophysiology/Cardiology  [facility] [place]  [facility] [address], [facility] [address] [place] [address]  [phone]

## 2019-12-18 ENCOUNTER — HOSPITAL ENCOUNTER (EMERGENCY)
Age: 39
Discharge: HOME | End: 2019-12-18
Attending: EMERGENCY MEDICINE
Payer: MEDICAID

## 2019-12-18 ENCOUNTER — APPOINTMENT (OUTPATIENT)
Dept: CT IMAGING | Age: 39
End: 2019-12-18
Attending: EMERGENCY MEDICINE
Payer: MEDICAID

## 2019-12-18 VITALS
HEART RATE: 85 BPM | DIASTOLIC BLOOD PRESSURE: 79 MMHG | OXYGEN SATURATION: 100 % | TEMPERATURE: 98 F | SYSTOLIC BLOOD PRESSURE: 131 MMHG | RESPIRATION RATE: 16 BRPM

## 2019-12-18 DIAGNOSIS — R06.02 SOB (SHORTNESS OF BREATH): ICD-10-CM

## 2019-12-18 DIAGNOSIS — R07.9 CHEST PAIN, UNSPECIFIED TYPE: Primary | ICD-10-CM

## 2019-12-18 LAB
ALBUMIN SERPL-MCNC: 4 G/DL (ref 3.5–5)
ALBUMIN/GLOB SERPL: 1.3 {RATIO} (ref 1.1–2.2)
ALP SERPL-CCNC: 39 U/L (ref 45–117)
ALT SERPL-CCNC: 23 U/L (ref 12–78)
ANION GAP SERPL CALC-SCNC: 8 MMOL/L (ref 5–15)
AST SERPL-CCNC: 14 U/L (ref 15–37)
BASOPHILS # BLD: 0.1 K/UL (ref 0–0.1)
BASOPHILS NFR BLD: 1 % (ref 0–1)
BILIRUB SERPL-MCNC: 0.6 MG/DL (ref 0.2–1)
BUN SERPL-MCNC: 14 MG/DL (ref 6–20)
BUN/CREAT SERPL: 18 (ref 12–20)
CALCIUM SERPL-MCNC: 8.7 MG/DL (ref 8.5–10.1)
CHLORIDE SERPL-SCNC: 108 MMOL/L (ref 97–108)
CO2 SERPL-SCNC: 25 MMOL/L (ref 21–32)
CREAT SERPL-MCNC: 0.76 MG/DL (ref 0.55–1.02)
DIFFERENTIAL METHOD BLD: NORMAL
EOSINOPHIL # BLD: 0.1 K/UL (ref 0–0.4)
EOSINOPHIL NFR BLD: 1 % (ref 0–7)
ERYTHROCYTE [DISTWIDTH] IN BLOOD BY AUTOMATED COUNT: 12.7 % (ref 11.5–14.5)
GLOBULIN SER CALC-MCNC: 3 G/DL (ref 2–4)
GLUCOSE SERPL-MCNC: 96 MG/DL (ref 65–100)
HCG UR QL: NEGATIVE
HCT VFR BLD AUTO: 37.1 % (ref 35–47)
HGB BLD-MCNC: 12.6 G/DL (ref 11.5–16)
IMM GRANULOCYTES # BLD AUTO: 0 K/UL (ref 0–0.04)
IMM GRANULOCYTES NFR BLD AUTO: 0 % (ref 0–0.5)
LYMPHOCYTES # BLD: 2.3 K/UL (ref 0.8–3.5)
LYMPHOCYTES NFR BLD: 49 % (ref 12–49)
MCH RBC QN AUTO: 32.5 PG (ref 26–34)
MCHC RBC AUTO-ENTMCNC: 34 G/DL (ref 30–36.5)
MCV RBC AUTO: 95.6 FL (ref 80–99)
MONOCYTES # BLD: 0.5 K/UL (ref 0–1)
MONOCYTES NFR BLD: 10 % (ref 5–13)
NEUTS SEG # BLD: 1.8 K/UL (ref 1.8–8)
NEUTS SEG NFR BLD: 39 % (ref 32–75)
NRBC # BLD: 0 K/UL (ref 0–0.01)
NRBC BLD-RTO: 0 PER 100 WBC
PLATELET # BLD AUTO: 291 K/UL (ref 150–400)
PMV BLD AUTO: 10.5 FL (ref 8.9–12.9)
POTASSIUM SERPL-SCNC: 3.2 MMOL/L (ref 3.5–5.1)
PROT SERPL-MCNC: 7 G/DL (ref 6.4–8.2)
RBC # BLD AUTO: 3.88 M/UL (ref 3.8–5.2)
SODIUM SERPL-SCNC: 141 MMOL/L (ref 136–145)
TROPONIN I SERPL-MCNC: <0.05 NG/ML
WBC # BLD AUTO: 4.7 K/UL (ref 3.6–11)

## 2019-12-18 PROCEDURE — 99285 EMERGENCY DEPT VISIT HI MDM: CPT

## 2019-12-18 PROCEDURE — 84484 ASSAY OF TROPONIN QUANT: CPT

## 2019-12-18 PROCEDURE — 81025 URINE PREGNANCY TEST: CPT

## 2019-12-18 PROCEDURE — 85025 COMPLETE CBC W/AUTO DIFF WBC: CPT

## 2019-12-18 PROCEDURE — 74011250636 HC RX REV CODE- 250/636: Performed by: EMERGENCY MEDICINE

## 2019-12-18 PROCEDURE — 74011250637 HC RX REV CODE- 250/637: Performed by: EMERGENCY MEDICINE

## 2019-12-18 PROCEDURE — 74011636320 HC RX REV CODE- 636/320: Performed by: EMERGENCY MEDICINE

## 2019-12-18 PROCEDURE — 80053 COMPREHEN METABOLIC PANEL: CPT

## 2019-12-18 PROCEDURE — 36415 COLL VENOUS BLD VENIPUNCTURE: CPT

## 2019-12-18 PROCEDURE — 71260 CT THORAX DX C+: CPT

## 2019-12-18 PROCEDURE — 93005 ELECTROCARDIOGRAM TRACING: CPT

## 2019-12-18 RX ORDER — SODIUM CHLORIDE 0.9 % (FLUSH) 0.9 %
10 SYRINGE (ML) INJECTION
Status: COMPLETED | OUTPATIENT
Start: 2019-12-18 | End: 2019-12-18

## 2019-12-18 RX ORDER — POTASSIUM CHLORIDE 20 MEQ/1
40 TABLET, EXTENDED RELEASE ORAL
Status: COMPLETED | OUTPATIENT
Start: 2019-12-18 | End: 2019-12-18

## 2019-12-18 RX ADMIN — Medication 10 ML: at 10:13

## 2019-12-18 RX ADMIN — POTASSIUM CHLORIDE 40 MEQ: 20 TABLET, EXTENDED RELEASE ORAL at 10:36

## 2019-12-18 RX ADMIN — SODIUM CHLORIDE 1000 ML: 900 INJECTION, SOLUTION INTRAVENOUS at 09:49

## 2019-12-18 RX ADMIN — IOPAMIDOL 100 ML: 755 INJECTION, SOLUTION INTRAVENOUS at 10:13

## 2019-12-18 NOTE — ED PROVIDER NOTES
EMERGENCY DEPARTMENT HISTORY AND PHYSICAL EXAM      Date: 12/18/2019  Patient Name: Annabel Shea  Patient Age and Sex: 42 y.o. female     History of Presenting Illness     Chief Complaint   Patient presents with    Shortness of Breath     x two weeks with chest tightness       History Provided By: Patient    HPI: Annabel Shea  Is a 46-year-old female with past medical history of Sjogren syndrome, and pots presenting today with shortness of breath and chest discomfort. Patient reports that she has been dealing with intermittent shortness of breath for several months. She also has been having intermittent substernal chest pressure. She reports that this is moderate in severity and nonradiating. Today while she was driving to go see her gastroenterologist she had an episode of chest pain that was worse than she has had in the past.  She states that this is resolved at this point. She has seen cardiology and has a stress test scheduled. She also seen her primary care provider and had a d-dimer that was negative. No fevers, chills, or cough. No other complaints at this time. She denies any recent travel, hormone use, or leg swelling. There are no other complaints, changes, or physical findings at this time. PCP: Danya Corley MD    No current facility-administered medications on file prior to encounter. Current Outpatient Medications on File Prior to Encounter   Medication Sig Dispense Refill    fluticasone propionate (FLONASE) 50 mcg/actuation nasal spray instill 1 spray into each nostril (Patient taking differently: daily as needed. instill 1 spray into each nostril) 16 g 5    vit B complex no.12/niacin,B3, (VITAMIN B COMPLEX NO.12-NIACIN PO) Take 1 Tab by mouth daily.  RESTASIS 0.05 % dpet 1 Drop every twelve (12) hours.  famotidine (PEPCID) 20 mg tablet Take 1 Tab by mouth nightly.  90 Tab 3    albuterol (PROVENTIL VENTOLIN) 2.5 mg /3 mL (0.083 %) nebulizer solution 3 mL by Nebulization route every four (4) hours as needed for Wheezing. 24 Each 0    cetirizine (ZYRTEC) 10 mg tablet Take 1 Tab by mouth daily. (Patient taking differently: Take 10 mg by mouth as needed.) 90 Tab 3    cevimeline (EVOXAC) 30 mg capsule Take 1 Cap by mouth three (3) times daily for 360 days. (Patient taking differently: Take 30 mg by mouth three (3) times daily as needed. Indications: dry mouth secondary to Sjogren's syndrome) 30 Cap 11    albuterol (PROVENTIL HFA, VENTOLIN HFA, PROAIR HFA) 90 mcg/actuation inhaler Take 1 Puff by inhalation every four (4) hours as needed for Wheezing. 1 Inhaler 5    nadolol (CORGARD) 40 mg tablet Take 1 Tab by mouth daily. 90 Tab 1    omeprazole (PRILOSEC) 40 mg capsule Take 40 mg by mouth daily as needed.  cholecalciferol (VITAMIN D3) 1,000 unit tablet Take 1,000 Units by mouth daily.  multivitamin (ONE A DAY) tablet Take 1 Tab by mouth daily.  LINZESS 290 mcg cap capsule Take 290 mcg by mouth as needed. 0       Past History     Past Medical History:  Past Medical History:   Diagnosis Date    Anemia     taking iron    Family history of skin cancer     sister has melanoma,     Gastroparesis     GERD (gastroesophageal reflux disease)     gastroparesis--h-pylori    H. pylori infection     H/O Clostridium difficile infection 95/6756    Helicobacter pylori (H. pylori) 05/2012    h pylori    Palpitations     2010  - cardiac workup per pt.  POTS (postural orthostatic tachycardia syndrome)     11/26/19 Dr. Leeanna Contreras.  Reports tx increased fluid and NA intake, betablocker    Sjogren's syndrome (HCC)     Sun-damaged skin     20's    Sunburn, blistering     8-9     Tanning bed exposure     11 years ago        Past Surgical History:  Past Surgical History:   Procedure Laterality Date    HX COLONOSCOPY      HX DILATION AND CURETTAGE  05/2012    HX ENDOSCOPY      HX GYN      tubal reversal laparoscopic    HX TUBAL LIGATION      TILT TABLE YVES W/WO DRUG  2017            Family History:  Family History   Problem Relation Age of Onset    Heart Disease Mother     Cancer Mother         Thyroid    Thyroid Disease Mother     Heart Disease Father     High Cholesterol Father     Heart Disease Maternal Grandmother     Diabetes Maternal Grandmother     Other Maternal Grandmother         Heomochromatosis    Thyroid Disease Sister     No Known Problems Brother     Stroke Maternal Grandfather     Diabetes Maternal Grandfather     Hypertension Paternal Grandmother     Heart Disease Paternal Grandfather     Heart Attack Paternal Grandfather        Social History:  Social History     Tobacco Use    Smoking status: Former Smoker     Types: Cigarettes     Last attempt to quit: 2013     Years since quittin.0    Smokeless tobacco: Never Used   Substance Use Topics    Alcohol use: Yes     Alcohol/week: 0.0 standard drinks     Comment: not monthly    Drug use: No       Allergies: Allergies   Allergen Reactions    Celexa [Citalopram] Nausea and Vomiting    Ciprofloxacin Shortness of Breath    Diflucan [Fluconazole] Rash     Burning sensation to skin    Sulfa (Sulfonamide Antibiotics) Rash         Review of Systems   Constitutional: No  fever,  No  headache  Skin: No  rash, No  jaundice  HEENT: No  nasal congestion, No  eye drainage.    Resp: No cough,  No  Wheezing, + dyspnea  CV: + chest pain, No  palpitations  GI: No vomiting,  No  diarrhea.,  No  constipation  : No dysuria,  No  hematuria  MSK: No joint pain,  No  trauma  Neuro: No numbness, No  tingling  Psych: No suicidal, No  paranoid      Physical Exam     Patient Vitals for the past 12 hrs:   Temp Pulse Resp BP SpO2   19 1050 98 °F (36.7 °C) 85 16 131/79 100 %   19 1000  100 16 131/77 100 %   19 0853     100 %   19 0844 98 °F (36.7 °C) 86 18 121/83 100 %     General: alert, No acute distress  Eyes: EOMI, normal conjunctiva  ENT: moist mucous membranes. Neck: Active, full ROM of neck. Skin: No rashes. no jaundice              Lungs: Equal chest expansion. no respiratory distress. clear to auscultation bilaterally No accessory muscle usage  Heart: regular rate     no peripheral edema   2+ radial pulses and DPs bilaterally  Abd:  non distended soft, nontender. No rebound tenderness. No guarding  Back: Full ROM  MSK: Full, active ROM in all 4 extremities. Neuro: Person, Place, Time and Situation; normal speech;   Psych: Cooperative with exam; Appropriate mood and affect             Diagnostic Study Results     Labs -     Recent Results (from the past 12 hour(s))   EKG, 12 LEAD, INITIAL    Collection Time: 12/18/19  8:14 AM   Result Value Ref Range    Ventricular Rate 95 BPM    Atrial Rate 95 BPM    P-R Interval 138 ms    QRS Duration 92 ms    Q-T Interval 342 ms    QTC Calculation (Bezet) 429 ms    Calculated P Axis 73 degrees    Calculated R Axis 9 degrees    Calculated T Axis 52 degrees    Diagnosis       Normal sinus rhythm  Normal ECG  When compared with ECG of 18-OCT-2016 08:29,  No significant change was found     CBC WITH AUTOMATED DIFF    Collection Time: 12/18/19  8:43 AM   Result Value Ref Range    WBC 4.7 3.6 - 11.0 K/uL    RBC 3.88 3.80 - 5.20 M/uL    HGB 12.6 11.5 - 16.0 g/dL    HCT 37.1 35.0 - 47.0 %    MCV 95.6 80.0 - 99.0 FL    MCH 32.5 26.0 - 34.0 PG    MCHC 34.0 30.0 - 36.5 g/dL    RDW 12.7 11.5 - 14.5 %    PLATELET 465 238 - 067 K/uL    MPV 10.5 8.9 - 12.9 FL    NRBC 0.0 0  WBC    ABSOLUTE NRBC 0.00 0.00 - 0.01 K/uL    NEUTROPHILS 39 32 - 75 %    LYMPHOCYTES 49 12 - 49 %    MONOCYTES 10 5 - 13 %    EOSINOPHILS 1 0 - 7 %    BASOPHILS 1 0 - 1 %    IMMATURE GRANULOCYTES 0 0.0 - 0.5 %    ABS. NEUTROPHILS 1.8 1.8 - 8.0 K/UL    ABS. LYMPHOCYTES 2.3 0.8 - 3.5 K/UL    ABS. MONOCYTES 0.5 0.0 - 1.0 K/UL    ABS. EOSINOPHILS 0.1 0.0 - 0.4 K/UL    ABS. BASOPHILS 0.1 0.0 - 0.1 K/UL    ABS. IMM.  GRANS. 0.0 0.00 - 0.04 K/UL    DF AUTOMATED METABOLIC PANEL, COMPREHENSIVE    Collection Time: 12/18/19  8:43 AM   Result Value Ref Range    Sodium 141 136 - 145 mmol/L    Potassium 3.2 (L) 3.5 - 5.1 mmol/L    Chloride 108 97 - 108 mmol/L    CO2 25 21 - 32 mmol/L    Anion gap 8 5 - 15 mmol/L    Glucose 96 65 - 100 mg/dL    BUN 14 6 - 20 MG/DL    Creatinine 0.76 0.55 - 1.02 MG/DL    BUN/Creatinine ratio 18 12 - 20      GFR est AA >60 >60 ml/min/1.73m2    GFR est non-AA >60 >60 ml/min/1.73m2    Calcium 8.7 8.5 - 10.1 MG/DL    Bilirubin, total 0.6 0.2 - 1.0 MG/DL    ALT (SGPT) 23 12 - 78 U/L    AST (SGOT) 14 (L) 15 - 37 U/L    Alk. phosphatase 39 (L) 45 - 117 U/L    Protein, total 7.0 6.4 - 8.2 g/dL    Albumin 4.0 3.5 - 5.0 g/dL    Globulin 3.0 2.0 - 4.0 g/dL    A-G Ratio 1.3 1.1 - 2.2     TROPONIN I    Collection Time: 12/18/19  8:43 AM   Result Value Ref Range    Troponin-I, Qt. <0.05 <0.05 ng/mL   HCG URINE, QL. - POC    Collection Time: 12/18/19  9:53 AM   Result Value Ref Range    Pregnancy test,urine (POC) NEGATIVE  NEG         Radiologic Studies -   CT CHEST W CONT   Final Result   IMPRESSION:   No acute abnormality. CT Results  (Last 48 hours)               12/18/19 1013  CT CHEST W CONT Final result    Impression:  IMPRESSION:   No acute abnormality. Narrative:  INDICATION: Shortness of breath for 2 weeks with chest tightness       COMPARISON: 8/6/2016       TECHNIQUE:  Following the uneventful intravenous administration of 100 cc   Isovue-300, 5 mm axial images were obtained through the chest. Coronal and   sagittal reconstructions were generated. CT dose reduction was achieved through   use of a standardized protocol tailored for this examination and automatic   exposure control for dose modulation. FINDINGS:       THYROID: 11 mm hypodense nodule is noted in the right thyroid gland. MEDIASTINUM: No mass or lymphadenopathy. SHANNA: No mass or lymphadenopathy. THORACIC AORTA: No dissection or aneurysm.    MAIN PULMONARY ARTERY: Normal in caliber. TRACHEA/BRONCHI: Patent. ESOPHAGUS: No wall thickening or dilatation. HEART: Normal in size. PLEURA: No effusion or pneumothorax. LUNGS: No nodule, mass, or airspace disease. INCIDENTALLY IMAGED UPPER ABDOMEN: Multiple hepatic cysts again demonstrated,   reference 11 mm cyst right hepatic lobe. BONES: No destructive bone lesion. CXR Results  (Last 48 hours)    None            Medical Decision Making     Differential Diagnosis: Arrhythmia, pneumonia, restrictive lung disease, ACS, electrolyte derangement    I reviewed the vital signs, available nursing notes, past medical history, past surgical history, family history and social history and old medical records. On my interpretation, Laboratory workup is significant for negative troponin, unremarkable CBC, unremarkable electrolytes  On my interpretation of the radiology studies CT of the chest shows no significant abnormality  On my interpretation of the EKG normal sinus rhythm at a rate of 95, QTC is 429, no ST elevation or depression    Management/ED course: Patient presents today with intermittent shortness of breath, and chest pain. She does have a history of Sjogren's syndrome. Her work-up here is largely negative although I do suspect that she may have restrictive lung disease versus esophageal spasm or esophageal component to her chest pain. The patient plans to have stress test with cardiology, and already has a primary GI doctor. She discussed that cardiology will likely refer her to pulmonology which I think is reasonable plan. Ultimately the patient is discharged to follow-up with the specialists as described. Dispo: Discharged. The patient has been re-evaluated and is ready for discharge. Reviewed available results with patient. Counseled patient on diagnosis and care plan. Patient has expressed understanding, and all questions have been answered.  Patient agrees with plan and agrees to follow up as recommended, or to return to the ED if their symptoms worsen. Discharge instructions have been provided and explained to the patient, along with reasons to return to the ED. PLAN:  Discharge Medication List as of 12/18/2019 10:49 AM        2. Follow-up Information     Follow up With Specialties Details Why Contact Info    Omar Corley MD [facility]  As needed [address]  [address] [place] [address]  [phone]          3. Return to ED if worse     Diagnosis     Clinical Impression:   1. Chest pain, unspecified type    2.  SOB (shortness of breath)        Attestations:    Annie Vaca MD

## 2019-12-18 NOTE — ED NOTES
Discharge instructions provided to patient. Verbalized understanding. Alert and oriented. IV lock removed. Ambulated with steady gait out of ED.

## 2019-12-19 LAB
ATRIAL RATE: 95 BPM
CALCULATED P AXIS, ECG09: 73 DEGREES
CALCULATED R AXIS, ECG10: 9 DEGREES
CALCULATED T AXIS, ECG11: 52 DEGREES
DIAGNOSIS, 93000: NORMAL
P-R INTERVAL, ECG05: 138 MS
Q-T INTERVAL, ECG07: 342 MS
QRS DURATION, ECG06: 92 MS
QTC CALCULATION (BEZET), ECG08: 429 MS
VENTRICULAR RATE, ECG03: 95 BPM

## 2019-12-23 RX ORDER — CYCLOSPORINE 0.5 MG/ML
EMULSION OPHTHALMIC
Qty: 30 EACH | Refills: 3 | Status: SHIPPED | OUTPATIENT
Start: 2019-12-23 | End: 2020-05-04

## 2019-12-31 RX ORDER — NADOLOL 40 MG/1
40 TABLET ORAL DAILY
Qty: 90 TAB | Refills: 1 | Status: SHIPPED | OUTPATIENT
Start: 2019-12-31 | End: 2020-03-13 | Stop reason: ALTCHOICE

## 2019-12-31 NOTE — TELEPHONE ENCOUNTER
Request for Nadolol 40 mg daily. Last office visit 12/17/19, next office visit 6/18/20. Refills per verbal order from Dr. Travis Archibald as Dr. Glen Hernandez is currently out of the office.

## 2020-01-13 ENCOUNTER — TELEPHONE (OUTPATIENT)
Dept: CARDIOLOGY CLINIC | Age: 40
End: 2020-01-13

## 2020-01-13 DIAGNOSIS — R06.02 SOB (SHORTNESS OF BREATH): Primary | ICD-10-CM

## 2020-01-13 NOTE — TELEPHONE ENCOUNTER
----- Message from Zeny Veras MD sent at 1/13/2020  8:19 AM EST -----  Exercise Nuclear stress test: normal perfusion LVEF 78%  Referred to pulmonary clinic see note

## 2020-01-16 ENCOUNTER — TELEPHONE (OUTPATIENT)
Dept: CARDIOLOGY CLINIC | Age: 40
End: 2020-01-16

## 2020-01-16 ENCOUNTER — OFFICE VISIT (OUTPATIENT)
Dept: FAMILY MEDICINE CLINIC | Age: 40
End: 2020-01-16

## 2020-01-16 VITALS
DIASTOLIC BLOOD PRESSURE: 83 MMHG | WEIGHT: 149 LBS | HEART RATE: 95 BPM | OXYGEN SATURATION: 100 % | HEIGHT: 64 IN | TEMPERATURE: 98.7 F | RESPIRATION RATE: 18 BRPM | BODY MASS INDEX: 25.44 KG/M2 | SYSTOLIC BLOOD PRESSURE: 124 MMHG

## 2020-01-16 DIAGNOSIS — H01.139 ECZEMA OF EYELID, UNSPECIFIED LATERALITY: Primary | ICD-10-CM

## 2020-01-16 DIAGNOSIS — J32.9 CHRONIC CONGESTION OF PARANASAL SINUS: ICD-10-CM

## 2020-01-16 RX ORDER — TRIAMCINOLONE ACETONIDE 0.25 MG/G
CREAM TOPICAL 2 TIMES DAILY
Qty: 30 G | Refills: 1 | Status: SHIPPED | OUTPATIENT
Start: 2020-01-16 | End: 2020-02-21

## 2020-01-16 RX ORDER — AMOXICILLIN 875 MG/1
875 TABLET, FILM COATED ORAL 2 TIMES DAILY
Qty: 20 TAB | Refills: 0 | Status: SHIPPED | OUTPATIENT
Start: 2020-01-16 | End: 2020-01-26

## 2020-01-16 NOTE — PROGRESS NOTES
Chief Complaint   Patient presents with    Sinus Infection     possible    Rash     around both eyes      Health Maintenance reviewed     1. Have you been to the ER, urgent care clinic since your last visit? Hospitalized since your last visit? No    2. Have you seen or consulted any other health care providers outside of the [address] [place] [address] since your last visit? Include any pap smears or colon screening.  No

## 2020-01-16 NOTE — TELEPHONE ENCOUNTER
Patient requesting the recommendation for the Pulmonary. Patient haven't heard anything yet. Please advise.     JEOVR:[phone]

## 2020-01-16 NOTE — PROGRESS NOTES
Chief Complaint   Patient presents with    Sinus Infection     possible    Rash     around both eyes      Pt has a chronic problem with sinus pressure and congestion ,recently worsened. Pt reports pressure and difficulty blowing her nose. Pt also reports that she has had redness and dryness around her eyes, skin is burning. Subjective: (As above and below)     Chief Complaint   Patient presents with    Sinus Infection     possible    Rash     around both eyes      she is a 42y.o. year old female who presents for evaluation. Reviewed PmHx, RxHx, FmHx, SocHx, AllgHx and updated in chart. Review of Systems - negative except as listed above    Objective:     Vitals:    01/16/20 1005   BP: 124/83   Pulse: 95   Resp: 18   Temp: 98.7 °F (37.1 °C)   TempSrc: Oral   SpO2: 100%   Weight: 149 lb (67.6 kg)   Height: 5' 4\" (1.626 m)     Physical Examination: General appearance - alert, well appearing, and in no distress  Mental status - normal mood, behavior, speech, dress, motor activity, and thought processes  Mouth - mucous membranes moist, pharynx normal without lesions  Chest - clear to auscultation, no wheezes, rales or rhonchi, symmetric air entry  Heart - normal rate, regular rhythm, normal S1, S2, no murmurs, rubs, clicks or gallops  Musculoskeletal - no joint tenderness, deformity or swelling  Extremities - peripheral pulses normal, no pedal edema, no clubbing or cyanosis  Skin - dry reddened skin around eyes    Assessment/ Plan:   1. Eczema of eyelid, unspecified laterality  -use small amount of cream around eyes  - triamcinolone acetonide (KENALOG) 0.025 % topical cream; Apply  to affected area two (2) times a day. use thin layer, around eyes  Dispense: 30 g; Refill: 1    2. Chronic congestion of paranasal sinus  -treat due to acute flare on chronic sinusitis   - amoxicillin (AMOXIL) 875 mg tablet; Take 1 Tab by mouth two (2) times a day for 10 days. Dispense: 20 Tab;  Refill: 0     Follow up for GI EGD as scheduled    I have discussed the diagnosis with the patient and the intended plan as seen in the above orders. The patient has received an after-visit summary and questions were answered concerning future plans.      Medication Side Effects and Warnings were discussed with patient: yes  Patient Labs were reviewed: yes  Patient Past Records were reviewed:  yes    Beatriz Scales M.D.

## 2020-01-16 NOTE — TELEPHONE ENCOUNTER
Called [facility] who states that patient is an established patient with Dr. Miky Cruz and is scheduled to see his Nurse Practitioner, Bharathi Donnelly, 1/20/20 at 9:00 am.     Attempted to reach patient by telephone. A message was left for return call. Will send [email or web address] message.

## 2020-02-18 ENCOUNTER — HOSPITAL ENCOUNTER (OUTPATIENT)
Dept: ULTRASOUND IMAGING | Age: 40
Discharge: HOME | End: 2020-02-18
Attending: OTOLARYNGOLOGY
Payer: MEDICAID

## 2020-02-18 DIAGNOSIS — D34 THYROID ADENOMA: ICD-10-CM

## 2020-02-18 PROCEDURE — 76536 US EXAM OF HEAD AND NECK: CPT

## 2020-02-21 NOTE — PERIOP NOTES
[facility]  Ambulatory Surgery Unit  Pre-operative Instructions for Endo Procedures    Procedure Date  2/27            Tentative Arrival Time 06:15am      1. On the day of your procedure, please report to the Ambulatory Surgery Unit Registration Desk and sign in at your designated time. The Ambulatory Surgery Unit is located in [facility] on the [facility] side of the [facility] across from the [address] [place] [address]. Please have all of your health insurance cards and a photo ID. 2. You must have someone with you to drive you home, as you should not drive a car for 24 hours following anesthesia. Please make arrangements for a responsible adult friend or family member to stay with you for at least the first 24 hours after your procedure. 3. Do not have anything to eat or drink (including water, gum, mints, coffee, juice) after 11:59 PM 2/26. This may not apply to medications prescribed by your physician. (Please note below the special instructions with medications to take the morning of your procedure.)    4. If applicable, follow the clear liquid diet and bowel prep instructions provided by your physician's office. If you do not have this information, or have any questions, please contact your physician's office. 5. We recommend you do not drink any alcoholic beverages for 24 hours before and after your procedure. 6. Contact your surgeons office for instructions on the following medications: non-steroidal anti-inflammatory drugs (i.e. Advil, Aleve), vitamins, and supplements. (Some surgeons will want you to stop these medications prior to surgery and others may allow you to take them)   **If you are currently taking Plavix, Coumadin, Aspirin and/or other blood-thinning agents, contact your surgeon for instructions. ** Your surgeon will partner with the physician prescribing these medications to determine if it is safe to stop or if you need to continue taking.  Please do not stop taking these medications without instructions from your surgeon. 7. In an effort to help prevent surgical site infection, we ask that you shower with an anti-bacterial soap (i.e. Dial or Safeguard) on the morning of your procedure. Do not apply any lotions, powders, or deodorants after showering. 8. Wear comfortable clothes. Wear glasses instead of contacts. Do not bring any jewelry or money (other than copays or fees as instructed). Do not wear make-up, particularly mascara, the morning of your procedure. Wear your hair loose or down, no ponytails, buns, brisa pins or clips. All body piercings must be removed. 9. You should understand that if you do not follow these instructions your procedure may be cancelled. If your physical condition changes (i.e. fever, cold or flu) please contact your surgeon as soon as possible. 10. It is important that you be on time. If a situation occurs where you may be late, or if you have any questions or problems, please call [phone]. 11. Your procedure time may be subject to change. You will receive a phone call the day prior to confirm your arrival time. Special Instructions: Take all medications and inhalers, as prescribed, on the morning of surgery with a sip of water EXCEPT: none      I understand a pre-operative phone call will be made to verify my procedure time. In the event that I am not available, I give permission for a message to be left on my answering service and/or with another person?       yes    Reviewed by phone with pt, verbalized understanding.     ___________________      ___________________      ___________________  (Signature of Patient)          (Witness)                   (Date and Time)

## 2020-02-26 ENCOUNTER — ANESTHESIA EVENT (OUTPATIENT)
Dept: SURGERY | Age: 40
End: 2020-02-26
Payer: MEDICAID

## 2020-02-27 ENCOUNTER — ANESTHESIA (OUTPATIENT)
Dept: SURGERY | Age: 40
End: 2020-02-27
Payer: MEDICAID

## 2020-02-27 ENCOUNTER — HOSPITAL ENCOUNTER (OUTPATIENT)
Age: 40
Setting detail: OUTPATIENT SURGERY
Discharge: HOME | End: 2020-02-27
Attending: SPECIALIST | Admitting: SPECIALIST
Payer: MEDICAID

## 2020-02-27 VITALS
TEMPERATURE: 98.5 F | SYSTOLIC BLOOD PRESSURE: 108 MMHG | WEIGHT: 140 LBS | RESPIRATION RATE: 20 BRPM | HEART RATE: 82 BPM | BODY MASS INDEX: 23.9 KG/M2 | DIASTOLIC BLOOD PRESSURE: 68 MMHG | HEIGHT: 64 IN | OXYGEN SATURATION: 100 %

## 2020-02-27 LAB — HCG UR QL: NEGATIVE

## 2020-02-27 PROCEDURE — 76210000040 HC AMBSU PH I REC FIRST 0.5 HR: Performed by: SPECIALIST

## 2020-02-27 PROCEDURE — 77030021352 HC CBL LD SYS DISP COVD -B: Performed by: SPECIALIST

## 2020-02-27 PROCEDURE — 76060000073 HC AMB SURG ANES FIRST 0.5 HR: Performed by: SPECIALIST

## 2020-02-27 PROCEDURE — 81025 URINE PREGNANCY TEST: CPT

## 2020-02-27 PROCEDURE — 74011250636 HC RX REV CODE- 250/636: Performed by: ANESTHESIOLOGY

## 2020-02-27 PROCEDURE — 76030000002 HC AMB SURG OR TIME FIRST 0.: Performed by: SPECIALIST

## 2020-02-27 PROCEDURE — 74011000250 HC RX REV CODE- 250: Performed by: REGISTERED NURSE

## 2020-02-27 PROCEDURE — 77030019988 HC FCPS ENDOSC DISP BSC -B: Performed by: SPECIALIST

## 2020-02-27 PROCEDURE — 74011250636 HC RX REV CODE- 250/636: Performed by: REGISTERED NURSE

## 2020-02-27 PROCEDURE — 76210000046 HC AMBSU PH II REC FIRST 0.5 HR: Performed by: SPECIALIST

## 2020-02-27 PROCEDURE — 88305 TISSUE EXAM BY PATHOLOGIST: CPT

## 2020-02-27 RX ORDER — SODIUM CHLORIDE 0.9 % (FLUSH) 0.9 %
5-40 SYRINGE (ML) INJECTION EVERY 8 HOURS
Status: DISCONTINUED | OUTPATIENT
Start: 2020-02-27 | End: 2020-02-27 | Stop reason: HOSPADM

## 2020-02-27 RX ORDER — MIDAZOLAM HYDROCHLORIDE 1 MG/ML
INJECTION, SOLUTION INTRAMUSCULAR; INTRAVENOUS AS NEEDED
Status: DISCONTINUED | OUTPATIENT
Start: 2020-02-27 | End: 2020-02-27 | Stop reason: HOSPADM

## 2020-02-27 RX ORDER — SODIUM CHLORIDE 0.9 % (FLUSH) 0.9 %
5-40 SYRINGE (ML) INJECTION AS NEEDED
Status: DISCONTINUED | OUTPATIENT
Start: 2020-02-27 | End: 2020-02-27 | Stop reason: HOSPADM

## 2020-02-27 RX ORDER — LIDOCAINE HYDROCHLORIDE 20 MG/ML
INJECTION, SOLUTION EPIDURAL; INFILTRATION; INTRACAUDAL; PERINEURAL AS NEEDED
Status: DISCONTINUED | OUTPATIENT
Start: 2020-02-27 | End: 2020-02-27 | Stop reason: HOSPADM

## 2020-02-27 RX ORDER — ONDANSETRON 2 MG/ML
4 INJECTION INTRAMUSCULAR; INTRAVENOUS AS NEEDED
Status: DISCONTINUED | OUTPATIENT
Start: 2020-02-27 | End: 2020-02-27 | Stop reason: HOSPADM

## 2020-02-27 RX ORDER — SODIUM CHLORIDE, SODIUM LACTATE, POTASSIUM CHLORIDE, CALCIUM CHLORIDE 600; 310; 30; 20 MG/100ML; MG/100ML; MG/100ML; MG/100ML
25 INJECTION, SOLUTION INTRAVENOUS CONTINUOUS
Status: DISCONTINUED | OUTPATIENT
Start: 2020-02-27 | End: 2020-02-27 | Stop reason: HOSPADM

## 2020-02-27 RX ORDER — FENTANYL CITRATE 50 UG/ML
25 INJECTION, SOLUTION INTRAMUSCULAR; INTRAVENOUS
Status: DISCONTINUED | OUTPATIENT
Start: 2020-02-27 | End: 2020-02-27 | Stop reason: HOSPADM

## 2020-02-27 RX ORDER — GLYCOPYRROLATE 0.2 MG/ML
INJECTION INTRAMUSCULAR; INTRAVENOUS AS NEEDED
Status: DISCONTINUED | OUTPATIENT
Start: 2020-02-27 | End: 2020-02-27 | Stop reason: HOSPADM

## 2020-02-27 RX ORDER — PROPOFOL 10 MG/ML
INJECTION, EMULSION INTRAVENOUS AS NEEDED
Status: DISCONTINUED | OUTPATIENT
Start: 2020-02-27 | End: 2020-02-27 | Stop reason: HOSPADM

## 2020-02-27 RX ORDER — LIDOCAINE HYDROCHLORIDE 10 MG/ML
0.1 INJECTION, SOLUTION EPIDURAL; INFILTRATION; INTRACAUDAL; PERINEURAL AS NEEDED
Status: DISCONTINUED | OUTPATIENT
Start: 2020-02-27 | End: 2020-02-27 | Stop reason: HOSPADM

## 2020-02-27 RX ORDER — DIPHENHYDRAMINE HYDROCHLORIDE 50 MG/ML
12.5 INJECTION, SOLUTION INTRAMUSCULAR; INTRAVENOUS AS NEEDED
Status: DISCONTINUED | OUTPATIENT
Start: 2020-02-27 | End: 2020-02-27 | Stop reason: HOSPADM

## 2020-02-27 RX ORDER — AMOXICILLIN 500 MG/1
500 CAPSULE ORAL 3 TIMES DAILY
COMMUNITY
End: 2020-05-06

## 2020-02-27 RX ORDER — DEXTROMETHORPHAN/PSEUDOEPHED 2.5-7.5/.8
1.2 DROPS ORAL
Status: DISCONTINUED | OUTPATIENT
Start: 2020-02-27 | End: 2020-02-27 | Stop reason: HOSPADM

## 2020-02-27 RX ORDER — SODIUM CHLORIDE 9 MG/ML
50 INJECTION, SOLUTION INTRAVENOUS CONTINUOUS
Status: DISCONTINUED | OUTPATIENT
Start: 2020-02-27 | End: 2020-02-27 | Stop reason: HOSPADM

## 2020-02-27 RX ADMIN — GLYCOPYRROLATE 0.2 MG: 0.2 INJECTION, SOLUTION INTRAMUSCULAR; INTRAVENOUS at 07:39

## 2020-02-27 RX ADMIN — PROPOFOL 100 MG: 10 INJECTION, EMULSION INTRAVENOUS at 07:39

## 2020-02-27 RX ADMIN — MIDAZOLAM HYDROCHLORIDE 2 MG: 1 INJECTION, SOLUTION INTRAMUSCULAR; INTRAVENOUS at 07:34

## 2020-02-27 RX ADMIN — LIDOCAINE HYDROCHLORIDE 80 MG: 20 INJECTION, SOLUTION EPIDURAL; INFILTRATION; INTRACAUDAL; PERINEURAL at 07:39

## 2020-02-27 RX ADMIN — PROPOFOL 100 MG: 10 INJECTION, EMULSION INTRAVENOUS at 07:45

## 2020-02-27 RX ADMIN — SODIUM CHLORIDE, SODIUM LACTATE, POTASSIUM CHLORIDE, AND CALCIUM CHLORIDE 25 ML/HR: 600; 310; 30; 20 INJECTION, SOLUTION INTRAVENOUS at 06:50

## 2020-02-27 NOTE — PROCEDURES
Esophagogastroduodenoscopy Procedure Note      Pat Norris  1980  [id number]    Indication:  Dysphagia     Endoscopist: Angie Branham MD    Referring Provider:  Teresa Bacon MD    Sedation:  MAC anesthesia Propofol    Procedure Details:  After infomed consent was obtained for the procedure, with all risks and benefits of procedure explained the patient was taken to the endoscopy suite and placed in the left lateral decubitus position. Following sequential administration of sedation as per above, the endoscope was inserted into the mouth and advanced under direct vision to second portion of the duodenum. A careful inspection was made as the gastroscope was withdrawn, including a retroflexed view of the proximal stomach; findings and interventions are described below. Findings:     Esophagus:   + There was normal mucosa throughout the entire esophagus. No exudate seen.  + S/P Bx of the mid esophagus (r/o EoE); s/p Bx of the EGJ to r/o Monterroso's.    + S/P Empiric dilation with 54 FR Savary Dilation over wire. No significant resistance to passage    Stomach:   + Streaking erythema throughout with some bile c/w Bile gastritis. Pylorus patent. S/P Bx    Duodenum:   - The bulb and post bulbar mucosa is normal in appearance to the second portion. The duodenal folds appeared normal.  Cold forceps biopsies r/o celiac. Therapies:    esophageal dilation with savary sizes 54 FR  biopsy of esophagus  biopsy of stomach antrum  biopsy of duodenal distal bulb, second portion    Specimen: Specimens were collected as described and send to the laboratory. Complications:   None were encountered during the procedure. EBL: < 10 ml.           Recommendations:   -F/U Path  -continue acid suppression    Angie Branham MD  2/27/2020  7:56 AM

## 2020-02-27 NOTE — PERIOP NOTES
2603: Patient received to PACU, VSS. Patient awake and alert, has no complaints of pain. 1417: Patient tolerating liquids without issue. Patient's son and fiance at bedside. Discharge instructions given. Patient and family verbalize understanding of instructions and follow up appointment. 5260: Patient and family state ready for discharge. IV removed. Patient discharged at this time by wheelchair with belongings, family to provide transportation home.

## 2020-02-27 NOTE — PERIOP NOTES
[facility]  1980  [id number]    Situation:  Verbal report given from: ALETHEA Johnson Case and Heather Jones CRNA  Procedure: Procedure(s):  ESOPHAGOGASTRODUODENOSCOPY (EGD)  ESOPHAGEAL DILATION  ESOPHAGOGASTRODUODENAL (EGD) BIOPSY    Background:    Preoperative diagnosis: EPIGASTRIC PAIN, SHERRY ESOPHAGITIS, GASTROESOPHAGEAL REFLUX DISEASE, GASTROPARESIS    Postoperative diagnosis: DYSPHAGIA, GERD, GASTRITIS    :  Dr. Grace Martines    Assistant(s): Circ-1: Jose Steve RN  Scrub Tech-1: Lacretia Gift N    Specimens:   ID Type Source Tests Collected by Time Destination   1 : STOMACH BIOPYS  Preservative Stomach  Carla Giles MD 2/27/2020 3863 Pathology   2 : DUODENUM BIOPSY  Preservative Duodenum  Carla Giles MD 2/27/2020 3810 Pathology   3 : [address] BIOPSY  Preservative GE Dewayne Aguilar MD 2/27/2020 1009 Pathology   4 : MID ESOPHAGUS BIOPSY  Preservative Esophagus, Mid  Carla Giles MD 2/27/2020 0632 Pathology       Assessment:  Intra-procedure medications   Propofol 200 mg      Anesthesia gave intra-procedure sedation and medications, see anesthesia flow sheet     Intravenous fluids: LR@ KVO     Vital signs stable     Abdominal assessment: round and soft       Recommendation:    Permission to share finding with son/fiance    All side rails up, bed in low position, wheels locked. Nurse at bedside.

## 2020-02-27 NOTE — H&P
Gastroenterology Outpatient History and Physical    Patient: Yamil Saldana    Physician: Jenny Harp MD    Vital Signs: Blood pressure 131/80, pulse (!) 101, temperature 98.8 °F (37.1 °C), resp. rate 13, height 5' 4\" (1.626 m), weight 63.5 kg (140 lb), last menstrual period 01/23/2020, SpO2 100 %, not currently breastfeeding. Allergies: Allergies   Allergen Reactions    Celexa [Citalopram] Nausea and Vomiting    Ciprofloxacin Shortness of Breath    Diflucan [Fluconazole] Rash     Burning sensation to skin    Sulfa (Sulfonamide Antibiotics) Rash       Chief Complaint: Dysphagia, esophageal motor disorder    History of Present Illness: 43 yo WF with h/o gastroparesis, GERD and dysphagia    Justification for Procedure: above    History:  Past Medical History:   Diagnosis Date    Anemia     taking iron    Family history of skin cancer     sister has melanoma,     Gastroparesis     GERD (gastroesophageal reflux disease)     gastroparesis--h-pylori    H. pylori infection     H/O Clostridium difficile infection 70/8589    Helicobacter pylori (H. pylori) 05/2012    h pylori    Palpitations     2010  - cardiac workup per pt.  POTS (postural orthostatic tachycardia syndrome)     [facility]  Reports tx increased fluid and NA intake, betablocker    Sjogren's syndrome (HCC)     Sun-damaged skin     20's    Sunburn, blistering     8-9     Tanning bed exposure     11 years ago       Past Surgical History:   Procedure Laterality Date    HX COLONOSCOPY      HX DILATION AND CURETTAGE  05/2012    HX ENDOSCOPY      HX GYN      tubal reversal laparoscopic    HX TUBAL LIGATION      TILT TABLE EVAL W/WO DRUG  1/21/2017           Social History     Socioeconomic History    Marital status: SINGLE     Spouse name: Not on file    Number of children: Not on file    Years of education: Not on file    Highest education level: Not on file   Tobacco Use    Smoking status: Former Smoker Types: Cigarettes     Last attempt to quit: 2013     Years since quittin.2    Smokeless tobacco: Never Used   Substance and Sexual Activity    Alcohol use: Yes     Alcohol/week: 0.0 standard drinks     Comment: not monthly    Drug use: No    Sexual activity: Yes     Partners: Male     Birth control/protection: None   Social History Narrative    3 kids (14yo - 7mo), lives with boyfriend (but he travels for work)      Family History   Problem Relation Age of Onset    Heart Disease Mother     Cancer Mother         Thyroid    Thyroid Disease Mother     Heart Disease Father     High Cholesterol Father     Heart Disease Maternal Grandmother     Diabetes Maternal Grandmother     Other Maternal Grandmother         Heomochromatosis    Thyroid Disease Sister     No Known Problems Brother     Stroke Maternal Grandfather     Diabetes Maternal Grandfather     Hypertension Paternal Grandmother     Heart Disease Paternal Grandfather     Heart Attack Paternal Grandfather        Medications:   Prior to Admission medications    Medication Sig Start Date End Date Taking? Authorizing Provider   amoxicillin (AMOXIL) 500 mg capsule Take 500 mg by mouth three (3) times daily. Yes Provider, Historical   nadolol (CORGARD) 40 mg tablet Take 1 Tab by mouth daily. 19  Yes Tom Shi MD   RESTASIS 0.05 % dpet INSTILL 1 DROP INTO BOTH EYES TWICE A DAY 19  Yes Ronald Berkowitz MD   fluticasone propionate (FLONASE) 50 mcg/actuation nasal spray instill 1 spray into each nostril  Patient taking differently: daily as needed. instill 1 spray into each nostril 19  Yes Kiley Corley MD   vit B complex no.12/niacin,B3, (VITAMIN B COMPLEX NO.12-NIACIN PO) Take 1 Tab by mouth daily. Yes Provider, Historical   famotidine (PEPCID) 20 mg tablet Take 1 Tab by mouth nightly. 7/15/19  Yes Fredi Campbell MD   cetirizine (ZYRTEC) 10 mg tablet Take 1 Tab by mouth daily.   Patient taking differently: Take 10 mg by mouth as needed. 3/22/19  Yes Dorothea Hamilton MD   omeprazole (PRILOSEC) 40 mg capsule Take 40 mg by mouth daily as needed. Yes Provider, Historical   cholecalciferol (VITAMIN D3) 1,000 unit tablet Take 1,000 Units by mouth daily. Yes Provider, Historical   albuterol (PROVENTIL VENTOLIN) 2.5 mg /3 mL (0.083 %) nebulizer solution 3 mL by Nebulization route every four (4) hours as needed for Wheezing. 4/5/19   Angela Coleman, NP   albuterol (PROVENTIL HFA, VENTOLIN HFA, PROAIR HFA) 90 mcg/actuation inhaler Take 1 Puff by inhalation every four (4) hours as needed for Wheezing. 11/26/18   Raymond Quezada MD   multivitamin (ONE A DAY) tablet Take 1 Tab by mouth daily. Provider, Historical   LINZESS 290 mcg cap capsule Take 290 mcg by mouth as needed. 11/10/15   Provider, Historical       Physical Exam:   General: alert, no distress   HEENT: Head: Normocephalic, no lesions, without obvious abnormality.    Heart: regular rate and rhythm, S1, S2 normal, no murmur, click, rub or gallop   Lungs: chest clear, no wheezing, rales, normal symmetric air entry   Abdominal: soft, NT/ND +BS   Neurological: Grossly normal   Extremities: extremities normal, atraumatic, no cyanosis or edema     Findings/Diagnosis: Dysphagia    Plan of Care/Planned Procedure: EGD with dilation

## 2020-02-27 NOTE — PERIOP NOTES
Permission received to review discharge instructions and discuss private health information with son/fiance  Patient states that family will be with them for at least 24 hours following today's procedure.

## 2020-02-27 NOTE — ANESTHESIA POSTPROCEDURE EVALUATION
Procedure(s):  ESOPHAGOGASTRODUODENOSCOPY (EGD)  ESOPHAGEAL DILATION  ESOPHAGOGASTRODUODENAL (EGD) BIOPSY.     total IV anesthesia, general    Anesthesia Post Evaluation      Multimodal analgesia: multimodal analgesia not used between 6 hours prior to anesthesia start to PACU discharge  Patient location during evaluation: PACU  Patient participation: complete - patient participated  Level of consciousness: awake and alert  Pain score: 0  Airway patency: patent  Anesthetic complications: no  Cardiovascular status: acceptable  Respiratory status: acceptable  Hydration status: acceptable  Post anesthesia nausea and vomiting:  none      Vitals Value Taken Time   /68 2/27/2020  8:10 AM   Temp 36.9 °C (98.5 °F) 2/27/2020  8:10 AM   Pulse 82 2/27/2020  8:10 AM   Resp 20 2/27/2020  8:10 AM   SpO2 100 % 2/27/2020  8:10 AM

## 2020-02-27 NOTE — ANESTHESIA PREPROCEDURE EVALUATION
Anesthetic History   No history of anesthetic complications            Review of Systems / Medical History  Patient summary reviewed, nursing notes reviewed and pertinent labs reviewed    Pulmonary          Shortness of breath      Comments: Chest CT negative   Neuro/Psych   Within defined limits           Cardiovascular                  Exercise tolerance: >4 METS  Comments: POT syndrome;controlled with diet & meds    01/20 Stress Test negative    11/19 ECHO= EF 57%   GI/Hepatic/Renal     GERD          Comments: Gastroparesis  Epigastric pain  Candida esophagitis Endo/Other  Within defined limits           Other Findings   Comments: Sjogren's syndrome           Physical Exam    Airway  Mallampati: I  TM Distance: 4 - 6 cm  Neck ROM: normal range of motion   Mouth opening: Normal     Cardiovascular    Rhythm: regular  Rate: abnormal      Pertinent negatives: No murmur   Dental  No notable dental hx       Pulmonary  Breath sounds clear to auscultation               Abdominal  GI exam deferred       Other Findings            Anesthetic Plan    ASA: 2  Anesthesia type: total IV anesthesia and general          Induction: Intravenous  Anesthetic plan and risks discussed with: Patient      Took BB at 6 am

## 2020-03-10 ENCOUNTER — TELEPHONE (OUTPATIENT)
Dept: CARDIOLOGY CLINIC | Age: 40
End: 2020-03-10

## 2020-03-10 NOTE — TELEPHONE ENCOUNTER
Not the best on it. Bp has been up a little at times and hear rate. .I have been on it sometime now and was thinking maybe i need a change. . I know i took atenonol 25 mg years ago for like 5 years and it kept my heart rates and all pretty good. So was just seeing about giving it a try again for awhile. If it doesnt do well i will switch back .      Thanks

## 2020-03-10 NOTE — TELEPHONE ENCOUNTER
----- Message from Tatiana Aldridge sent at 3/10/2020  8:47 AM EDT -----  Regarding: FW: Prescription Question  Contact: [phone]    ----- Message -----  From: Cassandra Morales  Sent: 3/9/2020   9:40 PM EDT  To: Mahsa Charlton Pool  Subject: Prescription Question                            Hi  I was on 25mg atenolol years ago and did well on it. I was wondering if i could make a switch off the nadalol to this for awile and see how i do.                            Thanks

## 2020-03-11 DIAGNOSIS — J06.9 UPPER RESPIRATORY TRACT INFECTION, UNSPECIFIED TYPE: ICD-10-CM

## 2020-03-12 RX ORDER — ALBUTEROL SULFATE 90 UG/1
1 AEROSOL, METERED RESPIRATORY (INHALATION)
Qty: 1 INHALER | Refills: 5 | Status: SHIPPED | OUTPATIENT
Start: 2020-03-12 | End: 2021-09-02

## 2020-03-13 ENCOUNTER — PATIENT MESSAGE (OUTPATIENT)
Dept: RHEUMATOLOGY | Age: 40
End: 2020-03-13

## 2020-03-13 RX ORDER — ATENOLOL 25 MG/1
25 TABLET ORAL DAILY
Qty: 30 TAB | Refills: 5 | Status: SHIPPED | OUTPATIENT
Start: 2020-03-13 | End: 2020-04-13

## 2020-03-13 NOTE — TELEPHONE ENCOUNTER
[employer] message sent with recommendation. Asked patient preferred pharmacy. Will send when patient responds.

## 2020-03-16 ENCOUNTER — DOCUMENTATION ONLY (OUTPATIENT)
Dept: RHEUMATOLOGY | Age: 40
End: 2020-03-16

## 2020-03-16 RX ORDER — MICONAZOLE 50 MG/1
1 TABLET BUCCAL DAILY
Qty: 14 EACH | Refills: 5 | Status: SHIPPED | OUTPATIENT
Start: 2020-03-16 | End: 2020-03-17 | Stop reason: CLARIF

## 2020-03-16 NOTE — TELEPHONE ENCOUNTER
From: Osbaldo Campbell  Sent: 3/15/2020 8:17 AM EDT  To: Elissa Lynch MD  Subject: RE: Prescription Question    Yes you are right im sorry. I had been using my Nystatin for my mouth which seems to help some but was seeing if you could resend that prescription for yeast to my pharmacy again.    Thanks

## 2020-03-17 RX ORDER — CLOTRIMAZOLE 10 MG/1
10 LOZENGE ORAL; TOPICAL
Qty: 35 TAB | Refills: 0 | Status: SHIPPED | OUTPATIENT
Start: 2020-03-17 | End: 2020-03-24

## 2020-04-06 ENCOUNTER — E-VISIT (OUTPATIENT)
Dept: FAMILY MEDICINE CLINIC | Age: 40
End: 2020-04-06

## 2020-04-06 DIAGNOSIS — J06.9 UPPER RESPIRATORY TRACT INFECTION, UNSPECIFIED TYPE: Primary | ICD-10-CM

## 2020-04-06 RX ORDER — AMOXICILLIN 875 MG/1
875 TABLET, FILM COATED ORAL 2 TIMES DAILY
Qty: 20 TAB | Refills: 0 | Status: SHIPPED | OUTPATIENT
Start: 2020-04-06 | End: 2020-04-16

## 2020-04-06 NOTE — TELEPHONE ENCOUNTER
E-visit note:    Patient submitted a questionnaire regarding sinus cough and cold symptoms. Patient reports that she was seen at the end of March at urgent care, was prescribed a Z-Tim and prednisone. Patient reports that she has had persistent symptoms including cough and nasal drainage. Patient also reports that she has been having hoarseness of her voice. Patient denies any fevers. Patient reports that azithromycin typically does not work well for her, is requesting an alternate antibiotic. After reviewing her chart noted that amoxicillin has worked for patient in the past, prescription was sent to her pharmacy on file and my chart message was sent.

## 2020-04-13 RX ORDER — ATENOLOL 25 MG/1
TABLET ORAL
Qty: 30 TAB | Refills: 5 | Status: SHIPPED | OUTPATIENT
Start: 2020-04-13 | End: 2020-08-05

## 2020-04-21 ENCOUNTER — TELEPHONE (OUTPATIENT)
Dept: FAMILY MEDICINE CLINIC | Age: 40
End: 2020-04-21

## 2020-04-21 RX ORDER — CETIRIZINE HCL 10 MG
10 TABLET ORAL DAILY
Qty: 90 TAB | Refills: 3 | Status: SHIPPED | OUTPATIENT
Start: 2020-04-21 | End: 2021-04-20 | Stop reason: SDUPTHER

## 2020-04-21 NOTE — TELEPHONE ENCOUNTER
----- Message from Sergo Bailey. Mari Olivas sent at 4/21/2020  8:54 AM EDT -----  Regarding: Prescription Question  Contact: [phone]  Nir Mrs. Corley  My prescription for Ceterzine was never forwarded from [facility] to [place] in [address] [facility] before they closed down. I was gonna see if you could send me that persription to [facility] in [address] [facility] for me. Thanks  Hope all is well for you and your family.

## 2020-05-04 RX ORDER — CYCLOSPORINE 0.5 MG/ML
EMULSION OPHTHALMIC
Qty: 60 EACH | Refills: 4 | Status: SHIPPED | OUTPATIENT
Start: 2020-05-04 | End: 2020-06-24

## 2020-05-06 NOTE — PROGRESS NOTES
REASON FOR VISIT    This is a follow-up visit for Ms. Jalen Altman for     ICD-10-CM   1. Sjogren's syndrome with keratoconjunctivitis sicca ([facility] M35.01     The patient has consented for synchronous (real-time) Telemedicine (audio-video technology) on 5/7/2020 for their care to be delivered over telemedicine in place of their regularly scheduled office visit pursuant to the emergency declaration under the [facility], [address] [place] [address] and the [employer] Act, this Virtual  Visit was conducted, with patient's consent, to reduce the patient's risk of exposure to COVID-19 and provide continuity of care for an established patient. Services were provided through a video synchronous discussion virtually to substitute for in-person clinic visit. Active problems include:    Patient Active Problem List   Diagnosis Code    Abdominal adhesions K66.0    POTS (postural orthostatic tachycardia syndrome) R00.0, I95.1    Chronic constipation K59.09    GERD (gastroesophageal reflux disease) K21.9    Sjogren's syndrome with keratoconjunctivitis sicca (HCC) M35.01    Gastroparesis K31.84    Irritable bowel syndrome with constipation K58.1    Long-term use of hydroxychloroquine Z79.899     HISTORY OF PRESENT ILLNESS    Ms. Jalen Altman returns for a follow-up visit. On her last visit, I continued Evoxac which she has been taking with good tolerance but as needed. I prescribed Oravig for oral thrush. She had esophageal dilatation in 2/2020. I referred [facility] records. She had an interval CT chest and nuclear stress test for dyspnea which were normal.     Today, she feels well. She no longer has oral sores like before.      She endorses dysphagia from esophageal narrowing, increased thirst.    She denies fever, weight loss, blurred vision, vision loss, oral ulcers, ankle swelling, dry cough, dyspnea, nausea, vomiting, abdominal pain, black or bloody stool, fall since last visit, rash and easy bruising. Last ophthalmology exam was 1/2019, who confirmed xerophthalmia. He is on Restasis. She felt better on it. Cesar Rain burned. She has punctate plugs but reports they were dissolvable. She does not wear contact lenses. She uses artifical tears up to 3 times a day. Last dental exam was 2019, which did not show an increase in caries. She does need water to swallow food or a cracker. She does drink a lot of water due to dryness.      The patient has not had any interval hospital admissions, infections, or surgeries. REVIEW OF SYSTEMS    A comprehensive review of systems was performed and pertinent results are documented in the HPI, review of systems is otherwise non-contributory. PAST MEDICAL HISTORY    She has a past medical history of Anemia, Family history of skin cancer, Gastroparesis, GERD (gastroesophageal reflux disease), H. pylori infection, H/O Clostridium difficile infection (98/5907), Helicobacter pylori (H. pylori) (05/2012), Palpitations, POTS (postural orthostatic tachycardia syndrome), Sjogren's syndrome ([facility] Sun-damaged skin, Sunburn, blistering, and Tanning bed exposure. FAMILY HISTORY    Her family history includes Cancer in her mother; Diabetes in her maternal grandfather and maternal grandmother; Heart Attack in her paternal grandfather; Heart Disease in her father, maternal grandmother, mother, and paternal grandfather; High Cholesterol in her father; Hypertension in her paternal grandmother; No Known Problems in her brother; Other in her maternal grandmother; Stroke in her maternal grandfather; Thyroid Disease in her mother and sister. SOCIAL HISTORY    She reports that she quit smoking about 6 years ago. Her smoking use included cigarettes. She has never used smokeless tobacco. She reports current alcohol use. She reports that she does not use drugs.     IMMUNIZATIONS  Immunization History   Administered Date(s) Administered    Influenza Vaccine 10/16/2013, 11/04/2018    Influenza Vaccine (Quad) 11/11/2014    Influenza Vaccine (Quad) PF 09/30/2015, 11/15/2018    TB Skin Test (PPD) Intradermal 01/27/2014    Tdap 11/11/2014, 08/14/2017       MEDICATIONS    Current Outpatient Medications   Medication Sig Dispense Refill    Miconazole (Oravig) 50 mg mabt 1 Mehnaz by Buccal route daily. 30 Each 5    cevimeline (EVOXAC) 30 mg capsule Take 1 Cap by mouth three (3) times daily for 360 days. 30 Cap 5    Restasis 0.05 % dpet INSTILL 1 DROP INTO BOTH EYES TWICE DAILY 60 Each 4    cetirizine (ZYRTEC) 10 mg tablet Take 1 Tab by mouth daily. 90 Tab 3    atenoloL (TENORMIN) 25 mg tablet TAKE 1 TABLET BY MOUTH DAILY 30 Tab 5    albuterol (PROVENTIL HFA, VENTOLIN HFA, PROAIR HFA) 90 mcg/actuation inhaler Take 1 Puff by inhalation every four (4) hours as needed for Wheezing. 1 Inhaler 5    fluticasone propionate (FLONASE) 50 mcg/actuation nasal spray instill 1 spray into each nostril (Patient taking differently: daily as needed. instill 1 spray into each nostril) 16 g 5    vit B complex no.12/niacin,B3, (VITAMIN B COMPLEX NO.12-NIACIN PO) Take 1 Tab by mouth daily.  famotidine (PEPCID) 20 mg tablet Take 1 Tab by mouth nightly. 90 Tab 3    albuterol (PROVENTIL VENTOLIN) 2.5 mg /3 mL (0.083 %) nebulizer solution 3 mL by Nebulization route every four (4) hours as needed for Wheezing. 24 Each 0    omeprazole (PRILOSEC) 40 mg capsule Take 40 mg by mouth daily as needed.  cholecalciferol (VITAMIN D3) 1,000 unit tablet Take 1,000 Units by mouth daily.  multivitamin (ONE A DAY) tablet Take 1 Tab by mouth daily.  LINZESS 290 mcg cap capsule Take 290 mcg by mouth as needed.   0        ALLERGIES    Allergies   Allergen Reactions    Celexa [Citalopram] Nausea and Vomiting    Ciprofloxacin Shortness of Breath    Diflucan [Fluconazole] Rash     Burning sensation to skin    Sulfa (Sulfonamide Antibiotics) Rash PHYSICAL EXAMINATION    There were no vitals taken for this visit. There is no height or weight on file to calculate BMI. General: Patient is alert, oriented x 3, not in acute distress    HEENT:   Sclerae are not injected and appear moist.  There is no alopecia. Chest:  Breathing comfortable at room air    DATA REVIEW    Laboratory     Recent laboratory results were reviewed, summarized, and discussed with the patient. Imaging    Musculoskeletal Ultrasound    None    Radiographs    KUB 11/29/2018: normal bowel gas pattern. The osseous structures are unremarkable. No pathologic calcification. Chest 11/02/2017: the lungs are clear. The central airways are patent. The heart size is normal. No pneumothorax or pleural effusion. CT Imaging    CT Chest with contrast 12/18/2019: THYROID: 11 mm hypodense nodule is noted in the right thyroid gland. MEDIASTINUM: No mass or lymphadenopathy. SHANNA: No mass or lymphadenopathy. THORACIC AORTA: No dissection or aneurysm. MAIN PULMONARY ARTERY: Normal in caliber. TRACHEA/BRONCHI: Patent. ESOPHAGUS: No wall thickening or dilatation. HEART: Normal in size. PLEURA: No effusion or pneumothorax. LUNGS: No nodule, mass, or airspace disease. INCIDENTALLY IMAGED UPPER ABDOMEN: Multiple hepatic cysts again demonstrated, reference 11 mm cyst right hepatic lobe. BONES: No destructive bone lesion. CT Sinuses without contrast showed The frontal sinuses and frontoethmoidal recesses are clear. The anterior ethmoid air cells are clear. The maxillary sinuses are clear. The maxillary sinus infundibula and ostiomeatal units are patent. The posterior ethmoid air cells and sphenoid sinus are clear. The nasal cavity is clear. The nasal septum is midline. There is no bone destruction or bone dehiscence of the paranasal sinuses. MR Imaging    None    DXA     None    GI Studies    Barium Swallow Esophagram 11/20/2018: The swallowing mechanism is intact.  There is no stricture extravasation or filling defect. Patient was unable to swallow the barium tablet.     NM Gastric Emptying Study 7/25/2012: markedly delayed gastric emptying. Ultrasonography    US abdomen 7/21/2018: the gallbladder is normal. There is no intrahepatic or extrahepatic biliary duct dilation. Common bile duct measures 3 mm. The liver is normal in size and echotexture without demonstration of mass lesion. Portal venous flow is normal. The pancreatic head appears normal and the right kidney is normal with length of 10.3 cm    PATHOLOGY    Stomach, biopsy 2/27/2020: Unremarkable gastric mucosa. Negative for increased inflammation  Duodenum, biopsy: Unremarkable duodenal mucosa. Negative for villous blunting or increased intraepithelial lymphocytes. Gastroesophageal junction, biopsy: Junctional mucosa with chronic reflux associated changes. Negative for intestinal metaplasia, dysplasia or malignancy  Esophagus, mid, biopsy: Unremarkable squamous mucosa. Negative for esophagitis or increased intraepithelial eosinophils     Small bowel, duodenum, biopsy 6/21/2018: No histopathologic abnormality. Stomach, biopsy: Mild chronic gastritis. Esophagus, mid, biopsy: No histopathologic abnormality.      ASSESSMENT AND PLAN    This is a follow-up visit for Ms. Christy Doe. 1) Sjogren's Syndrome. (xerostomia, xerophthalmia, autonomic dysfunction (POTS, gastroparesis)). Her labs did not show findings today concerning for lymphoma. She denies constitutional B symptoms concerning for lymphoma. I explained that interstitial lung disease may occur in Sjogren's patients but her CT chest was normal.    I refilled Oravig lozenges which helps her oral sores from thrush. She has not been using Evoxac regularly but uses it as needed. I refilled it. She is using Restasis with benefit. 2) Oral Thrush. This improved Oravig. I refilled it. 3) Gastroparesis with Esophageal Stricture.   I defer to GI, but she may benefit form motility agents. 4) UGI Issues, IBS with Constipation. She is on Linzess. She follows GI and will have an EGD soon. 5) POTS. This may also be associated with gastroparesis and irritable bowel syndrome. She does not have hypermobility to suggest Sarah Danlos Syndrome. She is on nadolol. I defer to cardiology. The patient voiced understanding of the aforementioned assessment and plan. The patient has consented for synchronous (real-time) Telemedicine (audio-video technology) on 5/7/2020 for their care to be delivered over telemedicine in place of their regularly scheduled office visit pursuant to the emergency declaration under the [facility], [facility] waiver authority and the [employer] Act, this Virtual  Visit was conducted, with patient's consent, to reduce the patient's risk of exposure to COVID-19 and provide continuity of care for an established patient. Services were provided through a video synchronous discussion virtually to substitute for in-person clinic visit.     TODAY'S ORDERS    Orders Placed This Encounter    Miconazole (Oravig) 50 mg mabt    cevimeline (EVOXAC) 30 mg capsule     Future Appointments   Date Time Provider Addis Caroline   6/18/2020  1:20 PM Juan José Lucas MD [address] Shellie Devlin MD, [address]    Adult Rheumatology   Rheumatology Ultrasound Certified  [facility]  A Part of [address]   Phone [phone]  Fax [phone]

## 2020-05-07 ENCOUNTER — VIRTUAL VISIT (OUTPATIENT)
Dept: RHEUMATOLOGY | Age: 40
End: 2020-05-07

## 2020-05-07 DIAGNOSIS — K31.84 GASTROPARESIS: ICD-10-CM

## 2020-05-07 DIAGNOSIS — M35.01 SJOGREN'S SYNDROME WITH KERATOCONJUNCTIVITIS SICCA (HCC): Primary | ICD-10-CM

## 2020-05-07 DIAGNOSIS — B37.0 ORAL THRUSH: ICD-10-CM

## 2020-05-07 RX ORDER — MICONAZOLE 50 MG/1
1 TABLET BUCCAL DAILY
Qty: 30 EACH | Refills: 5 | Status: SHIPPED | OUTPATIENT
Start: 2020-05-07 | End: 2020-06-25

## 2020-05-07 RX ORDER — CEVIMELINE HYDROCHLORIDE 30 MG/1
30 CAPSULE ORAL 3 TIMES DAILY
Qty: 30 CAP | Refills: 5 | Status: SHIPPED | OUTPATIENT
Start: 2020-05-07 | End: 2021-04-02 | Stop reason: SDUPTHER

## 2020-05-11 ENCOUNTER — E-VISIT (OUTPATIENT)
Dept: FAMILY MEDICINE CLINIC | Age: 40
End: 2020-05-11

## 2020-05-11 DIAGNOSIS — L02.419 AXILLARY ABSCESS: Primary | ICD-10-CM

## 2020-05-11 RX ORDER — CEPHALEXIN 500 MG/1
500 CAPSULE ORAL 3 TIMES DAILY
Qty: 30 CAP | Refills: 0 | Status: SHIPPED | OUTPATIENT
Start: 2020-05-11 | End: 2020-05-21

## 2020-05-11 NOTE — TELEPHONE ENCOUNTER
Gonzalo Thao (1980) initiated an asynchronous digital communication through [address] [place] [address]. HPI: per patient questionnaire     Exam: not applicable    Diagnoses and all orders for this visit:  Diagnoses and all orders for this visit:    1. Axillary abscess  -     cephALEXin (KEFLEX) 500 mg capsule; Take 1 Cap by mouth three (3) times daily for 10 days. Time: EV2 - 11-20 minutes were spent on the digital evaluation and management of this patient.        Triny Wayne MD

## 2020-05-29 NOTE — DISCHARGE INSTRUCTIONS
Dr Capone: please advise.    Josef Sol  [id number]  1980    EGD DISCHARGE INSTRUCTIONS  Discomfort:  Sore throat- throat lozenges or warm salt water gargle  redness at IV site- apply warm compress to area; if redness or soreness persist- contact your physician  Gaseous discomfort- walking, belching will help relieve any discomfort  You may not operate a vehicle for 12 hours  You may not engage in an occupation involving machinery or appliances for rest of today. You may not drink alcoholic beverages for at least 12 hours  Avoid making any critical decisions for at least 24 hour  DIET  You may resume your regular diet - however -  remember your colon is empty and a heavy meal will produce gas. Avoid these foods:  vegetables, fried / greasy foods, carbonated drinks  MEDICATIONS        Regarding Aspirin or Nonsteroidal medications specifically, please see below. ACTIVITY  You may resume your normal daily activities. Spend the remainder of the day resting -  avoid any strenuous activity. CALL M.D. ANY SIGN OF   Increasing pain, nausea, vomiting  Abdominal distension (swelling)  New increased bleeding (oral or rectal)  Fever (chills)  Pain in chest area  Bloody discharge from nose or mouth  Shortness of breath    ONLY  Tylenol as needed for pain. Follow-up Instructions:   Call Dr. Van Villalta for results of procedure / biopsy in 4-5 days at telephone #  [phone]              Continue medications for acid reflux: Omeprazole and Famotidine. Use Sucralfate as needed           DO NOT TAKE SLEEPING MEDICATIONS OR ANTIANXIETY MEDICATIONS WHILE TAKING NARCOTIC PAIN MEDICATIONS,  ESPECIALLY THE NIGHT OF ANESTHESIA. CPAP PATIENTS BE SURE TO WEAR MACHINE WHENEVER NAPPING OR SLEEPING.     DISCHARGE SUMMARY from Nurse    The following personal items collected during your admission are returned to you:   Dental Appliance: Dental Appliances: None  Vision: Visual Aid: None  Hearing Aid:    Jewelry:    Clothing:    Other Valuables: Valuables sent to safe:        PATIENT INSTRUCTIONS:    Anesthesia Discharge Instructions for Procedural Area requiring Sedation (MAC Anesthesia, Cath Lab, Endo and Radiology): You have been given medications during your procedure that may affect your memory and mental judgement for the next 24 hours. During this time frame for your safety, please follow the instructions listed below :    Have a responsible adult to drive you home and be with you for at least 12 hours.  Rest today and resume normal activities tomorrow.  Start with a soft bland diet and advance as tolerated to your recommended diet.  Do not drive any motor vehicle or operate mechanical or electrical equipment prior to [employer].  Avoid making critical decisions or signing legal documents prior to 6am tomorrow.  Do not drink alcohol prior to 6am tomorrow.  If you have sleep apnea and you plan to go home and take a nap, please use your CPAP machine not only at bedtime, but also while napping for 24 hours.  If you notice any redness or swelling on parts of your body where IV medications were given, place a warm wet washcloth over the area for 20 minutes at a time until the redness or swelling goes away. If you still have redness or swelling after 2-3 days, please call us. · You will receive a Post Operative Call from one of the Recovery Room Nurses on the day after your surgery to check on you. It is very important for us to know how you are recovering after your surgery. If you have an issue or need to speak with someone, please call your surgeon, do not wait for the post operative call. · You may receive an e-mail or letter in the mail from [facility] regarding your experience with us in the Ambulatory Surgery Unit. Your feedback is valuable to us and we appreciate your participation in the survey.        · If the above instructions are not adequate, please contact GERRI Owens, RN Perianesthesia Manager at [phone]. If you are having problems after your surgery, call the physician at his office number. · We wish you a speedy recovery ? What to do at Home:      *  Please give a list of your current medications to your Primary Care Provider. *  Please update this list whenever your medications are discontinued, doses are      changed, or new medications (including over-the-counter products) are added. *  Please carry medication information at all times in case of emergency situations. If you have not received your influenza and/or pneumococcal vaccine, please follow up with your primary care physician. The discharge information has been reviewed with the patient and caregiver. The patient and caregiver verbalized understanding.

## 2020-06-24 RX ORDER — CYCLOSPORINE 0.5 MG/ML
EMULSION OPHTHALMIC
Qty: 30 EACH | Refills: 5 | Status: SHIPPED | OUTPATIENT
Start: 2020-06-24 | End: 2021-07-02

## 2020-06-25 ENCOUNTER — VIRTUAL VISIT (OUTPATIENT)
Dept: CARDIOLOGY CLINIC | Age: 40
End: 2020-06-25

## 2020-06-25 DIAGNOSIS — R53.83 FATIGUE, UNSPECIFIED TYPE: ICD-10-CM

## 2020-06-25 DIAGNOSIS — R06.02 SOB (SHORTNESS OF BREATH): Primary | ICD-10-CM

## 2020-06-25 DIAGNOSIS — M35.01 SJOGREN'S SYNDROME WITH KERATOCONJUNCTIVITIS SICCA (HCC): ICD-10-CM

## 2020-06-25 DIAGNOSIS — G90.A POTS (POSTURAL ORTHOSTATIC TACHYCARDIA SYNDROME): ICD-10-CM

## 2020-06-25 NOTE — PROGRESS NOTES
Verified patient with two types of identifiers. Scheduled patient a 6 month follow up. Patient verbalized understanding and will call with any other questions. Per Dr Valarie Mendoza discontinued all medications not taken.

## 2020-06-25 NOTE — PROGRESS NOTES
Cardiac Electrophysiology TELEPHONE VIRTUAL VISIT Note   Pursuant to the emergency declaration under the [facility], [facility] waiver authority and the [employer] Act, this Virtual  Visit was conducted, with patient's consent, to reduce the patient's risk of exposure to COVID-19 and provide continuity of care for an established patient. Services were provided through a video/audio synchronous discussion virtually to substitute for in-person clinic visit. Subjective:      Alysha Weiss is a 47 y.o. female patient who is addressed virtually via synchronous audio/video DOXY. ME for follow up, has history of tilt table test positive for POTS. She said beta blocker works fine but she also noted dyspnea & fatigue for quite a while, but had normal nuclear stress test in 01/2020. She switched from nadolol to atenolol earlier this year for BP control. She is compliant with compression stockings. She denies lightheadedness or syncope. No chest pain, PND, orthopnea, or edema. She continues to note \"dryness\" throughout, associates this with Sjogren's. She was seen [facility] in [facility]. CT chest with contrast without acute abnormality in 12/2019. She said PFT was \" ok\" and had been given albuterol and long acting inhaler after she could not tolerate Breo  She said she has not used much yet    Previous:  Nuclear stress (01/10/2020): LVEF 78%. Normal myocardial perfusion imaging. Echo (11/04/2019): LVEF 57%, no RWMA. Trace TR, PASP 25.8 mmHg. Stress echo (09/08/2016): Normal LV function, RWMA. No previous syncope, but many near syncopal episodes. Activity & exercise are triggers for tachy episodes. Metoprolol caused fatigue. Disliked Inderal.    Abdominal & CXR, barium swallow normal in 11/2018. Underwent esophageal dilation 02/2020.     Diagnosed & treated for Sjogren's by  Vinny Son    Neurologist is Dr. Maria G Nam. Normal thyroid biopsy in 2017. Patient Active Problem List    Diagnosis Date Noted    Long-term use of hydroxychloroquine 02/14/2019    Sjogren's syndrome with keratoconjunctivitis sicca ([facility] [address] 08/07/2018    Gastroparesis 08/07/2018    Irritable bowel syndrome with constipation 08/07/2018    Chronic constipation 09/01/2017    GERD (gastroesophageal reflux disease) 09/01/2017    POTS (postural orthostatic tachycardia syndrome) 01/20/2017    Abdominal adhesions 05/04/2012     Current Outpatient Medications   Medication Sig Dispense Refill    Restasis 0.05 % dpet INSTILL 1 DROP IN BOTH EYES TWICE DAILY 30 Each 5    Miconazole (Oravig) 50 mg mabt 1 Mehnaz by Buccal route daily. 30 Each 5    cevimeline (EVOXAC) 30 mg capsule Take 1 Cap by mouth three (3) times daily for 360 days. 30 Cap 5    cetirizine (ZYRTEC) 10 mg tablet Take 1 Tab by mouth daily. 90 Tab 3    atenoloL (TENORMIN) 25 mg tablet TAKE 1 TABLET BY MOUTH DAILY 30 Tab 5    albuterol (PROVENTIL HFA, VENTOLIN HFA, PROAIR HFA) 90 mcg/actuation inhaler Take 1 Puff by inhalation every four (4) hours as needed for Wheezing. 1 Inhaler 5    fluticasone propionate (FLONASE) 50 mcg/actuation nasal spray instill 1 spray into each nostril (Patient taking differently: daily as needed. instill 1 spray into each nostril) 16 g 5    vit B complex no.12/niacin,B3, (VITAMIN B COMPLEX NO.12-NIACIN PO) Take 1 Tab by mouth daily.  famotidine (PEPCID) 20 mg tablet Take 1 Tab by mouth nightly. 90 Tab 3    albuterol (PROVENTIL VENTOLIN) 2.5 mg /3 mL (0.083 %) nebulizer solution 3 mL by Nebulization route every four (4) hours as needed for Wheezing. 24 Each 0    omeprazole (PRILOSEC) 40 mg capsule Take 40 mg by mouth daily as needed.  cholecalciferol (VITAMIN D3) 1,000 unit tablet Take 1,000 Units by mouth daily.  multivitamin (ONE A DAY) tablet Take 1 Tab by mouth daily.       LINZESS 290 mcg cap capsule Take 290 mcg by mouth as needed. 0     Allergies   Allergen Reactions    Celexa [Citalopram] Nausea and Vomiting    Ciprofloxacin Shortness of Breath    Diflucan [Fluconazole] Rash     Burning sensation to skin    Sulfa (Sulfonamide Antibiotics) Rash     Past Medical History:   Diagnosis Date    Anemia     taking iron    Family history of skin cancer     sister has melanoma,     Gastroparesis     GERD (gastroesophageal reflux disease)     gastroparesis--h-pylori    H. pylori infection     H/O Clostridium difficile infection     Helicobacter pylori (H. pylori) 2012    h pylori    Palpitations       - cardiac workup per pt.  POTS (postural orthostatic tachycardia syndrome)     [address] Jorden Spatz, [address]  Reports tx increased fluid and NA intake, betablocker    Sjogren's syndrome (HCC)     Sun-damaged skin     21's    Sunburn, blistering     8-9     Tanning bed exposure     11 years ago      Past Surgical History:   Procedure Laterality Date    HX COLONOSCOPY      HX DILATION AND CURETTAGE  2012    HX ENDOSCOPY      HX GYN      tubal reversal laparoscopic    HX TUBAL LIGATION      IR DILATE ESOPH STRICT      TILT TABLE EVAL W/WO DRUG  2017          Family History   Problem Relation Age of Onset    Heart Disease Mother     Cancer Mother         Thyroid    Thyroid Disease Mother     Heart Disease Father     High Cholesterol Father     Heart Disease Maternal Grandmother     Diabetes Maternal Grandmother     Other Maternal Grandmother         Heomochromatosis    Thyroid Disease Sister     No Known Problems Brother     Stroke Maternal Grandfather     Diabetes Maternal Grandfather     Hypertension Paternal Grandmother     Heart Disease Paternal Grandfather     Heart Attack Paternal Grandfather      Social History     Tobacco Use    Smoking status: Former Smoker     Types: Cigarettes     Last attempt to quit: 2013     Years since quittin.5    Smokeless tobacco: Never Used   Substance Use Topics    Alcohol use: Yes     Alcohol/week: 0.0 standard drinks     Comment: not monthly        Review of Systems:   Constitutional: Negative for fever, chills, weight loss + fatigue  HEENT: Negative for nosebleeds, vision changes. Respiratory: Negative for cough, hemoptysis, sputum production, and wheezing. Cardiovascular: Negative for chest pain, palpitations, no orthopnea, claudication, leg swelling, syncope, and PND. + SOB  Gastrointestinal: Negative for nausea, vomiting, diarrhea, constipation, blood in stool and melena. + gastroparesis. Occasional dysphagia. Genitourinary: Negative for dysuria, and hematuria. Musculoskeletal: Negative for myalgias, + arthralgia. Skin: occasional rash. Heme: Does not bleed or bruise easily. Neurological: Negative for speech change and focal weakness     Objective:     Due to this being a TeleHealth evaluation, many elements of the physical examination are unable to be assessed. Due to this being a TeleHealth evaluation, many elements of the physical examination are unable to be assessed. General: Well developed, in no acute distress, cooperative and alert  HEENT: Pupils equal/round. No marked JVD visible on video. Respiratory: No audible wheezing, no signs of respiratory distress, lips non cyanotic  Extremities:  No edema  Neuro: A&Ox3, speech clear, no facial droop, answering questions appropriately  Skin: Skin color is normal. Non diaphoretic on visible skin during exam    Assessment/Plan:       ICD-10-CM ICD-9-CM    1. SOB (shortness of breath) R06.02 786.05    2. Fatigue, unspecified type R53.83 780.79    3. Sjogren's syndrome with keratoconjunctivitis sicca (HCC) M35.01 710.2    4. POTS (postural orthostatic tachycardia syndrome) I49.8 427.89      Ms. Jalen Altman states palpitations associated with POTS are adequately controlled with atenolol & compression stockings.     SOB, had normal echo in 2019, no pulmonary hypertension. Nuclear stress test was negative I 01/2020. Under care of [facility]. Chest CT with contrast was essentially normal in 12/2019. She will try the inhalers    Follow up in EP clinic in 6-12 months    Future Appointments   Date Time Provider Addis Velazquez   6/25/2020  4:30 PM Nasir Odell  [address]   5/7/2021 10:00 AM Emelyn Velez MD [facility]       We discussed the expected course, resolution and complications of the diagnosis(es) in detail. Medication risks, benefits, costs, interactions, and alternatives were discussed as indicated. I advised her to contact the office if her condition worsens, changes or fails to improve as anticipated. She expressed understanding with the diagnosis(es) and plan. Patient was made aware and verbalized understanding that an appointment will be scheduled for them for a virtual visit and/or office visit within the above time frame. Patient understanding his/her responsibility to call and change time/date if he/she so chooses. Thank you for involving me in this patient's care and please call with further concerns or questions. Rosaura Rivera M.D.   Electrophysiology/Cardiology  [facility] [place]  [address], [address] , [address]  [address]  [phone] No

## 2020-07-27 ENCOUNTER — VIRTUAL VISIT (OUTPATIENT)
Dept: FAMILY MEDICINE CLINIC | Age: 40
End: 2020-07-27

## 2020-07-27 DIAGNOSIS — G90.A POTS (POSTURAL ORTHOSTATIC TACHYCARDIA SYNDROME): ICD-10-CM

## 2020-07-27 DIAGNOSIS — K31.84 GASTROPARESIS: ICD-10-CM

## 2020-07-27 DIAGNOSIS — M35.01 SJOGREN'S SYNDROME WITH KERATOCONJUNCTIVITIS SICCA (HCC): Primary | ICD-10-CM

## 2020-07-27 RX ORDER — BUDESONIDE AND FORMOTEROL FUMARATE DIHYDRATE 80; 4.5 UG/1; UG/1
AEROSOL RESPIRATORY (INHALATION)
COMMUNITY
Start: 2020-06-25 | End: 2021-11-02 | Stop reason: CLARIF

## 2020-07-27 NOTE — PROGRESS NOTES
Chief Complaint   Patient presents with    Numbness     in face     Shortness of Breath     Health Maintenance reviewed     1. Have you been to the ER, urgent care clinic since your last visit? Hospitalized since your last visit? No     2. Have you seen or consulted any other health care providers outside of the [address] [place] [address] since your last visit? Include any pap smears or colon screening.   Yes, on file

## 2020-07-27 NOTE — PROGRESS NOTES
Chief Complaint   Patient presents with    Numbness     in face     Shortness of Breath     Pt was seen via doxy. me video visit. Patient reports that she has been having ongoing numbness of her face no shortness of breath. Both are intermittent and come and go without much warning. Patient has a history of pots, Sjogren's and gastroparesis. Patient was seen by her cardiologist who did a nuclear stress test.  No significant findings per patient. Patient was then referred to a pulmonologist who did pulmonary function testing and a CT scan. Patient reports that both were normal however she was placed on Symbicort to see if this helps with her shortness of breath symptoms. Patient has been on this medication for approximately 1 month with no significant change in her symptoms. Patient reports that she never wheezes, always has a normal O2 when checked both the doctors offices and at home. Patient has previously had a normal sinus CT, normal an MRI of her neck. Patient is also seen a neurologist, no answer was found for her facial numbness other than is possibly related to her Sjogren's. Patient was considering getting a second opinion for neurology however is not sure if this would be helpful. Nga Bowen is a 47 y.o. female who was seen by synchronous (real-time) audio-video technology on 7/27/2020 for Numbness (in face ) and Shortness of Breath        Assessment & Plan:   Diagnoses and all orders for this visit:    1. Sjogren's syndrome with keratoconjunctivitis sicca ([facility] [address]    2. POTS (postural orthostatic tachycardia syndrome)    3. Gastroparesis      Discussed with patient at length that given her extensive testing and work-up that her symptoms are likely a combination of her known illnesses including Sjogren's, pots and gastroparesis.   Discussed with patient the role that stress plays on exacerbating these issues, and the need to control her current symptoms to help with overall health and wellbeing. Patient agrees and will monitor at this time. Subjective:       Prior to Admission medications    Medication Sig Start Date End Date Taking? Authorizing Provider   Restasis 0.05 % dpet INSTILL 1 DROP IN BOTH EYES TWICE DAILY 6/24/20  Yes Kelle Larson MD   [facility]) 30 mg capsule Take 1 Cap by mouth three (3) times daily for 360 days. 5/7/20 5/2/21 Yes Kelle Larson MD   cetirizine (ZYRTEC) 10 mg tablet Take 1 Tab by mouth daily. 4/21/20  Yes sIaiah Corley MD   atenoloL (TENORMIN) 25 mg tablet TAKE 1 TABLET BY MOUTH DAILY 4/13/20  Yes Matthew ENNIS NP   albuterol (PROVENTIL HFA, VENTOLIN HFA, PROAIR HFA) 90 mcg/actuation inhaler Take 1 Puff by inhalation every four (4) hours as needed for Wheezing. 3/12/20  Yes Isaiah Corley MD   fluticasone propionate (FLONASE) 50 mcg/actuation nasal spray instill 1 spray into each nostril  Patient taking differently: daily as needed. instill 1 spray into each nostril 11/4/19  Yes Isaiah Corley MD   vit B complex no.12/niacin,B3, (VITAMIN B COMPLEX NO.12-NIACIN PO) Take 1 Tab by mouth daily. Yes Provider, Historical   famotidine (PEPCID) 20 mg tablet Take 1 Tab by mouth nightly. 7/15/19  Yes Alie Gallagher MD   albuterol (PROVENTIL VENTOLIN) 2.5 mg /3 mL (0.083 %) nebulizer solution 3 mL by Nebulization route every four (4) hours as needed for Wheezing. 4/5/19  Yes Angela Coleman NP   omeprazole (PRILOSEC) 40 mg capsule Take 40 mg by mouth daily as needed. Yes Provider, Historical   cholecalciferol (VITAMIN D3) 1,000 unit tablet Take 1,000 Units by mouth daily. Yes Provider, Historical   multivitamin (ONE A DAY) tablet Take 1 Tab by mouth daily. Yes Provider, Historical   LINZESS 290 mcg cap capsule Take 290 mcg by mouth as needed.  11/10/15  Yes Provider, Historical   Symbicort 80-4.5 mcg/actuation HFAA INHALE 2 PUFFS BID RINSE MOUTH AFTER USE 6/25/20   Provider, Historical     Patient Active Problem List   Diagnosis Code    Abdominal adhesions K66.0    POTS (postural orthostatic tachycardia syndrome) I49.8    Chronic constipation K59.09    GERD (gastroesophageal reflux disease) K21.9    Sjogren's syndrome with keratoconjunctivitis sicca (HCC) M35.01    Gastroparesis K31.84    Irritable bowel syndrome with constipation K58.1    Long-term use of hydroxychloroquine Z79.899       Review of Systems   Constitutional: Positive for malaise/fatigue. Negative for chills and fever. HENT: Negative for congestion and sore throat. Respiratory: Positive for shortness of breath. Negative for cough. Cardiovascular: Negative for chest pain and palpitations. Gastrointestinal: Negative for abdominal pain, heartburn, nausea and vomiting. Genitourinary: Negative for dysuria and urgency. Musculoskeletal: Negative for joint pain and myalgias. Neurological: Positive for tingling, sensory change and headaches. Negative for dizziness. All other systems reviewed and are negative.       Objective:     Patient-Reported Vitals 7/27/2020   Patient-Reported Weight 145 lbs   Patient-Reported Height 5 ft 4 in        [INSTRUCTIONS:  \"[x]\" Indicates a positive item  \"[]\" Indicates a negative item  -- DELETE ALL ITEMS NOT EXAMINED]    Constitutional: [x] Appears well-developed and well-nourished [x] No apparent distress      [] Abnormal -     Mental status: [x] Alert and awake  [x] Oriented to person/place/time [x] Able to follow commands    [] Abnormal -     Eyes:   EOM    [x]  Normal    [] Abnormal -   Sclera  [x]  Normal    [] Abnormal -          Discharge [x]  None visible   [] Abnormal -     HENT: [x] Normocephalic, atraumatic  [] Abnormal -   [x] Mouth/Throat: Mucous membranes are moist    External Ears [x] Normal  [] Abnormal -    Neck: [x] No visualized mass [] Abnormal -     Pulmonary/Chest: [x] Respiratory effort normal   [x] No visualized signs of difficulty breathing or respiratory distress        [] Abnormal - Musculoskeletal:   [x] Normal gait with no signs of ataxia         [x] Normal range of motion of neck        [] Abnormal -     Neurological:        [x] No Facial Asymmetry (Cranial nerve 7 motor function) (limited exam due to video visit)          [x] No gaze palsy        [] Abnormal -          Skin:        [x] No significant exanthematous lesions or discoloration noted on facial skin         [] Abnormal -            Psychiatric:       [x] Normal Affect [] Abnormal -        [x] No Hallucinations    Other pertinent observable physical exam findings:-        We discussed the expected course, resolution and complications of the diagnosis(es) in detail. Medication risks, benefits, costs, interactions, and alternatives were discussed as indicated. I advised her to contact the office if her condition worsens, changes or fails to improve as anticipated. She expressed understanding with the diagnosis(es) and plan. Isis Ghosh, who was evaluated through a patient-initiated, synchronous (real-time) audio-video encounter, and/or her healthcare decision maker, is aware that it is a billable service, with coverage as determined by her insurance carrier. She provided verbal consent to proceed: Yes, and patient identification was verified. It was conducted pursuant to the emergency declaration under the [address] [place] [address], [address] [place] [address] authority and the [employer] Act. A caregiver was present when appropriate. Ability to conduct physical exam was limited. I was in the office. The patient was at home.       Tylor Bray MD

## 2020-08-05 RX ORDER — FAMOTIDINE 20 MG/1
20 TABLET, FILM COATED ORAL
Qty: 90 TAB | Refills: 3 | Status: SHIPPED | OUTPATIENT
Start: 2020-08-05 | End: 2021-09-03 | Stop reason: SDUPTHER

## 2020-08-05 RX ORDER — FLUTICASONE PROPIONATE 50 MCG
SPRAY, SUSPENSION (ML) NASAL
Qty: 16 G | Refills: 5 | Status: SHIPPED | OUTPATIENT
Start: 2020-08-05 | End: 2021-09-03 | Stop reason: SDUPTHER

## 2020-08-05 RX ORDER — ATENOLOL 25 MG/1
TABLET ORAL
Qty: 30 TAB | Refills: 5 | Status: SHIPPED | OUTPATIENT
Start: 2020-08-05 | End: 2021-04-23

## 2020-08-05 NOTE — TELEPHONE ENCOUNTER
Abigail is requesting a 90 day supply on med      Requested Prescriptions     Pending Prescriptions Disp Refills    fluticasone propionate (FLONASE) 50 mcg/actuation nasal spray 16 g 5     Sig: instill 1 spray into each nostril

## 2020-08-05 NOTE — TELEPHONE ENCOUNTER
Refill requested for atenolol 25 mg tabs. Refill authorized.     Future Appointments   Date Time Provider [facility]   12/29/2020  3:00 PM MD SHAISTA Barclay BS AMB   5/7/2021 10:00 AM Elizabeth Christian MD AO BS AMB

## 2020-08-24 ENCOUNTER — HOSPITAL ENCOUNTER (OUTPATIENT)
Dept: ULTRASOUND IMAGING | Age: 40
Discharge: HOME | End: 2020-08-24
Attending: OTOLARYNGOLOGY
Payer: MEDICAID

## 2020-08-24 DIAGNOSIS — D34 THYROID ADENOMA: ICD-10-CM

## 2020-08-24 PROCEDURE — 76536 US EXAM OF HEAD AND NECK: CPT

## 2020-09-25 ENCOUNTER — E-VISIT (OUTPATIENT)
Dept: FAMILY MEDICINE CLINIC | Age: 40
End: 2020-09-25
Payer: MEDICAID

## 2020-09-25 ENCOUNTER — TELEPHONE (OUTPATIENT)
Dept: FAMILY MEDICINE CLINIC | Age: 40
End: 2020-09-25

## 2020-09-25 DIAGNOSIS — M94.0 COSTOCHONDRITIS: Primary | ICD-10-CM

## 2020-09-25 PROCEDURE — 99422 OL DIG E/M SVC 11-20 MIN: CPT | Performed by: FAMILY MEDICINE

## 2020-09-25 RX ORDER — PREDNISONE 5 MG/1
TABLET ORAL
Qty: 21 TAB | Refills: 0 | Status: SHIPPED | OUTPATIENT
Start: 2020-09-25 | End: 2020-09-25

## 2020-09-25 RX ORDER — METHYLPREDNISOLONE 4 MG/1
TABLET ORAL
Qty: 1 DOSE PACK | Refills: 0 | Status: SHIPPED | OUTPATIENT
Start: 2020-09-25 | End: 2020-11-17 | Stop reason: ALTCHOICE

## 2020-09-26 NOTE — TELEPHONE ENCOUNTER
----- Message from Leia Del Rosario sent at 9/25/2020  3:24 PM EDT -----  Regarding: Prescription Question  Contact: [phone]  Hi I am so sorry to bother you again. The prescription you sent for prednisone I am unable to take. I have always been put on Methylprednisone taper pack. Regular prednisone makes my heart rate skyrocket so I havent used it since then because of my POTS.

## 2020-10-27 ENCOUNTER — E-VISIT (OUTPATIENT)
Dept: FAMILY MEDICINE CLINIC | Age: 40
End: 2020-10-27
Payer: MEDICAID

## 2020-10-27 DIAGNOSIS — R30.0 DYSURIA: Primary | ICD-10-CM

## 2020-10-27 PROCEDURE — 99422 OL DIG E/M SVC 11-20 MIN: CPT | Performed by: FAMILY MEDICINE

## 2020-10-28 RX ORDER — AMOXICILLIN 875 MG/1
875 TABLET, FILM COATED ORAL 2 TIMES DAILY
Qty: 20 TAB | Refills: 0 | Status: SHIPPED | OUTPATIENT
Start: 2020-10-28 | End: 2020-11-07

## 2020-10-28 RX ORDER — NITROFURANTOIN 25; 75 MG/1; MG/1
100 CAPSULE ORAL 2 TIMES DAILY
Qty: 14 CAP | Refills: 0 | Status: SHIPPED | OUTPATIENT
Start: 2020-10-28 | End: 2020-11-04

## 2020-10-28 NOTE — TELEPHONE ENCOUNTER
Nay France (1980) initiated an asynchronous digital communication through [address] [place] [address]. HPI: per patient questionnaire Exam: not applicable Diagnoses and all orders for this visit: 
Diagnoses and all orders for this visit: 1. Dysuria 
-     nitrofurantoin, macrocrystal-monohydrate, (Macrobid) 100 mg capsule; Take 1 Cap by mouth two (2) times a day for 7 days. Time: EV2 - 11-20 minutes were spent on the digital evaluation and management of this patient.   
 
 
Matheus Cedeno MD

## 2020-11-10 ENCOUNTER — TELEPHONE (OUTPATIENT)
Dept: CARDIOLOGY CLINIC | Age: 40
End: 2020-11-10

## 2020-11-10 NOTE — TELEPHONE ENCOUNTER
Returned patient call. Verified patient Id [id number]. Rescheduled patient in-office visit to a VV on 11/17/2020, as requested.     Future Appointments   Date Time Provider Addis Velazquez   11/16/2020 10:40 AM DO ANGELICA Sellers BS AMB   11/17/2020  4:50 PM MD SHAISTA Rios BS AMB   5/7/2021 10:00 AM Bren Christian MD AO BS AMB

## 2020-11-10 NOTE — TELEPHONE ENCOUNTER
Patient calling to reschedule appointment tomorrow with Dr. Jose Cain. Patient might want to change to VV. Please advise.       Best # [phone]

## 2020-11-17 ENCOUNTER — VIRTUAL VISIT (OUTPATIENT)
Dept: CARDIOLOGY CLINIC | Age: 40
End: 2020-11-17
Payer: MEDICAID

## 2020-11-17 DIAGNOSIS — R06.02 SOB (SHORTNESS OF BREATH): ICD-10-CM

## 2020-11-17 DIAGNOSIS — R53.83 FATIGUE, UNSPECIFIED TYPE: ICD-10-CM

## 2020-11-17 DIAGNOSIS — M35.01 SJOGREN'S SYNDROME WITH KERATOCONJUNCTIVITIS SICCA (HCC): ICD-10-CM

## 2020-11-17 DIAGNOSIS — G90.A POTS (POSTURAL ORTHOSTATIC TACHYCARDIA SYNDROME): Primary | ICD-10-CM

## 2020-11-17 PROCEDURE — 99213 OFFICE O/P EST LOW 20 MIN: CPT | Performed by: INTERNAL MEDICINE

## 2020-11-17 RX ORDER — BUDESONIDE 90 UG/1
AEROSOL, POWDER RESPIRATORY (INHALATION)
COMMUNITY
Start: 2020-11-02 | End: 2021-11-02 | Stop reason: CLARIF

## 2020-11-18 NOTE — PROGRESS NOTES
Cardiac Electrophysiology TELEPHONE VIRTUAL VISIT Note   Pursuant to the emergency declaration under the [facility], [facility] waiver authority and the [employer] Act, this Virtual  Visit was conducted, with patient's consent, to reduce the patient's risk of exposure to COVID-19 and provide continuity of care for an established patient. Services were provided through a video/audio synchronous discussion virtually to substitute for in-person clinic visit. Subjective:      Louie Baron is a 47 y.o. female patient who is addressed virtually via synchronous audio/video DOXY. ME for follow up, has history of tilt table test positive for POTS. She said beta blocker works fine but she also noted dyspnea & fatigue, but had normal nuclear stress test in 01/2020. She switched from nadolol to atenolol and so far likes it    She is compliant with compression stockings. She denies lightheadedness or syncope. No chest pain, PND, orthopnea, or edema. She continues to note \"dryness\" throughout, associates this with Sjogren's. She was seen at [facility] in 01/[facility]. CT chest with contrast without acute abnormality in 12/2019. She said PFT was \" ok\" and had been given albuterol and long acting inhaler after she could not tolerate Breo  She said she has not noted improvement  She is now on pulmicort and stopped symbicort    Previous:  Nuclear stress (01/10/2020): LVEF 78%. Normal myocardial perfusion imaging. Echo (11/04/2019): LVEF 57%, no RWMA. Trace TR, PASP 25.8 mmHg. Stress echo (09/08/2016): Normal LV function, RWMA. No previous syncope, but many near syncopal episodes. Activity & exercise are triggers for tachy episodes. Metoprolol caused fatigue. Disliked Inderal.    Abdominal & CXR, barium swallow normal in 11/2018. Underwent esophageal dilation 02/2020.     Diagnosed & treated for Sjogren's by Dr. Estrellita Mercer. Neurologist is Dr. Ronnie Iraheta. Normal thyroid biopsy in 2017. Patient Active Problem List    Diagnosis Date Noted    Long-term use of hydroxychloroquine 02/14/2019    Sjogren's syndrome with keratoconjunctivitis sicca ([facility] [address] 08/07/2018    Gastroparesis 08/07/2018    Irritable bowel syndrome with constipation 08/07/2018    Chronic constipation 09/01/2017    GERD (gastroesophageal reflux disease) 09/01/2017    POTS (postural orthostatic tachycardia syndrome) 01/20/2017    Abdominal adhesions 05/04/2012     Current Outpatient Medications   Medication Sig Dispense Refill    atenoloL (TENORMIN) 25 mg tablet TAKE 1 TABLET BY MOUTH DAILY 30 Tab 5    fluticasone propionate (FLONASE) 50 mcg/actuation nasal spray instill 1 spray into each nostril 16 g 5    famotidine (PEPCID) 20 mg tablet Take 1 Tab by mouth nightly. 90 Tab 3    Restasis 0.05 % dpet INSTILL 1 DROP IN BOTH EYES TWICE DAILY 30 Each 5    cevimeline (EVOXAC) 30 mg capsule Take 1 Cap by mouth three (3) times daily for 360 days. 30 Cap 5    cetirizine (ZYRTEC) 10 mg tablet Take 1 Tab by mouth daily. 90 Tab 3    albuterol (PROVENTIL HFA, VENTOLIN HFA, PROAIR HFA) 90 mcg/actuation inhaler Take 1 Puff by inhalation every four (4) hours as needed for Wheezing. 1 Inhaler 5    vit B complex no.12/niacin,B3, (VITAMIN B COMPLEX NO.12-NIACIN PO) Take 1 Tab by mouth daily.  albuterol (PROVENTIL VENTOLIN) 2.5 mg /3 mL (0.083 %) nebulizer solution 3 mL by Nebulization route every four (4) hours as needed for Wheezing. 24 Each 0    omeprazole (PRILOSEC) 40 mg capsule Take 40 mg by mouth daily as needed.  cholecalciferol (VITAMIN D3) 1,000 unit tablet Take 1,000 Units by mouth daily.  multivitamin (ONE A DAY) tablet Take 1 Tab by mouth daily.  LINZESS 290 mcg cap capsule Take 290 mcg by mouth as needed.   0    Pulmicort Flexhaler 90 mcg/actuation aepb inhaler INL 1 PUFF PO BID RINSE MOUTH AFTER USE      Symbicort 80-4.5 mcg/actuation HFAA INHALE 2 PUFFS BID RINSE MOUTH AFTER USE       Allergies   Allergen Reactions    Celexa [Citalopram] Nausea and Vomiting    Ciprofloxacin Shortness of Breath    Diflucan [Fluconazole] Rash     Burning sensation to skin    Macrobid [Nitrofurantoin Monohyd/M-Cryst] Other (comments)    Sulfa (Sulfonamide Antibiotics) Rash     Past Medical History:   Diagnosis Date    Anemia     taking iron    Family history of skin cancer     sister has melanoma,     Gastroparesis     GERD (gastroesophageal reflux disease)     gastroparesis--h-pylori    H. pylori infection     H/O Clostridium difficile infection 50/4023    Helicobacter pylori (H. pylori) 05/2012    h pylori    Palpitations     2010  - cardiac workup per pt.  POTS (postural orthostatic tachycardia syndrome)     [facility]  Reports tx increased fluid and NA intake, betablocker    Sjogren's syndrome (HCC)     Sun-damaged skin     20's    Sunburn, blistering     8-9     Tanning bed exposure     11 years ago      Past Surgical History:   Procedure Laterality Date    HX COLONOSCOPY      HX DILATION AND CURETTAGE  05/2012    HX ENDOSCOPY      HX GYN      tubal reversal laparoscopic    HX TUBAL LIGATION      IR DILATE ESOPH STRICT      TILT TABLE EVAL W/WO DRUG  1/21/2017          Family History   Problem Relation Age of Onset    Heart Disease Mother     Cancer Mother         Thyroid    Thyroid Disease Mother     Heart Disease Father     High Cholesterol Father     Heart Disease Maternal Grandmother     Diabetes Maternal Grandmother     Other Maternal Grandmother         Heomochromatosis    Thyroid Disease Sister     No Known Problems Brother     Stroke Maternal Grandfather     Diabetes Maternal Grandfather     Hypertension Paternal Grandmother     Heart Disease Paternal Grandfather     Heart Attack Paternal Grandfather      Social History     Tobacco Use    Smoking status: Former Smoker Types: Cigarettes     Last attempt to quit: 2013     Years since quittin.9    Smokeless tobacco: Never Used   Substance Use Topics    Alcohol use: Yes     Alcohol/week: 0.0 standard drinks     Comment: not monthly        Review of Systems:   Constitutional: Negative for fever, chills, weight loss + fatigue  HEENT: Negative for nosebleeds, vision changes. Respiratory: Negative for cough, hemoptysis, sputum production, and wheezing. Cardiovascular: Negative for chest pain, palpitations, no orthopnea, claudication, leg swelling, syncope, and PND. + SOB  Gastrointestinal: Negative for nausea, vomiting, diarrhea, constipation, blood in stool and melena. + gastroparesis. Occasional dysphagia. Genitourinary: Negative for dysuria, and hematuria. Musculoskeletal: Negative for myalgias, + arthralgia. Skin: occasional rash. Heme: Does not bleed or bruise easily. Neurological: Negative for speech change and focal weakness     Objective:     Due to this being a TeleHealth evaluation, many elements of the physical examination are unable to be assessed. General: Well developed, in no acute distress, cooperative and alert  HEENT: Pupils equal/round. No marked JVD visible on video. Respiratory: No audible wheezing, no signs of respiratory distress, lips non cyanotic  Extremities:  No edema  Neuro: A&Ox3, speech clear, no facial droop, answering questions appropriately  Skin: Skin color is normal. Non diaphoretic on visible skin during exam    Assessment/Plan:       ICD-10-CM ICD-9-CM    1. POTS (postural orthostatic tachycardia syndrome)  I49.8 427.89    2. SOB (shortness of breath)  R06.02 786.05    3. Fatigue, unspecified type  R53.83 780.79    4. Sjogren's syndrome with keratoconjunctivitis sicca (HCC)  M35.01 710.2      Ms. Dayana Townsend states palpitations associated with POTS are adequately controlled with atenolol & compression stockings. SOB, had normal echo in 2019, no pulmonary hypertension. Nuclear stress test was negative I 01/2020. She goes to [facility]. Chest CT with contrast was essentially normal in 12/2019. She had tried many inhalers, now she wants to try Pulmicort   Follow up in EP clinic in 6- months    Future Appointments   Date Time Provider Addis Velazquez   12/1/2020 10:00 AM RAMONITA Love BS AMB   5/7/2021 10:00 AM Phoebe Christian MD AO BS AMB       We discussed the expected course, resolution and complications of the diagnosis(es) in detail. Medication risks, benefits, costs, interactions, and alternatives were discussed as indicated. I advised her to contact the office if her condition worsens, changes or fails to improve as anticipated. She expressed understanding with the diagnosis(es) and plan. Patient was made aware and verbalized understanding that an appointment will be scheduled for them for a virtual visit and/or office visit within the above time frame. Patient understanding his/her responsibility to call and change time/date if he/she so chooses. Thank you for involving me in this patient's care and please call with further concerns or questions. Sean Smith M.D.   Electrophysiology/Cardiology  [facility]  [address] [place] [address] [place] [address]  [phone]

## 2021-02-01 ENCOUNTER — HOSPITAL ENCOUNTER (OUTPATIENT)
Dept: GENERAL RADIOLOGY | Age: 41
Discharge: HOME | End: 2021-02-01
Payer: MEDICAID

## 2021-02-01 ENCOUNTER — TRANSCRIBE ORDER (OUTPATIENT)
Dept: REGISTRATION | Age: 41
End: 2021-02-01

## 2021-02-01 DIAGNOSIS — R06.02 SHORTNESS OF BREATH: ICD-10-CM

## 2021-02-01 DIAGNOSIS — R06.02 SHORTNESS OF BREATH: Primary | ICD-10-CM

## 2021-02-01 PROCEDURE — 71046 X-RAY EXAM CHEST 2 VIEWS: CPT

## 2021-02-17 ENCOUNTER — E-VISIT (OUTPATIENT)
Dept: FAMILY MEDICINE CLINIC | Age: 41
End: 2021-02-17

## 2021-02-17 DIAGNOSIS — R35.0 URINARY FREQUENCY: Primary | ICD-10-CM

## 2021-02-18 ENCOUNTER — VIRTUAL VISIT (OUTPATIENT)
Dept: FAMILY MEDICINE CLINIC | Age: 41
End: 2021-02-18
Payer: MEDICAID

## 2021-02-18 DIAGNOSIS — R35.0 URINARY FREQUENCY: Primary | ICD-10-CM

## 2021-02-18 PROCEDURE — 99213 OFFICE O/P EST LOW 20 MIN: CPT | Performed by: FAMILY MEDICINE

## 2021-02-18 RX ORDER — POLYETHYLENE GLYCOL 3350 17 G/17G
POWDER, FOR SOLUTION ORAL
COMMUNITY
Start: 2020-12-07 | End: 2021-01-06

## 2021-02-18 RX ORDER — SUCRALFATE 1 G/1
TABLET ORAL
COMMUNITY
Start: 2021-01-29 | End: 2021-02-28

## 2021-02-18 RX ORDER — CEPHALEXIN 500 MG/1
500 CAPSULE ORAL 3 TIMES DAILY
Qty: 21 CAP | Refills: 0 | Status: SHIPPED | OUTPATIENT
Start: 2021-02-18 | End: 2021-02-25

## 2021-02-18 RX ORDER — BACLOFEN 10 MG/1
TABLET ORAL
COMMUNITY
Start: 2021-01-07 | End: 2021-02-06

## 2021-02-18 RX ORDER — CEPHALEXIN 500 MG/1
CAPSULE ORAL
COMMUNITY
Start: 2020-12-04 | End: 2021-02-18 | Stop reason: SDUPTHER

## 2021-02-18 NOTE — PROGRESS NOTES
1. Have you been to the ER, urgent care clinic since your last visit? Hospitalized since your last visit? No    2. Have you seen or consulted any other health care providers outside of the [address] [place] [address] since your last visit? Include any pap smears or colon screening. Yes. Urogynecology.        Health Maintenance Due   Topic Date Due    Flu Vaccine (1) 09/01/2020

## 2021-02-18 NOTE — PROGRESS NOTES
Chief Complaint   Patient presents with    Urinary Frequency     X 2 DAYS.  Urinary Pain     Pt was seen via virtual video visit. Pt reports that she has struggled with urinary issues for some time. Pt saw a uro-gynecologist, was advised that surgery was an option. Pt has elected to try PT. Pt reports she has been diagnosed with a fallen bladder and urinary retention. Last was on keflex with good results. Van Quick is a 47 y.o. female who was seen by synchronous (real-time) audio-video technology on 2/18/2021 for Urinary Frequency (X 2 DAYS.) and Urinary Pain        Assessment & Plan:   Diagnoses and all orders for this visit:    1. Urinary frequency  -     cephALEXin (KEFLEX) 500 mg capsule; Take 1 Cap by mouth three (3) times daily for 7 days. Advised pt to take medication as written and follow up if symptoms are not improving in 2-3 days. Subjective:       Prior to Admission medications    Medication Sig Start Date End Date Taking? Authorizing Provider   phenazopyridine HCl (AZO URINARY PAIN RELIEF PO) Azo Urinary Pain Relief   Yes Provider, Historical   cephALEXin (KEFLEX) 500 mg capsule Take 1 Cap by mouth three (3) times daily for 7 days. 2/18/21 2/25/21 Yes Pat Corley MD   atenoloL (TENORMIN) 25 mg tablet TAKE 1 TABLET BY MOUTH DAILY 8/5/20  Yes Ansley ENNIS NP   fluticasone propionate (FLONASE) 50 mcg/actuation nasal spray instill 1 spray into each nostril  Patient taking differently: 2 Sprays by Both Nostrils route daily as needed. instill 1 spray into each nostril 8/5/20  Yes Angela Coleman NP   famotidine (PEPCID) 20 mg tablet Take 1 Tab by mouth nightly. 8/5/20  Yes Angela Coleman NP   Restasis 0.05 % dpet INSTILL 1 DROP IN BOTH EYES TWICE DAILY 6/24/20  Yes Bassam Lopez MD   cetirizine (ZYRTEC) 10 mg tablet Take 1 Tab by mouth daily.  4/21/20  Yes Pat Corley MD   albuterol (PROVENTIL HFA, VENTOLIN HFA, PROAIR HFA) 90 mcg/actuation inhaler Take 1 Puff by inhalation every four (4) hours as needed for Wheezing. 3/12/20  Yes Jayda Corley MD   vit B complex no.12/niacin,B3, (VITAMIN B COMPLEX NO.12-NIACIN PO) Take 1 Tab by mouth daily. Yes Provider, Historical   albuterol (PROVENTIL VENTOLIN) 2.5 mg /3 mL (0.083 %) nebulizer solution 3 mL by Nebulization route every four (4) hours as needed for Wheezing. 4/5/19  Yes Angela Coleman NP   omeprazole (PRILOSEC) 40 mg capsule Take 40 mg by mouth daily as needed. Yes Provider, Historical   cholecalciferol (VITAMIN D3) 1,000 unit tablet Take 1,000 Units by mouth daily. Yes Provider, Historical   multivitamin (ONE A DAY) tablet Take 1 Tab by mouth daily. Yes Provider, Historical   LINZESS 290 mcg cap capsule Take 290 mcg by mouth as needed. 11/10/15  Yes Provider, Historical   sucralfate (CARAFATE) 1 gram tablet TAKE 1 TABLET BY MOUTH THREE TIMES DAILY AS DIRECTED 1/29/21 2/28/21  Provider, Historical   cephALEXin (KEFLEX) 500 mg capsule  12/4/20 2/18/21  Provider, Historical   Pulmicort Flexhaler 90 mcg/actuation aepb inhaler INL 1 PUFF PO BID RINSE MOUTH AFTER USE 11/2/20   Provider, Historical   Symbicort 80-4.5 mcg/actuation HFAA INHALE 2 PUFFS BID RINSE MOUTH AFTER USE 6/25/20   Provider, Historical   cevimeline (EVOXAC) 30 mg capsule Take 1 Cap by mouth three (3) times daily for 360 days.  5/7/20 5/2/21  Emelyn Velez MD     Patient Active Problem List   Diagnosis Code    Abdominal adhesions K66.0    POTS (postural orthostatic tachycardia syndrome) I49.8    Chronic constipation K59.09    GERD (gastroesophageal reflux disease) K21.9    Sjogren's syndrome with keratoconjunctivitis sicca (HCC) M35.01    Gastroparesis K31.84    Irritable bowel syndrome with constipation K58.1    Long-term use of hydroxychloroquine Z79.899     Current Outpatient Medications   Medication Sig Dispense Refill    phenazopyridine HCl (AZO URINARY PAIN RELIEF PO) Azo Urinary Pain Relief      cephALEXin (KEFLEX) 500 mg capsule Take 1 Cap by mouth three (3) times daily for 7 days. 21 Cap 0    atenoloL (TENORMIN) 25 mg tablet TAKE 1 TABLET BY MOUTH DAILY 30 Tab 5    fluticasone propionate (FLONASE) 50 mcg/actuation nasal spray instill 1 spray into each nostril (Patient taking differently: 2 Sprays by Both Nostrils route daily as needed. instill 1 spray into each nostril) 16 g 5    famotidine (PEPCID) 20 mg tablet Take 1 Tab by mouth nightly. 90 Tab 3    Restasis 0.05 % dpet INSTILL 1 DROP IN BOTH EYES TWICE DAILY 30 Each 5    cetirizine (ZYRTEC) 10 mg tablet Take 1 Tab by mouth daily. 90 Tab 3    albuterol (PROVENTIL HFA, VENTOLIN HFA, PROAIR HFA) 90 mcg/actuation inhaler Take 1 Puff by inhalation every four (4) hours as needed for Wheezing. 1 Inhaler 5    vit B complex no.12/niacin,B3, (VITAMIN B COMPLEX NO.12-NIACIN PO) Take 1 Tab by mouth daily.  albuterol (PROVENTIL VENTOLIN) 2.5 mg /3 mL (0.083 %) nebulizer solution 3 mL by Nebulization route every four (4) hours as needed for Wheezing. 24 Each 0    omeprazole (PRILOSEC) 40 mg capsule Take 40 mg by mouth daily as needed.  cholecalciferol (VITAMIN D3) 1,000 unit tablet Take 1,000 Units by mouth daily.  multivitamin (ONE A DAY) tablet Take 1 Tab by mouth daily.  LINZESS 290 mcg cap capsule Take 290 mcg by mouth as needed. 0    sucralfate (CARAFATE) 1 gram tablet TAKE 1 TABLET BY MOUTH THREE TIMES DAILY AS DIRECTED      Pulmicort Flexhaler 90 mcg/actuation aepb inhaler INL 1 PUFF PO BID RINSE MOUTH AFTER USE      Symbicort 80-4.5 mcg/actuation HFAA INHALE 2 PUFFS BID RINSE MOUTH AFTER USE      cevimeline (EVOXAC) 30 mg capsule Take 1 Cap by mouth three (3) times daily for 360 days.  30 Cap 5     Allergies   Allergen Reactions    Celexa [Citalopram] Nausea and Vomiting    Ciprofloxacin Shortness of Breath    Diflucan [Fluconazole] Rash     Burning sensation to skin    Macrobid [Nitrofurantoin Monohyd/M-Cryst] Other (comments)    Sulfa (Sulfonamide Antibiotics) Rash       Review of Systems   Constitutional: Negative for chills, fever and malaise/fatigue. HENT: Negative for congestion and sore throat. Respiratory: Negative for cough and shortness of breath. Cardiovascular: Negative for chest pain and palpitations. Gastrointestinal: Negative for abdominal pain, heartburn, nausea and vomiting. Genitourinary: Positive for frequency and urgency. Negative for dysuria. Musculoskeletal: Negative for joint pain and myalgias. Neurological: Negative for dizziness, tingling and headaches. All other systems reviewed and are negative.       Objective:     Patient-Reported Vitals 7/27/2020   Patient-Reported Weight 145 lbs   Patient-Reported Height 5 ft 4 in        [INSTRUCTIONS:  \"[x]\" Indicates a positive item  \"[]\" Indicates a negative item  -- DELETE ALL ITEMS NOT EXAMINED]    Constitutional: [x] Appears well-developed and well-nourished [x] No apparent distress      [] Abnormal -     Mental status: [x] Alert and awake  [x] Oriented to person/place/time [x] Able to follow commands    [] Abnormal -     Eyes:   EOM    [x]  Normal    [] Abnormal -   Sclera  [x]  Normal    [] Abnormal -          Discharge [x]  None visible   [] Abnormal -     HENT: [x] Normocephalic, atraumatic  [] Abnormal -   [x] Mouth/Throat: Mucous membranes are moist    External Ears [x] Normal  [] Abnormal -    Neck: [x] No visualized mass [] Abnormal -     Pulmonary/Chest: [x] Respiratory effort normal   [x] No visualized signs of difficulty breathing or respiratory distress        [] Abnormal -      Musculoskeletal:   [x] Normal gait with no signs of ataxia         [x] Normal range of motion of neck        [] Abnormal -     Neurological:        [x] No Facial Asymmetry (Cranial nerve 7 motor function) (limited exam due to video visit)          [x] No gaze palsy        [] Abnormal -          Skin:        [x] No significant exanthematous lesions or discoloration noted on facial skin         [] Abnormal -            Psychiatric:       [x] Normal Affect [] Abnormal -        [x] No Hallucinations    Other pertinent observable physical exam findings:-        We discussed the expected course, resolution and complications of the diagnosis(es) in detail. Medication risks, benefits, costs, interactions, and alternatives were discussed as indicated. I advised her to contact the office if her condition worsens, changes or fails to improve as anticipated. She expressed understanding with the diagnosis(es) and plan. Ara Rodriguez, who was evaluated through a patient-initiated, synchronous (real-time) audio-video encounter, and/or her healthcare decision maker, is aware that it is a billable service, with coverage as determined by her insurance carrier. She provided verbal consent to proceed: Yes, and patient identification was verified. It was conducted pursuant to the emergency declaration under the [address] [place] [address], [address] [place] [address] authority and the [employer] Act. A caregiver was present when appropriate. Ability to conduct physical exam was limited. I was at home. The patient was at home.       Carol Ann Lebron MD

## 2021-04-02 ENCOUNTER — VIRTUAL VISIT (OUTPATIENT)
Dept: RHEUMATOLOGY | Age: 41
End: 2021-04-02
Payer: MEDICAID

## 2021-04-02 DIAGNOSIS — B37.0 ORAL THRUSH: ICD-10-CM

## 2021-04-02 DIAGNOSIS — M35.01 SJOGREN'S SYNDROME WITH KERATOCONJUNCTIVITIS SICCA (HCC): Primary | ICD-10-CM

## 2021-04-02 PROCEDURE — 99215 OFFICE O/P EST HI 40 MIN: CPT | Performed by: INTERNAL MEDICINE

## 2021-04-02 RX ORDER — MICONAZOLE 50 MG/1
1 TABLET BUCCAL DAILY
Qty: 30 EACH | Refills: 3 | Status: SHIPPED | OUTPATIENT
Start: 2021-04-02 | End: 2021-12-13 | Stop reason: ALTCHOICE

## 2021-04-02 RX ORDER — CEVIMELINE HYDROCHLORIDE 30 MG/1
30 CAPSULE ORAL 3 TIMES DAILY
Qty: 270 CAP | Refills: 5 | Status: SHIPPED | OUTPATIENT
Start: 2021-04-02 | End: 2021-07-02

## 2021-04-02 NOTE — PROGRESS NOTES
REASON FOR VISIT    This is a follow-up visit for Ms. Pranay Cid for     ICD-10-CM   1. Sjogren's syndrome with keratoconjunctivitis sicca ([facility].) M35.01     The patient has consented for synchronous (real-time) Telemedicine (audio-video technology) on 4/2/2021 for their care to be delivered over telemedicine in place of their regularly scheduled office visit pursuant to the emergency declaration under the [facility], [facility] waiver authority and the [employer] Act, this Virtual  Visit was conducted, with patient's consent, to reduce the patient's risk of exposure to COVID-19 and provide continuity of care for an established patient. Services were provided through a video synchronous discussion virtually to substitute for in-person clinic visit. Luis Cid returns for a follow-up visit. On her last visit, I prescribed Shobha  for thrush which resolved it and continued to monitor since no concerning manifestations evolved. She had continued Restasis and Evoxac. Today, she feels well. She continues to complain of intermittent episodes of severe dyspnea that is self limiting and episodic. She saw cardiology and pulmonary who did not find any pathology. She endorses intermittent dysphagia from esophageal narrowing, increased thirst.    She denies fever, weight loss, blurred vision, vision loss, oral ulcers, ankle swelling, dry cough, dyspnea, nausea, vomiting, abdominal pain, black or bloody stool, fall since last visit, rash and easy bruising. Last ophthalmology exam was 2020, who confirmed xerophthalmia. She is on Restasis. She felt better on it. Catie Chakraborty burned. She has punctate plugs but reports they were dissolvable. She does not wear contact lenses. She uses artifical tears up to 3 times a day. Last dental exam was 2020, which did not show an increase in caries.  She does need water to swallow food or a cracker. She does drink a lot of water due to dryness.      The patient has not had any interval hospital admissions, infections, or surgeries but has scheduled for esophageal dilatation. REVIEW OF SYSTEMS    A comprehensive review of systems was performed and pertinent results are documented in the HPI, review of systems is otherwise non-contributory. PAST MEDICAL HISTORY    She has a past medical history of Anemia, Family history of skin cancer, Gastroparesis, GERD (gastroesophageal reflux disease), H. pylori infection, H/O Clostridium difficile infection (34/9202), Helicobacter pylori (H. pylori) (05/2012), Palpitations, POTS (postural orthostatic tachycardia syndrome), Sjogren's syndrome ([facility] Sun-damaged skin, Sunburn, blistering, and Tanning bed exposure. FAMILY HISTORY    Her family history includes Cancer in her mother; Diabetes in her maternal grandfather and maternal grandmother; Heart Attack in her paternal grandfather; Heart Disease in her father, maternal grandmother, mother, and paternal grandfather; High Cholesterol in her father; Hypertension in her paternal grandmother; No Known Problems in her brother; Other in her maternal grandmother; Stroke in her maternal grandfather; Thyroid Disease in her mother and sister. SOCIAL HISTORY    She reports that she quit smoking about 7 years ago. Her smoking use included cigarettes. She has never used smokeless tobacco. She reports current alcohol use. She reports that she does not use drugs. MEDICATIONS    Current Outpatient Medications   Medication Sig    cevimeline (EVOXAC) 30 mg capsule Take 1 Cap by mouth three (3) times daily.  Oravig 50 mg mabt 1 Mehnaz by Buccal route daily.     phenazopyridine HCl (AZO URINARY PAIN RELIEF PO) Azo Urinary Pain Relief    Pulmicort Flexhaler 90 mcg/actuation aepb inhaler INL 1 PUFF PO BID RINSE MOUTH AFTER USE    atenoloL (TENORMIN) 25 mg tablet TAKE 1 TABLET BY MOUTH DAILY    fluticasone propionate (FLONASE) 50 mcg/actuation nasal spray instill 1 spray into each nostril (Patient taking differently: 2 Sprays by Both Nostrils route daily as needed. instill 1 spray into each nostril)    famotidine (PEPCID) 20 mg tablet Take 1 Tab by mouth nightly.  Symbicort 80-4.5 mcg/actuation HFAA INHALE 2 PUFFS BID RINSE MOUTH AFTER USE    Restasis 0.05 % dpet INSTILL 1 DROP IN BOTH EYES TWICE DAILY    cetirizine (ZYRTEC) 10 mg tablet Take 1 Tab by mouth daily.  albuterol (PROVENTIL HFA, VENTOLIN HFA, PROAIR HFA) 90 mcg/actuation inhaler Take 1 Puff by inhalation every four (4) hours as needed for Wheezing.  vit B complex no.12/niacin,B3, (VITAMIN B COMPLEX NO.12-NIACIN PO) Take 1 Tab by mouth daily.  albuterol (PROVENTIL VENTOLIN) 2.5 mg /3 mL (0.083 %) nebulizer solution 3 mL by Nebulization route every four (4) hours as needed for Wheezing.  omeprazole (PRILOSEC) 40 mg capsule Take 40 mg by mouth daily as needed.  cholecalciferol (VITAMIN D3) 1,000 unit tablet Take 1,000 Units by mouth daily.  multivitamin (ONE A DAY) tablet Take 1 Tab by mouth daily.  LINZESS 290 mcg cap capsule Take 290 mcg by mouth as needed. No current facility-administered medications for this visit. ALLERGIES    Allergies   Allergen Reactions    Celexa [Citalopram] Nausea and Vomiting    Ciprofloxacin Shortness of Breath    Diflucan [Fluconazole] Rash     Burning sensation to skin    Macrobid [Nitrofurantoin Monohyd/M-Cryst] Other (comments)    Sulfa (Sulfonamide Antibiotics) Rash       PHYSICAL EXAMINATION    There were no vitals taken for this visit. There is no height or weight on file to calculate BMI. General: Patient is alert, oriented x 3, not in acute distress    HEENT:   Sclerae are not injected and appear moist.  There is no alopecia.     Chest:  Breathing comfortable at room air    DATA REVIEW    Laboratory     Recent laboratory results were reviewed, summarized, and discussed with the patient. Imaging    Musculoskeletal Ultrasound    None    Radiographs    KUB 11/29/2018: normal bowel gas pattern. The osseous structures are unremarkable. No pathologic calcification. Chest 11/02/2017: the lungs are clear. The central airways are patent. The heart size is normal. No pneumothorax or pleural effusion. CT Imaging    CT Chest with contrast 12/18/2019: THYROID: 11 mm hypodense nodule is noted in the right thyroid gland. MEDIASTINUM: No mass or lymphadenopathy. SHANNA: No mass or lymphadenopathy. THORACIC AORTA: No dissection or aneurysm. MAIN PULMONARY ARTERY: Normal in caliber. TRACHEA/BRONCHI: Patent. ESOPHAGUS: No wall thickening or dilatation. HEART: Normal in size. PLEURA: No effusion or pneumothorax. LUNGS: No nodule, mass, or airspace disease. INCIDENTALLY IMAGED UPPER ABDOMEN: Multiple hepatic cysts again demonstrated, reference 11 mm cyst right hepatic lobe. BONES: No destructive bone lesion. CT Sinuses without contrast showed The frontal sinuses and frontoethmoidal recesses are clear. The anterior ethmoid air cells are clear. The maxillary sinuses are clear. The maxillary sinus infundibula and ostiomeatal units are patent. The posterior ethmoid air cells and sphenoid sinus are clear. The nasal cavity is clear. The nasal septum is midline. There is no bone destruction or bone dehiscence of the paranasal sinuses. MR Imaging    None    DXA     None    GI Studies    Barium Swallow Esophagram 11/20/2018: The swallowing mechanism is intact. There is no stricture extravasation or filling defect. Patient was unable to swallow the barium tablet.     NM Gastric Emptying Study 7/25/2012: markedly delayed gastric emptying. Ultrasonography    US abdomen 7/21/2018: the gallbladder is normal. There is no intrahepatic or extrahepatic biliary duct dilation. Common bile duct measures 3 mm. The liver is normal in size and echotexture without demonstration of mass lesion. Portal venous flow is normal. The pancreatic head appears normal and the right kidney is normal with length of 10.3 cm    PATHOLOGY    Stomach, biopsy 2/27/2020: Unremarkable gastric mucosa. Negative for increased inflammation  Duodenum, biopsy: Unremarkable duodenal mucosa. Negative for villous blunting or increased intraepithelial lymphocytes. Gastroesophageal junction, biopsy: Junctional mucosa with chronic reflux associated changes. Negative for intestinal metaplasia, dysplasia or malignancy  Esophagus, mid, biopsy: Unremarkable squamous mucosa. Negative for esophagitis or increased intraepithelial eosinophils     Small bowel, duodenum, biopsy 6/21/2018: No histopathologic abnormality. Stomach, biopsy: Mild chronic gastritis. Esophagus, mid, biopsy: No histopathologic abnormality.      ASSESSMENT AND PLAN    This is a follow-up visit for Ms. Raven Gamboa. 1) Sjogren's Syndrome. (xerostomia, xerophthalmia, autonomic dysfunction (POTS, gastroparesis)). Her labs did not show findings today concerning for lymphoma. She denies constitutional B symptoms concerning for lymphoma. I refilled Oravig lozenges which helps her oral sores from thrush. I refilled Evoxac regularly     She is using Restasis with benefit. I ordered annual monitoring labs. 2) Oral Thrush. This resolved on Oravig. I refilled it. 3) Gastroparesis with Esophageal Stricture. I defer to GI, but she may benefit form motility agents. 4) UGI Issues, IBS with Constipation. She is on Linzess. She follows GI. 5) POTS. This may also be associated with gastroparesis and irritable bowel syndrome. She does not have hypermobility to suggest Sarah Danlos Syndrome. She follows with cardiology. 6) Intermittent Dyspnea. I do not know the etiology of this. The patient voiced understanding of the aforementioned assessment and plan.      The patient has consented for synchronous (real-time) Telemedicine (audio-video technology) on 4/2/2021 for their care to be delivered over telemedicine in place of their regularly scheduled office visit pursuant to the emergency declaration under the [facility] waiver authority and the [employer] Act, this Virtual  Visit was conducted, with patient's consent, to reduce the patient's risk of exposure to COVID-19 and provide continuity of care for an established patient. A total of 41 minutes was spent on this visit, reviewing interval notes, interval testing results, ordering tests, refilling medications, documenting the findings in the note, patient education, counseling, and coordination of care as described above. All questions asked and answered. Services were provided through a video synchronous discussion virtually to substitute for in-person clinic visit.     TODAY'S ORDERS    Orders Placed This Encounter    ANTI-NUCLEAR AB BY IFA (RDL)    RHEUMATOID FACTOR BY TURB (RDL)    SJOGREN'S ABS, SSA AND SSB    PROTEIN ELECTROPHORESIS W/ REFLX HUMA    CRYOGLOBULIN W/ REFLX HUMA    COMPLEMENT, C3 & C4    CBC WITH AUTOMATED DIFF    MYOMARKER 3 PLUS PROFILE (RDL)    cevimeline (EVOXAC) 30 mg capsule    Oravig 50 mg mabt     Future Appointments   Date Time Provider Addis Molinaisti   5/20/2021  8:40 AM MD SHAISTA Barrett   4/1/2022 10:00 AM Felipa Christian MD AOCR BS AMB     Becki Guerrero MD, [address] [place] [address]    Adult Rheumatology   Rheumatology Ultrasound Certified  [facility]  [facility] [place] [address] [address] [place]   Phone [phone]  Fax [phone]

## 2021-04-19 RX ORDER — CLOTRIMAZOLE 10 MG/1
10 LOZENGE ORAL; TOPICAL DAILY
Qty: 14 TAB | Refills: 0 | Status: SHIPPED | OUTPATIENT
Start: 2021-04-19 | End: 2021-05-03

## 2021-04-20 RX ORDER — CETIRIZINE HCL 10 MG
10 TABLET ORAL DAILY
Qty: 90 TAB | Refills: 3 | Status: SHIPPED | OUTPATIENT
Start: 2021-04-20 | End: 2022-05-04 | Stop reason: SDUPTHER

## 2021-04-20 NOTE — TELEPHONE ENCOUNTER
Abigail is requesting a refill on med    Requested Prescriptions     Pending Prescriptions Disp Refills    cetirizine (ZYRTEC) 10 mg tablet 90 Tab 3     Sig: Take 1 Tab by mouth daily.

## 2021-04-23 RX ORDER — ATENOLOL 25 MG/1
TABLET ORAL
Qty: 30 TAB | Refills: 5 | Status: SHIPPED | OUTPATIENT
Start: 2021-04-23 | End: 2021-12-13

## 2021-04-23 NOTE — TELEPHONE ENCOUNTER
Received refill request for atenolol 25 mg po tabs. Refill authorized.     Future Appointments   Date Time Provider Addis Velazquez   5/20/2021  8:40 AM MD SHAISTA Santana BS AMB   4/1/2022 10:00 AM Jose Christian MD AO BS AMB

## 2021-06-22 ENCOUNTER — ANCILLARY PROCEDURE (OUTPATIENT)
Dept: CARDIOLOGY CLINIC | Age: 41
End: 2021-06-22
Payer: MEDICAID

## 2021-06-22 VITALS — WEIGHT: 140 LBS | BODY MASS INDEX: 23.9 KG/M2 | HEIGHT: 64 IN

## 2021-06-22 DIAGNOSIS — R06.02 SOB (SHORTNESS OF BREATH): ICD-10-CM

## 2021-06-22 PROCEDURE — 93306 TTE W/DOPPLER COMPLETE: CPT | Performed by: INTERNAL MEDICINE

## 2021-06-23 LAB
ECHO AO ASC DIAM: 2.46 CM
ECHO AO ROOT DIAM: 2.94 CM
ECHO AV AREA PEAK VELOCITY: 2.14 CM2
ECHO AV AREA VTI: 2.08 CM2
ECHO AV AREA/BSA PEAK VELOCITY: 1.3 CM2/M2
ECHO AV AREA/BSA VTI: 1.2 CM2/M2
ECHO AV MEAN GRADIENT: 3.97 MMHG
ECHO AV PEAK GRADIENT: 7.53 MMHG
ECHO AV PEAK VELOCITY: 137.25 CM/S
ECHO AV VTI: 27.24 CM
ECHO LA AREA 4C: 15.47 CM2
ECHO LA MAJOR AXIS: 3.14 CM
ECHO LA MINOR AXIS: 1.87 CM
ECHO LA VOL 2C: 38.79 ML (ref 22–52)
ECHO LA VOL 4C: 41.65 ML (ref 22–52)
ECHO LA VOL BP: 43.59 ML (ref 22–52)
ECHO LA VOL/BSA BIPLANE: 25.95 ML/M2 (ref 16–28)
ECHO LA VOLUME INDEX A2C: 23.09 ML/M2 (ref 16–28)
ECHO LA VOLUME INDEX A4C: 24.79 ML/M2 (ref 16–28)
ECHO LV E' LATERAL VELOCITY: 14.22 CM/S
ECHO LV E' SEPTAL VELOCITY: 10.52 CM/S
ECHO LV EDV A2C: 109.38 ML
ECHO LV EDV A4C: 107.4 ML
ECHO LV EDV BP: 108.74 ML (ref 56–104)
ECHO LV EDV INDEX A4C: 63.9 ML/M2
ECHO LV EDV INDEX BP: 64.7 ML/M2
ECHO LV EDV NDEX A2C: 65.1 ML/M2
ECHO LV EJECTION FRACTION A2C: 68 PERCENT
ECHO LV EJECTION FRACTION A4C: 63 PERCENT
ECHO LV EJECTION FRACTION BIPLANE: 64.6 PERCENT (ref 55–100)
ECHO LV ESV A2C: 35.22 ML
ECHO LV ESV A4C: 39.77 ML
ECHO LV ESV BP: 38.51 ML (ref 19–49)
ECHO LV ESV INDEX A2C: 21 ML/M2
ECHO LV ESV INDEX A4C: 23.7 ML/M2
ECHO LV ESV INDEX BP: 22.9 ML/M2
ECHO LV INTERNAL DIMENSION DIASTOLIC: 4.45 CM (ref 3.9–5.3)
ECHO LV INTERNAL DIMENSION SYSTOLIC: 3.01 CM
ECHO LV IVSD: 0.79 CM (ref 0.6–0.9)
ECHO LV MASS 2D: 106.1 G (ref 67–162)
ECHO LV MASS INDEX 2D: 63.2 G/M2 (ref 43–95)
ECHO LV POSTERIOR WALL DIASTOLIC: 0.75 CM (ref 0.6–0.9)
ECHO LVOT DIAM: 1.93 CM
ECHO LVOT PEAK GRADIENT: 4.06 MMHG
ECHO LVOT PEAK VELOCITY: 100.74 CM/S
ECHO LVOT SV: 56.6 ML
ECHO LVOT VTI: 19.44 CM
ECHO MV A VELOCITY: 63.33 CM/S
ECHO MV AREA PHT: 3.74 CM2
ECHO MV E DECELERATION TIME (DT): 202.68 MS
ECHO MV E VELOCITY: 78.67 CM/S
ECHO MV E/A RATIO: 1.24
ECHO MV E/E' LATERAL: 5.53
ECHO MV E/E' RATIO (AVERAGED): 6.51
ECHO MV E/E' SEPTAL: 7.48
ECHO MV PRESSURE HALF TIME (PHT): 58.78 MS
ECHO RA AREA 4C: 11.39 CM2
ECHO RV INTERNAL DIMENSION: 3.42 CM
ECHO RV TAPSE: 2.02 CM (ref 1.5–2)
ECHO TV REGURGITANT MAX VELOCITY: 199.14 CM/S
ECHO TV REGURGITANT PEAK GRADIENT: 15.86 MMHG
LA VOL DISK BP: 40.77 ML (ref 22–52)

## 2021-06-24 ENCOUNTER — TELEPHONE (OUTPATIENT)
Dept: CARDIOLOGY CLINIC | Age: 41
End: 2021-06-24

## 2021-06-24 NOTE — TELEPHONE ENCOUNTER
----- Message from Nighat Bryant MD sent at 6/24/2021  7:13 AM EDT -----  Normal LVEF  Continue follow up

## 2021-06-29 LAB
ALBUMIN SERPL ELPH-MCNC: 3.8 G/DL (ref 2.9–4.4)
ALBUMIN/GLOB SERPL: 1.4 {RATIO} (ref 0.7–1.7)
ALPHA1 GLOB SERPL ELPH-MCNC: 0.2 G/DL (ref 0–0.4)
ALPHA2 GLOB SERPL ELPH-MCNC: 0.6 G/DL (ref 0.4–1)
ANA TITR SER IF: NEGATIVE {TITER}
ANTI-MDA-5 AB (CADM-140)(RDL): <20 UNITS
ANTI-NXP-2 (P140) AB (RDL): <20 UNITS
ANTI-SAE1 AB, IGG (RDL): <20 UNITS
ANTI-TIF-1GAMMA AB (RDL): <20 UNITS
B-GLOBULIN SERPL ELPH-MCNC: 1 G/DL (ref 0.7–1.3)
BASOPHILS # BLD AUTO: 0.1 X10E3/UL (ref 0–0.2)
BASOPHILS NFR BLD AUTO: 1 %
C3 SERPL-MCNC: 123 MG/DL (ref 82–167)
C4 SERPL-MCNC: 23 MG/DL (ref 12–38)
CRYOGLOB SER QL 1D COLD INC: NORMAL
EJ AB SER QL: NEGATIVE
ENA JO1 AB SER IA-ACNC: <20 UNITS
ENA PM/SCL AB SER-ACNC: <20 UNITS
ENA SS-A 52KD IGG SER IA-ACNC: <20 UNITS
ENA SS-A AB SER-ACNC: <0.2 AI (ref 0–0.9)
ENA SS-B AB SER-ACNC: <0.2 AI (ref 0–0.9)
EOSINOPHIL # BLD AUTO: 0.1 X10E3/UL (ref 0–0.4)
EOSINOPHIL NFR BLD AUTO: 1 %
ERYTHROCYTE [DISTWIDTH] IN BLOOD BY AUTOMATED COUNT: 13.3 % (ref 11.7–15.4)
FIBRILLARIN AB SER QL: NEGATIVE
GAMMA GLOB SERPL ELPH-MCNC: 0.8 G/DL (ref 0.4–1.8)
GLOBULIN SER CALC-MCNC: 2.7 G/DL (ref 2.2–3.9)
HCT VFR BLD AUTO: 38.2 % (ref 34–46.6)
HGB BLD-MCNC: 12.8 G/DL (ref 11.1–15.9)
IMM GRANULOCYTES # BLD AUTO: 0 X10E3/UL (ref 0–0.1)
IMM GRANULOCYTES NFR BLD AUTO: 0 %
KU AB SER QL: NEGATIVE
LYMPHOCYTES # BLD AUTO: 2.7 X10E3/UL (ref 0.7–3.1)
LYMPHOCYTES NFR BLD AUTO: 43 %
M PROTEIN SERPL ELPH-MCNC: NORMAL G/DL
MCH RBC QN AUTO: 32.2 PG (ref 26.6–33)
MCHC RBC AUTO-ENTMCNC: 33.5 G/DL (ref 31.5–35.7)
MCV RBC AUTO: 96 FL (ref 79–97)
MI2 AB SER QL: NEGATIVE
MONOCYTES # BLD AUTO: 0.4 X10E3/UL (ref 0.1–0.9)
MONOCYTES NFR BLD AUTO: 6 %
NEUTROPHILS # BLD AUTO: 3.1 X10E3/UL (ref 1.4–7)
NEUTROPHILS NFR BLD AUTO: 49 %
OJ AB SER QL: NEGATIVE
PL12 AB SER QL: NEGATIVE
PL7 AB SER QL: NEGATIVE
PLATELET # BLD AUTO: 297 X10E3/UL (ref 150–450)
PLEASE NOTE, 011150: NORMAL
PROT PATTERN SERPL ELPH-IMP: NORMAL
PROT SERPL-MCNC: 6.5 G/DL (ref 6–8.5)
RBC # BLD AUTO: 3.98 X10E6/UL (ref 3.77–5.28)
RHEUMATOID FACT SERPL-ACNC: <15 IU/ML (ref 0–15)
SRP AB SERPL QL: NEGATIVE
U1 SNRNP AB SER IA-ACNC: <20 UNITS
U2 SNRNP AB SER QL: NEGATIVE
WBC # BLD AUTO: 6.3 X10E3/UL (ref 3.4–10.8)

## 2021-07-01 DIAGNOSIS — M35.01 SJOGREN'S SYNDROME WITH KERATOCONJUNCTIVITIS SICCA (HCC): ICD-10-CM

## 2021-07-02 RX ORDER — CEVIMELINE HYDROCHLORIDE 30 MG/1
CAPSULE ORAL
Qty: 270 CAPSULE | Refills: 5 | Status: SHIPPED | OUTPATIENT
Start: 2021-07-02 | End: 2022-07-20

## 2021-07-02 RX ORDER — CYCLOSPORINE 0.5 MG/ML
EMULSION OPHTHALMIC
Qty: 30 EACH | Refills: 5 | Status: SHIPPED | OUTPATIENT
Start: 2021-07-02 | End: 2022-03-04 | Stop reason: SDUPTHER

## 2021-09-02 ENCOUNTER — E-VISIT (OUTPATIENT)
Dept: FAMILY MEDICINE CLINIC | Age: 41
End: 2021-09-02
Payer: MEDICAID

## 2021-09-02 DIAGNOSIS — R09.81 NASAL CONGESTION: Primary | ICD-10-CM

## 2021-09-02 DIAGNOSIS — J06.9 UPPER RESPIRATORY TRACT INFECTION, UNSPECIFIED TYPE: ICD-10-CM

## 2021-09-02 PROCEDURE — 99421 OL DIG E/M SVC 5-10 MIN: CPT | Performed by: FAMILY MEDICINE

## 2021-09-02 RX ORDER — ALBUTEROL SULFATE 90 UG/1
AEROSOL, METERED RESPIRATORY (INHALATION)
Qty: 8.5 G | Refills: 5 | Status: SHIPPED | OUTPATIENT
Start: 2021-09-02 | End: 2021-11-02 | Stop reason: CLARIF

## 2021-09-02 NOTE — PROGRESS NOTES
Katie Denise (1980) initiated an asynchronous digital communication through [address] [place] [address]. HPI: per patient questionnaire     Exam: not applicable    Diagnoses and all orders for this visit:  Diagnoses and all orders for this visit:    1. Nasal congestion          Time: EV1 - 5-10 minutes were spent on the digital evaluation and management of this patient.       Liston Gosselin, MD

## 2021-09-03 RX ORDER — FAMOTIDINE 20 MG/1
20 TABLET, FILM COATED ORAL
Qty: 90 TABLET | Refills: 3 | Status: SHIPPED | OUTPATIENT
Start: 2021-09-03 | End: 2022-09-14 | Stop reason: SDUPTHER

## 2021-09-03 RX ORDER — FLUTICASONE PROPIONATE 50 MCG
2 SPRAY, SUSPENSION (ML) NASAL
Qty: 1 EACH | Refills: 6 | Status: SHIPPED | OUTPATIENT
Start: 2021-09-03 | End: 2022-09-14 | Stop reason: SDUPTHER

## 2021-09-03 NOTE — TELEPHONE ENCOUNTER
Requested Prescriptions     Pending Prescriptions Disp Refills    famotidine (PEPCID) 20 mg tablet 90 Tablet 3     Sig: Take 1 Tablet by mouth nightly.     fluticasone propionate (FLONASE) 50 mcg/actuation nasal spray 16 g 5     Sig: instill 1 spray into each nostril     QTY 90 for famotidine

## 2021-09-07 ENCOUNTER — TELEPHONE (OUTPATIENT)
Dept: FAMILY MEDICINE CLINIC | Age: 41
End: 2021-09-07

## 2021-09-07 RX ORDER — AMOXICILLIN 875 MG/1
875 TABLET, FILM COATED ORAL 2 TIMES DAILY
Qty: 20 TABLET | Refills: 0 | Status: SHIPPED | OUTPATIENT
Start: 2021-09-07 | End: 2021-09-17

## 2021-09-07 NOTE — TELEPHONE ENCOUNTER
----- Message from Edyta Ruffin. Mikala Mani sent at 9/7/2021 10:52 AM EDT -----  Regarding: Prescription Question  Contact: [phone]  Hello  I did an e visit with you last week. I went and got covid tested because I was still feeling bad at [place] in [facility] 21 and it was negative. I'm still feeling rough. I'm coughing up yellow mucus and have a hoarse voice and clammy and all. I was gonna see maybe if you could go ahead and write me something for this now? Maybe an antibiotic will help.  I usually do well with Amoxicillin thanks

## 2021-10-21 ENCOUNTER — TELEPHONE (OUTPATIENT)
Dept: NEUROLOGY | Age: 41
End: 2021-10-21

## 2021-10-21 NOTE — TELEPHONE ENCOUNTER
I have not seen this patient in almost 2 years. I cannot do any documentation at this time. We need to know the reason for transfer and an appointment. It may not be applicable to neurology.

## 2021-10-21 NOTE — TELEPHONE ENCOUNTER
----- Message from [facility] sent at 10/21/2021 10:03 AM EDT -----  Regarding: /telephone  General Message/Vendor Calls    Caller's first and last name:self      Reason for call: a release form      Callback required yes/no and why: yes to speak with Dr. Sharron Coulter contact number(s):[phone]      Details to clarify the request: Pt called to speak with , regarding on faxing over a release form of the pt to transfer.    Fax: [phone]    [facility]

## 2021-10-25 NOTE — ED NOTES
Discharge instructions reviewed w/ pt and copy given by Dr. Dian Todd. Pt discharged ambulatory from the ED. Questions answered regarding vaccines (COVID, Flu and Shingrix)

## 2021-10-29 ENCOUNTER — HOSPITAL ENCOUNTER (OUTPATIENT)
Dept: PREADMISSION TESTING | Age: 41
Discharge: HOME | End: 2021-10-29
Payer: MEDICAID

## 2021-10-29 PROCEDURE — U0005 INFEC AGEN DETEC AMPLI PROBE: HCPCS

## 2021-10-31 LAB
SARS-COV-2, XPLCVT: NOT DETECTED
SOURCE, COVRS: NORMAL

## 2021-11-02 ENCOUNTER — ANESTHESIA EVENT (OUTPATIENT)
Dept: ENDOSCOPY | Age: 41
End: 2021-11-02
Payer: MEDICAID

## 2021-11-02 NOTE — ANESTHESIA PREPROCEDURE EVALUATION
Relevant Problems   GASTROINTESTINAL   (+) GERD (gastroesophageal reflux disease)       Anesthetic History   No history of anesthetic complications            Review of Systems / Medical History  Patient summary reviewed, nursing notes reviewed and pertinent labs reviewed    Pulmonary                Comments: Former Smoker - Quit 2013   Neuro/Psych   Within defined limits           Cardiovascular                    Comments: POTS (postural orthostatic tachycardia syndrome)    TTE (6/22/21):  ·LV: Calculated LVEF is 65%. Biplane method used to measure ejection fraction. Normal cavity size, wall thickness, systolic function (ejection fraction normal) and diastolic function. Wall motion: normal.  ·IAS: No atrial septal defect present. ·No appreciable valvular pathology. Nuclear Stress Test (1/10/20):  ·Gated SPECT: Left ventricular function post-stress was normal. Calculated ejection fraction is 78%. There is no evidence of transient ischemic dilation (TID). The TID ratio is 0.93. ·Baseline ECG: Normal EKG, normal sinus rhythm. ·Low risk Duke treadmill score. ·Left ventricular perfusion is normal.  ·Normal myocardial perfusion imaging. Myocardial perfusion imaging supports a no risk stress test.    ECG (12/18/19):  Normal sinus rhythm   Normal ECG   When compared with ECG of 18-OCT-2016 08:29,   No significant change was found   GI/Hepatic/Renal     GERD          Comments: Gastroparesis    Irritable bowel syndrome with constipation  H/O H. Pylori infection (5/2012)  H/O C.  Difficile infection (6/2012) Endo/Other             Other Findings   Comments: Sjogren's Syndrome with keratoconjunctivitis sicca          Physical Exam    Airway  Mallampati: I  TM Distance: 4 - 6 cm  Neck ROM: normal range of motion   Mouth opening: Normal     Cardiovascular               Dental  No notable dental hx       Pulmonary                 Abdominal         Other Findings            Anesthetic Plan    ASA: 3  Anesthesia type: MAC and total IV anesthesia          Induction: Intravenous  Anesthetic plan and risks discussed with: Patient

## 2021-11-03 ENCOUNTER — HOSPITAL ENCOUNTER (OUTPATIENT)
Age: 41
Setting detail: OUTPATIENT SURGERY
Discharge: HOME | End: 2021-11-03
Attending: SPECIALIST | Admitting: SPECIALIST
Payer: MEDICAID

## 2021-11-03 ENCOUNTER — ANESTHESIA (OUTPATIENT)
Dept: ENDOSCOPY | Age: 41
End: 2021-11-03
Payer: MEDICAID

## 2021-11-03 VITALS
OXYGEN SATURATION: 100 % | SYSTOLIC BLOOD PRESSURE: 118 MMHG | BODY MASS INDEX: 27.61 KG/M2 | DIASTOLIC BLOOD PRESSURE: 70 MMHG | HEIGHT: 64 IN | WEIGHT: 161.7 LBS | RESPIRATION RATE: 16 BRPM | TEMPERATURE: 97.8 F | HEART RATE: 68 BPM

## 2021-11-03 LAB — HCG UR QL: NEGATIVE

## 2021-11-03 PROCEDURE — 74011250636 HC RX REV CODE- 250/636: Performed by: NURSE ANESTHETIST, CERTIFIED REGISTERED

## 2021-11-03 PROCEDURE — 74011000258 HC RX REV CODE- 258: Performed by: NURSE ANESTHETIST, CERTIFIED REGISTERED

## 2021-11-03 PROCEDURE — 81025 URINE PREGNANCY TEST: CPT

## 2021-11-03 PROCEDURE — 88305 TISSUE EXAM BY PATHOLOGIST: CPT

## 2021-11-03 PROCEDURE — 2709999900 HC NON-CHARGEABLE SUPPLY: Performed by: SPECIALIST

## 2021-11-03 PROCEDURE — 77030018712 HC DEV BLLN INFL BSC -B: Performed by: SPECIALIST

## 2021-11-03 PROCEDURE — 76040000007: Performed by: SPECIALIST

## 2021-11-03 PROCEDURE — 77030019988 HC FCPS ENDOSC DISP BSC -B: Performed by: SPECIALIST

## 2021-11-03 PROCEDURE — 74011250636 HC RX REV CODE- 250/636: Performed by: SPECIALIST

## 2021-11-03 PROCEDURE — 74011000250 HC RX REV CODE- 250: Performed by: NURSE ANESTHETIST, CERTIFIED REGISTERED

## 2021-11-03 PROCEDURE — 76060000032 HC ANESTHESIA 0.5 TO 1 HR: Performed by: SPECIALIST

## 2021-11-03 RX ORDER — PROPOFOL 10 MG/ML
INJECTION, EMULSION INTRAVENOUS AS NEEDED
Status: DISCONTINUED | OUTPATIENT
Start: 2021-11-03 | End: 2021-11-03 | Stop reason: HOSPADM

## 2021-11-03 RX ORDER — DEXTROMETHORPHAN/PSEUDOEPHED 2.5-7.5/.8
1.2 DROPS ORAL
Status: DISCONTINUED | OUTPATIENT
Start: 2021-11-03 | End: 2021-11-03 | Stop reason: HOSPADM

## 2021-11-03 RX ORDER — LIDOCAINE HYDROCHLORIDE 20 MG/ML
INJECTION, SOLUTION EPIDURAL; INFILTRATION; INTRACAUDAL; PERINEURAL AS NEEDED
Status: DISCONTINUED | OUTPATIENT
Start: 2021-11-03 | End: 2021-11-03 | Stop reason: HOSPADM

## 2021-11-03 RX ORDER — SODIUM CHLORIDE 0.9 % (FLUSH) 0.9 %
5-40 SYRINGE (ML) INJECTION AS NEEDED
Status: DISCONTINUED | OUTPATIENT
Start: 2021-11-03 | End: 2021-11-03 | Stop reason: HOSPADM

## 2021-11-03 RX ORDER — SODIUM CHLORIDE 9 MG/ML
50 INJECTION, SOLUTION INTRAVENOUS CONTINUOUS
Status: DISCONTINUED | OUTPATIENT
Start: 2021-11-03 | End: 2021-11-03 | Stop reason: HOSPADM

## 2021-11-03 RX ORDER — SODIUM CHLORIDE 0.9 % (FLUSH) 0.9 %
5-40 SYRINGE (ML) INJECTION EVERY 8 HOURS
Status: DISCONTINUED | OUTPATIENT
Start: 2021-11-03 | End: 2021-11-03 | Stop reason: HOSPADM

## 2021-11-03 RX ADMIN — PROPOFOL 50 MG: 10 INJECTION, EMULSION INTRAVENOUS at 07:45

## 2021-11-03 RX ADMIN — DEXMEDETOMIDINE HYDROCHLORIDE 2 MCG: 100 INJECTION, SOLUTION, CONCENTRATE INTRAVENOUS at 07:48

## 2021-11-03 RX ADMIN — DEXMEDETOMIDINE HYDROCHLORIDE 4 MCG: 100 INJECTION, SOLUTION, CONCENTRATE INTRAVENOUS at 07:42

## 2021-11-03 RX ADMIN — PROPOFOL 50 MG: 10 INJECTION, EMULSION INTRAVENOUS at 07:43

## 2021-11-03 RX ADMIN — PROPOFOL 50 MG: 10 INJECTION, EMULSION INTRAVENOUS at 07:48

## 2021-11-03 RX ADMIN — PROPOFOL 50 MG: 10 INJECTION, EMULSION INTRAVENOUS at 07:51

## 2021-11-03 RX ADMIN — PROPOFOL 30 MG: 10 INJECTION, EMULSION INTRAVENOUS at 07:53

## 2021-11-03 RX ADMIN — SODIUM CHLORIDE: 900 INJECTION, SOLUTION INTRAVENOUS at 07:25

## 2021-11-03 RX ADMIN — LIDOCAINE HYDROCHLORIDE 60 MG: 20 INJECTION, SOLUTION EPIDURAL; INFILTRATION; INTRACAUDAL; PERINEURAL at 07:43

## 2021-11-03 RX ADMIN — DEXMEDETOMIDINE HYDROCHLORIDE 4 MCG: 100 INJECTION, SOLUTION, CONCENTRATE INTRAVENOUS at 07:45

## 2021-11-03 NOTE — PROCEDURES
Esophagogastroduodenoscopy Procedure Note      Hugo June 1980  [id number]    Indication:  Dysphagia     Endoscopist: Silvana Cordoba MD    Referring Provider:  Nicholas Ndiaye MD    Sedation:  MAC anesthesia Propofol    Procedure Details:  After infomed consent was obtained for the procedure, with all risks and benefits of procedure explained the patient was taken to the endoscopy suite and placed in the left lateral decubitus position. Following sequential administration of sedation as per above, the endoscope was inserted into the mouth and advanced under direct vision to second portion of the duodenum. A careful inspection was made as the gastroscope was withdrawn, including a retroflexed view of the proximal stomach; findings and interventions are described below. 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  + S/P bx of the mid esophagus to r/o EoE  + Irregular Z line located at 37 cm from incisors s/p Bx to r/o Monterroso's  + 3 cm Hiatal Hernia    Stomach:   + There was mild diffuse erythema with streaking s/p Bx c/w gastritis, nonerosive. Duodenum:   - The bulb and post bulbar mucosa is normal in appearance to the second portion. The duodenal folds appeared normal.  Cold forceps biopsies to r/o Celiac. Therapies:  See above    Specimen: Specimens were collected as described and send to the laboratory. Complications:   None were encountered during the procedure. EBL: < 10 ml.           Recommendations:   -f/u path  -continue acid suppression  -f/u post procedure    Silvana Cordoba MD  11/3/2021  7:58 AM

## 2021-11-03 NOTE — H&P
Gastroenterology Outpatient History and Physical    Patient: Hope Loss    Physician: Yaima Wooten MD    Vital Signs: Blood pressure 136/69, pulse 84, temperature 97.8 °F (36.6 °C), resp. rate 15, height 5' 4\" (1.626 m), weight 73.3 kg (161 lb 11.2 oz), last menstrual period 10/11/2021, SpO2 100 %, not currently breastfeeding. Allergies: Allergies   Allergen Reactions    Celexa [Citalopram] Nausea and Vomiting    Ciprofloxacin Shortness of Breath    Diflucan [Fluconazole] Rash     Burning sensation to skin    Macrobid [Nitrofurantoin Monohyd/M-Cryst] Other (comments)    Sulfa (Sulfonamide Antibiotics) Rash       Chief Complaint: Dysphagia    History of Present Illness: 47 yo F for egd for dysphagia    Justification for Procedure: above    History:  Past Medical History:   Diagnosis Date    Anemia     taking iron    Family history of skin cancer     sister has melanoma,     Gastroparesis     GERD (gastroesophageal reflux disease)     gastroparesis--h-pylori    H. pylori infection     H/O Clostridium difficile infection     Helicobacter pylori (H. pylori) 2012    h pylori    Palpitations       - cardiac workup per pt.  POTS (postural orthostatic tachycardia syndrome)     20 Dr. Daniel Menchaca, [facility].  Reports tx increased fluid and NA intake, betablocker    Sjogren's syndrome (HCC)     Sun-damaged skin     20's    Sunburn, blistering     8-9     Tanning bed exposure     11 years ago       Past Surgical History:   Procedure Laterality Date    HX COLONOSCOPY      HX DILATION AND CURETTAGE  2012    HX ENDOSCOPY      HX GYN      tubal reversal laparoscopic    HX TUBAL LIGATION      IR DILATE ESOPH STRICT      TILT TABLE EVAL W/WO DRUG  2017           Social History     Socioeconomic History    Marital status: SINGLE   Tobacco Use    Smoking status: Former Smoker     Types: Cigarettes     Quit date: 2013     Years since quittin.9    Smokeless tobacco: Never Used   Vaping Use    Vaping Use: Never used   Substance and Sexual Activity    Alcohol use: Yes     Alcohol/week: 0.0 standard drinks     Comment: not monthly    Drug use: No    Sexual activity: Yes     Partners: Male     Birth control/protection: None   Social History Narrative    3 kids (16yo - 7mo), lives with boyfriend (but he travels for work)      Family History   Problem Relation Age of Onset    Heart Disease Mother     Cancer Mother         Thyroid    Thyroid Disease Mother     Heart Disease Father     High Cholesterol Father     Heart Disease Maternal Grandmother     Diabetes Maternal Grandmother     Other Maternal Grandmother         Heomochromatosis    Thyroid Disease Sister     No Known Problems Brother     Stroke Maternal Grandfather     Diabetes Maternal Grandfather     Hypertension Paternal Grandmother     Heart Disease Paternal Grandfather     Heart Attack Paternal Grandfather        Medications:   Prior to Admission medications    Medication Sig Start Date End Date Taking? Authorizing Provider   famotidine (PEPCID) 20 mg tablet Take 1 Tablet by mouth nightly. 9/3/21  Yes Angela Coleman NP   fluticasone propionate (FLONASE) 50 mcg/actuation nasal spray 2 Sprays by Both Nostrils route daily as needed for Rhinitis. 9/3/21  Yes Angela Coleman NP   Restasis 0.05 % dpet INSTILL 1 DROP IN BOTH EYES TWICE DAILY 7/2/21  Yes Andres Jean MD   atenoloL (TENORMIN) 25 mg tablet TAKE 1 TABLET BY MOUTH DAILY 4/23/21  Yes Sage ENNIS NP   cetirizine (ZYRTEC) 10 mg tablet Take 1 Tab by mouth daily. Patient taking differently: Take 10 mg by mouth as needed. 4/20/21  Yes Kecia Corley MD   vit B complex no.12/niacin,B3, (VITAMIN B COMPLEX NO.12-NIACIN PO) Take 1 Tab by mouth daily. Yes Provider, Historical   omeprazole (PRILOSEC) 40 mg capsule Take 40 mg by mouth daily as needed.    Yes Provider, Historical   cholecalciferol (VITAMIN D3) 1,000 unit tablet Take 1,000 Units by mouth daily. Yes Provider, Historical   LINZESS 290 mcg cap capsule Take 290 mcg by mouth as needed. 11/10/15  Yes Provider, Historical   cevimeline (EVOXAC) 30 mg capsule TAKE 1 CAPSULE BY MOUTH THREE TIMES DAILY  Patient taking differently: as needed. 7/2/21   Jocy Melgar MD   Oravig 50 mg mabt 1 Mehnaz by Buccal route daily. 4/2/21   Jocy Melgar MD   phenazopyridine HCl (AZO URINARY PAIN RELIEF PO) as needed. Provider, Historical   albuterol (PROVENTIL VENTOLIN) 2.5 mg /3 mL (0.083 %) nebulizer solution 3 mL by Nebulization route every four (4) hours as needed for Wheezing. 4/5/19   Angela Coleman, NP   multivitamin (ONE A DAY) tablet Take 1 Tab by mouth daily. Provider, Historical       Physical Exam:   General: alert, no distress   HEENT: Head: Normocephalic, no lesions, without obvious abnormality.    Heart: regular rate and rhythm, S1, S2 normal, no murmur, click, rub or gallop   Lungs: chest clear, no wheezing, rales, normal symmetric air entry   Abdominal: Bowel sounds are normal, liver is not enlarged, spleen is not enlarged   Neurological: Grossly normal   Extremities: extremities normal, atraumatic, no cyanosis or edema     Findings/Diagnosis: Dysphagia    Plan of Care/Planned Procedure: EGD

## 2021-11-03 NOTE — PERIOP NOTES
See anesthesia note for medications given during procedure. Received report from anesthesia staff on vital signs and status of patient. Endoscope was pre-cleaned at the bedside immediately following procedure by ARUNA Caruso Esophageal dilation procedure done with a 57FR Savory.  No post-op crepitus felt on assessment

## 2021-11-03 NOTE — DISCHARGE INSTRUCTIONS
Jorge Luis Maldonado  [id number]  1980    EGD DISCHARGE INSTRUCTIONS  Discomfort:  Sore throat- throat lozenges or warm salt water gargle  redness at IV site- apply warm compress to area; if redness or soreness persist- contact your physician  Gaseous discomfort- walking, belching will help relieve any discomfort  You may not operate a vehicle for 12 hours  You may not engage in an occupation involving machinery or appliances for rest of today. You may not drink alcoholic beverages for at least 12 hours  Avoid making any critical decisions for at least 24 hour  DIET  You may resume your regular diet - however -  remember your colon is empty and a heavy meal will produce gas. Avoid these foods:  vegetables, fried / greasy foods, carbonated drinks  MEDICATIONS        Regarding Aspirin or Nonsteroidal medications specifically, please see below. ACTIVITY  You may resume your normal daily activities. Spend the remainder of the day resting -  avoid any strenuous activity. CALL M.D. ANY SIGN OF   Increasing pain, nausea, vomiting  Abdominal distension (swelling)  New increased bleeding (oral or rectal)  Fever (chills)  Pain in chest area  Bloody discharge from nose or mouth  Shortness of breath    Tylenol as needed for pain.     Follow-up Instructions:   Call Dr. Josselin Mesa for results of procedure / biopsy in 4-5 days at telephone #  [phone]              Make a follow up appt with Dr. Callaway Dear office     f/u path  -continue acid suppression  -f/u post procedure

## 2021-11-03 NOTE — ANESTHESIA POSTPROCEDURE EVALUATION
Procedure(s):  ESOPHAGEAL DILATION  ESOPHAGOGASTRODUODENOSCOPY (EGD)  ESOPHAGOGASTRODUODENAL (EGD) BIOPSY. MAC, total IV anesthesia    Anesthesia Post Evaluation        Patient location during evaluation: PACU  Note status: Adequate. Level of consciousness: responsive to verbal stimuli and sleepy but conscious  Pain management: satisfactory to patient  Airway patency: patent  Anesthetic complications: no  Cardiovascular status: acceptable  Respiratory status: acceptable  Hydration status: acceptable  Comments: +Post-Anesthesia Evaluation and Assessment    Patient: Delta Dotson MRN: [id number]  SSN: [id number]   YOB: 1980  Age: 46 y.o. Sex: female      Cardiovascular Function/Vital Signs    BP (!) 106/45   Pulse 79   Temp 36.6 °C (97.8 °F)   Resp 15   Ht 5' 4\" (1.626 m)   Wt 73.3 kg (161 lb 11.2 oz)   SpO2 100%   Breastfeeding No   BMI 27.76 kg/m²     Patient is status post Procedure(s):  ESOPHAGEAL DILATION  ESOPHAGOGASTRODUODENOSCOPY (EGD)  ESOPHAGOGASTRODUODENAL (EGD) BIOPSY. Nausea/Vomiting: Controlled. Postoperative hydration reviewed and adequate. Pain:  Pain Scale 1: Numeric (0 - 10) (11/03/21 0718)  Pain Intensity 1: 0 (11/03/21 0718)   Managed. Neurological Status: At baseline. Mental Status and Level of Consciousness: Arousable. Pulmonary Status:   O2 Device: None (11/03/21 0805)   Adequate oxygenation and airway patent. Complications related to anesthesia: None    Post-anesthesia assessment completed. No concerns. Signed By: Ang Vasquez MD    11/3/2021  Post anesthesia nausea and vomiting:  controlled      INITIAL Post-op Vital signs: No vitals data found for the desired time range. Normal rate, regular rhythm.  Heart sounds S1, S2.  No murmurs, rubs or gallops.

## 2021-11-03 NOTE — ROUTINE PROCESS
Gautam Mcintosh 1980 
[id number] Situation: 
Verbal report received from: Jose J Hampton Procedure: Procedure(s): ESOPHAGEAL DILATION 
ESOPHAGOGASTRODUODENOSCOPY (EGD) ESOPHAGOGASTRODUODENAL (EGD) BIOPSY Background: 
 
Preoperative diagnosis: Gastroparesis [K31.84] Postoperative diagnosis: Dysphagia, Hietal hernia, Gastritis, esophogeal web :  Dr. Valentina Cortes Assistant(s): Endoscopy Technician-1: Estefani Wolf Endoscopy RN-1: [facility] Specimens:  
ID Type Source Tests Collected by Time Destination 1 : Stomach BX Preservative   Lauren Guallpa MD 11/3/2021 8148 Pathology 2 : Duodenal BX Preservative Duodenum  Lauren Guallpa MD 11/3/2021 9745 Pathology 3 : GE junction BX Preservative   Lauren Guallpa MD 11/3/2021 6057 Pathology 4 : Mid esophagus BX Preservative   Lauren Guallpa MD 11/3/2021 6923 Pathology H. Pylori  no Assessment: 
Intra-procedure medications Anesthesia gave intra-procedure sedation and medications, see anesthesia flow sheet yes Intravenous fluids: NS@ [facility] Vital signs stable Abdominal assessment: round and soft No subcutaneous crepitus of the chest or cervical region was noted post procedure. Endoscopy discharge instructions have been reviewed and given to patient. The patient verbalized understanding and acceptance of instructions. Recommendation: 
Discharge patient per MD order. Family Permission to share finding with family or friend yes

## 2021-12-04 ENCOUNTER — E-VISIT (OUTPATIENT)
Dept: FAMILY MEDICINE CLINIC | Age: 41
End: 2021-12-04
Payer: MEDICAID

## 2021-12-04 DIAGNOSIS — R09.81 NASAL CONGESTION: Primary | ICD-10-CM

## 2021-12-04 PROCEDURE — 99422 OL DIG E/M SVC 11-20 MIN: CPT | Performed by: FAMILY MEDICINE

## 2021-12-06 RX ORDER — AZITHROMYCIN 250 MG/1
TABLET, FILM COATED ORAL
Qty: 6 TABLET | Refills: 0 | Status: SHIPPED | OUTPATIENT
Start: 2021-12-06 | End: 2021-12-11

## 2021-12-06 NOTE — PROGRESS NOTES
Gautam Mcintosh (1980) initiated an asynchronous digital communication through [address] [place] [address]. HPI: per patient questionnaire     Exam: not applicable    Diagnoses and all orders for this visit:  Diagnoses and all orders for this visit:    1. Nasal congestion    Other orders  -     azithromycin (ZITHROMAX) 250 mg tablet; Take 2 tablets today, then take 1 tablet daily          Time: EV2 - 11-20 minutes were spent on the digital evaluation and management of this patient.        Svitlana Canchola MD

## 2021-12-13 ENCOUNTER — OFFICE VISIT (OUTPATIENT)
Dept: FAMILY MEDICINE CLINIC | Age: 41
End: 2021-12-13
Payer: MEDICAID

## 2021-12-13 ENCOUNTER — TELEPHONE (OUTPATIENT)
Dept: CARDIOLOGY CLINIC | Age: 41
End: 2021-12-13

## 2021-12-13 VITALS
SYSTOLIC BLOOD PRESSURE: 139 MMHG | TEMPERATURE: 98.2 F | WEIGHT: 158 LBS | RESPIRATION RATE: 16 BRPM | OXYGEN SATURATION: 98 % | DIASTOLIC BLOOD PRESSURE: 84 MMHG | BODY MASS INDEX: 26.98 KG/M2 | HEIGHT: 64 IN | HEART RATE: 79 BPM

## 2021-12-13 DIAGNOSIS — R00.2 PALPITATIONS: Primary | ICD-10-CM

## 2021-12-13 PROCEDURE — 93000 ELECTROCARDIOGRAM COMPLETE: CPT | Performed by: FAMILY MEDICINE

## 2021-12-13 PROCEDURE — 99213 OFFICE O/P EST LOW 20 MIN: CPT | Performed by: FAMILY MEDICINE

## 2021-12-13 RX ORDER — ATENOLOL 25 MG/1
TABLET ORAL
Qty: 30 TABLET | Refills: 5 | Status: SHIPPED | OUTPATIENT
Start: 2021-12-13 | End: 2022-06-03

## 2021-12-13 NOTE — TELEPHONE ENCOUNTER
Patient is calling because she is trying to schedule an appt. With the provider for an annual and heart palpitations.     [phone]

## 2021-12-13 NOTE — PROGRESS NOTES
Chief Complaint   Patient presents with    Palpitations     Pt states she's not having chest pains or discomfort but she just feels funny     Pt recently recovered from a sinus infection, \"I had not felt that sick in some time\". Pt was COVID tested and was negative. Pt reports that since she recovered she has been having epigastric pressure and increased palpations. Pt has a history of POTS and gastroparesis. Pt felt drained, clammy last week. That has resolved. The palpations feel constant. Pt takes atenolol at least half a tablet daily, more as needed. Subjective: (As above and below)     Chief Complaint   Patient presents with    Palpitations     Pt states she's not having chest pains or discomfort but she just feels funny     she is a 46y.o. year old female who presents for evaluation. Reviewed PmHx, RxHx, FmHx, SocHx, AllgHx and updated in chart. Review of Systems - negative except as listed above    Objective:     Vitals:    12/13/21 1337   BP: 139/84   Pulse: 79   Resp: 16   Temp: 98.2 °F (36.8 °C)   TempSrc: Oral   SpO2: 98%   Weight: 158 lb (71.7 kg)   Height: 5' 4\" (1.626 m)     Physical Examination: General appearance - alert, well appearing, and in no distress  Mental status - normal mood, behavior, speech, dress, motor activity, and thought processes  Ears - bilateral TM's and external ear canals normal  Chest - clear to auscultation, no wheezes, rales or rhonchi, symmetric air entry  Heart - normal rate, regular rhythm, normal S1, S2, no murmurs, rubs, clicks or gallops  Musculoskeletal - no joint tenderness, deformity or swelling  Extremities - peripheral pulses normal, no pedal edema, no clubbing or cyanosis    Assessment/ Plan:   1.  Palpitations  Check EKG, likely more GI related due to gastroparesis and recent abx use  - AMB POC EKG ROUTINE W/ 12 LEADS, INTER & REP       I have discussed the diagnosis with the patient and the intended plan as seen in the above orders. The patient has received an after-visit summary and questions were answered concerning future plans.      Medication Side Effects and Warnings were discussed with patient: yes  Patient Labs were reviewed: yes  Patient Past Records were reviewed:  yes    Omar Estrada M.D.

## 2021-12-13 NOTE — TELEPHONE ENCOUNTER
Received refill request for atenolol 25 mg po tabs. Refill authorized.     Future Appointments   Date Time Provider Addis Velazquez   12/13/2021  1:30 PM Juan A Corley MD [facility] BS AMB   2/1/2022 10:40 AM Nik Puente DO [facility] BS AMB   4/1/2022 10:00 AM Donavan Christian MD [facility] BS AMB

## 2021-12-13 NOTE — PROGRESS NOTES
1. Have you been to the ER, urgent care clinic since your last visit? Hospitalized since your last visit? Yes, [facility] for about 2 weeks ago, pt states she was covid tested and flu tested both resulted negative. 2. Have you seen or consulted any other health care providers outside of the [address] [place] [address] since your last visit? Include any pap smears or colon screening. Yes, Cardiologist a couple months ago.     Health Maintenance Due   Topic Date Due    Cervical cancer screen  Never done    Flu Vaccine (1) 09/01/2021     Chief Complaint   Patient presents with    Palpitations     Pt states she's not having chest pains or discomfort but she just feels funny

## 2021-12-15 NOTE — TELEPHONE ENCOUNTER
Verified patient with two types of identifiers. Patient states she had a cold about a week ago and was prescribed a Z pa. Patient noted increase in palpitations and GI symptoms. Known Gastroparesis. Patient saw PCP on Monday who checked EKG. EKG normal. PCP states likely  More GI related due to gastroparesis and recent abx use. Discussed with Yesy Torres, RAMONITA. VO per Yesy Torres, NP order 30 day loop and schedule follow up with NP in 1 month. Patient agrees to 30 day loop monitor and appointment with NP. Patient verbalized understanding and will call with any other questions.       Future Appointments   Date Time Provider Addis Velazquez   1/17/2022 10:00 AM RAMONITA Higuera BS AMB   2/1/2022 10:40 AM Nik Puente DO [facility] BS AMB   4/1/2022 10:00 AM Danni Christian MD [facility] BS AMB

## 2021-12-20 ENCOUNTER — NURSE TRIAGE (OUTPATIENT)
Dept: OTHER | Facility: CLINIC | Age: 41
End: 2021-12-20

## 2021-12-20 ENCOUNTER — OFFICE VISIT (OUTPATIENT)
Dept: FAMILY MEDICINE CLINIC | Age: 41
End: 2021-12-20

## 2021-12-20 VITALS
WEIGHT: 157.2 LBS | OXYGEN SATURATION: 98 % | SYSTOLIC BLOOD PRESSURE: 132 MMHG | HEIGHT: 64 IN | DIASTOLIC BLOOD PRESSURE: 76 MMHG | RESPIRATION RATE: 17 BRPM | BODY MASS INDEX: 26.84 KG/M2 | TEMPERATURE: 98.5 F | HEART RATE: 68 BPM

## 2021-12-20 DIAGNOSIS — R30.0 DYSURIA: Primary | ICD-10-CM

## 2021-12-20 DIAGNOSIS — R10.13 EPIGASTRIC PAIN: ICD-10-CM

## 2021-12-20 LAB
BILIRUB UR QL STRIP: NEGATIVE
GLUCOSE UR-MCNC: NEGATIVE MG/DL
KETONES P FAST UR STRIP-MCNC: NEGATIVE MG/DL
PH UR STRIP: 5.5 [PH] (ref 4.6–8)
PROT UR QL STRIP: NEGATIVE
SP GR UR STRIP: 1.03 (ref 1–1.03)
UA UROBILINOGEN AMB POC: NORMAL (ref 0.2–1)
URINALYSIS CLARITY POC: CLEAR
URINALYSIS COLOR POC: NORMAL
URINE BLOOD POC: NEGATIVE
URINE LEUKOCYTES POC: NEGATIVE
URINE NITRITES POC: NEGATIVE

## 2021-12-20 PROCEDURE — 81003 URINALYSIS AUTO W/O SCOPE: CPT | Performed by: FAMILY MEDICINE

## 2021-12-20 PROCEDURE — 99214 OFFICE O/P EST MOD 30 MIN: CPT | Performed by: FAMILY MEDICINE

## 2021-12-20 NOTE — PROGRESS NOTES
Chief Complaint   Patient presents with    Abdominal Pain     (L) side stomach pains    Urinary Pain     Burning when urinating     Pt is struggling with intermittent burning with urination. Pt reports that her primary problem is fluttering in her epigastric area. Pt reports that it feels like something is constantly moving. Pt reports that the feeling never fully goes away. Pt has reached out to her cardiologist, Holter is progress. Pt has also reached out to GI, waiting for call back. Pt is taking Linzess and reflux medication    Subjective: (As above and below)     Chief Complaint   Patient presents with    Abdominal Pain     (L) side stomach pains    Urinary Pain     Burning when urinating     she is a 46y.o. year old female who presents for evaluation. Reviewed PmHx, RxHx, FmHx, SocHx, AllgHx and updated in chart. Review of Systems - negative except as listed above    Objective:     Vitals:    12/20/21 1007   BP: 132/76   Pulse: 68   Resp: 17   Temp: 98.5 °F (36.9 °C)   TempSrc: Oral   SpO2: 98%   Weight: 157 lb 3.2 oz (71.3 kg)   Height: 5' 4\" (1.626 m)     Physical Examination: General appearance - alert, well appearing, and in no distress  Mental status - normal mood, behavior, speech, dress, motor activity, and thought processes  Ears - bilateral TM's and external ear canals normal  Chest - clear to auscultation, no wheezes, rales or rhonchi, symmetric air entry  Heart - normal rate, regular rhythm, normal S1, S2, no murmurs, rubs, clicks or gallops  Abdomen - epigastric and RUQ tenderness, no guarding , no rebound  Musculoskeletal - no joint tenderness, deformity or swelling  Extremities - peripheral pulses normal, no pedal edema, no clubbing or cyanosis    Assessment/ Plan:   1. Dysuria  -check culture  - AMB POC URINALYSIS DIP STICK AUTO W/O MICRO  - CULTURE, URINE; Future    2.  Epigastric pain  -continue on reflux medication, follow up with Gi  -check US  - US ABD COMP; Future I have discussed the diagnosis with the patient and the intended plan as seen in the above orders. The patient has received an after-visit summary and questions were answered concerning future plans.      Medication Side Effects and Warnings were discussed with patient: yes  Patient Labs were reviewed: yes  Patient Past Records were reviewed:  yes    Pamela Jones M.D.

## 2021-12-20 NOTE — PROGRESS NOTES
1. Have you been to the ER, urgent care clinic since your last visit? Hospitalized since your last visit? No    2. Have you seen or consulted any other health care providers outside of the [address] [place] [address] since your last visit? Include any pap smears or colon screening.  No    Health Maintenance Due   Topic Date Due    Cervical cancer screen  Never done    Flu Vaccine (1) 09/01/2021     Chief Complaint   Patient presents with    Abdominal Pain     (L) side stomach pains    Urinary Pain     Burning when urinating

## 2021-12-20 NOTE — TELEPHONE ENCOUNTER
[facility] with Red Flag Complaint. Subjective: Caller states \"The pressure seems more intense and when I urinate it's burning. \"     Current Symptoms: \"Twitching and pressure\" in the epigastric region. Onset: Burning with urination started 12/14/2021 and epigastric pressure has been present x 1.5-2 weeks. Associated Symptoms: Urgency    Pain Severity: 5/10    Temperature: Denies fever    What has been tried: Azo, drinking fluids    LMP: 12/10/2021 Pregnant: Denies    Recommended disposition: see PCP today in the office. Advised UCC if no available appts. Care advice provided, patient verbalizes understanding; denies any other questions or concerns; instructed to call back for any new or worsening symptoms. Attention Provider: Thank you for allowing me to participate in the care of your patient. The patient was connected to triage in response to information provided to the [facility]. Please do not respond through this encounter as the response is not directed to a shared pool.     Reason for Disposition   Painful urination AND EITHER frequency or urgency    Protocols used: URINATION PAIN - FEMALE-ADULT-[place]

## 2021-12-22 ENCOUNTER — HOSPITAL ENCOUNTER (OUTPATIENT)
Dept: ULTRASOUND IMAGING | Age: 41
Discharge: HOME | End: 2021-12-22
Attending: FAMILY MEDICINE
Payer: MEDICAID

## 2021-12-22 DIAGNOSIS — R10.13 EPIGASTRIC PAIN: ICD-10-CM

## 2021-12-22 LAB
BACTERIA SPEC CULT: ABNORMAL
CC UR VC: ABNORMAL
SERVICE CMNT-IMP: ABNORMAL

## 2021-12-22 PROCEDURE — 76700 US EXAM ABDOM COMPLETE: CPT

## 2021-12-22 RX ORDER — PENICILLIN V POTASSIUM 500 MG/1
500 TABLET, FILM COATED ORAL 3 TIMES DAILY
Qty: 30 TABLET | Refills: 0 | Status: SHIPPED | OUTPATIENT
Start: 2021-12-22 | End: 2022-01-01

## 2022-01-19 NOTE — PROGRESS NOTES
Cardiac Electrophysiology OFFICE Note     Subjective:      Wenceslao Shin is a 46 y.o. female patient who presents for follow up of palpitations. She called in 12/2021 to report increased palpitations & GI symptoms while taking azithromycin for a URI.  ECG at PCP office reportedly WNL. GI distress thought to be due to gastroparesis. States also diagnosed with a UTI soon thereafter, treated with PCN. Since the, states palpitations have improved significantly, now at/near baseline. 30 day loop monitor was mailed to patient's home. Sinus terry to sinus tach noted, no significant arrhythmia. Lexiscan cardiolite stress test in 01/2020 WNL. Reports compliance with compression stockings. She denies syncope. Previous:  Evaluated at [facility] in 01/2020. CT chest with contrast WNL in 12/2019. PFT reportedly \"ok\". No previous syncope, but many near syncopal episodes. Activity & exercise are triggers for tachy episodes. Metoprolol caused fatigue. Disliked Inderal.    Abdominal & CXR, barium swallow normal in 11/2018. Underwent esophageal dilation 02/2020. Diagnosed & treated for Sjogren's by Dr. Oksana Serrano. Neurologist is Dr. Edward Lopez. Normal thyroid biopsy in 2017. Patient Active Problem List    Diagnosis Date Noted    Long-term use of hydroxychloroquine 02/14/2019    Sjogren's syndrome with keratoconjunctivitis sicca ([facility] [address] 08/07/2018    Gastroparesis 08/07/2018    Irritable bowel syndrome with constipation 08/07/2018    Chronic constipation 09/01/2017    GERD (gastroesophageal reflux disease) 09/01/2017    POTS (postural orthostatic tachycardia syndrome) 01/20/2017    Abdominal adhesions 05/04/2012     Current Outpatient Medications   Medication Sig Dispense Refill    atenoloL (TENORMIN) 25 mg tablet TAKE 1 TABLET BY MOUTH DAILY 30 Tablet 5    famotidine (PEPCID) 20 mg tablet Take 1 Tablet by mouth nightly.  90 Tablet 3    fluticasone propionate (FLONASE) 50 mcg/actuation nasal spray 2 Sprays by Both Nostrils route daily as needed for Rhinitis. 1 Each 6    cevimeline (EVOXAC) 30 mg capsule TAKE 1 CAPSULE BY MOUTH THREE TIMES DAILY (Patient taking differently: as needed.) 270 Capsule 5    Restasis 0.05 % dpet INSTILL 1 DROP IN BOTH EYES TWICE DAILY 30 Each 5    cetirizine (ZYRTEC) 10 mg tablet Take 1 Tab by mouth daily. (Patient taking differently: Take 10 mg by mouth as needed.) 90 Tab 3    vit B complex no.12/niacin,B3, (VITAMIN B COMPLEX NO.12-NIACIN PO) Take 1 Tab by mouth daily.  omeprazole (PRILOSEC) 40 mg capsule Take 40 mg by mouth daily as needed.  cholecalciferol (VITAMIN D3) 1,000 unit tablet Take 1,000 Units by mouth daily.  multivitamin (ONE A DAY) tablet Take 1 Tab by mouth daily.  LINZESS 290 mcg cap capsule Take 290 mcg by mouth as needed. 0     Allergies   Allergen Reactions    Celexa [Citalopram] Nausea and Vomiting    Ciprofloxacin Shortness of Breath    Diflucan [Fluconazole] Rash     Burning sensation to skin    Macrobid [Nitrofurantoin Monohyd/M-Cryst] Other (comments)    Sulfa (Sulfonamide Antibiotics) Rash     Past Medical History:   Diagnosis Date    Anemia     taking iron    Family history of skin cancer     sister has melanoma,     Gastroparesis     GERD (gastroesophageal reflux disease)     gastroparesis--h-pylori    H. pylori infection     H/O Clostridium difficile infection 27/3350    Helicobacter pylori (H. pylori) 05/2012    h pylori    Palpitations     2010  - cardiac workup per pt.  POTS (postural orthostatic tachycardia syndrome)     2/21/20 Dia Allred 22.  Reports tx increased fluid and NA intake, betablocker    Sjogren's syndrome (HCC)     Sun-damaged skin     20's    Sunburn, blistering     8-9     Tanning bed exposure     11 years ago      Past Surgical History:   Procedure Laterality Date    HX COLONOSCOPY      HX DILATION AND CURETTAGE  05/2012    HX ENDOSCOPY      HX GYN tubal reversal laparoscopic    HX TUBAL LIGATION      IR DILATE ESOPH STRICT      TILT TABLE EVAL W/WO DRUG  2017          Family History   Problem Relation Age of Onset    Heart Disease Mother     Cancer Mother         Thyroid    Thyroid Disease Mother     Heart Disease Father     High Cholesterol Father     Heart Disease Maternal Grandmother     Diabetes Maternal Grandmother     Other Maternal Grandmother         Heomochromatosis    Thyroid Disease Sister     No Known Problems Brother     Stroke Maternal Grandfather     Diabetes Maternal Grandfather     Hypertension Paternal Grandmother     Heart Disease Paternal Grandfather     Heart Attack Paternal Grandfather      Social History     Tobacco Use    Smoking status: Former Smoker     Types: Cigarettes     Quit date: 2013     Years since quittin.1    Smokeless tobacco: Never Used   Substance Use Topics    Alcohol use: Yes     Alcohol/week: 0.0 standard drinks     Comment: not monthly        Review of Systems:   Constitutional: Negative for fever, chills, weight loss. + fatigue  HEENT: Negative for nosebleeds, vision changes. Respiratory: Negative for cough, hemoptysis, sputum production, and wheezing. Cardiovascular: Negative for chest pain, + occasional palpitations, no orthopnea, claudication, leg swelling, syncope, and PND. + chronic CASTILLO  Gastrointestinal: Negative for nausea, vomiting, diarrhea, constipation, blood in stool and melena. + gastroparesis. Occasional dysphagia. Genitourinary: Negative for dysuria, and hematuria. Musculoskeletal: Negative for myalgias, + arthralgia. Skin: occasional rash. Heme: Does not bleed or bruise easily. Neurological: Negative for speech change and focal weakness. Objective:     Visit Vitals  /60   Pulse 94   Resp 18   Ht 5' 4\" (1.626 m)   Wt 165 lb (74.8 kg)   SpO2 98%   BMI 28.32 kg/m²       Physical Exam:   Constitutional: Well-developed and well-nourished.  No respiratory distress. Head: Normocephalic and atraumatic. Eyes: Pupils are equal, round. ENT: Hearing grossly normal.  Neck: Supple. No JVD present. Cardiovascular: Normal rate, regular rhythm. Exam reveals no gallop and no friction rub. No murmur heard. Pulmonary/Chest: Effort normal and breath sounds normal. No wheezes. Abdominal: Soft, no tenderness. Musculoskeletal: Moves extremities independently. Normal gait. Vasc/lymphatic: No edema. Neurological: Alert,oriented. Skin: Skin is warm and dry. Psychiatric: Normal mood and affect. Behavior is normal. Judgment and thought content normal.      Assessment/Plan:     Imaging/Studies:  Echo (06/22/2021): LVEF 65%, no RWMA. Trace TR. Nuclear stress (01/10/2020): LVEF 78%. Normal myocardial perfusion imaging. Stress echo (09/08/2016): Normal LV function, RWMA. ICD-10-CM ICD-9-CM    1. Palpitations  R00.2 785.1    2. POTS (postural orthostatic tachycardia syndrome)  I49.8 427.89    3. SOB (shortness of breath)  R06.02 786.05         POTS: Recent exacerbation of palpitations during URI, had been adequately controlled with atenolol & compression stockings; improved now that her infection resolved. Loop monitor showed sinus terry to sinus tach. Continue atenolol, can use additional 25 mg po prn increased palpitations. SOB: Normal echo in 06/2021, normal nuclear stress test in 01/2020. Evaluated at [facility]. Chest CT essentially normal in 12/2019. Has tried many inhalers without significant improvement. Follow up with Dr. Mikaela Dennison as scheduled below. Future Appointments   Date Time Provider Addis Velazquez   2/1/2022 10:40 AM DO ANGELICA Cordova BS AMB   4/1/2022 11:00 AM Divina Faye MD [facility] BS AMB   8/2/2022  1:00 PM MD SHAISTA Mendoza BS AMB     Thank you for involving me in this patient's care and please call with further concerns or questions.     Waleska Solomon, FNP-C  [facility]  01/25/22

## 2022-01-20 ENCOUNTER — OFFICE VISIT (OUTPATIENT)
Dept: FAMILY MEDICINE CLINIC | Age: 42
End: 2022-01-20
Payer: MEDICAID

## 2022-01-20 VITALS
TEMPERATURE: 98 F | SYSTOLIC BLOOD PRESSURE: 133 MMHG | DIASTOLIC BLOOD PRESSURE: 75 MMHG | BODY MASS INDEX: 28.17 KG/M2 | HEART RATE: 100 BPM | OXYGEN SATURATION: 98 % | HEIGHT: 64 IN | RESPIRATION RATE: 18 BRPM | WEIGHT: 165 LBS

## 2022-01-20 DIAGNOSIS — H92.01 RIGHT EAR PAIN: Primary | ICD-10-CM

## 2022-01-20 DIAGNOSIS — K58.1 IRRITABLE BOWEL SYNDROME WITH CONSTIPATION: ICD-10-CM

## 2022-01-20 PROCEDURE — 99213 OFFICE O/P EST LOW 20 MIN: CPT | Performed by: NURSE PRACTITIONER

## 2022-01-20 NOTE — PROGRESS NOTES
Subjective:     Chief Complaint   Patient presents with    Ear Pain     x1 day, not as bad as yesterday        HPI:  Jeannette Powers is a 46 y.o. female here for complaints right ear pain. Woke up yesterday AM with right ear aching and some itching \"way inside\". Not sure if she had wax or something in ear. Today ear is better  She says that she took some motrin yesterday and that helped. Has had some intermittent runny nose  Has not been on her allergy medicine for a little while. GI Dr Derek Mccurdy   Has history of GI issues and chronic constipation, says that she was having constipation the past two days and did have straining a little yesterday and today and then this morning with her BM she had some blood in her stool. No pain or active bleeding now. Was on antibiotics last month and has been off her usual regimen to regulate her bowels. No hospital, ER or specialist visits since last primary care visit except as noted above. Past Medical History:   Diagnosis Date    Anemia     taking iron    Family history of skin cancer     sister has melanoma,     Gastroparesis     GERD (gastroesophageal reflux disease)     gastroparesis--h-pylori    H. pylori infection     H/O Clostridium difficile infection 5325    Helicobacter pylori (H. pylori) 2012    h pylori    Palpitations       - cardiac workup per pt.  POTS (postural orthostatic tachycardia syndrome)     20 Dia Singer 22. Reports tx increased fluid and NA intake, betablocker    Sjogren's syndrome ([facility] [address]     Sun-damaged skin     20's    Sunburn, blistering     8-9     Tanning bed exposure     11 years ago        Social History     Tobacco Use    Smoking status: Former Smoker     Types: Cigarettes     Quit date: 2013     Years since quittin.1    Smokeless tobacco: Never Used   Vaping Use    Vaping Use: Never used   Substance Use Topics    Alcohol use:  Yes     Alcohol/week: 0.0 standard drinks     Comment: not monthly    Drug use: No       Outpatient Medications Marked as Taking for the 1/20/22 encounter (Office Visit) with Connie Ward NP   Medication Sig Dispense Refill    atenoloL (TENORMIN) 25 mg tablet TAKE 1 TABLET BY MOUTH DAILY 30 Tablet 5    famotidine (PEPCID) 20 mg tablet Take 1 Tablet by mouth nightly. 90 Tablet 3    fluticasone propionate (FLONASE) 50 mcg/actuation nasal spray 2 Sprays by Both Nostrils route daily as needed for Rhinitis. 1 Each 6    Restasis 0.05 % dpet INSTILL 1 DROP IN BOTH EYES TWICE DAILY 30 Each 5    vit B complex no.12/niacin,B3, (VITAMIN B COMPLEX NO.12-NIACIN PO) Take 1 Tab by mouth daily.  omeprazole (PRILOSEC) 40 mg capsule Take 40 mg by mouth daily as needed.  cholecalciferol (VITAMIN D3) 1,000 unit tablet Take 1,000 Units by mouth daily.  multivitamin (ONE A DAY) tablet Take 1 Tab by mouth daily.  LINZESS 290 mcg cap capsule Take 290 mcg by mouth as needed. 0       Allergies   Allergen Reactions    Celexa [Citalopram] Nausea and Vomiting    Ciprofloxacin Shortness of Breath    Diflucan [Fluconazole] Rash     Burning sensation to skin    Macrobid [Nitrofurantoin Monohyd/M-Cryst] Other (comments)    Sulfa (Sulfonamide Antibiotics) Rash       Health Maintenance reviewed       ROS:  Gen: no fatigue, no fever, no chills, no unexplained weight loss or weight gain  Eyes: no excessive tearing, itching, or discharge  Nose: no rhinorrhea, no sinus pain  Mouth: no oral lesions, no sore throat, no difficulty swallowing  Resp: no shortness of breath, no wheezing, no cough  CV: no chest pain, no orthopnea, no paroxysmal nocturnal dyspnea, no lower extremity edema, no palpitations  Abd: no nausea, no heartburn, no diarrhea, no constipation, no abdominal pain  Neuro: no headaches, no syncope or presyncopal episodes  Endo: no polyuria, no polydipsia.     : no hematuria, no dysuria, no frequency, no incontinence  Heme: no lymphadenopathy, no easy bruising or bleeding, no night sweats  MSK: no joint pain or swelling    PE:  Visit Vitals  /75 (BP 1 Location: Left arm, BP Patient Position: Sitting, BP Cuff Size: Adult)   Pulse 100   Temp 98 °F (36.7 °C) (Oral)   Resp 18   Ht 5' 4\" (1.626 m)   Wt 165 lb (74.8 kg)   SpO2 98%   BMI 28.32 kg/m²     Gen: alert, oriented, no acute distress  Head: normocephalic, atraumatic  Ears: external auditory canals clear, LEFT TM without erythema or effusion  Right canal clear, no swelling or erythema. TM no bulge and no erythema, some clear fluid bubbles seen. Eyes: pupils equal round reactive to light, sclera clear, conjunctiva clear  Oral: moist mucus membranes, no oral lesions, no pharyngeal inflammation or exudate  Neck: symmetric normal sized thyroid, no carotid bruits, no jugular vein distention  Resp: no increase work of breathing  Abd: soft, not tender, not distended. No hepatosplenomegaly. Normal bowel sounds. No hernias. No abdominal or renal bruits. Neuro: cranial nerves intact, normal strength and movement in all extremities, and sensation intact and symmetric. Skin: no lesion or rash  Extremities: no cyanosis or edema    No results found for this visit on 01/20/22. Assessment/Plan:  Differential diagnosis and treatment options reviewed with patient who is in agreement with treatment plan as outlined below. ICD-10-CM ICD-9-CM    1. Right ear pain  H92.01 388.70    2. Irritable bowel syndrome with constipation  K58.1 564.1      No ear infection at this time. Appears to have some fluid. Restart zyrtec, add flonase and Also advised to use OTC nasal saline 2 sprays each nostril 2-4 times per day to assist with eustachian tube draining. Will monitor stools for blood, has had some blood in stool before with straining. Will call Dr Lazaro Castillo for appointment but increase water intake and add colace (stool softener) daily. To ER if rectal bleeding severe or abdominal pain or dizziness.     Discussed BMI and healthy weight. Encouraged patient to work to implement changes including diet high in raw fruits and vegetables, lean protein and good fats. Limit refined, processed carbohydrates and sugar. Encouraged regular exercise. Recommended regular cardiovascular exercise 3-6 times per week for 30-60 minutes daily. I have discussed the diagnosis with the patient and the intended plan as seen in the above orders. The patient has received an after-visit summary and questions were answered concerning future plans. I have discussed medication side effects and warnings with the patient as well. The patient verbalizes understanding and agreement with the plan.

## 2022-01-20 NOTE — PATIENT INSTRUCTIONS
Middle Ear Fluid: Care Instructions  Your Care Instructions     Fluid often builds up inside the ear during a cold or allergies. Usually the fluid drains away, but sometimes a small tube in the ear, called the eustachian tube, stays blocked for months. Symptoms of fluid buildup may include:  · Popping, ringing, or a feeling of fullness or pressure in the ear. · Trouble hearing. · Balance problems and dizziness. In most cases, you can treat yourself at home. Follow-up care is a key part of your treatment and safety. Be sure to make and go to all appointments, and call your doctor if you are having problems. It's also a good idea to know your test results and keep a list of the medicines you take. How can you care for yourself at home? · In most cases, the fluid clears up within a few months without treatment. You may need more tests if the fluid does not clear up after 3 months. · If your doctor prescribed antibiotics, take them as directed. Do not stop taking them just because you feel better. You need to take the full course of antibiotics. When should you call for help? Call your doctor now or seek immediate medical care if:    · You have symptoms of infection, such as:  ? Increased pain, swelling, warmth, or redness. ? Pus draining from the area. ? A fever. Watch closely for changes in your health, and be sure to contact your doctor if:    · You notice changes in hearing.     · You do not get better as expected. Where can you learn more? Go to http://www.gray.com/  Enter X830 in the search box to learn more about \"Middle Ear Fluid: Care Instructions. \"  Current as of: December 2, 2020               Content Version: 13.0  © 9571-9160 [employer]. Care instructions adapted under license by [employer] (which disclaims liability or warranty for this information).  If you have questions about a medical condition or this instruction, always ask your healthcare professional. [place] [address] any warranty or liability for your use of this information.

## 2022-01-25 ENCOUNTER — OFFICE VISIT (OUTPATIENT)
Dept: CARDIOLOGY CLINIC | Age: 42
End: 2022-01-25
Payer: MEDICAID

## 2022-01-25 VITALS
DIASTOLIC BLOOD PRESSURE: 60 MMHG | HEIGHT: 64 IN | BODY MASS INDEX: 28.17 KG/M2 | HEART RATE: 94 BPM | RESPIRATION RATE: 18 BRPM | WEIGHT: 165 LBS | OXYGEN SATURATION: 98 % | SYSTOLIC BLOOD PRESSURE: 120 MMHG

## 2022-01-25 DIAGNOSIS — G90.A POTS (POSTURAL ORTHOSTATIC TACHYCARDIA SYNDROME): ICD-10-CM

## 2022-01-25 DIAGNOSIS — R06.02 SOB (SHORTNESS OF BREATH): ICD-10-CM

## 2022-01-25 DIAGNOSIS — R00.2 PALPITATIONS: Primary | ICD-10-CM

## 2022-01-25 PROCEDURE — 99214 OFFICE O/P EST MOD 30 MIN: CPT | Performed by: NURSE PRACTITIONER

## 2022-03-05 RX ORDER — CYCLOSPORINE 0.5 MG/ML
EMULSION OPHTHALMIC
Qty: 30 EACH | Refills: 5 | Status: SHIPPED | OUTPATIENT
Start: 2022-03-05 | End: 2022-07-21 | Stop reason: SDUPTHER

## 2022-03-19 PROBLEM — G90.A POTS (POSTURAL ORTHOSTATIC TACHYCARDIA SYNDROME): Status: ACTIVE | Noted: 2017-01-20

## 2022-03-19 PROBLEM — K31.84 GASTROPARESIS: Status: ACTIVE | Noted: 2018-08-07

## 2022-03-19 PROBLEM — M35.01 SJOGREN'S SYNDROME WITH KERATOCONJUNCTIVITIS SICCA (HCC): Status: ACTIVE | Noted: 2018-08-07

## 2022-03-19 PROBLEM — Z79.899 LONG-TERM USE OF HYDROXYCHLOROQUINE: Status: ACTIVE | Noted: 2019-02-14

## 2022-03-19 PROBLEM — K59.09 CHRONIC CONSTIPATION: Status: ACTIVE | Noted: 2017-09-01

## 2022-03-19 PROBLEM — K58.1 IRRITABLE BOWEL SYNDROME WITH CONSTIPATION: Status: ACTIVE | Noted: 2018-08-07

## 2022-03-19 PROBLEM — K21.9 GERD (GASTROESOPHAGEAL REFLUX DISEASE): Status: ACTIVE | Noted: 2017-09-01

## 2022-03-29 ENCOUNTER — E-VISIT (OUTPATIENT)
Dept: FAMILY MEDICINE CLINIC | Age: 42
End: 2022-03-29
Payer: MEDICAID

## 2022-03-29 ENCOUNTER — TELEPHONE (OUTPATIENT)
Dept: RHEUMATOLOGY | Age: 42
End: 2022-03-29

## 2022-03-29 DIAGNOSIS — J02.9 PHARYNGITIS, UNSPECIFIED ETIOLOGY: Primary | ICD-10-CM

## 2022-03-29 PROCEDURE — 99422 OL DIG E/M SVC 11-20 MIN: CPT | Performed by: FAMILY MEDICINE

## 2022-03-30 ENCOUNTER — TRANSCRIBE ORDER (OUTPATIENT)
Dept: SCHEDULING | Age: 42
End: 2022-03-30

## 2022-03-30 DIAGNOSIS — D34 THYROID ADENOMA: Primary | ICD-10-CM

## 2022-03-30 RX ORDER — AMOXICILLIN 875 MG/1
875 TABLET, FILM COATED ORAL 2 TIMES DAILY
Qty: 20 TABLET | Refills: 0 | Status: SHIPPED | OUTPATIENT
Start: 2022-03-30 | End: 2022-04-09

## 2022-03-30 NOTE — PROGRESS NOTES
Chele Macedo (1980) initiated an asynchronous digital communication through [address] [place] [address]. HPI: per patient questionnaire     Exam: not applicable    Diagnoses and all orders for this visit:  Diagnoses and all orders for this visit:    1. Pharyngitis, unspecified etiology    Other orders  -     amoxicillin (AMOXIL) 875 mg tablet; Take 1 Tablet by mouth two (2) times a day for 10 days. Time: EV2 - 11-20 minutes were spent on the digital evaluation and management of this patient.        Ita Chakraborty MD

## 2022-04-12 ENCOUNTER — HOSPITAL ENCOUNTER (OUTPATIENT)
Dept: ULTRASOUND IMAGING | Age: 42
Discharge: HOME | End: 2022-04-12
Attending: OTOLARYNGOLOGY
Payer: MEDICAID

## 2022-04-12 DIAGNOSIS — D34 THYROID ADENOMA: ICD-10-CM

## 2022-04-12 PROCEDURE — 76536 US EXAM OF HEAD AND NECK: CPT

## 2022-04-22 ENCOUNTER — HOSPITAL ENCOUNTER (EMERGENCY)
Age: 42
Discharge: HOME | End: 2022-04-22
Attending: EMERGENCY MEDICINE
Payer: MEDICAID

## 2022-04-22 ENCOUNTER — HOSPITAL ENCOUNTER (EMERGENCY)
Age: 42
Discharge: HOME | End: 2022-04-23
Attending: STUDENT IN AN ORGANIZED HEALTH CARE EDUCATION/TRAINING PROGRAM

## 2022-04-22 VITALS
DIASTOLIC BLOOD PRESSURE: 72 MMHG | RESPIRATION RATE: 18 BRPM | BODY MASS INDEX: 27.36 KG/M2 | SYSTOLIC BLOOD PRESSURE: 117 MMHG | HEART RATE: 81 BPM | TEMPERATURE: 98.1 F | WEIGHT: 160.27 LBS | HEIGHT: 64 IN | OXYGEN SATURATION: 100 %

## 2022-04-22 VITALS
DIASTOLIC BLOOD PRESSURE: 83 MMHG | TEMPERATURE: 97.3 F | HEIGHT: 64 IN | OXYGEN SATURATION: 98 % | SYSTOLIC BLOOD PRESSURE: 128 MMHG | BODY MASS INDEX: 27.31 KG/M2 | WEIGHT: 160 LBS | RESPIRATION RATE: 18 BRPM | HEART RATE: 91 BPM

## 2022-04-22 DIAGNOSIS — R11.2 NAUSEA, VOMITING, AND DIARRHEA: ICD-10-CM

## 2022-04-22 DIAGNOSIS — R19.7 NAUSEA, VOMITING, AND DIARRHEA: ICD-10-CM

## 2022-04-22 DIAGNOSIS — K52.9 GASTROENTERITIS, ACUTE: Primary | ICD-10-CM

## 2022-04-22 LAB
ALBUMIN SERPL-MCNC: 4 G/DL (ref 3.5–5)
ALBUMIN/GLOB SERPL: 1.2 {RATIO} (ref 1.1–2.2)
ALP SERPL-CCNC: 50 U/L (ref 45–117)
ALT SERPL-CCNC: 28 U/L (ref 12–78)
ANION GAP SERPL CALC-SCNC: 6 MMOL/L (ref 5–15)
APPEARANCE UR: CLEAR
AST SERPL-CCNC: 22 U/L (ref 15–37)
BACTERIA URNS QL MICRO: NEGATIVE /HPF
BASOPHILS # BLD: 0.1 K/UL (ref 0–0.1)
BASOPHILS NFR BLD: 1 % (ref 0–1)
BILIRUB SERPL-MCNC: 0.9 MG/DL (ref 0.2–1)
BILIRUB UR QL: NEGATIVE
BUN SERPL-MCNC: 10 MG/DL (ref 6–20)
BUN/CREAT SERPL: 13 (ref 12–20)
C DIFF GDH STL QL: NEGATIVE
C DIFF TOX A+B STL QL IA: NEGATIVE
CALCIUM SERPL-MCNC: 9.3 MG/DL (ref 8.5–10.1)
CHLORIDE SERPL-SCNC: 108 MMOL/L (ref 97–108)
CO2 SERPL-SCNC: 26 MMOL/L (ref 21–32)
COLOR UR: NORMAL
CREAT SERPL-MCNC: 0.77 MG/DL (ref 0.55–1.02)
DIFFERENTIAL METHOD BLD: NORMAL
EOSINOPHIL # BLD: 0.1 K/UL (ref 0–0.4)
EOSINOPHIL NFR BLD: 1 % (ref 0–7)
EPITH CASTS URNS QL MICRO: NORMAL /LPF
ERYTHROCYTE [DISTWIDTH] IN BLOOD BY AUTOMATED COUNT: 12.9 % (ref 11.5–14.5)
GLOBULIN SER CALC-MCNC: 3.4 G/DL (ref 2–4)
GLUCOSE SERPL-MCNC: 105 MG/DL (ref 65–100)
GLUCOSE UR STRIP.AUTO-MCNC: NEGATIVE MG/DL
HCG UR QL: NEGATIVE
HCT VFR BLD AUTO: 41.8 % (ref 35–47)
HGB BLD-MCNC: 13.7 G/DL (ref 11.5–16)
HGB UR QL STRIP: NEGATIVE
HYALINE CASTS URNS QL MICRO: NORMAL /LPF (ref 0–5)
IMM GRANULOCYTES # BLD AUTO: 0 K/UL (ref 0–0.04)
IMM GRANULOCYTES NFR BLD AUTO: 0 % (ref 0–0.5)
INTERPRETATION: NORMAL
KETONES UR QL STRIP.AUTO: NEGATIVE MG/DL
LEUKOCYTE ESTERASE UR QL STRIP.AUTO: NEGATIVE
LYMPHOCYTES # BLD: 1.7 K/UL (ref 0.8–3.5)
LYMPHOCYTES NFR BLD: 19 % (ref 12–49)
MCH RBC QN AUTO: 31.4 PG (ref 26–34)
MCHC RBC AUTO-ENTMCNC: 32.8 G/DL (ref 30–36.5)
MCV RBC AUTO: 95.7 FL (ref 80–99)
MONOCYTES # BLD: 0.7 K/UL (ref 0–1)
MONOCYTES NFR BLD: 8 % (ref 5–13)
NEUTS SEG # BLD: 6.2 K/UL (ref 1.8–8)
NEUTS SEG NFR BLD: 71 % (ref 32–75)
NITRITE UR QL STRIP.AUTO: NEGATIVE
NRBC # BLD: 0 K/UL (ref 0–0.01)
NRBC BLD-RTO: 0 PER 100 WBC
PH UR STRIP: 5 [PH] (ref 5–8)
PLATELET # BLD AUTO: 271 K/UL (ref 150–400)
PMV BLD AUTO: 9.5 FL (ref 8.9–12.9)
POTASSIUM SERPL-SCNC: 3.6 MMOL/L (ref 3.5–5.1)
PROT SERPL-MCNC: 7.4 G/DL (ref 6.4–8.2)
PROT UR STRIP-MCNC: NEGATIVE MG/DL
RBC # BLD AUTO: 4.37 M/UL (ref 3.8–5.2)
RBC #/AREA URNS HPF: NORMAL /HPF (ref 0–5)
SODIUM SERPL-SCNC: 140 MMOL/L (ref 136–145)
SP GR UR REFRACTOMETRY: 1.02 (ref 1–1.03)
UA: UC IF INDICATED,UAUC: NORMAL
UROBILINOGEN UR QL STRIP.AUTO: 0.2 EU/DL (ref 0.2–1)
WBC # BLD AUTO: 8.8 K/UL (ref 3.6–11)
WBC URNS QL MICRO: NORMAL /HPF (ref 0–4)

## 2022-04-22 PROCEDURE — 96374 THER/PROPH/DIAG INJ IV PUSH: CPT

## 2022-04-22 PROCEDURE — 85025 COMPLETE CBC W/AUTO DIFF WBC: CPT

## 2022-04-22 PROCEDURE — 36415 COLL VENOUS BLD VENIPUNCTURE: CPT

## 2022-04-22 PROCEDURE — 87324 CLOSTRIDIUM AG IA: CPT

## 2022-04-22 PROCEDURE — 99284 EMERGENCY DEPT VISIT MOD MDM: CPT

## 2022-04-22 PROCEDURE — 80053 COMPREHEN METABOLIC PANEL: CPT

## 2022-04-22 PROCEDURE — 81001 URINALYSIS AUTO W/SCOPE: CPT

## 2022-04-22 PROCEDURE — 74011250636 HC RX REV CODE- 250/636: Performed by: EMERGENCY MEDICINE

## 2022-04-22 PROCEDURE — 81025 URINE PREGNANCY TEST: CPT

## 2022-04-22 RX ORDER — ONDANSETRON 4 MG/1
4 TABLET, ORALLY DISINTEGRATING ORAL
Qty: 20 TABLET | Refills: 0 | Status: SHIPPED | OUTPATIENT
Start: 2022-04-22 | End: 2022-07-20

## 2022-04-22 RX ORDER — ONDANSETRON 2 MG/ML
4 INJECTION INTRAMUSCULAR; INTRAVENOUS
Status: COMPLETED | OUTPATIENT
Start: 2022-04-22 | End: 2022-04-22

## 2022-04-22 RX ADMIN — SODIUM CHLORIDE, POTASSIUM CHLORIDE, SODIUM LACTATE AND CALCIUM CHLORIDE 1000 ML: 600; 310; 30; 20 INJECTION, SOLUTION INTRAVENOUS at 06:34

## 2022-04-22 RX ADMIN — ONDANSETRON 4 MG: 2 INJECTION INTRAMUSCULAR; INTRAVENOUS at 07:01

## 2022-04-22 NOTE — ED PROVIDER NOTES
EMERGENCY DEPARTMENT HISTORY AND PHYSICAL EXAM      Date: 4/22/2022  Patient Name: Sammie Green    History of Presenting Illness     Chief Complaint   Patient presents with    Vomiting     Pt presents ambulatory to ed due to n/v/d that began last night. Pt denies abdominal pain, cp, sob, urinary. Pt notes that she took linzess around 7:30pm, she reports that this normally causes her to have more BM but states she has been going more often and does not normally have vomiting. Pt reports that she took 4mg of zofran about 45 mins ago. History Provided By: Patient    HPI: Sammie Green, 46 y.o. female  presents to the ED with cc of nausea vomiting and diarrhea. Patient states symptoms began last night. She took her Linzess but continued to have multiple bowel movements. She does have a history of gastroparesis and POTS. She denies any focal abdominal pains. No melena or bright red blood per rectum. No hematemesis. No fevers or chills. No URI symptoms. No shortness of breath or chest pain. No urinary symptoms such as dysuria. She took 4 mg of Zofran about 45 minutes prior to arrival.  She states her nausea and vomiting appears to be controlled at this point time. Past History     Past Medical History:  Past Medical History:   Diagnosis Date    Anemia     taking iron    Family history of skin cancer     sister has melanoma,     Gastroparesis     GERD (gastroesophageal reflux disease)     gastroparesis--h-pylori    H. pylori infection     H/O Clostridium difficile infection 69/2065    Helicobacter pylori (H. pylori) 05/2012    h pylori    Palpitations     2010  - cardiac workup per pt.  POTS (postural orthostatic tachycardia syndrome)     2/21/20 Dia Fletcher 22.  Reports tx increased fluid and NA intake, betablocker    Sjogren's syndrome (HCC)     Sun-damaged skin     20's    Sunburn, blistering     8-9     Tanning bed exposure     11 years ago        Past Surgical History:  Past Surgical History:   Procedure Laterality Date    HX COLONOSCOPY      HX DILATION AND CURETTAGE  05/2012    HX ENDOSCOPY      HX GYN      tubal reversal laparoscopic    HX TUBAL LIGATION      IR DILATE ESOPH STRICT      TILT TABLE EVAL W/WO DRUG  1/21/2017            Medications:  No current facility-administered medications on file prior to encounter. Current Outpatient Medications on File Prior to Encounter   Medication Sig Dispense Refill    cycloSPORINE (Restasis) 0.05 % dpet INSTILL 1 DROP IN BOTH EYES TWICE DAILY 30 Each 5    atenoloL (TENORMIN) 25 mg tablet TAKE 1 TABLET BY MOUTH DAILY 30 Tablet 5    famotidine (PEPCID) 20 mg tablet Take 1 Tablet by mouth nightly. 90 Tablet 3    fluticasone propionate (FLONASE) 50 mcg/actuation nasal spray 2 Sprays by Both Nostrils route daily as needed for Rhinitis. 1 Each 6    cevimeline (EVOXAC) 30 mg capsule TAKE 1 CAPSULE BY MOUTH THREE TIMES DAILY (Patient taking differently: as needed.) 270 Capsule 5    cetirizine (ZYRTEC) 10 mg tablet Take 1 Tab by mouth daily. (Patient taking differently: Take 10 mg by mouth as needed.) 90 Tab 3    vit B complex no.12/niacin,B3, (VITAMIN B COMPLEX NO.12-NIACIN PO) Take 1 Tab by mouth daily.  omeprazole (PRILOSEC) 40 mg capsule Take 40 mg by mouth daily as needed.  cholecalciferol (VITAMIN D3) 1,000 unit tablet Take 1,000 Units by mouth daily.  multivitamin (ONE A DAY) tablet Take 1 Tab by mouth daily.  LINZESS 290 mcg cap capsule Take 290 mcg by mouth as needed.   0       Family History:  Family History   Problem Relation Age of Onset    Heart Disease Mother     Cancer Mother         Thyroid    Thyroid Disease Mother     Heart Disease Father     High Cholesterol Father     Heart Disease Maternal Grandmother     Diabetes Maternal Grandmother     Other Maternal Grandmother         Heomochromatosis    Thyroid Disease Sister     No Known Problems Brother     Stroke Maternal Grandfather     Diabetes Maternal Grandfather     Hypertension Paternal Grandmother     Heart Disease Paternal Grandfather     Heart Attack Paternal Grandfather        Social History:  Social History     Tobacco Use    Smoking status: Former Smoker     Types: Cigarettes     Quit date: 2013     Years since quittin.4    Smokeless tobacco: Never Used   Vaping Use    Vaping Use: Never used   Substance Use Topics    Alcohol use: Yes     Alcohol/week: 0.0 standard drinks     Comment: not monthly    Drug use: No       Allergies: Allergies   Allergen Reactions    Celexa [Citalopram] Nausea and Vomiting    Ciprofloxacin Shortness of Breath    Diflucan [Fluconazole] Rash     Burning sensation to skin    Macrobid [Nitrofurantoin Monohyd/M-Cryst] Other (comments)    Sulfa (Sulfonamide Antibiotics) Rash       All the above components of the past  history are auto-populated from the electronic record. They have been reviewed and the patient has been interviewed for any pertinent past history that pertains to the patient's chief complaint and reason for visit. Not all pre-populated components may be accurate at the time this note was generated. Review of Systems   Review of Systems   Constitutional: Negative for chills and fever. HENT: Negative for congestion, ear pain, rhinorrhea, sore throat and trouble swallowing. Eyes: Negative for visual disturbance. Respiratory: Negative for cough, chest tightness and shortness of breath. Cardiovascular: Negative for chest pain and palpitations. Gastrointestinal: Positive for diarrhea, nausea and vomiting. Negative for abdominal pain, blood in stool and constipation. Genitourinary: Negative for decreased urine volume, difficulty urinating, dysuria and frequency. Musculoskeletal: Negative for back pain and neck pain. Skin: Negative for color change and rash. Neurological: Negative for dizziness, weakness, light-headedness and headaches.        Physical Exam Physical Exam  Vitals and nursing note reviewed. Constitutional:       General: She is not in acute distress. Appearance: She is well-developed. She is not ill-appearing. HENT:      Head: Normocephalic. Eyes:      Conjunctiva/sclera: Conjunctivae normal.   Cardiovascular:      Rate and Rhythm: Normal rate and regular rhythm. Pulmonary:      Effort: Pulmonary effort is normal. No accessory muscle usage or respiratory distress. Abdominal:      General: There is no distension. Musculoskeletal:      Cervical back: Normal range of motion. Skin:     General: Skin is warm and dry. Neurological:      Mental Status: She is alert and oriented to person, place, and time. Diagnostic Study Results     Labs -     Recent Results (from the past 24 hour(s))   CBC WITH AUTOMATED DIFF    Collection Time: 04/22/22  6:31 AM   Result Value Ref Range    WBC 8.8 3.6 - 11.0 K/uL    RBC 4.37 3.80 - 5.20 M/uL    HGB 13.7 11.5 - 16.0 g/dL    HCT 41.8 35.0 - 47.0 %    MCV 95.7 80.0 - 99.0 FL    MCH 31.4 26.0 - 34.0 PG    MCHC 32.8 30.0 - 36.5 g/dL    RDW 12.9 11.5 - 14.5 %    PLATELET 829 686 - 479 K/uL    MPV 9.5 8.9 - 12.9 FL    NRBC 0.0 0  WBC    ABSOLUTE NRBC 0.00 0.00 - 0.01 K/uL    NEUTROPHILS 71 32 - 75 %    LYMPHOCYTES 19 12 - 49 %    MONOCYTES 8 5 - 13 %    EOSINOPHILS 1 0 - 7 %    BASOPHILS 1 0 - 1 %    IMMATURE GRANULOCYTES 0 0.0 - 0.5 %    ABS. NEUTROPHILS 6.2 1.8 - 8.0 K/UL    ABS. LYMPHOCYTES 1.7 0.8 - 3.5 K/UL    ABS. MONOCYTES 0.7 0.0 - 1.0 K/UL    ABS. EOSINOPHILS 0.1 0.0 - 0.4 K/UL    ABS. BASOPHILS 0.1 0.0 - 0.1 K/UL    ABS. IMM.  GRANS. 0.0 0.00 - 0.04 K/UL    DF AUTOMATED     METABOLIC PANEL, COMPREHENSIVE    Collection Time: 04/22/22  6:31 AM   Result Value Ref Range    Sodium 140 136 - 145 mmol/L    Potassium 3.6 3.5 - 5.1 mmol/L    Chloride 108 97 - 108 mmol/L    CO2 26 21 - 32 mmol/L    Anion gap 6 5 - 15 mmol/L    Glucose 105 (H) 65 - 100 mg/dL    BUN 10 6 - 20 MG/DL    Creatinine 0. 77 0.55 - 1.02 MG/DL    BUN/Creatinine ratio 13 12 - 20      GFR est AA >60 >60 ml/min/1.73m2    GFR est non-AA >60 >60 ml/min/1.73m2    Calcium 9.3 8.5 - 10.1 MG/DL    Bilirubin, total 0.9 0.2 - 1.0 MG/DL    ALT (SGPT) 28 12 - 78 U/L    AST (SGOT) 22 15 - 37 U/L    Alk. phosphatase 50 45 - 117 U/L    Protein, total 7.4 6.4 - 8.2 g/dL    Albumin 4.0 3.5 - 5.0 g/dL    Globulin 3.4 2.0 - 4.0 g/dL    A-G Ratio 1.2 1.1 - 2.2     URINALYSIS W/ REFLEX CULTURE    Collection Time: 04/22/22  6:58 AM    Specimen: Urine   Result Value Ref Range    Color YELLOW/STRAW      Appearance CLEAR CLEAR      Specific gravity 1.021 1.003 - 1.030      pH (UA) 5.0 5.0 - 8.0      Protein Negative NEG mg/dL    Glucose Negative NEG mg/dL    Ketone Negative NEG mg/dL    Bilirubin Negative NEG      Blood Negative NEG      Urobilinogen 0.2 0.2 - 1.0 EU/dL    Nitrites Negative NEG      Leukocyte Esterase Negative NEG      WBC 0-4 0 - 4 /hpf    RBC 0-5 0 - 5 /hpf    Epithelial cells FEW FEW /lpf    Bacteria Negative NEG /hpf    UA:UC IF INDICATED CULTURE NOT INDICATED BY UA RESULT CNI      Hyaline cast 0-2 0 - 5 /lpf   HCG URINE, QL. - POC    Collection Time: 04/22/22  7:10 AM   Result Value Ref Range    Pregnancy test,urine (POC) Negative NEG         Radiologic Studies -   No orders to display     CT Results  (Last 48 hours)    None        CXR Results  (Last 48 hours)    None            Medical Decision Making     I reviewed the vital signs, available nursing notes, past medical history, past surgical history, family history and social history. Vital Signs-I have reviewed the vital signs that have been made available during the patient's emergency department visit. The vital signs auto-populated below are obtained mostly by electronic means through monitoring devices that have been downloaded into the patient's chart by the nursing staff.   Some vital signs are not downloaded into the chart until after the patient has been discharged and this note has been completed, therefore some vital signs may not be available to the physician for review prior to patient's discharge or admission. The physician has reviewed the patient's triage vital signs, monitored the electronic monitoring devices remotely for any significant vital sign abnormalities, and have reviewed vital signs prior to discharge. Some vital signs reviewed at bedside or remotely utilizing electronic monitoring devices may be different than the vital signs downloaded into the electronic medical record. Some vital signs may be erroneous and inaccurate since they are obtained by electronic monitoring devices, and not all vital signs are verified for accuracy by nursing staff prior to downloading into the patient's chart. Patient Vitals for the past 24 hrs:   Temp Pulse Resp BP SpO2   04/22/22 0715 -- -- -- 117/72 100 %   04/22/22 0604 98.1 °F (36.7 °C) 81 18 127/80 98 %         Records Reviewed: Nursing notes for today's visit have been reviewed. I have also reviewed most recent medical records pertinent to today's complaints, if available in our medical record system. I have also reviewed all labs and imaging results from previous results in comparison to results obtained today. If an EKG was obtained today, it has been compared to previous EKGs, if available. If arriving via EMS, the EMS report has been reviewed if made available to us within the patient's time in the emergency department. Provider Notes (Medical Decision Making):   Patient presents with nausea vomiting and diarrhea. She has a benign abdominal exam with no peritoneal signs or focal tenderness. Labs are all within acceptable ranges. Vital signs are within normal limits. I suspect a viral gastroenteritis. Patient was hydrated with IV fluids and was given antiemetics. Prior to discharge she was able to tolerate oral fluids. Patient has had watery diarrhea. She has a history of C. difficile.   She was concerned about C. difficile infection. I have low suspicion given that she does not have a leukocytosis. She was able to provide a stool sample and I will send it for testing but a do not feel like we need to start her on antibiotics empirically, and can wait for results. ED Course:   Initial assessment performed. The patients presenting problems have been discussed, and they are in agreement with the care plan formulated and outlined with them. I have encouraged them to ask questions as they arise throughout their visit. Orders Placed This Encounter    C. DIFFICILE AG & TOXIN A/B    URINALYSIS W/ REFLEX CULTURE    CBC WITH AUTOMATED DIFF    METABOLIC PANEL, COMPREHENSIVE    POC URINE PREGNANCY TEST    HCG URINE, QL. - POC    INSERT PERIPHERAL IV ONE TIME STAT    lactated ringers bolus infusion 1,000 mL    ondansetron (ZOFRAN) injection 4 mg    ondansetron (ZOFRAN ODT) 4 mg disintegrating tablet       Procedures      Critical Care Time:   0    Disposition:  Discharge    The patient's emergency department evaluation is now complete. I have reviewed all labs, imaging, and pertinent information. I have discussed all results with the patient and/or family. Based on our evaluation today I do believe that the patient is safe to be discharged home. The patient has been provided with at home instructions that are pertinent to their complaint today, although these may not be specific to the exact diagnosis. I have reviewed the patient's home medications and attempted to reconcile if not already done so by pharmacy or nursing staff. I have discussed all new prescriptions with the patient. The patient has been encouraged to follow-up with primary care doctor and/or specialist, and these have been discussed with the patient. The patient has been advised that they may return to the emergency department if they have any worsening symptoms and or new symptoms that are of concern to them.   Verbal discharge instructions may have also been provided to the patient that may not be specifically contained in the written discharge instructions. The patient has been given opportunity to ask questions prior to discharge. PLAN:  1. Current Discharge Medication List      START taking these medications    Details   ondansetron (ZOFRAN ODT) 4 mg disintegrating tablet Take 1 Tablet by mouth every eight (8) hours as needed for Nausea or Vomiting. Qty: 20 Tablet, Refills: 0  Start date: 4/22/2022           2. Follow-up Information     Follow up With Specialties Details Why Contact Info    Alexus Corley MD Family Medicine Schedule an appointment as soon as possible for a visit   [address] [place] [address] Dr Singh [facility]  [phone]          Return to ED if worse     Diagnosis     Clinical Impression:   1. Gastroenteritis, acute    2.  Nausea, vomiting, and diarrhea

## 2022-04-22 NOTE — DISCHARGE INSTRUCTIONS
It was a pleasure taking care of you in our Emergency Department today. We know that when you come to [facility], you are entrusting us with your health, comfort, and safety. Our physicians and nurses honor that trust, and truly appreciate the opportunity to care for you and your loved ones. We also value your feedback. If you receive a survey about your Emergency Department experience today, please fill it out. We care about our patients' feedback, and we listen to what you have to say. Please read over your discharge instructions as these contain pertinent information to help you in the healing process. These instructions include a list of prescriptions you were given today. Follow-up information is also noted on your discharge papers. There are attached instructions and information pertaining to the reason why you were seen in the emergency department today. These discharge instructions may not be for exactly why you were here, but may be the closest available instructions that we have. These include important advice for things that you can do at home to feel better, and reasons to return to the emergency department. The evaluation and treatment you received in the emergency department is not always definitive care. If follow-up with your primary care doctor or specialist was recommended, it is important that you make these appointments for follow-up care. You may need further testing, procedures, and/or medications to help you feel better. Further tests may be required that are not available in the emergency department. Failure to make these follow-up appointments may jeopardize your health. The emergency department is here for emergent stabilization and evaluation of life and limb threatening illness and/or injuries.   Further care through a specialist or primary care doctor may be required to assist in your healing and complete your treatment and/or evaluation. We may not always be able to make a diagnosis in the emergency department, or things may change that will alter your diagnosis. Our primary goal is to ensure that nothing serious is occurring and that you are stable to continue your treatment and evaluation at home as an outpatient. Of course, if things change, and you feel worse, you are always encouraged to return to the emergency department for re-evaluation. Lab Results Today:  Recent Results (from the past 8 hour(s))   CBC WITH AUTOMATED DIFF    Collection Time: 04/22/22  6:31 AM   Result Value Ref Range    WBC 8.8 3.6 - 11.0 K/uL    RBC 4.37 3.80 - 5.20 M/uL    HGB 13.7 11.5 - 16.0 g/dL    HCT 41.8 35.0 - 47.0 %    MCV 95.7 80.0 - 99.0 FL    MCH 31.4 26.0 - 34.0 PG    MCHC 32.8 30.0 - 36.5 g/dL    RDW 12.9 11.5 - 14.5 %    PLATELET 882 623 - 968 K/uL    MPV 9.5 8.9 - 12.9 FL    NRBC 0.0 0  WBC    ABSOLUTE NRBC 0.00 0.00 - 0.01 K/uL    NEUTROPHILS 71 32 - 75 %    LYMPHOCYTES 19 12 - 49 %    MONOCYTES 8 5 - 13 %    EOSINOPHILS 1 0 - 7 %    BASOPHILS 1 0 - 1 %    IMMATURE GRANULOCYTES 0 0.0 - 0.5 %    ABS. NEUTROPHILS 6.2 1.8 - 8.0 K/UL    ABS. LYMPHOCYTES 1.7 0.8 - 3.5 K/UL    ABS. MONOCYTES 0.7 0.0 - 1.0 K/UL    ABS. EOSINOPHILS 0.1 0.0 - 0.4 K/UL    ABS. BASOPHILS 0.1 0.0 - 0.1 K/UL    ABS. IMM. GRANS. 0.0 0.00 - 0.04 K/UL    DF AUTOMATED     METABOLIC PANEL, COMPREHENSIVE    Collection Time: 04/22/22  6:31 AM   Result Value Ref Range    Sodium 140 136 - 145 mmol/L    Potassium 3.6 3.5 - 5.1 mmol/L    Chloride 108 97 - 108 mmol/L    CO2 26 21 - 32 mmol/L    Anion gap 6 5 - 15 mmol/L    Glucose 105 (H) 65 - 100 mg/dL    BUN 10 6 - 20 MG/DL    Creatinine 0.77 0.55 - 1.02 MG/DL    BUN/Creatinine ratio 13 12 - 20      GFR est AA >60 >60 ml/min/1.73m2    GFR est non-AA >60 >60 ml/min/1.73m2    Calcium 9.3 8.5 - 10.1 MG/DL    Bilirubin, total 0.9 0.2 - 1.0 MG/DL    ALT (SGPT) 28 12 - 78 U/L    AST (SGOT) 22 15 - 37 U/L    Alk.  phosphatase 50 45 - 117 U/L    Protein, total 7.4 6.4 - 8.2 g/dL    Albumin 4.0 3.5 - 5.0 g/dL    Globulin 3.4 2.0 - 4.0 g/dL    A-G Ratio 1.2 1.1 - 2.2     URINALYSIS W/ REFLEX CULTURE    Collection Time: 04/22/22  6:58 AM    Specimen: Urine   Result Value Ref Range    Color YELLOW/STRAW      Appearance CLEAR CLEAR      Specific gravity 1.021 1.003 - 1.030      pH (UA) 5.0 5.0 - 8.0      Protein Negative NEG mg/dL    Glucose Negative NEG mg/dL    Ketone Negative NEG mg/dL    Bilirubin Negative NEG      Blood Negative NEG      Urobilinogen 0.2 0.2 - 1.0 EU/dL    Nitrites Negative NEG      Leukocyte Esterase Negative NEG      WBC 0-4 0 - 4 /hpf    RBC 0-5 0 - 5 /hpf    Epithelial cells FEW FEW /lpf    Bacteria Negative NEG /hpf    UA:UC IF INDICATED CULTURE NOT INDICATED BY UA RESULT CNI      Hyaline cast 0-2 0 - 5 /lpf   HCG URINE, QL. - POC    Collection Time: 04/22/22  7:10 AM   Result Value Ref Range    Pregnancy test,urine (POC) Negative NEG          Radiology Results Today:  No results found.

## 2022-05-04 RX ORDER — CETIRIZINE HCL 10 MG
10 TABLET ORAL DAILY
Qty: 90 TABLET | Refills: 3 | Status: SHIPPED | OUTPATIENT
Start: 2022-05-04

## 2022-05-04 NOTE — TELEPHONE ENCOUNTER
Refill Request (Abigail sent fax)     Requested Prescriptions     Pending Prescriptions Disp Refills    cetirizine (ZYRTEC) 10 mg tablet 90 Tablet 3     Sig: Take 1 Tablet by mouth daily.        Thank you

## 2022-06-06 ENCOUNTER — TELEPHONE (OUTPATIENT)
Dept: CARDIOLOGY CLINIC | Age: 42
End: 2022-06-06

## 2022-06-06 DIAGNOSIS — G90.A POTS (POSTURAL ORTHOSTATIC TACHYCARDIA SYNDROME): ICD-10-CM

## 2022-06-06 DIAGNOSIS — R53.83 FATIGUE, UNSPECIFIED TYPE: Primary | ICD-10-CM

## 2022-06-06 DIAGNOSIS — R00.2 PALPITATIONS: ICD-10-CM

## 2022-06-06 NOTE — TELEPHONE ENCOUNTER
Good morning,    Patient states that she has been experiencing low bp and low heart rate. Requests a call back at [phone].     Thanks,  Saravanan Blankenship'

## 2022-06-06 NOTE — TELEPHONE ENCOUNTER
I can see that she had an ER visit in 04/2022 for n/v/d, but nothing more recent. Did she go to an outside ER? If so, please obtain records. Please order 24 hr holter monitor to check for HR variability. Recommend salty snacks, hydration with Gatorade & water, & compression stockings. Echo in 06/2021 showed normal LVEF & nuclear stress test in 2020 showed no ischemia. Do not restart atenolol.

## 2022-06-06 NOTE — TELEPHONE ENCOUNTER
Verified patient with two types of identifiers. Pt reports that she has been feeling weak and fatigued, which prompted her to go to the ER 1.5 weeks ago. She said they gave her IV fluids for dehydration, but denies any recent illness that could have contributed to dehydration and feeling weak. She reports low HR and BP readings. She said that her heart rate when ambulating was 60-65 BPM.  She confirmed that this HR range was during activity, and not necessarily at rest. Pt acknowledged that this range is WNL at rest but on the low side during activity. BP/ HR readings over last couple of days at rest, reported by patient:  104/65,  68  90/60,  67    101/70,  62    She has not taken atenolol for the last week. Will forward to MD/NP for recommendations.

## 2022-06-06 NOTE — TELEPHONE ENCOUNTER
Verified patient with two types of identifiers. Informed pt of NP recommendations. Pt states she went to [facility] for ER visit. Faxed records request to [facility]. 24 hr holter monitor ordered and scheduled. Pt said she has been hydrating well and already started wearing compression stockings. Advised her not to restart atenolol per NP. Patient verbalized understanding and will call with any other questions.       Future Appointments   Date Time Provider Addis Velazquez   6/7/2022  2:20 PM Sandhya Corley MD [facility] BS AMB   6/8/2022  1:00 PM HOLTER, REYNOLDS CAVREY BS AMB   7/26/2022  9:00 AM Isis Bagley MD [facility] BS AMB   8/2/2022  1:00 PM MD SHAISTA Tipton BS AMB

## 2022-06-08 ENCOUNTER — CLINICAL SUPPORT (OUTPATIENT)
Dept: CARDIOLOGY CLINIC | Age: 42
End: 2022-06-08
Payer: MEDICAID

## 2022-06-08 DIAGNOSIS — R00.1 BRADYCARDIA: Primary | ICD-10-CM

## 2022-06-08 PROCEDURE — 93225 XTRNL ECG REC<48 HRS REC: CPT | Performed by: INTERNAL MEDICINE

## 2022-06-17 PROCEDURE — 93227 XTRNL ECG REC<48 HR R&I: CPT | Performed by: INTERNAL MEDICINE

## 2022-06-20 ENCOUNTER — TELEPHONE (OUTPATIENT)
Dept: CARDIOLOGY CLINIC | Age: 42
End: 2022-06-20

## 2022-06-20 NOTE — TELEPHONE ENCOUNTER
Patient is calling because she would like to know her results from her 24 hr holter monitor.     [phone]

## 2022-06-20 NOTE — TELEPHONE ENCOUNTER
Returned patient call, ID verified using two patient identifiers. Patient calling because she would like to know the results of her 24h holter monitor. She states she turned her monitor in on the 9th and she has yet to receive results. Advised patient that I don't see her results uploaded into her chart at this time. There are currently several people out of the [place] office at this time but I will send a message and see if someone can call her this week with her results. Patient verbalized understanding and will call with any other questions.

## 2022-06-21 NOTE — TELEPHONE ENCOUNTER
Verified patient with two types of identifiers. Notified patient of preliminary results \"The observed rhythms are sinus bradycardia to sinus tachycardia. *The Maximum Heart Rate recorded was 138 bpm, Day 2 / 10:50:16 am, the Minimum Heart Rate recorded was 47  bpm, Day 2 / 05:15:31 am and the Average Heart Rate was 73 bpm.  *There was1 PVC with a burden of < 0.01 %. \" Patient states she just has episdoes of feeling exhausted. Discussed POTS symptoms. Verified she has been staying hydrated and wearing compression stockings. Patient verbalized understanding and will call with any other questions.

## 2022-06-22 ENCOUNTER — DOCUMENTATION ONLY (OUTPATIENT)
Dept: CARDIOLOGY CLINIC | Age: 42
End: 2022-06-22

## 2022-06-22 NOTE — PROGRESS NOTES
The Maximum Heart Holter: Rate recorded was 138 bpm, Day 2 / 10:50:16 am, the Minimum Heart Rate recorded was 47  bpm, Day 2 / 05:15:31 am and the Average Heart Rate was 73 bpm.  *There was1 PVC with a burden of < 0.01 %. \"

## 2022-07-02 ENCOUNTER — HOSPITAL ENCOUNTER (EMERGENCY)
Age: 42
Discharge: HOME | End: 2022-07-02
Attending: EMERGENCY MEDICINE
Payer: MEDICAID

## 2022-07-02 VITALS
TEMPERATURE: 98.4 F | OXYGEN SATURATION: 100 % | HEIGHT: 64 IN | BODY MASS INDEX: 27.63 KG/M2 | WEIGHT: 161.82 LBS | DIASTOLIC BLOOD PRESSURE: 63 MMHG | SYSTOLIC BLOOD PRESSURE: 115 MMHG | RESPIRATION RATE: 16 BRPM | HEART RATE: 82 BPM

## 2022-07-02 DIAGNOSIS — K22.4 ESOPHAGEAL SPASM: Primary | ICD-10-CM

## 2022-07-02 PROCEDURE — 99282 EMERGENCY DEPT VISIT SF MDM: CPT

## 2022-07-02 NOTE — ED PROVIDER NOTES
EMERGENCY DEPARTMENT HISTORY AND PHYSICAL EXAM      Date: 7/2/2022  Patient Name: Nakul Mueller    History of Presenting Illness     Chief Complaint   Patient presents with    Blocked Esophagus     last night went to eat \"I feel like my esophagus is closing\" she has a hernia, gastroparesis and last night throwing up due to so much pressure. has had esophagus stretched in past. ate fine yesterday morning         HPI: Nakul Mueller, 38 y.o. female presents to the ED with cc of esophageal spasm. She has a history of Schatzki ring previously dilated by Dr. Marissa Wells, most recently about 7 months ago, as well as gastroparesis and hiatal hernia. Yesterday, she felt like a [place] vazquez got stuck when she was eating at. She has been able to tolerate p.o. since but still feels like something is stuck. She vomited once but has not vomited since. Patient notes that she has recently been taking amoxicillin for a infection on her finger that has improved significantly. Patient notes shortness of breath over the last few months. It is intermittent and comes on with either rest or activity. It is not associated with chest pain. She has no symptoms right now. There are no other complaints, changes, or physical findings at this time. PCP: Celeste Corley MD    No current facility-administered medications on file prior to encounter. Current Outpatient Medications on File Prior to Encounter   Medication Sig Dispense Refill    cetirizine (ZYRTEC) 10 mg tablet Take 1 Tablet by mouth daily. 90 Tablet 3    ondansetron (ZOFRAN ODT) 4 mg disintegrating tablet Take 1 Tablet by mouth every eight (8) hours as needed for Nausea or Vomiting. 20 Tablet 0    cycloSPORINE (Restasis) 0.05 % dpet INSTILL 1 DROP IN BOTH EYES TWICE DAILY 30 Each 5    famotidine (PEPCID) 20 mg tablet Take 1 Tablet by mouth nightly.  90 Tablet 3    fluticasone propionate (FLONASE) 50 mcg/actuation nasal spray 2 Sprays by Both Nostrils route daily as needed for Rhinitis. 1 Each 6    cevimeline (EVOXAC) 30 mg capsule TAKE 1 CAPSULE BY MOUTH THREE TIMES DAILY (Patient taking differently: as needed.) 270 Capsule 5    vit B complex no.12/niacin,B3, (VITAMIN B COMPLEX NO.12-NIACIN PO) Take 1 Tab by mouth daily.  omeprazole (PRILOSEC) 40 mg capsule Take 40 mg by mouth daily as needed.  cholecalciferol (VITAMIN D3) 1,000 unit tablet Take 1,000 Units by mouth daily.  multivitamin (ONE A DAY) tablet Take 1 Tab by mouth daily.  LINZESS 290 mcg cap capsule Take 290 mcg by mouth as needed. 0       Past History     Past Medical History:  Past Medical History:   Diagnosis Date    Anemia     taking iron    Family history of skin cancer     sister has melanoma,     Gastroparesis     GERD (gastroesophageal reflux disease)     gastroparesis--h-pylori    H. pylori infection     H/O Clostridium difficile infection 06/8714    Helicobacter pylori (H. pylori) 05/2012    h pylori    Palpitations     2010  - cardiac workup per pt.  POTS (postural orthostatic tachycardia syndrome)     2/21/20 Dr. Thien Sanchez, [facility] 22.  Reports tx increased fluid and NA intake, betablocker    Sjogren's syndrome (HCC)     Sun-damaged skin     20's    Sunburn, blistering     8-9     Tanning bed exposure     11 years ago        Past Surgical History:  Past Surgical History:   Procedure Laterality Date    HX COLONOSCOPY      HX DILATION AND CURETTAGE  05/2012    HX ENDOSCOPY      HX GYN      tubal reversal laparoscopic    HX TUBAL LIGATION      IR DILATE ESOPH STRICT      TILT TABLE EVAL W/WO DRUG  1/21/2017            Family History:  Family History   Problem Relation Age of Onset    Heart Disease Mother     Cancer Mother         Thyroid    Thyroid Disease Mother     Heart Disease Father     High Cholesterol Father     Heart Disease Maternal Grandmother     Diabetes Maternal Grandmother     Other Maternal Grandmother         Heomochromatosis  Thyroid Disease Sister     No Known Problems Brother     Stroke Maternal Grandfather     Diabetes Maternal Grandfather     Hypertension Paternal Grandmother     Heart Disease Paternal Grandfather     Heart Attack Paternal Grandfather        Social History:  Social History     Tobacco Use    Smoking status: Former Smoker     Types: Cigarettes     Quit date: 2013     Years since quittin.6    Smokeless tobacco: Never Used   Vaping Use    Vaping Use: Never used   Substance Use Topics    Alcohol use: Yes     Alcohol/week: 0.0 standard drinks     Comment: not monthly    Drug use: No       Allergies: Allergies   Allergen Reactions    Celexa [Citalopram] Nausea and Vomiting    Ciprofloxacin Shortness of Breath    Diflucan [Fluconazole] Rash     Burning sensation to skin    Macrobid [Nitrofurantoin Monohyd/M-Cryst] Other (comments)    Sulfa (Sulfonamide Antibiotics) Rash         Review of Systems   Review of Systems   Constitutional: Negative for chills and fever. HENT: Negative for rhinorrhea. Eyes: Negative for redness. Respiratory: Negative for shortness of breath. Cardiovascular: Negative for chest pain. Gastrointestinal: Positive for abdominal pain. Genitourinary: Negative for dysuria. Musculoskeletal: Negative for back pain. Neurological: Negative for syncope. Psychiatric/Behavioral: The patient is not nervous/anxious. All other systems reviewed and are negative. Physical Exam   Physical Exam  Vitals and nursing note reviewed. Constitutional:       Appearance: Normal appearance. HENT:      Head: Normocephalic and atraumatic. Mouth/Throat:      Mouth: Mucous membranes are moist.   Eyes:      Extraocular Movements: Extraocular movements intact. Cardiovascular:      Rate and Rhythm: Normal rate and regular rhythm. Pulses: Normal pulses. Pulmonary:      Effort: Pulmonary effort is normal.      Breath sounds: Normal breath sounds.    Abdominal: Palpations: Abdomen is soft. Tenderness: There is no abdominal tenderness. Musculoskeletal:         General: No deformity. Cervical back: Neck supple. Skin:     General: Skin is warm and dry. Neurological:      General: No focal deficit present. Mental Status: She is alert. Psychiatric:         Mood and Affect: Mood normal.         Behavior: Behavior normal.         Diagnostic Study Results     Labs -   No results found for this or any previous visit (from the past 24 hour(s)). Radiologic Studies -   No orders to display     CT Results  (Last 48 hours)    None        CXR Results  (Last 48 hours)    None            Medical Decision Making   I am the first provider for this patient. I reviewed the vital signs, available nursing notes, past medical history, past surgical history, family history and social history. Vital Signs-Reviewed the patient's vital signs. Patient Vitals for the past 24 hrs:   Temp Pulse Resp BP SpO2   07/02/22 0751 98.4 °F (36.9 °C) 82 16 115/63 100 %         Provider Notes (Medical Decision Making): Abd exam benign w no TTP noted. Moi po w/o difficulty. Does not want GI cocktail. Sounds like esophageal irritation versus partial nonobstructing food bolus. Plan for outpatient follow up with Dr Claribel Small. Return precautions givne. ED Course:     Initial assessment performed. The patients presenting problems have been discussed, and they are in agreement with the care plan formulated and outlined with them. I have encouraged them to ask questions as they arise throughout their visit. Disposition:  dc    PLAN:  1. Discharge Medication List as of 7/2/2022  8:18 AM        2.    Follow-up Information     Follow up With Specialties Details Why Irving Palomo MD Gastroenterology   [address] [place] [phone] [id number] [phone]          Return to ED if worse     Diagnosis     Clinical Impression: food bolus

## 2022-07-02 NOTE — ED NOTES
Assumed care of patient from triage. Pt denies nausea, vomiting and abdominal pain. Pt states she will started a liquid diet for this weekend. Pt has hx of gastroparesis, hiatal hernia and stretched esophagus. Pt is alert and oriented x 4, resting comfortably in stretcher with side rails up and call bell within reach.

## 2022-07-04 ENCOUNTER — HOSPITAL ENCOUNTER (EMERGENCY)
Age: 42
Discharge: HOME | End: 2022-07-04
Attending: EMERGENCY MEDICINE
Payer: MEDICAID

## 2022-07-04 VITALS
BODY MASS INDEX: 27.44 KG/M2 | SYSTOLIC BLOOD PRESSURE: 147 MMHG | TEMPERATURE: 97.5 F | RESPIRATION RATE: 14 BRPM | HEIGHT: 64 IN | WEIGHT: 160.72 LBS | OXYGEN SATURATION: 100 % | DIASTOLIC BLOOD PRESSURE: 82 MMHG | HEART RATE: 75 BPM

## 2022-07-04 DIAGNOSIS — R09.89 GLOBUS SENSATION: Primary | ICD-10-CM

## 2022-07-04 LAB
ALBUMIN SERPL-MCNC: 3.9 G/DL (ref 3.5–5)
ALBUMIN/GLOB SERPL: 1.1 {RATIO} (ref 1.1–2.2)
ALP SERPL-CCNC: 47 U/L (ref 45–117)
ALT SERPL-CCNC: 22 U/L (ref 12–78)
ANION GAP SERPL CALC-SCNC: 3 MMOL/L (ref 5–15)
AST SERPL-CCNC: 23 U/L (ref 15–37)
BASOPHILS # BLD: 0.1 K/UL (ref 0–0.1)
BASOPHILS NFR BLD: 1 % (ref 0–1)
BILIRUB SERPL-MCNC: 0.6 MG/DL (ref 0.2–1)
BUN SERPL-MCNC: 10 MG/DL (ref 6–20)
BUN/CREAT SERPL: 13 (ref 12–20)
CALCIUM SERPL-MCNC: 9.5 MG/DL (ref 8.5–10.1)
CHLORIDE SERPL-SCNC: 107 MMOL/L (ref 97–108)
CO2 SERPL-SCNC: 27 MMOL/L (ref 21–32)
CREAT SERPL-MCNC: 0.79 MG/DL (ref 0.55–1.02)
DIFFERENTIAL METHOD BLD: NORMAL
EOSINOPHIL # BLD: 0 K/UL (ref 0–0.4)
EOSINOPHIL NFR BLD: 1 % (ref 0–7)
ERYTHROCYTE [DISTWIDTH] IN BLOOD BY AUTOMATED COUNT: 13.3 % (ref 11.5–14.5)
GLOBULIN SER CALC-MCNC: 3.5 G/DL (ref 2–4)
GLUCOSE SERPL-MCNC: 80 MG/DL (ref 65–100)
HCT VFR BLD AUTO: 39.4 % (ref 35–47)
HGB BLD-MCNC: 13.6 G/DL (ref 11.5–16)
IMM GRANULOCYTES # BLD AUTO: 0 K/UL (ref 0–0.04)
IMM GRANULOCYTES NFR BLD AUTO: 0 % (ref 0–0.5)
LIPASE SERPL-CCNC: 218 U/L (ref 73–393)
LYMPHOCYTES # BLD: 1.7 K/UL (ref 0.8–3.5)
LYMPHOCYTES NFR BLD: 25 % (ref 12–49)
MCH RBC QN AUTO: 32.3 PG (ref 26–34)
MCHC RBC AUTO-ENTMCNC: 34.5 G/DL (ref 30–36.5)
MCV RBC AUTO: 93.6 FL (ref 80–99)
MONOCYTES # BLD: 0.5 K/UL (ref 0–1)
MONOCYTES NFR BLD: 7 % (ref 5–13)
NEUTS SEG # BLD: 4.5 K/UL (ref 1.8–8)
NEUTS SEG NFR BLD: 66 % (ref 32–75)
NRBC # BLD: 0 K/UL (ref 0–0.01)
NRBC BLD-RTO: 0 PER 100 WBC
PLATELET # BLD AUTO: 302 K/UL (ref 150–400)
PMV BLD AUTO: 9.9 FL (ref 8.9–12.9)
POTASSIUM SERPL-SCNC: 3.9 MMOL/L (ref 3.5–5.1)
PROT SERPL-MCNC: 7.4 G/DL (ref 6.4–8.2)
RBC # BLD AUTO: 4.21 M/UL (ref 3.8–5.2)
SODIUM SERPL-SCNC: 137 MMOL/L (ref 136–145)
WBC # BLD AUTO: 6.7 K/UL (ref 3.6–11)

## 2022-07-04 PROCEDURE — 80053 COMPREHEN METABOLIC PANEL: CPT

## 2022-07-04 PROCEDURE — 99284 EMERGENCY DEPT VISIT MOD MDM: CPT

## 2022-07-04 PROCEDURE — 74011250636 HC RX REV CODE- 250/636: Performed by: EMERGENCY MEDICINE

## 2022-07-04 PROCEDURE — 36415 COLL VENOUS BLD VENIPUNCTURE: CPT

## 2022-07-04 PROCEDURE — 96374 THER/PROPH/DIAG INJ IV PUSH: CPT

## 2022-07-04 PROCEDURE — 85025 COMPLETE CBC W/AUTO DIFF WBC: CPT

## 2022-07-04 PROCEDURE — 96375 TX/PRO/DX INJ NEW DRUG ADDON: CPT

## 2022-07-04 PROCEDURE — 83690 ASSAY OF LIPASE: CPT

## 2022-07-04 PROCEDURE — 74011000250 HC RX REV CODE- 250: Performed by: EMERGENCY MEDICINE

## 2022-07-04 RX ORDER — ONDANSETRON 4 MG/1
4 TABLET, ORALLY DISINTEGRATING ORAL
Qty: 15 TABLET | Refills: 0 | Status: SHIPPED | OUTPATIENT
Start: 2022-07-04 | End: 2022-07-20

## 2022-07-04 RX ORDER — METOCLOPRAMIDE HYDROCHLORIDE 5 MG/ML
10 INJECTION INTRAMUSCULAR; INTRAVENOUS
Status: COMPLETED | OUTPATIENT
Start: 2022-07-04 | End: 2022-07-04

## 2022-07-04 RX ADMIN — GLUCAGON HYDROCHLORIDE 1 MG: 1 INJECTION, POWDER, FOR SOLUTION INTRAMUSCULAR; INTRAVENOUS; SUBCUTANEOUS at 09:04

## 2022-07-04 RX ADMIN — METOCLOPRAMIDE 10 MG: 5 INJECTION, SOLUTION INTRAMUSCULAR; INTRAVENOUS at 09:57

## 2022-07-04 RX ADMIN — ANTACID/ANTIFLATULENT 4 G: 380; 550; 10; 10 GRANULE, EFFERVESCENT ORAL at 09:10

## 2022-07-04 RX ADMIN — SODIUM CHLORIDE 1000 ML: 9 INJECTION, SOLUTION INTRAVENOUS at 10:25

## 2022-07-04 NOTE — ED PROVIDER NOTES
EMERGENCY DEPARTMENT HISTORY AND PHYSICAL EXAM      Date: 7/4/2022  Patient Name: Marvin Romero    History of Presenting Illness     Chief Complaint   Patient presents with    Vomiting     unable to keep food down. now can't keep water down. it feels like food is stuck upper abdomen       History Provided By: Patient    HPI: Marvin Romero, 38 y.o. female with PMHx significant for gastroparesis, prior esophageal dilation for Schatzki's ring, POTS, who presents with a chief complaint of difficulty swallowing. Patient states that her symptoms have been ongoing for last 3 days. Tells me she was eating [employer] fries and felt something got stuck. She was seen 2 days ago and discharged with instructions to follow-up with her GI. She states that every time she eats or drinks anything it comes back up. She is not having any issues swallowing her saliva. Dr Arjun Mtz.    PCP: Tawanna Corley MD    There are no other complaints, changes, or physical findings at this time. Current Outpatient Medications   Medication Sig Dispense Refill    ondansetron (ZOFRAN ODT) 4 mg disintegrating tablet Take 1 Tablet by mouth every eight (8) hours as needed for Nausea or Vomiting. 15 Tablet 0    cetirizine (ZYRTEC) 10 mg tablet Take 1 Tablet by mouth daily. 90 Tablet 3    ondansetron (ZOFRAN ODT) 4 mg disintegrating tablet Take 1 Tablet by mouth every eight (8) hours as needed for Nausea or Vomiting. 20 Tablet 0    cycloSPORINE (Restasis) 0.05 % dpet INSTILL 1 DROP IN BOTH EYES TWICE DAILY 30 Each 5    famotidine (PEPCID) 20 mg tablet Take 1 Tablet by mouth nightly. 90 Tablet 3    fluticasone propionate (FLONASE) 50 mcg/actuation nasal spray 2 Sprays by Both Nostrils route daily as needed for Rhinitis.  1 Each 6    cevimeline (EVOXAC) 30 mg capsule TAKE 1 CAPSULE BY MOUTH THREE TIMES DAILY (Patient taking differently: as needed.) 270 Capsule 5    vit B complex no.12/niacin,B3, (VITAMIN B COMPLEX NO.12-NIACIN PO) Take 1 Tab by mouth daily.  omeprazole (PRILOSEC) 40 mg capsule Take 40 mg by mouth daily as needed.  cholecalciferol (VITAMIN D3) 1,000 unit tablet Take 1,000 Units by mouth daily.  multivitamin (ONE A DAY) tablet Take 1 Tab by mouth daily.  LINZESS 290 mcg cap capsule Take 290 mcg by mouth as needed. 0     Past History     Past Medical History:  Past Medical History:   Diagnosis Date    Anemia     taking iron    Family history of skin cancer     sister has melanoma,     Gastroparesis     GERD (gastroesophageal reflux disease)     gastroparesis--h-pylori    H. pylori infection     H/O Clostridium difficile infection 28/9517    Helicobacter pylori (H. pylori) 05/2012    h pylori    Palpitations     2010  - cardiac workup per pt.  POTS (postural orthostatic tachycardia syndrome)     2/21/20 Dia Montoya 22.  Reports tx increased fluid and NA intake, betablocker    Sjogren's syndrome (HCC)     Sun-damaged skin     19's    Sunburn, blistering     8-9     Tanning bed exposure     11 years ago      Past Surgical History:  Past Surgical History:   Procedure Laterality Date    HX COLONOSCOPY      HX DILATION AND CURETTAGE  05/2012    HX ENDOSCOPY      HX GYN      tubal reversal laparoscopic    HX TUBAL LIGATION      IR DILATE ESOPH STRICT      TILT TABLE EVAL W/WO DRUG  1/21/2017          Family History:  Family History   Problem Relation Age of Onset    Heart Disease Mother     Cancer Mother         Thyroid    Thyroid Disease Mother     Heart Disease Father     High Cholesterol Father     Heart Disease Maternal Grandmother     Diabetes Maternal Grandmother     Other Maternal Grandmother         Heomochromatosis    Thyroid Disease Sister     No Known Problems Brother     Stroke Maternal Grandfather     Diabetes Maternal Grandfather     Hypertension Paternal Grandmother     Heart Disease Paternal Grandfather     Heart Attack Paternal Grandfather      Social History:  Social History     Tobacco Use    Smoking status: Former Smoker     Types: Cigarettes     Quit date: 2013     Years since quittin.6    Smokeless tobacco: Never Used   Vaping Use    Vaping Use: Never used   Substance Use Topics    Alcohol use: Yes     Alcohol/week: 0.0 standard drinks     Comment: not monthly    Drug use: No     Allergies: Allergies   Allergen Reactions    Celexa [Citalopram] Nausea and Vomiting    Ciprofloxacin Shortness of Breath    Diflucan [Fluconazole] Rash     Burning sensation to skin    Macrobid [Nitrofurantoin Monohyd/M-Cryst] Other (comments)    Sulfa (Sulfonamide Antibiotics) Rash     Review of Systems   Review of Systems   Constitutional: Negative for chills and fever. HENT: Positive for trouble swallowing. Negative for congestion, rhinorrhea and sore throat. Respiratory: Negative for cough and shortness of breath. Cardiovascular: Negative for chest pain. Gastrointestinal: Positive for vomiting. Negative for abdominal pain and nausea. Genitourinary: Negative for dysuria and urgency. Skin: Negative for rash. Neurological: Negative for dizziness, light-headedness and headaches. All other systems reviewed and are negative. Physical Exam   Physical Exam  Vitals and nursing note reviewed. Constitutional:       General: She is not in acute distress. Appearance: She is well-developed. Comments: Tolerating secretions   HENT:      Head: Normocephalic and atraumatic. Eyes:      Conjunctiva/sclera: Conjunctivae normal.      Pupils: Pupils are equal, round, and reactive to light. Cardiovascular:      Rate and Rhythm: Normal rate and regular rhythm. Pulmonary:      Effort: Pulmonary effort is normal. No respiratory distress. Breath sounds: Normal breath sounds. No stridor. Abdominal:      General: There is no distension. Palpations: Abdomen is soft. Tenderness: There is no abdominal tenderness.    Musculoskeletal: General: Normal range of motion. Cervical back: Normal range of motion. Skin:     General: Skin is warm and dry. Neurological:      Mental Status: She is alert and oriented to person, place, and time. Diagnostic Study Results   Labs -     Recent Results (from the past 12 hour(s))   CBC WITH AUTOMATED DIFF    Collection Time: 07/04/22  8:56 AM   Result Value Ref Range    WBC 6.7 3.6 - 11.0 K/uL    RBC 4.21 3.80 - 5.20 M/uL    HGB 13.6 11.5 - 16.0 g/dL    HCT 39.4 35.0 - 47.0 %    MCV 93.6 80.0 - 99.0 FL    MCH 32.3 26.0 - 34.0 PG    MCHC 34.5 30.0 - 36.5 g/dL    RDW 13.3 11.5 - 14.5 %    PLATELET 432 973 - 662 K/uL    MPV 9.9 8.9 - 12.9 FL    NRBC 0.0 0  WBC    ABSOLUTE NRBC 0.00 0.00 - 0.01 K/uL    NEUTROPHILS 66 32 - 75 %    LYMPHOCYTES 25 12 - 49 %    MONOCYTES 7 5 - 13 %    EOSINOPHILS 1 0 - 7 %    BASOPHILS 1 0 - 1 %    IMMATURE GRANULOCYTES 0 0.0 - 0.5 %    ABS. NEUTROPHILS 4.5 1.8 - 8.0 K/UL    ABS. LYMPHOCYTES 1.7 0.8 - 3.5 K/UL    ABS. MONOCYTES 0.5 0.0 - 1.0 K/UL    ABS. EOSINOPHILS 0.0 0.0 - 0.4 K/UL    ABS. BASOPHILS 0.1 0.0 - 0.1 K/UL    ABS. IMM. GRANS. 0.0 0.00 - 0.04 K/UL    DF AUTOMATED     METABOLIC PANEL, COMPREHENSIVE    Collection Time: 07/04/22  8:56 AM   Result Value Ref Range    Sodium 137 136 - 145 mmol/L    Potassium 3.9 3.5 - 5.1 mmol/L    Chloride 107 97 - 108 mmol/L    CO2 27 21 - 32 mmol/L    Anion gap 3 (L) 5 - 15 mmol/L    Glucose 80 65 - 100 mg/dL    BUN 10 6 - 20 MG/DL    Creatinine 0.79 0.55 - 1.02 MG/DL    BUN/Creatinine ratio 13 12 - 20      GFR est AA >60 >60 ml/min/1.73m2    GFR est non-AA >60 >60 ml/min/1.73m2    Calcium 9.5 8.5 - 10.1 MG/DL    Bilirubin, total 0.6 0.2 - 1.0 MG/DL    ALT (SGPT) 22 12 - 78 U/L    AST (SGOT) 23 15 - 37 U/L    Alk.  phosphatase 47 45 - 117 U/L    Protein, total 7.4 6.4 - 8.2 g/dL    Albumin 3.9 3.5 - 5.0 g/dL    Globulin 3.5 2.0 - 4.0 g/dL    A-G Ratio 1.1 1.1 - 2.2     LIPASE    Collection Time: 07/04/22  8:56 AM Result Value Ref Range    Lipase 218 73 - 393 U/L       Radiologic Studies -   No orders to display     No results found. Medical Decision Making   I am the first provider for this patient. I reviewed the vital signs, available nursing notes, past medical history, past surgical history, family history and social history. Vital Signs-Reviewed the patient's vital signs. Patient Vitals for the past 12 hrs:   Temp Pulse Resp BP SpO2   07/04/22 1014 -- -- -- (!) 147/82 --   07/04/22 0919 -- -- -- 130/75 --   07/04/22 0819 97.5 °F (36.4 °C) 75 14 119/72 100 %       Pulse Oximetry Analysis - 100% on ra      Records Reviewed: Nursing Notes and Old Medical Records    Provider Notes (Medical Decision Making):   Patient presents with a chief complaint of feeling as though food is getting stuck in her throat. Has had multiple esophageal dilations previously. On exam she is nontoxic-appearing with stable vital signs. Is tolerating her secretions without issue. Will check basic labs, give glucagon EZ gas and reevaluate. Given that she is tolerating her secretions I do not feel she requires emergent endoscopy. ED Course:   Initial assessment performed. The patients presenting problems have been discussed, and they are in agreement with the care plan formulated and outlined with them. I have encouraged them to ask questions as they arise throughout their visit. Lab work unremarkable. Patient has not vomited while in the emergency department. I advised that she call her GI doctor tomorrow to schedule a follow-up appointment as she likely does need endoscopy and possible dilation. Will prescribe nausea medication for at home. Procedures:  Procedures    Critical Care:  none    Disposition:  Discharge Note:  The patient has been re-evaluated and is ready for discharge. Reviewed available results with patient. Counseled patient on diagnosis and care plan.  Patient has expressed understanding, and all questions have been answered. Patient agrees with plan and agrees to follow up as recommended, or to return to the ED if their symptoms worsen. Discharge instructions have been provided and explained to the patient, along with reasons to return to the ED. PLAN:  1. Discharge Medication List as of 7/4/2022 10:54 AM      START taking these medications    Details   !! ondansetron (ZOFRAN ODT) 4 mg disintegrating tablet Take 1 Tablet by mouth every eight (8) hours as needed for Nausea or Vomiting., Normal, Disp-15 Tablet, R-0       !! - Potential duplicate medications found. Please discuss with provider. CONTINUE these medications which have NOT CHANGED    Details   cetirizine (ZYRTEC) 10 mg tablet Take 1 Tablet by mouth daily. , Normal, Disp-90 Tablet, R-3      !! ondansetron (ZOFRAN ODT) 4 mg disintegrating tablet Take 1 Tablet by mouth every eight (8) hours as needed for Nausea or Vomiting., Normal, Disp-20 Tablet, R-0      cycloSPORINE (Restasis) 0.05 % dpet INSTILL 1 DROP IN BOTH EYES TWICE DAILY, Normal, Disp-30 Each, R-5      famotidine (PEPCID) 20 mg tablet Take 1 Tablet by mouth nightly., Normal, Disp-90 Tablet, R-3      fluticasone propionate (FLONASE) 50 mcg/actuation nasal spray 2 Sprays by Both Nostrils route daily as needed for Rhinitis., Normal, Disp-1 Each, R-6      cevimeline (EVOXAC) 30 mg capsule TAKE 1 CAPSULE BY MOUTH THREE TIMES DAILY, Normal, Disp-270 Capsule, R-5      vit B complex no.12/niacin,B3, (VITAMIN B COMPLEX NO.12-NIACIN PO) Take 1 Tab by mouth daily. , Historical Med      omeprazole (PRILOSEC) 40 mg capsule Take 40 mg by mouth daily as needed., Historical Med      cholecalciferol (VITAMIN D3) 1,000 unit tablet Take 1,000 Units by mouth daily. , Historical Med      multivitamin (ONE A DAY) tablet Take 1 Tab by mouth daily. , Historical Med      LINZESS 290 mcg cap capsule Take 290 mcg by mouth as needed., Historical Med, R-0       !! - Potential duplicate medications found. Please discuss with provider. 2.   Follow-up Information     Follow up With Specialties Details Why Contact Info    Lizette Giles MD Gastroenterology Schedule an appointment as soon as possible for a visit in 2 days  [address] [phone]      [facility] EMERGENCY DEPT Emergency Medicine  As needed, If symptoms worsen [address]  [address] [facility]  [phone]        Return to ED if worse     Diagnosis     Clinical Impression:   1. Globus sensation            Please note that this dictation was completed with [employer], the computer voice recognition software. Quite often unanticipated grammatical, syntax, homophones, and other interpretive errors are inadvertently transcribed by the computer software. Please disregard these errors.   Please excuse any errors that have escaped final proofreading

## 2022-07-04 NOTE — ED NOTES
Pt. Glenys Song she feels like she is going to pass out. Vitals obtained and are WDL. Patient resting comfortably in bed. Spoke with Dr. Gary Beltre who is aware.

## 2022-07-12 ENCOUNTER — TELEPHONE (OUTPATIENT)
Dept: CARDIOLOGY CLINIC | Age: 42
End: 2022-07-12

## 2022-07-12 NOTE — TELEPHONE ENCOUNTER
7/12/2022 at 3:25 left message call to reschedule 8/2/2022 1:00 appointment with Leo Tello MD due to his schedule changes

## 2022-07-20 ENCOUNTER — OFFICE VISIT (OUTPATIENT)
Dept: FAMILY MEDICINE CLINIC | Age: 42
End: 2022-07-20
Payer: MEDICAID

## 2022-07-20 VITALS
TEMPERATURE: 98.1 F | HEIGHT: 64 IN | RESPIRATION RATE: 16 BRPM | DIASTOLIC BLOOD PRESSURE: 83 MMHG | BODY MASS INDEX: 28.2 KG/M2 | OXYGEN SATURATION: 99 % | SYSTOLIC BLOOD PRESSURE: 121 MMHG | HEART RATE: 88 BPM | WEIGHT: 165.2 LBS

## 2022-07-20 DIAGNOSIS — R06.02 SOB (SHORTNESS OF BREATH): ICD-10-CM

## 2022-07-20 DIAGNOSIS — G90.A POTS (POSTURAL ORTHOSTATIC TACHYCARDIA SYNDROME): ICD-10-CM

## 2022-07-20 DIAGNOSIS — R20.0 FACIAL NUMBNESS: Primary | ICD-10-CM

## 2022-07-20 PROBLEM — Z79.899 LONG-TERM USE OF HYDROXYCHLOROQUINE: Status: RESOLVED | Noted: 2019-02-14 | Resolved: 2022-07-20

## 2022-07-20 PROBLEM — N32.81 OVERACTIVE BLADDER: Status: ACTIVE | Noted: 2020-12-18

## 2022-07-20 PROCEDURE — 99214 OFFICE O/P EST MOD 30 MIN: CPT | Performed by: FAMILY MEDICINE

## 2022-07-20 RX ORDER — ALBUTEROL SULFATE 90 UG/1
AEROSOL, METERED RESPIRATORY (INHALATION)
COMMUNITY
Start: 2022-07-18

## 2022-07-20 RX ORDER — METHOCARBAMOL 500 MG/1
500 TABLET, FILM COATED ORAL 3 TIMES DAILY
Qty: 90 TABLET | Refills: 0 | Status: SHIPPED | OUTPATIENT
Start: 2022-07-20 | End: 2022-09-19

## 2022-07-20 NOTE — PROGRESS NOTES
Chief Complaint   Patient presents with    Numbness     Face numbness follow up    Shortness of Breath     Pt reports that she has recurrent numbness in her face. She also has SOB, feels like her breath is catching. This has been an ongoing issue, she saw ortho, they advised that this might be a cranial nerve problem, neurology thought she was hyperventilating. Pt's cardiologist who follows her POTS, sent her to pulmonology. She did PFT x 2, normal.     Feels like she cant get a deep breath. Pt uses albuterol, does not change symptoms. Pt has had her esophagus stretched, has a GI appt in 2 weeks. Pt sees an ENT, no cause found. Pt reports that some days it is more noticeable than other days. Subjective: (As above and below)     Chief Complaint   Patient presents with    Numbness     Face numbness follow up    Shortness of Breath     she is a 38y.o. year old female who presents for evaluation. Reviewed PmHx, RxHx, FmHx, SocHx, AllgHx and updated in chart. Review of Systems - negative except as listed above    Objective:     Vitals:    07/20/22 1519   BP: 121/83   Pulse: 88   Resp: 16   Temp: 98.1 °F (36.7 °C)   TempSrc: Oral   SpO2: 99%   Weight: 165 lb 3.2 oz (74.9 kg)   Height: 5' 4\" (1.626 m)     Physical Examination: General appearance - alert, well appearing, and in no distress  Mental status - normal mood, behavior, speech, dress, motor activity, and thought processes  Chest - clear to auscultation, no wheezes, rales or rhonchi, symmetric air entry  Heart - normal rate, regular rhythm, normal S1, S2, no murmurs, rubs, clicks or gallops  Musculoskeletal - no joint tenderness, deformity or swelling  Extremities - peripheral pulses normal, no pedal edema, no clubbing or cyanosis    Assessment/ Plan:   1. Facial numbness  -trial of muscle relaxer to help with symptoms  -no clear cause for facial numbness  -work up so far is normal  - methocarbamoL (ROBAXIN) 500 mg tablet;  Take 1 Tablet by mouth three (3) times daily. Dispense: 90 Tablet; Refill: 0    2. POTS (postural orthostatic tachycardia syndrome)  -trial of muscle relaxer to help with tightness, keep GI follow up  - methocarbamoL (ROBAXIN) 500 mg tablet; Take 1 Tablet by mouth three (3) times daily. Dispense: 90 Tablet; Refill: 0    3. SOB (shortness of breath)  - methocarbamoL (ROBAXIN) 500 mg tablet; Take 1 Tablet by mouth three (3) times daily. Dispense: 90 Tablet; Refill: 0       I have discussed the diagnosis with the patient and the intended plan as seen in the above orders. The patient has received an after-visit summary and questions were answered concerning future plans.      Medication Side Effects and Warnings were discussed with patient: yes  Patient Labs were reviewed: yes  Patient Past Records were reviewed:  yes      Александр Leyva M.D.

## 2022-07-20 NOTE — PROGRESS NOTES
1. Have you been to the ER, urgent care clinic since your last visit? Hospitalized since your last visit? Yes Where: [facility], on file. 2. Have you seen or consulted any other health care providers outside of the [address] [place] [address] since your last visit? Include any pap smears or colon screening.  No    Health Maintenance Due   Topic Date Due    Cervical cancer screen  Never done    COVID-19 Vaccine (3 - Booster for Pfizer series) 01/20/2022     Chief Complaint   Patient presents with    Numbness     Face numbness follow up    Shortness of Breath

## 2022-07-21 DIAGNOSIS — M35.01 SJOGREN'S SYNDROME WITH KERATOCONJUNCTIVITIS SICCA (HCC): Primary | ICD-10-CM

## 2022-07-21 NOTE — TELEPHONE ENCOUNTER
Received faxed request from [facility] stating Cyclosporine 0.05% needs prior auth. Last labs were done 7/4/22, and is currently scheduled for an appointment on 7/26/22.

## 2022-07-22 RX ORDER — CYCLOSPORINE 0.5 MG/ML
EMULSION OPHTHALMIC
Qty: 30 EACH | Refills: 3 | Status: SHIPPED | OUTPATIENT
Start: 2022-07-22 | End: 2022-07-29

## 2022-07-26 ENCOUNTER — OFFICE VISIT (OUTPATIENT)
Dept: RHEUMATOLOGY | Age: 42
End: 2022-07-26
Payer: MEDICAID

## 2022-07-26 VITALS
RESPIRATION RATE: 16 BRPM | SYSTOLIC BLOOD PRESSURE: 143 MMHG | OXYGEN SATURATION: 98 % | BODY MASS INDEX: 27.81 KG/M2 | WEIGHT: 162 LBS | DIASTOLIC BLOOD PRESSURE: 83 MMHG | HEART RATE: 80 BPM | TEMPERATURE: 98 F

## 2022-07-26 DIAGNOSIS — M35.01 SJOGREN'S SYNDROME WITH KERATOCONJUNCTIVITIS SICCA (HCC): Primary | ICD-10-CM

## 2022-07-26 PROCEDURE — 99215 OFFICE O/P EST HI 40 MIN: CPT | Performed by: PEDIATRICS

## 2022-07-26 RX ORDER — CEVIMELINE HYDROCHLORIDE 30 MG/1
30 CAPSULE ORAL 3 TIMES DAILY
Qty: 30 CAPSULE | Refills: 11 | Status: SHIPPED | OUTPATIENT
Start: 2022-07-26 | End: 2023-07-21

## 2022-07-26 RX ORDER — BACLOFEN 10 MG/1
TABLET ORAL
COMMUNITY
Start: 2022-07-25 | End: 2022-09-19

## 2022-07-26 RX ORDER — SUCRALFATE 1 G/1
TABLET ORAL
COMMUNITY
Start: 2022-07-25 | End: 2022-09-19

## 2022-07-26 RX ORDER — PANTOPRAZOLE SODIUM 40 MG/1
TABLET, DELAYED RELEASE ORAL
COMMUNITY
Start: 2022-07-25 | End: 2022-09-19

## 2022-07-26 RX ORDER — FAMOTIDINE 40 MG/1
TABLET, FILM COATED ORAL
COMMUNITY
Start: 2022-07-25 | End: 2022-09-19

## 2022-07-26 NOTE — PROGRESS NOTES
Chief Complaint   Patient presents with    Joint Pain     1. Have you been to the ER, urgent care clinic since your last visit? Hospitalized since your last visit? No    2. Have you seen or consulted any other health care providers outside of the [address] [place] [address] since your last visit? Include any pap smears or colon screening.  No

## 2022-07-26 NOTE — PROGRESS NOTES
RHEUMATOLOGY PROBLEM LIST AND CHIEF COMPLAINT  1. Sjogren's Syndrome - xerostomia, xerophthalmia, autonomic dysfunction (POTS, gastroparesis)     INTERVAL HISTORY  This is a 38 y.o.  female. Today, the patient complains of no pain in the joints. Location: NA  Severity:  0 on a scale of 0-10  Timing: none at this time   Duration: NA  Modifying factors:  Context/Associated signs and symptoms: The patient was previously followed by Dr. Tera Arteaga and I am now taking over her care. Previous medical hx, notes in chart, and lab work were reviewed. She continues on Restasis for dry eyes, but recently ran out. The Restasis was working well. She is also on Evoxac. She mentions that she has been feeling short of breath and chest tightness recently and has seen Dr. Krzysztof Hawkins (pulmonology). She has had PFTs within the past year which showed no restrictive pattern. She had a chest xray on 5/22/22 which was unremarkable. She is also complaining of intermittent face numbness around her nose.       RHEUMATOLOGY REVIEW OF SYSTEMS  Positives as per history  Negatives as follows:  Kenisha Valentinenios:    Denies unexplained persistent fevers, weight change, chronic fatigue  HEAD/EYES:              Denies eye redness, blurry vision or sudden loss of vision, HA, temporal artery pain  ENT:                            Denies oral/nasal ulcers, recurrent sinus infections  RESPIRATORY:         No pleuritic pain, history of pleural effusions, hemoptysis, exertional dyspnea  CARDIOVASCULAR: Denies chest pain, history of pericardial effusions  GASTRO:                    Denies heartburn, esophageal dysmotility, abdominal pain, nausea, vomiting, diarrhea, blood in the stool  HEMATOLOGIC:        No easy bruising, purpura, swollen lymph nodes  SKIN:                           Denies alopecia, ulcers, nodules, sun sensitivity, unexplained persistent rash  VASCULAR:                Denies edema, cyanosis, raynaud phenomenon  NEUROLOGIC:           Denies specific muscle weakness, paresthesias  PSYCHIATRIC:           No sleep disturbance / snoring, depression, anxiety  MSK:                           No morning stiffness >1 hour, SI joint pain, persistent joint swelling, persistent joint pain     PAST MEDICAL HISTORY  Reviewed with patient, significant changes in medical history - no     PHYSICAL EXAM  Blood pressure (!) 143/83, pulse 80, temperature 98 °F (36.7 °C), temperature source Oral, resp. rate 16, weight 162 lb (73.5 kg), last menstrual period 07/06/2022, SpO2 98 %. GENERAL APPEARANCE: Well-nourished, no acute distress  EYES: No scleral erythema, conjunctival injection  ENT: No oral ulcer, parotid enlargement, except swollen turbinates  NECK: No adenopathy, thyroid enlargement  CARDIOVASCULAR: Heart rhythm is regular. No murmur, rub, gallop  CHEST: Normal vesicular breath sounds. No wheezes, rales, pleural friction rubs  ABDOMINAL: The abdomen is soft and nontender. Bowel sounds are normal  EXTREMITIES: There is no evidence of clubbing, cyanosis, edema  SKIN: No rash, palpable purpura, digital ulcer, abnormal thickening, normal nailfold capillaries  NEUROLOGICAL: Normal gait and station, full strength in upper and lower extremities,  normal sensation to light touch  MUSCULOSKELETAL:  Upper extremities - full range of motion, no tenderness, no swelling, no synovial thickening and no deformity of joints  Lower extremities - full range of motion, no tenderness, no swelling, no synovial thickening and no deformity of joints     LABS, RADIOLOGY AND PROCEDURES  Previous labs reviewed -Yes  Previous radiology reviewed -Yes  Previous procedures reviewed -Yes  Previous medical records reviewed/summarized -Yes     Radiographs     KUB 11/29/2018: normal bowel gas pattern. The osseous structures are unremarkable. No pathologic calcification. Chest 11/02/2017: the lungs are clear. The central airways are patent.  The heart size is normal. No pneumothorax or pleural effusion. CT Imaging     CT Chest with contrast 12/18/2019: THYROID: 11 mm hypodense nodule is noted in the right thyroid gland. MEDIASTINUM: No mass or lymphadenopathy. SHANNA: No mass or lymphadenopathy. THORACIC AORTA: No dissection or aneurysm. MAIN PULMONARY ARTERY: Normal in caliber. TRACHEA/BRONCHI: Patent. ESOPHAGUS: No wall thickening or dilatation. HEART: Normal in size. PLEURA: No effusion or pneumothorax. LUNGS: No nodule, mass, or airspace disease. INCIDENTALLY IMAGED UPPER ABDOMEN: Multiple hepatic cysts again demonstrated, reference 11 mm cyst right hepatic lobe. BONES: No destructive bone lesion. CT Sinuses without contrast showed The frontal sinuses and frontoethmoidal recesses are clear. The anterior ethmoid air cells are clear. The maxillary sinuses are clear. The maxillary sinus infundibula and ostiomeatal units are patent. The posterior ethmoid air cells and sphenoid sinus are clear. The nasal cavity is clear. The nasal septum is midline. There is no bone destruction or bone dehiscence of the paranasal sinuses. MR Imaging     None     DXA     None     GI Studies     Barium Swallow Esophagram 11/20/2018: The swallowing mechanism is intact. There is no stricture extravasation or filling defect. Patient was unable to swallow the barium tablet. NM Gastric Emptying Study 7/25/2012: markedly delayed gastric emptying. Ultrasonography     US abdomen 7/21/2018: the gallbladder is normal. There is no intrahepatic or extrahepatic biliary duct dilation. Common bile duct measures 3 mm. The liver is normal in size and echotexture without demonstration of mass lesion. Portal venous flow is normal. The pancreatic head appears normal and the right kidney is normal with length of 10.3 cm     PATHOLOGY     Stomach, biopsy 2/27/2020: Unremarkable gastric mucosa. Negative for increased inflammation  Duodenum, biopsy: Unremarkable duodenal mucosa.  Negative for villous blunting or increased intraepithelial lymphocytes. Gastroesophageal junction, biopsy: Junctional mucosa with chronic reflux associated changes. Negative for intestinal metaplasia, dysplasia or malignancy  Esophagus, mid, biopsy: Unremarkable squamous mucosa. Negative for esophagitis or increased intraepithelial eosinophils     Small bowel, duodenum, biopsy 6/21/2018: No histopathologic abnormality. Stomach, biopsy: Mild chronic gastritis. Esophagus, mid, biopsy: No histopathologic abnormality. ASSESSMENT  1. Sjogren's Syndrome - The patient is doing well with Evoxac 30 mg and Restasis. She will continue current medications. Recommend she follow up with Dr. Santana Akhtar (ENT) for shortness of breath since workup with pulmonology was normal.      PLAN  1. Continue Evoxac 30 mg  2. Continue Restasis  3. Follow up in 1 year     Mago Hatch MD  Adult and Pediatric Rheumatology     [address], Phone [phone], Fax [phone]      Visiting  of Pediatrics    Department of Pediatrics, [address], Phone [phone], Fax [phone]     There are no Patient Instructions on file for this visit. cc:  Yefri Corley MD     Written by duane Claros, as dictated by Dr. Makayla Lin M.D. Total time was 40 minutes, greater than 50% of which was spent in counseling and coordination of care. The diagnosis, treatment and various other items were discussed in detail: Test results, medication options, possible side effects, lifestyle changes.

## 2022-07-26 NOTE — PROGRESS NOTES
RHEUMATOLOGY PROBLEM LIST AND CHIEF COMPLAINT  1. Sjogren's Syndrome - xerostomia, xerophthalmia, autonomic dysfunction (POTS, gastroparesis)    INTERVAL HISTORY  This is a 38 y.o.  female. Today, the patient complains of no pain in the joints. Location: NA  Severity:  0 on a scale of 0-10  Timing: none at this time   Duration: NA  Modifying factors:   Context/Associated signs and symptoms: The patient was previously followed by Dr. Magalys Brandt and I am now taking over her care. Previous medical hx, notes in chart, and lab work were reviewed. She continues on Restasis for dry eyes, but recently ran out. The Restasis was working well. She is also on Evoxac. She mentions that she has been feeling short of breath and chest tightness recently and has seen Dr. Dima Cox (pulmonology). She has had PFTs within the past year which showed no restrictive pattern. She had a chest xray on 5/22/22 which was unremarkable. She is also complaining of intermittent face numbness around her nose.      RHEUMATOLOGY REVIEW OF SYSTEMS   Positives as per history  Negatives as follows:  Evelyn Knapps:  Denies unexplained persistent fevers, weight change, chronic fatigue  HEAD/EYES:   Denies eye redness, blurry vision or sudden loss of vision, HA, temporal artery pain  ENT:    Denies oral/nasal ulcers, recurrent sinus infections  RESPIRATORY:  No pleuritic pain, history of pleural effusions, hemoptysis, exertional dyspnea  CARDIOVASCULAR: Denies chest pain, history of pericardial effusions  GASTRO:   Denies heartburn, esophageal dysmotility, abdominal pain, nausea, vomiting, diarrhea, blood in the stool  HEMATOLOGIC:  No easy bruising, purpura, swollen lymph nodes  SKIN:    Denies alopecia, ulcers, nodules, sun sensitivity, unexplained persistent rash   VASCULAR:   Denies edema, cyanosis, raynaud phenomenon  NEUROLOGIC:  Denies specific muscle weakness, paresthesias   PSYCHIATRIC:  No sleep disturbance / snoring, depression, anxiety  MSK:    No morning stiffness >1 hour, SI joint pain, persistent joint swelling, persistent joint pain    PAST MEDICAL HISTORY  Reviewed with patient, significant changes in medical history - no    PHYSICAL EXAM  Blood pressure (!) 143/83, pulse 80, temperature 98 °F (36.7 °C), temperature source Oral, resp. rate 16, weight 162 lb (73.5 kg), last menstrual period 07/06/2022, SpO2 98 %. GENERAL APPEARANCE: Well-nourished, no acute distress  EYES: No scleral erythema, conjunctival injection  ENT: No oral ulcer, parotid enlargement, except swollen turbinates  NECK: No adenopathy, thyroid enlargement  CARDIOVASCULAR: Heart rhythm is regular. No murmur, rub, gallop  CHEST: Normal vesicular breath sounds. No wheezes, rales, pleural friction rubs  ABDOMINAL: The abdomen is soft and nontender. Bowel sounds are normal  EXTREMITIES: There is no evidence of clubbing, cyanosis, edema  SKIN: No rash, palpable purpura, digital ulcer, abnormal thickening, normal nailfold capillaries   NEUROLOGICAL: Normal gait and station, full strength in upper and lower extremities,  normal sensation to light touch  MUSCULOSKELETAL:   Upper extremities - full range of motion, no tenderness, no swelling, no synovial thickening and no deformity of joints  Lower extremities - full range of motion, no tenderness, no swelling, no synovial thickening and no deformity of joints     LABS, RADIOLOGY AND PROCEDURES  Previous labs reviewed -Yes  Previous radiology reviewed -Yes  Previous procedures reviewed -Yes  Previous medical records reviewed/summarized -Yes    Radiographs    KUB 11/29/2018: normal bowel gas pattern. The osseous structures are unremarkable. No pathologic calcification. Chest 11/02/2017: the lungs are clear. The central airways are patent. The heart size is normal. No pneumothorax or pleural effusion. CT Imaging    CT Chest with contrast 12/18/2019: THYROID: 11 mm hypodense nodule is noted in the right thyroid gland. MEDIASTINUM: No mass or lymphadenopathy. SHANNA: No mass or lymphadenopathy. THORACIC AORTA: No dissection or aneurysm. MAIN PULMONARY ARTERY: Normal in caliber. TRACHEA/BRONCHI: Patent. ESOPHAGUS: No wall thickening or dilatation. HEART: Normal in size. PLEURA: No effusion or pneumothorax. LUNGS: No nodule, mass, or airspace disease. INCIDENTALLY IMAGED UPPER ABDOMEN: Multiple hepatic cysts again demonstrated, reference 11 mm cyst right hepatic lobe. BONES: No destructive bone lesion. CT Sinuses without contrast showed The frontal sinuses and frontoethmoidal recesses are clear. The anterior ethmoid air cells are clear. The maxillary sinuses are clear. The maxillary sinus infundibula and ostiomeatal units are patent. The posterior ethmoid air cells and sphenoid sinus are clear. The nasal cavity is clear. The nasal septum is midline. There is no bone destruction or bone dehiscence of the paranasal sinuses. MR Imaging    None    DXA     None    GI Studies    Barium Swallow Esophagram 11/20/2018: The swallowing mechanism is intact. There is no stricture extravasation or filling defect. Patient was unable to swallow the barium tablet. NM Gastric Emptying Study 7/25/2012: markedly delayed gastric emptying. Ultrasonography    US abdomen 7/21/2018: the gallbladder is normal. There is no intrahepatic or extrahepatic biliary duct dilation. Common bile duct measures 3 mm. The liver is normal in size and echotexture without demonstration of mass lesion. Portal venous flow is normal. The pancreatic head appears normal and the right kidney is normal with length of 10.3 cm    PATHOLOGY    Stomach, biopsy 2/27/2020: Unremarkable gastric mucosa. Negative for increased inflammation  Duodenum, biopsy: Unremarkable duodenal mucosa. Negative for villous blunting or increased intraepithelial lymphocytes. Gastroesophageal junction, biopsy: Junctional mucosa with chronic reflux associated changes.  Negative for intestinal metaplasia, dysplasia or malignancy  Esophagus, mid, biopsy: Unremarkable squamous mucosa. Negative for esophagitis or increased intraepithelial eosinophils     Small bowel, duodenum, biopsy 6/21/2018: No histopathologic abnormality. Stomach, biopsy: Mild chronic gastritis. Esophagus, mid, biopsy: No histopathologic abnormality. ASSESSMENT  1. Sjogren's Syndrome - The patient is doing well with Evoxac 30 mg and Restasis. She will continue current medications. Recommend she follow up with Dr. Dat Vargas (ENT) for shortness of breath since workup with pulmonology was normal.     PLAN  1. Continue Evoxac 30 mg  2. Continue Restasis  3. Follow up in 1 year    Mago Moreno MD  Adult and Pediatric Rheumatology     [facility], [address] [place] [address], Phone [phone], Fax [phone]     Visiting  of Pediatrics    Department of Pediatrics, [facility] of [address] [place] [address], [address] [place] [address], Phone [phone], Fax [phone]    There are no Patient Instructions on file for this visit.     cc:  Martinez Corley MD    Written by duane Rainey, as dictated by Dr. Melania Mendosa M.D.

## 2022-07-27 DIAGNOSIS — M35.01 SJOGREN'S SYNDROME WITH KERATOCONJUNCTIVITIS SICCA (HCC): ICD-10-CM

## 2022-07-28 ENCOUNTER — TELEPHONE (OUTPATIENT)
Dept: RHEUMATOLOGY | Age: 42
End: 2022-07-28

## 2022-07-28 NOTE — TELEPHONE ENCOUNTER
Patient was informed by the Alon Olmos that script has been sent to [address] [place] [address] to have the PA completed, once the medication is authorized it will be forwarded to the pharmacy of the insurance choice

## 2022-07-29 RX ORDER — CYCLOSPORINE 0.5 MG/ML
EMULSION OPHTHALMIC
Qty: 30 EACH | Refills: 3 | Status: SHIPPED | OUTPATIENT
Start: 2022-07-29 | End: 2022-10-24 | Stop reason: SDUPTHER

## 2022-08-18 ENCOUNTER — TRANSCRIBE ORDER (OUTPATIENT)
Dept: REGISTRATION | Age: 42
End: 2022-08-18

## 2022-08-18 ENCOUNTER — HOSPITAL ENCOUNTER (OUTPATIENT)
Dept: GENERAL RADIOLOGY | Age: 42
Discharge: HOME | End: 2022-08-18
Payer: MEDICAID

## 2022-08-18 DIAGNOSIS — R06.02 SOB (SHORTNESS OF BREATH): Primary | ICD-10-CM

## 2022-08-18 DIAGNOSIS — R06.02 SOB (SHORTNESS OF BREATH): ICD-10-CM

## 2022-08-18 PROCEDURE — 71046 X-RAY EXAM CHEST 2 VIEWS: CPT

## 2022-09-10 ENCOUNTER — PATIENT MESSAGE (OUTPATIENT)
Dept: CARDIOLOGY CLINIC | Age: 42
End: 2022-09-10

## 2022-09-12 RX ORDER — ATENOLOL 25 MG/1
25 TABLET ORAL DAILY
Qty: 90 TABLET | Refills: 0 | Status: SHIPPED | OUTPATIENT
Start: 2022-09-12

## 2022-09-12 NOTE — PROGRESS NOTES
Cardiac Electrophysiology OFFICE Note     Subjective:      Geovanna Loza is a 38 y.o. female patient who presents for follow up of palpitations. States palpitations have improved significantly, now at/near baseline. Currently using atenolol, but sometimes reduces dose due to low BP. She does, however, report persistent episodic SOB, may occur at rest, can last days at a time. It has been worse recently. States pulmonology eval was unrevealing. Holter in 06/2022 showed sinus terry to sinus tach, no significant arrhythmia. Echo in 06/2021 showed normal LVEF. Lexiscan cardiolite stress test in 01/2020 WNL. Reports compliance with compression stockings. She denies syncope. Previous:  Evaluated at [facility] in 01/2020. CT chest with contrast WNL in 12/2019. PFT reportedly \"ok\". No previous syncope, but many near syncopal episodes. Activity & exercise are triggers for tachy episodes. Metoprolol caused fatigue. Disliked Inderal.    Abdominal & CXR, barium swallow normal in 11/2018. Underwent esophageal dilation 02/2020. Diagnosed & treated for Sjogren's by Dr. Gerry Mackey. Neurologist is Dr. Grecia Workman. Normal thyroid biopsy in 2017. Patient Active Problem List    Diagnosis Date Noted    Overactive bladder 12/18/2020    Sjogren's syndrome with keratoconjunctivitis sicca ([facility] [address] 08/07/2018    Gastroparesis 08/07/2018    Irritable bowel syndrome with constipation 08/07/2018    Chronic constipation 09/01/2017    GERD (gastroesophageal reflux disease) 09/01/2017    POTS (postural orthostatic tachycardia syndrome) 01/20/2017    Dyspareunia 12/16/2015    Fibromyositis 12/16/2015    Abdominal adhesions 05/04/2012     Current Outpatient Medications   Medication Sig Dispense Refill    famotidine (PEPCID) 20 mg tablet Take 1 Tablet by mouth nightly.  90 Tablet 3    fluticasone propionate (FLONASE) 50 mcg/actuation nasal spray 2 Sprays by Both Nostrils route daily as needed for Rhinitis. 1 Each 6    atenoloL (TENORMIN) 25 mg tablet Take 1 Tablet by mouth daily. 90 Tablet 0    cycloSPORINE (RESTASIS) 0.05 % dpet INSTILL 1 DROP IN BOTH EYES TWICE DAILY 30 Each 3    cevimeline (EVOXAC) 30 mg capsule Take 1 Capsule by mouth three (3) times daily for 360 days. 30 Capsule 11    albuterol (PROVENTIL HFA, VENTOLIN HFA, PROAIR HFA) 90 mcg/actuation inhaler       cetirizine (ZYRTEC) 10 mg tablet Take 1 Tablet by mouth daily. 90 Tablet 3    vit B complex no.12/niacin,B3, (VITAMIN B COMPLEX NO.12-NIACIN PO) Take 1 Tab by mouth daily. omeprazole (PRILOSEC) 40 mg capsule Take 40 mg by mouth daily as needed. multivitamin (ONE A DAY) tablet Take 1 Tab by mouth daily. LINZESS 290 mcg cap capsule Take 290 mcg by mouth as needed. 0    baclofen (LIORESAL) 10 mg tablet  (Patient not taking: Reported on 9/19/2022)      pantoprazole (PROTONIX) 40 mg tablet  (Patient not taking: Reported on 9/19/2022)      sucralfate (CARAFATE) 1 gram tablet  (Patient not taking: Reported on 9/19/2022)      famotidine (PEPCID) 40 mg tablet  (Patient not taking: Reported on 9/19/2022)      methocarbamoL (ROBAXIN) 500 mg tablet Take 1 Tablet by mouth three (3) times daily. (Patient not taking: No sig reported) 90 Tablet 0     Allergies   Allergen Reactions    Celexa [Citalopram] Nausea and Vomiting    Ciprofloxacin Shortness of Breath    Diflucan [Fluconazole] Rash     Burning sensation to skin    Macrobid [Nitrofurantoin Monohyd/M-Cryst] Other (comments)    Sulfa (Sulfonamide Antibiotics) Rash     Past Medical History:   Diagnosis Date    Anemia     taking iron    Family history of skin cancer     sister has melanoma,     Gastroparesis     GERD (gastroesophageal reflux disease)     gastroparesis--h-pylori    H. pylori infection     H/O Clostridium difficile infection 47/5992    Helicobacter pylori (H. pylori) 05/2012    h pylori    Palpitations     2010  - cardiac workup per pt.      P[facility] (postural orthostatic tachycardia syndrome)     [facility] increased fluid and NA intake, betablocker    Sjogren's syndrome (HCC)     Sun-damaged skin     20's    Sunburn, blistering     8-9     Tanning bed exposure     11 years ago      Past Surgical History:   Procedure Laterality Date    HX COLONOSCOPY      HX DILATION AND CURETTAGE  2012    HX ENDOSCOPY      HX GYN      tubal reversal laparoscopic    HX TUBAL LIGATION      IR DILATE ESOPH STRICT      TILT TABLE EVAL W/WO DRUG  2017          Family History   Problem Relation Age of Onset    Heart Disease Mother     Cancer Mother         Thyroid    Thyroid Disease Mother     Heart Disease Father     High Cholesterol Father     Heart Disease Maternal Grandmother     Diabetes Maternal Grandmother     Other Maternal Grandmother         Heomochromatosis    Thyroid Disease Sister     No Known Problems Brother     Stroke Maternal Grandfather     Diabetes Maternal Grandfather     Hypertension Paternal Grandmother     Heart Disease Paternal Grandfather     Heart Attack Paternal Grandfather      Social History     Tobacco Use    Smoking status: Former     Types: Cigarettes     Quit date: 2013     Years since quittin.8    Smokeless tobacco: Never   Substance Use Topics    Alcohol use: Yes     Alcohol/week: 0.0 standard drinks     Comment: not monthly        Review of Systems:   Constitutional: Negative for fever, chills, weight loss. + fatigue  HEENT: Negative for nosebleeds, vision changes. Respiratory: Negative for cough, hemoptysis, sputum production, and wheezing. Cardiovascular: Negative for chest pain, + occasional palpitations, no orthopnea, claudication, leg swelling, syncope, and PND. + intermittent SOB  Gastrointestinal: Negative for nausea, vomiting, diarrhea, constipation, blood in stool and melena. + gastroparesis. Occasional dysphagia. Genitourinary: Negative for dysuria, and hematuria.    Musculoskeletal: Negative for myalgias, + arthralgia. Skin: occasional rash. Heme: Does not bleed or bruise easily. Neurological: Negative for speech change and focal weakness. Objective:     Visit Vitals  /70 (BP 1 Location: Left arm, BP Patient Position: Standing)   Pulse 94   Resp 13   Ht 5' 4\" (1.626 m)   Wt 162 lb (73.5 kg)   SpO2 99%   BMI 27.81 kg/m²         Physical Exam:   Constitutional: Well-developed and well-nourished. No respiratory distress. Head: Normocephalic and atraumatic. Eyes: Pupils are equal, round. ENT: Hearing grossly normal.  Neck: Supple. No JVD present. Cardiovascular: Normal rate, regular rhythm. Exam reveals no gallop and no friction rub. No murmur heard. Pulmonary/Chest: Effort normal and breath sounds normal. No wheezes. Abdominal: Soft, no tenderness. Musculoskeletal: Moves extremities independently. Normal gait. Vasc/lymphatic: No edema. Neurological: Alert, oriented. Skin: Skin is warm and dry. Psychiatric: Normal mood and affect. Behavior is normal. Judgment and thought content normal.      Assessment/Plan:     Imaging/Studies:  Holter (06/2022): HR  bpm, avg 73 bpm.  Terry 21.1%, tachy 7.7%. Echo (06/22/2021): LVEF 65%, no RWMA. Trace TR. Nuclear stress (01/10/2020): LVEF 78%. Normal myocardial perfusion imaging. Stress echo (09/08/2016): Normal LV function, RWMA. ICD-10-CM ICD-9-CM    1. POTS (postural orthostatic tachycardia syndrome)  I49.8 427.89 ECHO STRESS      2. Shortness of breath  R06.02 786.05 ECHO STRESS           POTS: Usually controlled with atenolol & compression stockings. Loop monitor showed sinus terry to sinus tach. Continue atenolol, can use additional 25 mg po prn increased palpitations. SOB: Normal echo in 06/2021, normal nuclear stress test in 01/2020. Evaluated at [facility], had unrevealing evaluation. Chest CT essentially normal in 12/2019. Has tried many inhalers without significant improvement.   Nuclear stress test in 01/2020 was normal, but she states SOB has worsened recently. Will check stress echo. Follow up with Dr. Elias Thomas in 6 months. Addendum  Stress echo 11/2022: normal LVEF and no ischemia  Artifacts and not PVC with exercise  Continue to follow up for now    Future Appointments   Date Time Provider Addis Velazquez   11/7/2022 10:00 AM KENNY ZULETA BS AMB   11/7/2022 10:00 AM VASCULAR, KENNY NOONAN BS AMB   11/29/2022 10:20 AM Nik Puente DO NEUSM BS AMB   3/7/2023  9:20 AM MD SHAISTA Mccoy BS AMB     Thank you for involving me in this patient's care and please call with further concerns or questions.     SOLITARIO Gastelum  [facility] [place]  09/19/22

## 2022-09-12 NOTE — TELEPHONE ENCOUNTER
From: Alyssa Henderson  To: Romero Nascimento MD  Sent: 9/10/2022 10:43 AM EDT  Subject: Hello    When I was having some issues awhile back I was asked to stop my Atenolol until I did the test. Now my pharmacy I guess had that med on a stop list. So could you please send it in again so I can get my medicine.  Thanks

## 2022-09-14 RX ORDER — FLUTICASONE PROPIONATE 50 MCG
2 SPRAY, SUSPENSION (ML) NASAL
Qty: 1 EACH | Refills: 6 | Status: SHIPPED | OUTPATIENT
Start: 2022-09-14 | End: 2022-09-22 | Stop reason: SDUPTHER

## 2022-09-14 RX ORDER — FAMOTIDINE 20 MG/1
20 TABLET, FILM COATED ORAL
Qty: 90 TABLET | Refills: 3 | Status: SHIPPED | OUTPATIENT
Start: 2022-09-14 | End: 2022-09-22 | Stop reason: SDUPTHER

## 2022-09-14 NOTE — TELEPHONE ENCOUNTER
Refill Request (katherine sent fax)     Requested Prescriptions     Pending Prescriptions Disp Refills    famotidine (PEPCID) 20 mg tablet 90 Tablet 3     Sig: Take 1 Tablet by mouth nightly. fluticasone propionate (FLONASE) 50 mcg/actuation nasal spray 1 Each 6     Si Sprays by Both Nostrils route daily as needed for Rhinitis.        Thank You

## 2022-09-19 ENCOUNTER — OFFICE VISIT (OUTPATIENT)
Dept: CARDIOLOGY CLINIC | Age: 42
End: 2022-09-19
Payer: MEDICAID

## 2022-09-19 VITALS
DIASTOLIC BLOOD PRESSURE: 70 MMHG | HEART RATE: 94 BPM | WEIGHT: 162 LBS | OXYGEN SATURATION: 99 % | HEIGHT: 64 IN | RESPIRATION RATE: 13 BRPM | SYSTOLIC BLOOD PRESSURE: 122 MMHG | BODY MASS INDEX: 27.66 KG/M2

## 2022-09-19 DIAGNOSIS — G90.A POTS (POSTURAL ORTHOSTATIC TACHYCARDIA SYNDROME): Primary | ICD-10-CM

## 2022-09-19 DIAGNOSIS — R06.02 SHORTNESS OF BREATH: ICD-10-CM

## 2022-09-19 PROCEDURE — 99214 OFFICE O/P EST MOD 30 MIN: CPT | Performed by: NURSE PRACTITIONER

## 2022-09-19 NOTE — PATIENT INSTRUCTIONS
You will be scheduled for a Stress Echocardiogram after your appointment today. Please wear comfortable clothing (shorts or pants with a shirt or blouse) and walking/athletic shoes. Do not eat or drink anything, except water, for at least 2 hours prior to your test.    Do not take your scheduled medication, Atenolol, 24 hours prior to your test.        You will need to follow up in clinic with Dr. Mindi Hunt in 6 months.

## 2022-09-22 RX ORDER — FAMOTIDINE 20 MG/1
20 TABLET, FILM COATED ORAL
Qty: 90 TABLET | Refills: 3 | Status: SHIPPED | OUTPATIENT
Start: 2022-09-22

## 2022-09-22 RX ORDER — FLUTICASONE PROPIONATE 50 MCG
2 SPRAY, SUSPENSION (ML) NASAL
Qty: 1 EACH | Refills: 6 | Status: SHIPPED | OUTPATIENT
Start: 2022-09-22

## 2022-09-22 NOTE — TELEPHONE ENCOUNTER
Requested Prescriptions     Pending Prescriptions Disp Refills    fluticasone propionate (FLONASE) 50 mcg/actuation nasal spray 1 Each 6     Si Sprays by Both Nostrils route daily as needed for Rhinitis. famotidine (PEPCID) 20 mg tablet 90 Tablet 3     Sig: Take 1 Tablet by mouth nightly.

## 2022-09-28 ENCOUNTER — NURSE TRIAGE (OUTPATIENT)
Dept: OTHER | Facility: CLINIC | Age: 42
End: 2022-09-28

## 2022-09-28 NOTE — TELEPHONE ENCOUNTER
Received call from Bob Buck at [facility] with [employer]. Subjective: Caller states \"back pain, chronic constipation\"     Current Symptoms: lower right side of back radiating around to the abdomen area that is likely increasing the chronic constipation, nausea, sometimes pain shoots to the right leg    Onset: 2 weeks ago; intermittent    Associated Symptoms: constipation    Pain Severity: 4/10; dull and aching and sometimes shooting and shooting; constant    Temperature: denies    What has been tried: linzest for constipationm, laying down helps    LMP: NA Pregnant: No    Recommended disposition: See in Office Today for further evaluation of right sided lower back pain. Care advice provided, patient verbalizes understanding; denies any other questions or concerns; instructed to call back for any new or worsening symptoms. Patient/Caller agrees with recommended disposition; writer provided warm transfer to   at [facility] for appointment scheduling    Attention Provider: Thank you for allowing me to participate in the care of your patient. The patient was connected to triage in response to information provided to the E[facility]. Please do not respond through this encounter as the response is not directed to a shared pool.         Reason for Disposition   Pain radiates into the thigh or further down the leg, and in both legs    Protocols used: Back Pain-ADULT-[place]

## 2022-10-01 ENCOUNTER — APPOINTMENT (OUTPATIENT)
Dept: CT IMAGING | Age: 42
End: 2022-10-01
Attending: STUDENT IN AN ORGANIZED HEALTH CARE EDUCATION/TRAINING PROGRAM
Payer: MEDICAID

## 2022-10-01 ENCOUNTER — APPOINTMENT (OUTPATIENT)
Dept: ULTRASOUND IMAGING | Age: 42
End: 2022-10-01
Attending: STUDENT IN AN ORGANIZED HEALTH CARE EDUCATION/TRAINING PROGRAM
Payer: MEDICAID

## 2022-10-01 ENCOUNTER — HOSPITAL ENCOUNTER (EMERGENCY)
Age: 42
Discharge: HOME | End: 2022-10-01
Attending: STUDENT IN AN ORGANIZED HEALTH CARE EDUCATION/TRAINING PROGRAM
Payer: MEDICAID

## 2022-10-01 VITALS
TEMPERATURE: 98 F | OXYGEN SATURATION: 99 % | BODY MASS INDEX: 28.38 KG/M2 | HEIGHT: 64 IN | RESPIRATION RATE: 16 BRPM | HEART RATE: 74 BPM | WEIGHT: 166.23 LBS | DIASTOLIC BLOOD PRESSURE: 75 MMHG | SYSTOLIC BLOOD PRESSURE: 129 MMHG

## 2022-10-01 DIAGNOSIS — R10.11 ABDOMINAL PAIN, RIGHT UPPER QUADRANT: Primary | ICD-10-CM

## 2022-10-01 LAB
ALBUMIN SERPL-MCNC: 4 G/DL (ref 3.5–5)
ALBUMIN/GLOB SERPL: 1.1 {RATIO} (ref 1.1–2.2)
ALP SERPL-CCNC: 45 U/L (ref 45–117)
ALT SERPL-CCNC: 24 U/L (ref 12–78)
ANION GAP SERPL CALC-SCNC: 7 MMOL/L (ref 5–15)
APPEARANCE UR: CLEAR
AST SERPL-CCNC: 16 U/L (ref 15–37)
BACTERIA URNS QL MICRO: NEGATIVE /HPF
BASOPHILS # BLD: 0.1 K/UL (ref 0–0.1)
BASOPHILS NFR BLD: 1 % (ref 0–1)
BILIRUB SERPL-MCNC: 0.5 MG/DL (ref 0.2–1)
BILIRUB UR QL: NEGATIVE
BUN SERPL-MCNC: 7 MG/DL (ref 6–20)
BUN/CREAT SERPL: 10 (ref 12–20)
CALCIUM SERPL-MCNC: 9.3 MG/DL (ref 8.5–10.1)
CHLORIDE SERPL-SCNC: 105 MMOL/L (ref 97–108)
CO2 SERPL-SCNC: 26 MMOL/L (ref 21–32)
COLOR UR: NORMAL
CREAT SERPL-MCNC: 0.71 MG/DL (ref 0.55–1.02)
DIFFERENTIAL METHOD BLD: NORMAL
EOSINOPHIL # BLD: 0.1 K/UL (ref 0–0.4)
EOSINOPHIL NFR BLD: 2 % (ref 0–7)
EPITH CASTS URNS QL MICRO: NORMAL /LPF
ERYTHROCYTE [DISTWIDTH] IN BLOOD BY AUTOMATED COUNT: 12.3 % (ref 11.5–14.5)
GLOBULIN SER CALC-MCNC: 3.5 G/DL (ref 2–4)
GLUCOSE SERPL-MCNC: 81 MG/DL (ref 65–100)
GLUCOSE UR STRIP.AUTO-MCNC: NEGATIVE MG/DL
HCG UR QL: NEGATIVE
HCT VFR BLD AUTO: 39 % (ref 35–47)
HGB BLD-MCNC: 13.6 G/DL (ref 11.5–16)
HGB UR QL STRIP: NEGATIVE
IMM GRANULOCYTES # BLD AUTO: 0 K/UL (ref 0–0.04)
IMM GRANULOCYTES NFR BLD AUTO: 0 % (ref 0–0.5)
KETONES UR QL STRIP.AUTO: NEGATIVE MG/DL
LEUKOCYTE ESTERASE UR QL STRIP.AUTO: NEGATIVE
LIPASE SERPL-CCNC: 278 U/L (ref 73–393)
LYMPHOCYTES # BLD: 2.4 K/UL (ref 0.8–3.5)
LYMPHOCYTES NFR BLD: 44 % (ref 12–49)
MCH RBC QN AUTO: 33.2 PG (ref 26–34)
MCHC RBC AUTO-ENTMCNC: 34.9 G/DL (ref 30–36.5)
MCV RBC AUTO: 95.1 FL (ref 80–99)
MONOCYTES # BLD: 0.5 K/UL (ref 0–1)
MONOCYTES NFR BLD: 10 % (ref 5–13)
NEUTS SEG # BLD: 2.3 K/UL (ref 1.8–8)
NEUTS SEG NFR BLD: 43 % (ref 32–75)
NITRITE UR QL STRIP.AUTO: NEGATIVE
NRBC # BLD: 0 K/UL (ref 0–0.01)
NRBC BLD-RTO: 0 PER 100 WBC
PH UR STRIP: 6.5 [PH] (ref 5–8)
PLATELET # BLD AUTO: 309 K/UL (ref 150–400)
PMV BLD AUTO: 9.8 FL (ref 8.9–12.9)
POTASSIUM SERPL-SCNC: 3.5 MMOL/L (ref 3.5–5.1)
PROT SERPL-MCNC: 7.5 G/DL (ref 6.4–8.2)
PROT UR STRIP-MCNC: NEGATIVE MG/DL
RBC # BLD AUTO: 4.1 M/UL (ref 3.8–5.2)
RBC #/AREA URNS HPF: NORMAL /HPF (ref 0–5)
SODIUM SERPL-SCNC: 138 MMOL/L (ref 136–145)
SP GR UR REFRACTOMETRY: <1.005
TROPONIN-HIGH SENSITIVITY: 4 NG/L (ref 0–51)
UA: UC IF INDICATED,UAUC: NORMAL
UROBILINOGEN UR QL STRIP.AUTO: 0.2 EU/DL (ref 0.2–1)
WBC # BLD AUTO: 5.4 K/UL (ref 3.6–11)
WBC URNS QL MICRO: NORMAL /HPF (ref 0–4)

## 2022-10-01 PROCEDURE — 81001 URINALYSIS AUTO W/SCOPE: CPT

## 2022-10-01 PROCEDURE — 80053 COMPREHEN METABOLIC PANEL: CPT

## 2022-10-01 PROCEDURE — 85025 COMPLETE CBC W/AUTO DIFF WBC: CPT

## 2022-10-01 PROCEDURE — 74011250636 HC RX REV CODE- 250/636: Performed by: STUDENT IN AN ORGANIZED HEALTH CARE EDUCATION/TRAINING PROGRAM

## 2022-10-01 PROCEDURE — 76705 ECHO EXAM OF ABDOMEN: CPT

## 2022-10-01 PROCEDURE — 83690 ASSAY OF LIPASE: CPT

## 2022-10-01 PROCEDURE — 74011000636 HC RX REV CODE- 636: Performed by: STUDENT IN AN ORGANIZED HEALTH CARE EDUCATION/TRAINING PROGRAM

## 2022-10-01 PROCEDURE — 81025 URINE PREGNANCY TEST: CPT

## 2022-10-01 PROCEDURE — 84484 ASSAY OF TROPONIN QUANT: CPT

## 2022-10-01 PROCEDURE — 36415 COLL VENOUS BLD VENIPUNCTURE: CPT

## 2022-10-01 PROCEDURE — 99285 EMERGENCY DEPT VISIT HI MDM: CPT

## 2022-10-01 PROCEDURE — 74177 CT ABD & PELVIS W/CONTRAST: CPT

## 2022-10-01 RX ORDER — ONDANSETRON 2 MG/ML
4 INJECTION INTRAMUSCULAR; INTRAVENOUS
Status: DISCONTINUED | OUTPATIENT
Start: 2022-10-01 | End: 2022-10-01

## 2022-10-01 RX ADMIN — IOPAMIDOL 100 ML: 755 INJECTION, SOLUTION INTRAVENOUS at 08:40

## 2022-10-01 RX ADMIN — SODIUM CHLORIDE 1000 ML: 9 INJECTION, SOLUTION INTRAVENOUS at 08:23

## 2022-10-01 NOTE — ED PROVIDER NOTES
EMERGENCY DEPARTMENT HISTORY AND PHYSICAL EXAM      Date: 10/1/2022  Patient Name: Edith Ash    History of Presenting Illness     Chief Complaint   Patient presents with    Abdominal Pain     X 2 weeks, dull cramping, worse after eating, [facility] and set up for outpt US but pain worse last night wanted evaluation today     History Provided By: Patient    HPI: Edith Ash, 38 y.o. female with a past medical history significant for POTS, chronic diarrhea who presents to the ED with cc of right-sided flank and upper quadrant pain. This is been intermittent for the past 1.5 weeks. She describes the pain as intermittently dull, but occasionally sharp. She notices sharp episodes after eating meals. She reports that she has been doing weights recently and was concerned that this was a musculoskeletal pain at first.  [facility] and was ordered an outpatient ultrasound, but she developed recurrence of symptoms last night. Denies any other associated symptoms including nausea, vomiting, fever, chills, or worsening diarrhea. She does have some chronic diarrhea at baseline that she takes an appetite for. Takes famotidine and omperazole at home. There are no associated symptoms. No other exacerbating or ameliorating factors. PCP: Timothy Corley MD    No current facility-administered medications on file prior to encounter. Current Outpatient Medications on File Prior to Encounter   Medication Sig Dispense Refill    fluticasone propionate (FLONASE) 50 mcg/actuation nasal spray 2 Sprays by Both Nostrils route daily as needed for Rhinitis. 1 Each 6    famotidine (PEPCID) 20 mg tablet Take 1 Tablet by mouth nightly. 90 Tablet 3    atenoloL (TENORMIN) 25 mg tablet Take 1 Tablet by mouth daily.  90 Tablet 0    cycloSPORINE (RESTASIS) 0.05 % dpet INSTILL 1 DROP IN BOTH EYES TWICE DAILY 30 Each 3    cevimeline (EVOXAC) 30 mg capsule Take 1 Capsule by mouth three (3) times daily for 360 days. 30 Capsule 11    albuterol (PROVENTIL HFA, VENTOLIN HFA, PROAIR HFA) 90 mcg/actuation inhaler       cetirizine (ZYRTEC) 10 mg tablet Take 1 Tablet by mouth daily. 90 Tablet 3    vit B complex no.12/niacin,B3, (VITAMIN B COMPLEX NO.12-NIACIN PO) Take 1 Tab by mouth daily. omeprazole (PRILOSEC) 40 mg capsule Take 40 mg by mouth daily as needed. multivitamin (ONE A DAY) tablet Take 1 Tab by mouth daily. LINZESS 290 mcg cap capsule Take 290 mcg by mouth as needed. 0       Past History     Past Medical History:  Past Medical History:   Diagnosis Date    Anemia     taking iron    Family history of skin cancer     sister has melanoma,     Gastroparesis     GERD (gastroesophageal reflux disease)     gastroparesis--h-pylori    H. pylori infection     H/O Clostridium difficile infection 97/3072    Helicobacter pylori (H. pylori) 05/2012    h pylori    Palpitations     2010  - cardiac workup per pt. POTS (postural orthostatic tachycardia syndrome)     2/21/20 Dr. Irvine Gilford, [address]  Reports tx increased fluid and NA intake, betablocker    Sjogren's syndrome (HCC)     Sun-damaged skin     20's    Sunburn, blistering     8-9     Tanning bed exposure     11 years ago        Past Surgical History:  Past Surgical History:   Procedure Laterality Date    HX COLONOSCOPY      HX DILATION AND CURETTAGE  05/2012    HX ENDOSCOPY      HX GYN      tubal reversal laparoscopic    HX TUBAL LIGATION      IR DILATE ESOPH STRICT      TILT TABLE EVAL W/WO DRUG  1/21/2017            Family History:  Family History   Problem Relation Age of Onset    Heart Disease Mother     Cancer Mother         Thyroid    Thyroid Disease Mother     Heart Disease Father     High Cholesterol Father     Heart Disease Maternal Grandmother     Diabetes Maternal Grandmother     Other Maternal Grandmother         Heomochromatosis    Thyroid Disease Sister     No Known Problems Brother     Stroke Maternal Grandfather Diabetes Maternal Grandfather     Hypertension Paternal Grandmother     Heart Disease Paternal Grandfather     Heart Attack Paternal Grandfather        Social History:  Social History     Tobacco Use    Smoking status: Former     Types: Cigarettes     Quit date: 2013     Years since quittin.8    Smokeless tobacco: Never   Vaping Use    Vaping Use: Never used   Substance Use Topics    Alcohol use: Yes     Alcohol/week: 0.0 standard drinks     Comment: not monthly    Drug use: No       Allergies: Allergies   Allergen Reactions    Celexa [Citalopram] Nausea and Vomiting    Ciprofloxacin Shortness of Breath    Diflucan [Fluconazole] Rash     Burning sensation to skin    Macrobid [Nitrofurantoin Monohyd/M-Cryst] Other (comments)    Sulfa (Sulfonamide Antibiotics) Rash         Review of Systems   Review of Systems   Constitutional:  Negative for activity change, chills and fever. HENT:  Negative for sore throat. Respiratory:  Negative for shortness of breath. Cardiovascular:  Negative for chest pain. Gastrointestinal:  Positive for abdominal pain. Negative for nausea and vomiting. Genitourinary:  Negative for difficulty urinating, dysuria and hematuria. Musculoskeletal:  Negative for myalgias. Skin:  Negative for color change. Neurological:  Negative for dizziness, weakness and headaches. Psychiatric/Behavioral:  Negative for agitation. Physical Exam   Physical Exam  Constitutional:       Appearance: Normal appearance. HENT:      Head: Normocephalic and atraumatic. Mouth/Throat:      Comments: Dry  Eyes:      Pupils: Pupils are equal, round, and reactive to light. Cardiovascular:      Rate and Rhythm: Normal rate and regular rhythm. Heart sounds: Normal heart sounds. Pulmonary:      Effort: Pulmonary effort is normal.      Breath sounds: Normal breath sounds. Abdominal:      General: Abdomen is flat. Bowel sounds are normal.      Tenderness:  There is no abdominal tenderness. There is no guarding or rebound. Comments: No current abdominal tenderness to deep palpation; negative Rivas sign   Musculoskeletal:         General: Normal range of motion. Cervical back: Normal range of motion and neck supple. Skin:     General: Skin is warm and dry. Capillary Refill: Capillary refill takes less than 2 seconds. Neurological:      General: No focal deficit present. Mental Status: She is alert. Diagnostic Study Results     Labs -     Recent Results (from the past 24 hour(s))   HCG URINE, QL. - POC    Collection Time: 10/01/22  7:37 AM   Result Value Ref Range    Pregnancy test,urine (POC) Negative NEG     CBC WITH AUTOMATED DIFF    Collection Time: 10/01/22  7:38 AM   Result Value Ref Range    WBC 5.4 3.6 - 11.0 K/uL    RBC 4.10 3.80 - 5.20 M/uL    HGB 13.6 11.5 - 16.0 g/dL    HCT 39.0 35.0 - 47.0 %    MCV 95.1 80.0 - 99.0 FL    MCH 33.2 26.0 - 34.0 PG    MCHC 34.9 30.0 - 36.5 g/dL    RDW 12.3 11.5 - 14.5 %    PLATELET 740 986 - 796 K/uL    MPV 9.8 8.9 - 12.9 FL    NRBC 0.0 0  WBC    ABSOLUTE NRBC 0.00 0.00 - 0.01 K/uL    NEUTROPHILS 43 32 - 75 %    LYMPHOCYTES 44 12 - 49 %    MONOCYTES 10 5 - 13 %    EOSINOPHILS 2 0 - 7 %    BASOPHILS 1 0 - 1 %    IMMATURE GRANULOCYTES 0 0.0 - 0.5 %    ABS. NEUTROPHILS 2.3 1.8 - 8.0 K/UL    ABS. LYMPHOCYTES 2.4 0.8 - 3.5 K/UL    ABS. MONOCYTES 0.5 0.0 - 1.0 K/UL    ABS. EOSINOPHILS 0.1 0.0 - 0.4 K/UL    ABS. BASOPHILS 0.1 0.0 - 0.1 K/UL    ABS. IMM.  GRANS. 0.0 0.00 - 0.04 K/UL    DF AUTOMATED     METABOLIC PANEL, COMPREHENSIVE    Collection Time: 10/01/22  7:38 AM   Result Value Ref Range    Sodium 138 136 - 145 mmol/L    Potassium 3.5 3.5 - 5.1 mmol/L    Chloride 105 97 - 108 mmol/L    CO2 26 21 - 32 mmol/L    Anion gap 7 5 - 15 mmol/L    Glucose 81 65 - 100 mg/dL    BUN 7 6 - 20 MG/DL    Creatinine 0.71 0.55 - 1.02 MG/DL    BUN/Creatinine ratio 10 (L) 12 - 20      GFR est AA >60 >60 ml/min/1.73m2    GFR est non-AA >60 >60 ml/min/1.73m2    Calcium 9.3 8.5 - 10.1 MG/DL    Bilirubin, total 0.5 0.2 - 1.0 MG/DL    ALT (SGPT) 24 12 - 78 U/L    AST (SGOT) 16 15 - 37 U/L    Alk. phosphatase 45 45 - 117 U/L    Protein, total 7.5 6.4 - 8.2 g/dL    Albumin 4.0 3.5 - 5.0 g/dL    Globulin 3.5 2.0 - 4.0 g/dL    A-G Ratio 1.1 1.1 - 2.2     LIPASE    Collection Time: 10/01/22  7:38 AM   Result Value Ref Range    Lipase 278 73 - 393 U/L   URINALYSIS W/ REFLEX CULTURE    Collection Time: 10/01/22  7:38 AM    Specimen: Urine   Result Value Ref Range    Color YELLOW/STRAW      Appearance CLEAR CLEAR      Specific gravity <1.005     pH (UA) 6.5 5.0 - 8.0      Protein Negative NEG mg/dL    Glucose Negative NEG mg/dL    Ketone Negative NEG mg/dL    Bilirubin Negative NEG      Blood Negative NEG      Urobilinogen 0.2 0.2 - 1.0 EU/dL    Nitrites Negative NEG      Leukocyte Esterase Negative NEG      WBC 0-4 0 - 4 /hpf    RBC 0-5 0 - 5 /hpf    Epithelial cells FEW FEW /lpf    Bacteria Negative NEG /hpf    UA:UC IF INDICATED CULTURE NOT INDICATED BY UA RESULT CNI         Radiologic Studies -   US ABD LTD   Final Result   Normal right upper quadrant ultrasound. CT ABD PELV W CONT   Final Result   1. No acute intra-abdominal pathology. 2.  Incidental hepatic cysts. 3.  Right adnexal corpus luteum with right adnexal free fluid, likely   physiologic. 4.  Normal appendix. 5.  Normal gallbladder. CT Results  (Last 48 hours)                 10/01/22 0840  CT ABD PELV W CONT Final result    Impression:  1. No acute intra-abdominal pathology. 2.  Incidental hepatic cysts. 3.  Right adnexal corpus luteum with right adnexal free fluid, likely   physiologic. 4.  Normal appendix. 5.  Normal gallbladder. Narrative:  EXAM: CT ABD PELV W CONT       INDICATION: Epigastric pain       COMPARISON: December 2021        CONTRAST: 100 mL of Isovue-370.        ORAL CONTRAST: None       TECHNIQUE:    Following the uneventful intravenous administration of contrast, thin axial   images were obtained through the abdomen and pelvis. Coronal and sagittal   reconstructions were generated. CT dose reduction was achieved through use of a   standardized protocol tailored for this examination and automatic exposure   control for dose modulation. FINDINGS:    LOWER THORAX: No significant abnormality in the incidentally imaged lower chest.   LIVER: Scattered hepatic cysts of varying sizes. BILIARY TREE: Gallbladder is within normal limits. CBD is not dilated. SPLEEN: within normal limits. PANCREAS: No mass or ductal dilatation. ADRENALS: Unremarkable. KIDNEYS: No mass, calculus, or hydronephrosis. STOMACH: Unremarkable. SMALL BOWEL: No dilatation or wall thickening. COLON: No dilatation or wall thickening. APPENDIX: Normal   PERITONEUM: No ascites or pneumoperitoneum. RETROPERITONEUM: No lymphadenopathy or aortic aneurysm. REPRODUCTIVE ORGANS: Right ovarian corpus luteum with trace right adnexal free   fluid, likely physiologic. Uterus is within normal limits. URINARY BLADDER: No mass or calculus. BONES: No destructive bone lesion. ABDOMINAL WALL: No mass or hernia. ADDITIONAL COMMENTS: N/A                 CXR Results  (Last 48 hours)      None            Medical Decision Making   I am the first provider for this patient. I reviewed the vital signs, available nursing notes, past medical history, past surgical history, family history and social history. Vital Signs-Reviewed the patient's vital signs. Patient Vitals for the past 12 hrs:   Temp Pulse Resp BP SpO2   10/01/22 0729 98 °F (36.7 °C) 74 16 129/75 99 %       Records Reviewed: Nursing records and medical records reviewed      Provider Notes (Medical Decision Making):   Patient presents with acute abdominal pain. Pain intermittent, worse after eating. Suspect biliary colic based on description.   Non-peritoneal exam currently, but will evaluate with both RUQ US as well as CT abd/pelvis. Doubt cholecystitis with relatively benign exam, no fever, no leukocytosis. Patient did    ED Course:   Initial assessment performed. The patients presenting problems have been discussed, and they are in agreement with the care plan formulated and outlined with them. I have encouraged them to ask questions as they arise throughout their visit. ED Course as of 10/01/22 0940   Sat Oct 01, 2022   5253 Labs unremarkable. Discussed CT findings of hepatic cysts - chronic and patient knows about them. Also discussed possible corpus luteum cyst - she denies RLQ pain, vaginal bleeding, vaginal discharge. Doubt TOA or torsion. Repeat abdominal exam with no tenderness at all. Stable for dc at this time. Recommended increased PPI to BID for one week and following up with GI. Also discussed customary return precautions. She voiced understanding and agreement with plan. [WB]      ED Course User Index  [WB] Milla Velasquez MD         Critical Care:  None      Disposition:  Home    DISCHARGE PLAN:  1. Current Discharge Medication List        2. Follow-up Information       Follow up With Specialties Details Why Contact Info    Brenna Corley MD Family Medicine Schedule an appointment as soon as possible for a visit   [address] [place] [address] [phone]      [address] DEPT Emergency Medicine  As needed, If symptoms worsen [address]  [address]   [address] [address] [phone]          3. Return to ED if worse     Diagnosis     Clinical Impression:   1. Abdominal pain, right upper quadrant        Attestations:    Azra Ramos MD    Please note that this dictation was completed with [employer], the computer voice recognition software. Quite often unanticipated grammatical, syntax, homophones, and other interpretive errors are inadvertently transcribed by the computer software. Please disregard these errors.   Please excuse any errors that have escaped final proofreading. Thank you.

## 2022-10-01 NOTE — ED NOTES
DC plan reviewed with Dr. Yeison Hugo.    DC papers reviewed and in hand, pt verbalized understanding. Ambulatory out of ER without assist, no acute distress noted.

## 2022-10-01 NOTE — Clinical Note
[facility] EMERGENCY DEPT  [address]  [address] [id number] [phone]    Work/School Note    Date: 10/1/2022    To Whom It May concern:      Laurent Powers was seen and treated today in the emergency room by the following provider(s):  Attending Provider: Camron Bragg MD.      Laurent Powers is excused from work/school on 10/01/22. She is clear to return to work/school on 10/02/22.         Sincerely,          Yang Hunter MD

## 2022-10-01 NOTE — Clinical Note
[facility] EMERGENCY DEPT  [address] [facility] [address]  [phone]    Work/School Note    Date: 10/1/2022    To Whom It May concern:      Taiwo Corrales was seen and treated today in the emergency room by the following provider(s):  Attending Provider: Ronnie Fletcher MD.      Taiwo Corrales is excused from work/school on 10/01/22. She is clear to return to work/school on 10/02/22.         Sincerely,          Jodi Galeano MD

## 2022-10-03 ENCOUNTER — TRANSCRIBE ORDER (OUTPATIENT)
Dept: SCHEDULING | Age: 42
End: 2022-10-03

## 2022-10-03 DIAGNOSIS — R19.01 RUQ ABDOMINAL MASS: Primary | ICD-10-CM

## 2022-10-03 DIAGNOSIS — R10.11 RUQ ABDOMINAL PAIN: ICD-10-CM

## 2022-10-10 ENCOUNTER — E-VISIT (OUTPATIENT)
Dept: FAMILY MEDICINE CLINIC | Age: 42
End: 2022-10-10
Payer: MEDICAID

## 2022-10-10 DIAGNOSIS — R09.81 SINUS CONGESTION: Primary | ICD-10-CM

## 2022-10-10 PROCEDURE — 99422 OL DIG E/M SVC 11-20 MIN: CPT | Performed by: FAMILY MEDICINE

## 2022-10-10 RX ORDER — AMOXICILLIN 875 MG/1
875 TABLET, FILM COATED ORAL 2 TIMES DAILY
Qty: 20 TABLET | Refills: 0 | Status: SHIPPED | OUTPATIENT
Start: 2022-10-10 | End: 2022-10-20

## 2022-10-10 NOTE — PROGRESS NOTES
Jeannette Powers (1980) initiated an asynchronous digital communication through [address] [place] [address]. HPI: per patient questionnaire     Exam: not applicable    Diagnoses and all orders for this visit:  Diagnoses and all orders for this visit:    1. Sinus congestion  -     amoxicillin (AMOXIL) 875 mg tablet; Take 1 Tablet by mouth two (2) times a day for 10 days. Time: EV2 - 11-20 minutes were spent on the digital evaluation and management of this patient.        Karlos Brooks MD

## 2022-10-21 DIAGNOSIS — M35.01 SJOGREN'S SYNDROME WITH KERATOCONJUNCTIVITIS SICCA (HCC): ICD-10-CM

## 2022-10-21 NOTE — TELEPHONE ENCOUNTER
Received PA Renewal request for Cyclosporine 0.05%    Last visit was 7/26/22  Labs done 10/1/22  Annual visits - no appointment scheduled yet.

## 2022-10-24 DIAGNOSIS — M35.01 SJOGREN'S SYNDROME WITH KERATOCONJUNCTIVITIS SICCA (HCC): ICD-10-CM

## 2022-10-24 RX ORDER — CYCLOSPORINE 0.5 MG/ML
EMULSION OPHTHALMIC
Qty: 30 EACH | Refills: 3 | Status: SHIPPED | OUTPATIENT
Start: 2022-10-24

## 2022-10-24 RX ORDER — CYCLOSPORINE 0.5 MG/ML
1 EMULSION OPHTHALMIC EVERY 12 HOURS
Qty: 30 EACH | Refills: 3 | Status: SHIPPED | OUTPATIENT
Start: 2022-10-24

## 2022-10-27 ENCOUNTER — E-VISIT (OUTPATIENT)
Dept: FAMILY MEDICINE CLINIC | Age: 42
End: 2022-10-27
Payer: MEDICAID

## 2022-10-27 DIAGNOSIS — J01.00 SUBACUTE MAXILLARY SINUSITIS: Primary | ICD-10-CM

## 2022-10-27 PROCEDURE — 99422 OL DIG E/M SVC 11-20 MIN: CPT | Performed by: FAMILY MEDICINE

## 2022-10-27 RX ORDER — AMOXICILLIN AND CLAVULANATE POTASSIUM 875; 125 MG/1; MG/1
1 TABLET, FILM COATED ORAL EVERY 12 HOURS
Qty: 20 TABLET | Refills: 0 | Status: SHIPPED | OUTPATIENT
Start: 2022-10-27 | End: 2022-11-06

## 2022-10-27 NOTE — PROGRESS NOTES
Andre Holland (1980) initiated an asynchronous digital communication through [address] [place] [address]. HPI: per patient questionnaire     Exam: not applicable    Diagnoses and all orders for this visit:  Diagnoses and all orders for this visit:    1. Subacute maxillary sinusitis    Other orders  -     amoxicillin-clavulanate (AUGMENTIN) 875-125 mg per tablet; Take 1 Tablet by mouth every twelve (12) hours for 10 days. Time: EV2 - 11-20 minutes were spent on the digital evaluation and management of this patient.        Ursula Krabbe, MD

## 2022-11-07 ENCOUNTER — ANCILLARY PROCEDURE (OUTPATIENT)
Dept: CARDIOLOGY CLINIC | Age: 42
End: 2022-11-07
Payer: MEDICAID

## 2022-11-07 ENCOUNTER — APPOINTMENT (OUTPATIENT)
Dept: CARDIOLOGY CLINIC | Age: 42
End: 2022-11-07

## 2022-11-07 VITALS
WEIGHT: 166 LBS | HEIGHT: 64 IN | BODY MASS INDEX: 28.34 KG/M2 | SYSTOLIC BLOOD PRESSURE: 104 MMHG | DIASTOLIC BLOOD PRESSURE: 60 MMHG

## 2022-11-07 DIAGNOSIS — G90.A POTS (POSTURAL ORTHOSTATIC TACHYCARDIA SYNDROME): ICD-10-CM

## 2022-11-07 DIAGNOSIS — R06.02 SHORTNESS OF BREATH: ICD-10-CM

## 2022-11-07 PROCEDURE — 93351 STRESS TTE COMPLETE: CPT | Performed by: INTERNAL MEDICINE

## 2022-11-08 ENCOUNTER — TELEPHONE (OUTPATIENT)
Dept: CARDIOLOGY CLINIC | Age: 42
End: 2022-11-08

## 2022-11-08 LAB
STRESS ANGINA INDEX: 0
STRESS BASELINE DIAS BP: 60 MMHG
STRESS BASELINE HR: 96 BPM
STRESS BASELINE ST DEPRESSION: 0 MM
STRESS BASELINE SYS BP: 104 MMHG
STRESS ESTIMATED WORKLOAD: 11.8 METS
STRESS EXERCISE DUR MIN: 9 MIN
STRESS EXERCISE DUR SEC: 32 SEC
STRESS O2 SAT PEAK: 96 %
STRESS O2 SAT REST: 98 %
STRESS PEAK DIAS BP: 74 MMHG
STRESS PEAK SYS BP: 158 MMHG
STRESS PERCENT HR ACHIEVED: 91 %
STRESS POST PEAK HR: 164 BPM
STRESS RATE PRESSURE PRODUCT: NORMAL BPM*MMHG
STRESS SR DUKE TREADMILL SCORE: 10
STRESS ST DEPRESSION: 0 MM
STRESS TARGET HR: 180 BPM

## 2022-11-08 NOTE — TELEPHONE ENCOUNTER
----- Message from Maura Zhao MD sent at 11/8/2022  2:04 PM EST -----  Stress echo 11/2022: normal LVEF and no ischemia  Artifacts and not PVC with exercise  Continue to follow up for now

## 2022-11-10 NOTE — PROGRESS NOTES
Stress echo with normal LVEF, no significant wall motion abnormalities. Good exercise tolerance. No change in treatment plan based on results.

## 2022-11-14 ENCOUNTER — HOSPITAL ENCOUNTER (EMERGENCY)
Age: 42
Discharge: HOME | End: 2022-11-14
Attending: EMERGENCY MEDICINE
Payer: MEDICAID

## 2022-11-14 VITALS
HEART RATE: 87 BPM | WEIGHT: 165.57 LBS | TEMPERATURE: 97.6 F | BODY MASS INDEX: 28.27 KG/M2 | OXYGEN SATURATION: 98 % | SYSTOLIC BLOOD PRESSURE: 133 MMHG | HEIGHT: 64 IN | DIASTOLIC BLOOD PRESSURE: 69 MMHG | RESPIRATION RATE: 16 BRPM

## 2022-11-14 DIAGNOSIS — R11.0 NAUSEA WITHOUT VOMITING: Primary | ICD-10-CM

## 2022-11-14 LAB
APPEARANCE UR: CLEAR
BILIRUB UR QL: NEGATIVE
COLOR UR: ABNORMAL
GLUCOSE UR STRIP.AUTO-MCNC: NEGATIVE MG/DL
HCG UR QL: NEGATIVE
HGB UR QL STRIP: NEGATIVE
KETONES UR QL STRIP.AUTO: ABNORMAL MG/DL
LEUKOCYTE ESTERASE UR QL STRIP.AUTO: NEGATIVE
NITRITE UR QL STRIP.AUTO: NEGATIVE
PH UR STRIP: 6 [PH] (ref 5–8)
PROT UR STRIP-MCNC: NEGATIVE MG/DL
SP GR UR REFRACTOMETRY: 1.01
UROBILINOGEN UR QL STRIP.AUTO: 1 EU/DL (ref 0.2–1)

## 2022-11-14 PROCEDURE — 81003 URINALYSIS AUTO W/O SCOPE: CPT

## 2022-11-14 PROCEDURE — 99283 EMERGENCY DEPT VISIT LOW MDM: CPT

## 2022-11-14 PROCEDURE — 81025 URINE PREGNANCY TEST: CPT

## 2022-11-14 RX ORDER — ONDANSETRON 4 MG/1
4 TABLET, ORALLY DISINTEGRATING ORAL
Qty: 20 TABLET | Refills: 0 | Status: SHIPPED | OUTPATIENT
Start: 2022-11-14

## 2022-11-14 NOTE — LETTER
[address]  [facility] EMERGENCY DEPT  [address] [place] [address]  [facility] [address]  [phone]    Work/School Note    Date: 11/14/2022    To Whom It May concern:    Katie Williamson was seen and treated today in the emergency room by the following provider(s):  Attending Provider: Medina Evans MD  Physician Assistant: CORINNE Mcbride. Katie Williamson may return to work on [id number].     Sincerely,          CORINNE Sharp

## 2022-11-14 NOTE — ED PROVIDER NOTES
EMERGENCY DEPARTMENT HISTORY AND PHYSICAL EXAM      Please note that this dictation was completed with [employer], the computer voice recognition software. Quite often unanticipated grammatical, syntax, homophones, and other interpretive errors are inadvertently transcribed by the computer software. Please disregard these errors. Please excuse any errors that have escaped final proofreading. Date: 11/14/2022  Patient Name: Deyanira Zamora    History of Presenting Illness     Chief Complaint   Patient presents with    Nausea     Nausea and weak since Sunday; had a tooth extraction Saturday put on Amoxicillin and MOtrin ; not having any pain but not feeling well. Not having diarrhea       History Provided By: Patient    HPI: Deyanira Zamora, 38 y.o. female with history of gastroparesis, POTS, GERD and others as listed below presents ambulatory with her son to the ED with cc of nausea that began yesterday around 10AM.  Patient reports having a tooth extraction this Saturday, [id number], and was prescribed Amoxicillin. Patient stated that about one week prior to surgery, she had a sinus infection and was placed on Augmentin. She reports that she was on antibiotics prior to the sinus infection as well; she is wondering if this is why she is nauseous. She has been able to eat but is restricted to a soft foods diet, she has been trying to eat yogurt and add probiotics to her diet. Reports having Zofran at home and took some last night with some relief. Denies fever, chills, vomiting, diarrhea, abdominal pain, difficulty swallowing, facial pain, skin rash, or other symptoms at this time. There are no other complaints, changes, or physical findings at this time. PCP: India Corley MD    Current Outpatient Medications   Medication Sig Dispense Refill    ondansetron (ZOFRAN ODT) 4 mg disintegrating tablet Take 1 Tablet by mouth every eight (8) hours as needed for Nausea or Vomiting.  20 Tablet 0 cycloSPORINE (RESTASIS) 0.05 % dpet INSTILL 1 DROP IN BOTH EYES TWICE DAILY 30 Each 3    cycloSPORINE (RESTASIS) 0.05 % dpet INSTILL 1 DROP IN BOTH EYES TWICE DAILY 30 Each 3    cycloSPORINE (RESTASIS) 0.05 % dpet Administer 1 Drop to both eyes every twelve (12) hours. 30 Each 3    fluticasone propionate (FLONASE) 50 mcg/actuation nasal spray 2 Sprays by Both Nostrils route daily as needed for Rhinitis. 1 Each 6    famotidine (PEPCID) 20 mg tablet Take 1 Tablet by mouth nightly. 90 Tablet 3    atenoloL (TENORMIN) 25 mg tablet Take 1 Tablet by mouth daily. 90 Tablet 0    cevimeline (EVOXAC) 30 mg capsule Take 1 Capsule by mouth three (3) times daily for 360 days. 30 Capsule 11    albuterol (PROVENTIL HFA, VENTOLIN HFA, PROAIR HFA) 90 mcg/actuation inhaler       cetirizine (ZYRTEC) 10 mg tablet Take 1 Tablet by mouth daily. 90 Tablet 3    vit B complex no.12/niacin,B3, (VITAMIN B COMPLEX NO.12-NIACIN PO) Take 1 Tab by mouth daily. omeprazole (PRILOSEC) 40 mg capsule Take 40 mg by mouth daily as needed. multivitamin (ONE A DAY) tablet Take 1 Tab by mouth daily. LINZESS 290 mcg cap capsule Take 290 mcg by mouth as needed. 0     Past History     Past Medical History:  Past Medical History:   Diagnosis Date    Anemia     taking iron    Family history of skin cancer     sister has melanoma,     Gastroparesis     GERD (gastroesophageal reflux disease)     gastroparesis--h-pylori    H. pylori infection     H/O Clostridium difficile infection 22/7181    Helicobacter pylori (H. pylori) 05/2012    h pylori    Palpitations     2010  - cardiac workup per pt. POTS (postural orthostatic tachycardia syndrome)     2/21/20 Dia Roach 22.  Reports tx increased fluid and NA intake, betablocker    Sjogren's syndrome (HCC)     Sun-damaged skin     20's    Sunburn, blistering     8-9     Tanning bed exposure     11 years ago        Past Surgical History:  Past Surgical History:   Procedure Laterality Date    HX COLONOSCOPY      HX DILATION AND CURETTAGE  2012    HX ENDOSCOPY      HX GYN      tubal reversal laparoscopic    HX TUBAL LIGATION      IR DILATE ESOPH STRICT      TILT TABLE EVAL W/WO DRUG  2017            Family History:  Family History   Problem Relation Age of Onset    Heart Disease Mother     Cancer Mother         Thyroid    Thyroid Disease Mother     Heart Disease Father     High Cholesterol Father     Heart Disease Maternal Grandmother     Diabetes Maternal Grandmother     Other Maternal Grandmother         Heomochromatosis    Thyroid Disease Sister     No Known Problems Brother     Stroke Maternal Grandfather     Diabetes Maternal Grandfather     Hypertension Paternal Grandmother     Heart Disease Paternal Grandfather     Heart Attack Paternal Grandfather        Social History:  Social History     Tobacco Use    Smoking status: Former     Types: Cigarettes     Quit date: 2013     Years since quittin.9    Smokeless tobacco: Never   Vaping Use    Vaping Use: Never used   Substance Use Topics    Alcohol use: Yes     Alcohol/week: 0.0 standard drinks     Comment: not monthly    Drug use: No       Allergies: Allergies   Allergen Reactions    Celexa [Citalopram] Nausea and Vomiting    Ciprofloxacin Shortness of Breath    Diflucan [Fluconazole] Rash     Burning sensation to skin    Macrobid [Nitrofurantoin Monohyd/M-Cryst] Other (comments)    Sulfa (Sulfonamide Antibiotics) Rash     Review of Systems   Review of Systems   Constitutional:  Positive for fatigue. Negative for fever. HENT:  Negative for sore throat. Eyes:  Negative for pain. Respiratory:  Negative for shortness of breath. Cardiovascular:  Negative for chest pain. Gastrointestinal:  Positive for nausea. Negative for abdominal pain and vomiting. Genitourinary:  Negative for flank pain. Musculoskeletal:  Negative for back pain. Skin:  Negative for rash. Neurological:  Negative for headaches.    Physical Exam Physical Exam  Vitals and nursing note reviewed. Constitutional:       General: She is not in acute distress. Appearance: She is well-developed. She is not toxic-appearing. HENT:      Head: Normocephalic and atraumatic. No right periorbital erythema or left periorbital erythema. Right Ear: External ear normal.      Left Ear: External ear normal.      Nose: Nose normal.      Mouth/Throat:      Mouth: Mucous membranes are moist.        Comments:   No facial swelling  No trismus  Left lower first molar is absent. Stitches are seen. There is no redness or swelling  Eyes:      General: No scleral icterus. Right eye: No discharge. Left eye: No discharge. Conjunctiva/sclera: Conjunctivae normal.      Pupils: Pupils are equal, round, and reactive to light. Cardiovascular:      Rate and Rhythm: Normal rate. Pulmonary:      Effort: Pulmonary effort is normal. No respiratory distress. Abdominal:      General: Abdomen is flat. Bowel sounds are normal.      Palpations: Abdomen is soft. Tenderness: There is no abdominal tenderness. There is no guarding or rebound. Negative signs include Rivas's sign, Rovsing's sign and McBurney's sign. Musculoskeletal:         General: Normal range of motion. Cervical back: Normal range of motion. Skin:     Findings: No rash. Neurological:      Mental Status: She is alert and oriented to person, place, and time. Cranial Nerves: No cranial nerve deficit. Sensory: No sensory deficit.    Psychiatric:         Speech: Speech normal.     Diagnostic Study Results     Labs -     Recent Results (from the past 12 hour(s))   URINALYSIS W/ RFLX MICROSCOPIC    Collection Time: 11/14/22  5:11 PM   Result Value Ref Range    Color YELLOW/STRAW      Appearance CLEAR CLEAR      Specific gravity 1.012      pH (UA) 6.0 5.0 - 8.0      Protein Negative NEG mg/dL    Glucose Negative NEG mg/dL    Ketone TRACE (A) NEG mg/dL    Bilirubin Negative NEG Blood Negative NEG      Urobilinogen 1.0 0.2 - 1.0 EU/dL    Nitrites Negative NEG      Leukocyte Esterase Negative NEG     HCG URINE, QL. - POC    Collection Time: 11/14/22  5:13 PM   Result Value Ref Range    Pregnancy test,urine (POC) Negative NEG         Radiologic Studies -   No orders to display     CT Results  (Last 48 hours)      None          CXR Results  (Last 48 hours)      None          Medical Decision Making   I am the first provider for this patient. I reviewed the vital signs, available nursing notes, past medical history, past surgical history, family history and social history. Vital Signs-Reviewed the patient's vital signs. Patient Vitals for the past 12 hrs:   Temp Pulse Resp BP SpO2   11/14/22 1638 97.6 °F (36.4 °C) 87 16 133/69 98 %       Pulse Oximetry Analysis - 98% on RA    Records Reviewed: Nursing Notes, Old Medical Records, and Previous Laboratory Studies    Provider Notes (Medical Decision Making):     Patient afebrile in NAD. Abdomen is soft and non-tender on exam.  Patient denies vomiting or abdominal pain. Mucous membranes are moist and she appears well-hydrated. Urinalysis does not suggest UTI. Additional testing deferred. Will offer Zofran and a note for work. Her face is not swollen and there is no dry socket and the wound appears to be healing well. She is instructed to reach out to the dentist to see if the antibiotics are still required. ED Course:   Initial assessment performed. The patients presenting problems have been discussed, and they are in agreement with the care plan formulated and outlined with them. I have encouraged them to ask questions as they arise throughout their visit. Disposition:  Discharge. PLAN:  1.    Discharge Medication List as of 11/14/2022  6:43 PM        START taking these medications    Details   ondansetron (ZOFRAN ODT) 4 mg disintegrating tablet Take 1 Tablet by mouth every eight (8) hours as needed for Nausea or Vomiting., Normal, Disp-20 Tablet, R-0           CONTINUE these medications which have NOT CHANGED    Details   !! cycloSPORINE (RESTASIS) 0.05 % dpet INSTILL 1 DROP IN BOTH EYES TWICE DAILY, Normal, Disp-30 Each, R-3      !! cycloSPORINE (RESTASIS) 0.05 % dpet INSTILL 1 DROP IN BOTH EYES TWICE DAILY, Normal, Disp-30 Each, R-3      !! cycloSPORINE (RESTASIS) 0.05 % dpet Administer 1 Drop to both eyes every twelve (12) hours. , Normal, Disp-30 Each, R-3      fluticasone propionate (FLONASE) 50 mcg/actuation nasal spray 2 Sprays by Both Nostrils route daily as needed for Rhinitis., Normal, Disp-1 Each, R-6      famotidine (PEPCID) 20 mg tablet Take 1 Tablet by mouth nightly., Normal, Disp-90 Tablet, R-3      atenoloL (TENORMIN) 25 mg tablet Take 1 Tablet by mouth daily. , Normal, Disp-90 Tablet, R-0      cevimeline (EVOXAC) 30 mg capsule Take 1 Capsule by mouth three (3) times daily for 360 days. , Normal, Disp-30 Capsule, R-11      albuterol (PROVENTIL HFA, VENTOLIN HFA, PROAIR HFA) 90 mcg/actuation inhaler Historical Med      cetirizine (ZYRTEC) 10 mg tablet Take 1 Tablet by mouth daily. , Normal, Disp-90 Tablet, R-3      vit B complex no.12/niacin,B3, (VITAMIN B COMPLEX NO.12-NIACIN PO) Take 1 Tab by mouth daily. , Historical Med      omeprazole (PRILOSEC) 40 mg capsule Take 40 mg by mouth daily as needed., Historical Med      multivitamin (ONE A DAY) tablet Take 1 Tab by mouth daily. , Historical Med      LINZESS 290 mcg cap capsule Take 290 mcg by mouth as needed., Historical Med, R-0       !! - Potential duplicate medications found. Please discuss with provider. 2.   Follow-up Information       Follow up With Specialties Details Why Contact Info    Rojelio Corley MD Family Medicine Call  PRIMARY CARE: as needed regarding your ER visit [address] [place] [address] Dr [address] [phone]            Return to ED if worse     Diagnosis     Clinical Impression:   1.  Nausea without vomiting Attestations: This note is prepared in part by GUILLE KINSEY, during her clinical rotation. The Physician Assistant student's documentation has been prepared under my direction and personally reviewed by me in its entirety. I confirm that the note above accurately reflects all work, treatment, procedures, and medical decision making performed by me.     CORINNE Hussein

## 2022-11-17 ENCOUNTER — HOSPITAL ENCOUNTER (EMERGENCY)
Age: 42
Discharge: HOME | End: 2022-11-17
Attending: EMERGENCY MEDICINE
Payer: MEDICAID

## 2022-11-17 ENCOUNTER — TELEPHONE (OUTPATIENT)
Dept: FAMILY MEDICINE CLINIC | Age: 42
End: 2022-11-17

## 2022-11-17 VITALS
HEIGHT: 64 IN | BODY MASS INDEX: 28 KG/M2 | OXYGEN SATURATION: 100 % | TEMPERATURE: 98.1 F | DIASTOLIC BLOOD PRESSURE: 88 MMHG | SYSTOLIC BLOOD PRESSURE: 118 MMHG | RESPIRATION RATE: 16 BRPM | WEIGHT: 164.02 LBS | HEART RATE: 85 BPM

## 2022-11-17 DIAGNOSIS — I95.1 ORTHOSTATIC HYPOTENSION: Primary | ICD-10-CM

## 2022-11-17 LAB
ALBUMIN SERPL-MCNC: 3.9 G/DL (ref 3.5–5)
ALBUMIN/GLOB SERPL: 1.1 {RATIO} (ref 1.1–2.2)
ALP SERPL-CCNC: 46 U/L (ref 45–117)
ALT SERPL-CCNC: 20 U/L (ref 12–78)
ANION GAP SERPL CALC-SCNC: 4 MMOL/L (ref 5–15)
APPEARANCE UR: CLEAR
AST SERPL-CCNC: 18 U/L (ref 15–37)
BACTERIA URNS QL MICRO: NEGATIVE /HPF
BASOPHILS # BLD: 0.1 K/UL (ref 0–0.1)
BASOPHILS NFR BLD: 1 % (ref 0–1)
BILIRUB SERPL-MCNC: 0.4 MG/DL (ref 0.2–1)
BILIRUB UR QL: NEGATIVE
BUN SERPL-MCNC: 6 MG/DL (ref 6–20)
BUN/CREAT SERPL: 9 (ref 12–20)
CALCIUM SERPL-MCNC: 9.4 MG/DL (ref 8.5–10.1)
CHLORIDE SERPL-SCNC: 105 MMOL/L (ref 97–108)
CO2 SERPL-SCNC: 29 MMOL/L (ref 21–32)
COLOR UR: NORMAL
CREAT SERPL-MCNC: 0.7 MG/DL (ref 0.55–1.02)
DIFFERENTIAL METHOD BLD: NORMAL
EOSINOPHIL # BLD: 0 K/UL (ref 0–0.4)
EOSINOPHIL NFR BLD: 0 % (ref 0–7)
EPITH CASTS URNS QL MICRO: NORMAL /LPF
ERYTHROCYTE [DISTWIDTH] IN BLOOD BY AUTOMATED COUNT: 12.1 % (ref 11.5–14.5)
GLOBULIN SER CALC-MCNC: 3.4 G/DL (ref 2–4)
GLUCOSE SERPL-MCNC: 101 MG/DL (ref 65–100)
GLUCOSE UR STRIP.AUTO-MCNC: NEGATIVE MG/DL
HCT VFR BLD AUTO: 38.8 % (ref 35–47)
HGB BLD-MCNC: 13.4 G/DL (ref 11.5–16)
HGB UR QL STRIP: NEGATIVE
HYALINE CASTS URNS QL MICRO: NORMAL /LPF (ref 0–5)
IMM GRANULOCYTES # BLD AUTO: 0 K/UL (ref 0–0.04)
IMM GRANULOCYTES NFR BLD AUTO: 0 % (ref 0–0.5)
KETONES UR QL STRIP.AUTO: NEGATIVE MG/DL
LEUKOCYTE ESTERASE UR QL STRIP.AUTO: NEGATIVE
LYMPHOCYTES # BLD: 1.8 K/UL (ref 0.8–3.5)
LYMPHOCYTES NFR BLD: 26 % (ref 12–49)
MCH RBC QN AUTO: 32.8 PG (ref 26–34)
MCHC RBC AUTO-ENTMCNC: 34.5 G/DL (ref 30–36.5)
MCV RBC AUTO: 95.1 FL (ref 80–99)
MONOCYTES # BLD: 0.6 K/UL (ref 0–1)
MONOCYTES NFR BLD: 8 % (ref 5–13)
NEUTS SEG # BLD: 4.3 K/UL (ref 1.8–8)
NEUTS SEG NFR BLD: 65 % (ref 32–75)
NITRITE UR QL STRIP.AUTO: NEGATIVE
NRBC # BLD: 0 K/UL (ref 0–0.01)
NRBC BLD-RTO: 0 PER 100 WBC
PH UR STRIP: 6 [PH] (ref 5–8)
PLATELET # BLD AUTO: 339 K/UL (ref 150–400)
PMV BLD AUTO: 10.2 FL (ref 8.9–12.9)
POTASSIUM SERPL-SCNC: 4.1 MMOL/L (ref 3.5–5.1)
PROT SERPL-MCNC: 7.3 G/DL (ref 6.4–8.2)
PROT UR STRIP-MCNC: NEGATIVE MG/DL
RBC # BLD AUTO: 4.08 M/UL (ref 3.8–5.2)
RBC #/AREA URNS HPF: NORMAL /HPF (ref 0–5)
SODIUM SERPL-SCNC: 138 MMOL/L (ref 136–145)
SP GR UR REFRACTOMETRY: <1.005
UA: UC IF INDICATED,UAUC: NORMAL
UROBILINOGEN UR QL STRIP.AUTO: 0.2 EU/DL (ref 0.2–1)
WBC # BLD AUTO: 6.8 K/UL (ref 3.6–11)
WBC URNS QL MICRO: NORMAL /HPF (ref 0–4)

## 2022-11-17 PROCEDURE — 96361 HYDRATE IV INFUSION ADD-ON: CPT

## 2022-11-17 PROCEDURE — 80053 COMPREHEN METABOLIC PANEL: CPT

## 2022-11-17 PROCEDURE — 93005 ELECTROCARDIOGRAM TRACING: CPT

## 2022-11-17 PROCEDURE — 96360 HYDRATION IV INFUSION INIT: CPT

## 2022-11-17 PROCEDURE — 85025 COMPLETE CBC W/AUTO DIFF WBC: CPT

## 2022-11-17 PROCEDURE — 74011250636 HC RX REV CODE- 250/636: Performed by: EMERGENCY MEDICINE

## 2022-11-17 PROCEDURE — 36415 COLL VENOUS BLD VENIPUNCTURE: CPT

## 2022-11-17 PROCEDURE — 81001 URINALYSIS AUTO W/SCOPE: CPT

## 2022-11-17 PROCEDURE — 99284 EMERGENCY DEPT VISIT MOD MDM: CPT

## 2022-11-17 RX ADMIN — SODIUM CHLORIDE 1000 ML: 9 INJECTION, SOLUTION INTRAVENOUS at 15:13

## 2022-11-17 NOTE — ED PROVIDER NOTES
EMERGENCY DEPARTMENT HISTORY AND PHYSICAL EXAM      Date: 11/17/2022  Patient Name: Jeannette Powers    History of Presenting Illness     Chief Complaint   Patient presents with    Dizziness    Nausea     Pt had a tooth pulled Saturday. She took her last antibiotic 2 days ago. Starting the day after her dental procedure she began feeling dizzy and nauseous. Pt states yesterday she was rooom spinning, but today she just feels weak. Pt has not vomited. Dentist does not feel her sx are originating from her procedure. History Provided By: Patient    HPI: Jeannette Powers, 38 y.o. female presents to the ED with cc of dizziness and  Nausea. States that she had a sinus infection a couple of weeks ago. She initially was prescribed amoxicillin, but that did not resolve her symptoms. She then took Augmentin. She had a tooth pulled 5 days ago. She started to feel dizzy and nauseated after the procedure. She was prescribed another antibiotic, which she stopped taking 2 days ago. She says that was amoxicillin. She has not had vomiting. She talked to her dentist, who did not feel that her symptoms are related to her her surgery. She also states that she has a history of POTS, so she is not sure if she just needs fluids. She denies chest pain, shortness of breath, abdominal pain, fever, headache. She has been taking Zofran for nausea, which does seem to help that symptom. She denies tinnitus. She denies diarrhea. There are no other complaints, changes, or physical findings at this time. PCP: Anjali Corley MD    No current facility-administered medications on file prior to encounter. Current Outpatient Medications on File Prior to Encounter   Medication Sig Dispense Refill    ondansetron (ZOFRAN ODT) 4 mg disintegrating tablet Take 1 Tablet by mouth every eight (8) hours as needed for Nausea or Vomiting.  20 Tablet 0    cycloSPORINE (RESTASIS) 0.05 % dpet INSTILL 1 DROP IN BOTH EYES TWICE DAILY 30 Each 3    cycloSPORINE (RESTASIS) 0.05 % dpet INSTILL 1 DROP IN BOTH EYES TWICE DAILY 30 Each 3    cycloSPORINE (RESTASIS) 0.05 % dpet Administer 1 Drop to both eyes every twelve (12) hours. 30 Each 3    fluticasone propionate (FLONASE) 50 mcg/actuation nasal spray 2 Sprays by Both Nostrils route daily as needed for Rhinitis. 1 Each 6    famotidine (PEPCID) 20 mg tablet Take 1 Tablet by mouth nightly. 90 Tablet 3    atenoloL (TENORMIN) 25 mg tablet Take 1 Tablet by mouth daily. 90 Tablet 0    cevimeline (EVOXAC) 30 mg capsule Take 1 Capsule by mouth three (3) times daily for 360 days. 30 Capsule 11    albuterol (PROVENTIL HFA, VENTOLIN HFA, PROAIR HFA) 90 mcg/actuation inhaler       cetirizine (ZYRTEC) 10 mg tablet Take 1 Tablet by mouth daily. 90 Tablet 3    vit B complex no.12/niacin,B3, (VITAMIN B COMPLEX NO.12-NIACIN PO) Take 1 Tab by mouth daily. omeprazole (PRILOSEC) 40 mg capsule Take 40 mg by mouth daily as needed. multivitamin (ONE A DAY) tablet Take 1 Tab by mouth daily. LINZESS 290 mcg cap capsule Take 290 mcg by mouth as needed. 0       Past History     Past Medical History:  Past Medical History:   Diagnosis Date    Anemia     taking iron    Family history of skin cancer     sister has melanoma,     Gastroparesis     GERD (gastroesophageal reflux disease)     gastroparesis--h-pylori    H. pylori infection     H/O Clostridium difficile infection 67/8305    Helicobacter pylori (H. pylori) 05/2012    h pylori    Palpitations     2010  - cardiac workup per pt. POTS (postural orthostatic tachycardia syndrome)     2/21/20 Dia Becker 22.  Reports tx increased fluid and NA intake, betablocker    Sjogren's syndrome (HCC)     Sun-damaged skin     20's    Sunburn, blistering     8-9     Tanning bed exposure     11 years ago        Past Surgical History:  Past Surgical History:   Procedure Laterality Date    HX COLONOSCOPY      HX DILATION AND CURETTAGE  05/2012    HX ENDOSCOPY      HX GYN      tubal reversal laparoscopic    HX TUBAL LIGATION      IR DILATE ESOPH STRICT      TILT TABLE EVAL W/WO DRUG  2017            Family History:  Family History   Problem Relation Age of Onset    Heart Disease Mother     Cancer Mother         Thyroid    Thyroid Disease Mother     Heart Disease Father     High Cholesterol Father     Heart Disease Maternal Grandmother     Diabetes Maternal Grandmother     Other Maternal Grandmother         Heomochromatosis    Thyroid Disease Sister     No Known Problems Brother     Stroke Maternal Grandfather     Diabetes Maternal Grandfather     Hypertension Paternal Grandmother     Heart Disease Paternal Grandfather     Heart Attack Paternal Grandfather        Social History:  Social History     Tobacco Use    Smoking status: Former     Types: Cigarettes     Quit date: 2013     Years since quittin.9    Smokeless tobacco: Never   Vaping Use    Vaping Use: Never used   Substance Use Topics    Alcohol use: Yes     Alcohol/week: 0.0 standard drinks     Comment: not monthly    Drug use: No       Allergies: Allergies   Allergen Reactions    Celexa [Citalopram] Nausea and Vomiting    Ciprofloxacin Shortness of Breath    Diflucan [Fluconazole] Rash     Burning sensation to skin    Macrobid [Nitrofurantoin Monohyd/M-Cryst] Other (comments)    Sulfa (Sulfonamide Antibiotics) Rash         Review of Systems   Review of Systems   Constitutional:  Negative for fever. HENT:  Negative for congestion. Eyes: Negative. Respiratory:  Negative for shortness of breath. Cardiovascular:  Negative for chest pain. Gastrointestinal:  Positive for nausea. Negative for abdominal pain. Endocrine: Negative for heat intolerance. Genitourinary: Negative. Musculoskeletal:  Negative for back pain. Skin:  Negative for rash. Allergic/Immunologic: Negative for immunocompromised state. Neurological:  Positive for dizziness and light-headedness.    Hematological:  Does not bruise/bleed easily. Psychiatric/Behavioral: Negative. All other systems reviewed and are negative. Physical Exam   Physical Exam  Vitals and nursing note reviewed. Constitutional:       General: She is not in acute distress. Appearance: She is well-developed. HENT:      Head: Normocephalic. Eyes:      Extraocular Movements: Extraocular movements intact. Cardiovascular:      Rate and Rhythm: Normal rate and regular rhythm. Pulses: Normal pulses. Heart sounds: Normal heart sounds. Pulmonary:      Effort: Pulmonary effort is normal.      Breath sounds: Normal breath sounds. Abdominal:      General: Bowel sounds are normal.      Palpations: Abdomen is soft. Tenderness: There is no abdominal tenderness. Musculoskeletal:         General: Normal range of motion. Cervical back: Normal range of motion and neck supple. Skin:     General: Skin is warm and dry. Neurological:      General: No focal deficit present. Mental Status: She is alert and oriented to person, place, and time. Psychiatric:         Mood and Affect: Mood normal.         Behavior: Behavior normal.       Diagnostic Study Results     Labs -     Recent Results (from the past 12 hour(s))   CBC WITH AUTOMATED DIFF    Collection Time: 11/17/22  1:05 PM   Result Value Ref Range    WBC 6.8 3.6 - 11.0 K/uL    RBC 4.08 3.80 - 5.20 M/uL    HGB 13.4 11.5 - 16.0 g/dL    HCT 38.8 35.0 - 47.0 %    MCV 95.1 80.0 - 99.0 FL    MCH 32.8 26.0 - 34.0 PG    MCHC 34.5 30.0 - 36.5 g/dL    RDW 12.1 11.5 - 14.5 %    PLATELET 138 957 - 098 K/uL    MPV 10.2 8.9 - 12.9 FL    NRBC 0.0 0  WBC    ABSOLUTE NRBC 0.00 0.00 - 0.01 K/uL    NEUTROPHILS 65 32 - 75 %    LYMPHOCYTES 26 12 - 49 %    MONOCYTES 8 5 - 13 %    EOSINOPHILS 0 0 - 7 %    BASOPHILS 1 0 - 1 %    IMMATURE GRANULOCYTES 0 0.0 - 0.5 %    ABS. NEUTROPHILS 4.3 1.8 - 8.0 K/UL    ABS. LYMPHOCYTES 1.8 0.8 - 3.5 K/UL    ABS. MONOCYTES 0.6 0.0 - 1.0 K/UL    ABS. EOSINOPHILS 0.0 0.0 - 0.4 K/UL    ABS. BASOPHILS 0.1 0.0 - 0.1 K/UL    ABS. IMM. GRANS. 0.0 0.00 - 0.04 K/UL    DF AUTOMATED     METABOLIC PANEL, COMPREHENSIVE    Collection Time: 11/17/22  1:05 PM   Result Value Ref Range    Sodium 138 136 - 145 mmol/L    Potassium 4.1 3.5 - 5.1 mmol/L    Chloride 105 97 - 108 mmol/L    CO2 29 21 - 32 mmol/L    Anion gap 4 (L) 5 - 15 mmol/L    Glucose 101 (H) 65 - 100 mg/dL    BUN 6 6 - 20 MG/DL    Creatinine 0.70 0.55 - 1.02 MG/DL    BUN/Creatinine ratio 9 (L) 12 - 20      eGFR >60 >60 ml/min/1.73m2    Calcium 9.4 8.5 - 10.1 MG/DL    Bilirubin, total 0.4 0.2 - 1.0 MG/DL    ALT (SGPT) 20 12 - 78 U/L    AST (SGOT) 18 15 - 37 U/L    Alk.  phosphatase 46 45 - 117 U/L    Protein, total 7.3 6.4 - 8.2 g/dL    Albumin 3.9 3.5 - 5.0 g/dL    Globulin 3.4 2.0 - 4.0 g/dL    A-G Ratio 1.1 1.1 - 2.2     EKG, 12 LEAD, INITIAL    Collection Time: 11/17/22  1:22 PM   Result Value Ref Range    Ventricular Rate 72 BPM    Atrial Rate 72 BPM    P-R Interval 122 ms    QRS Duration 82 ms    Q-T Interval 358 ms    QTC Calculation (Bezet) 392 ms    Calculated P Axis 77 degrees    Calculated R Axis -7 degrees    Calculated T Axis 39 degrees    Diagnosis       Normal sinus rhythm  Low voltage QRS  When compared with ECG of 18-DEC-2019 08:14,  No significant change was found     URINALYSIS W/ REFLEX CULTURE    Collection Time: 11/17/22  1:30 PM    Specimen: Urine   Result Value Ref Range    Color YELLOW/STRAW      Appearance CLEAR CLEAR      Specific gravity <1.005     pH (UA) 6.0 5.0 - 8.0      Protein Negative NEG mg/dL    Glucose Negative NEG mg/dL    Ketone Negative NEG mg/dL    Bilirubin Negative NEG      Blood Negative NEG      Urobilinogen 0.2 0.2 - 1.0 EU/dL    Nitrites Negative NEG      Leukocyte Esterase Negative NEG      WBC 0-4 0 - 4 /hpf    RBC 0-5 0 - 5 /hpf    Epithelial cells FEW FEW /lpf    Bacteria Negative NEG /hpf    UA:UC IF INDICATED CULTURE NOT INDICATED BY UA RESULT CNI      Hyaline cast 0-2 0 - 5 /lpf       Radiologic Studies -   No orders to display     CT Results  (Last 48 hours)      None          CXR Results  (Last 48 hours)      None            Medical Decision Making   I am the first provider for this patient. I reviewed the vital signs, available nursing notes, past medical history, past surgical history, family history and social history. Vital Signs-Reviewed the patient's vital signs. Patient Vitals for the past 12 hrs:   Temp Pulse Resp BP SpO2   11/17/22 1545 -- 85 -- 118/88 --   11/17/22 1544 -- 81 -- 135/82 --   11/17/22 1540 -- 82 -- 139/75 --   11/17/22 1255 98.1 °F (36.7 °C) 69 16 (!) 144/75 100 %         Records Reviewed: Nursing Notes, Old Medical Records, Previous Radiology Studies, and Previous Laboratory Studies    Provider Notes (Medical Decision Making):   Dehydration, electrolyte abnormality, anemia, pots    ED Course:   Initial assessment performed. The patients presenting problems have been discussed, and they are in agreement with the care plan formulated and outlined with them. I have encouraged them to ask questions as they arise throughout their visit. progress note: The patient is feeling better. Her results were reviewed. She is advised to follow-up and return to ER if worse      Critical Care Time:   0      Disposition:  home    DISCHARGE PLAN:  1. Discharge Medication List as of 11/17/2022  4:41 PM        2. Follow-up Information       Follow up With Specialties Details Why Contact Info    Misa Corley MD Family Medicine  As needed [address]  [address] [facility] [phone] [facility] EMERGENCY DEPT Emergency Medicine  If symptoms worsen [address]  [address] [facility]  [phone]          3. Return to ED if worse     Diagnosis     Clinical Impression:   1.  Orthostatic hypotension        Attestations:    Mathew Sommer MD        Please note that this dictation was completed with Dragon, the computer voice recognition software. Quite often unanticipated grammatical, syntax, homophones, and other interpretive errors are inadvertently transcribed by the computer software. Please disregard these errors. Please excuse any errors that have escaped final proofreading. Thank you.

## 2022-11-17 NOTE — ED NOTES
Dizziness and nausea  Post dental surgery.   Hx of matthews and gastroparesis pt does say she hasn't been drinking as much as usual.

## 2022-11-17 NOTE — Clinical Note
Tess Boggs was seen and treated in our emergency department on 11/17/2022.     Patient may return to work on November 21, 2022    Mimi Plata MD

## 2022-11-17 NOTE — TELEPHONE ENCOUNTER
Pt had a tooth extracted last weekend; Pt states that she has not fel good since then; Pt states that she is feeling nauseous all the time; Pt states that she did call back to her dentist office to inform them of her symptoms;  Pt states that her Dentist office told her that it was okay to take the left over [facility]; Pt states that the medication is not working; Pt is requesting something else for this; please call pt back       Thank You

## 2022-11-18 LAB
ATRIAL RATE: 72 BPM
CALCULATED P AXIS, ECG09: 50 DEGREES
CALCULATED R AXIS, ECG10: -7 DEGREES
CALCULATED T AXIS, ECG11: 42 DEGREES
DIAGNOSIS, 93000: NORMAL
P-R INTERVAL, ECG05: 128 MS
Q-T INTERVAL, ECG07: 364 MS
QRS DURATION, ECG06: 78 MS
QTC CALCULATION (BEZET), ECG08: 398 MS
VENTRICULAR RATE, ECG03: 72 BPM

## 2022-11-18 RX ORDER — PROMETHAZINE HYDROCHLORIDE 25 MG/1
25 TABLET ORAL
Qty: 20 TABLET | Refills: 0 | Status: SHIPPED | OUTPATIENT
Start: 2022-11-18

## 2022-11-18 NOTE — TELEPHONE ENCOUNTER
verified. Infrmed patient of medication called to pharmacy. Patient verified understanding. Pt states she was dizzy yesterday and went to ER and they gave her fluids and blood work (came back normal) Pt states she is not having pain. Patient states if she has any new symptoms she will go back to ER or urgent care. Patient states she will follow up with provider as well if needed.

## 2022-11-18 NOTE — TELEPHONE ENCOUNTER
Please ask if she is having any symptoms other than nausea - ie pain, swelling, chills? Alternate nausea medication called to pharmacy on file. If this does not help pt should be evaluated.

## 2022-11-29 ENCOUNTER — OFFICE VISIT (OUTPATIENT)
Dept: NEUROLOGY | Age: 42
End: 2022-11-29
Payer: MEDICAID

## 2022-11-29 VITALS
SYSTOLIC BLOOD PRESSURE: 128 MMHG | HEIGHT: 64 IN | OXYGEN SATURATION: 99 % | HEART RATE: 106 BPM | DIASTOLIC BLOOD PRESSURE: 74 MMHG | WEIGHT: 162 LBS | BODY MASS INDEX: 27.66 KG/M2

## 2022-11-29 DIAGNOSIS — R20.0 FACIAL NUMBNESS: Primary | ICD-10-CM

## 2022-11-29 DIAGNOSIS — Q38.3 ABNORMALITY OF TONGUE: ICD-10-CM

## 2022-11-29 PROCEDURE — 99204 OFFICE O/P NEW MOD 45 MIN: CPT | Performed by: PSYCHIATRY & NEUROLOGY

## 2022-11-29 RX ORDER — AMOXICILLIN 500 MG/1
TABLET, FILM COATED ORAL
COMMUNITY
Start: 2022-11-12

## 2022-11-29 RX ORDER — TRAMADOL HYDROCHLORIDE 50 MG/1
TABLET ORAL
COMMUNITY
Start: 2022-11-12

## 2022-11-29 RX ORDER — BENZONATATE 100 MG/1
CAPSULE ORAL
COMMUNITY
Start: 2022-11-02

## 2022-11-29 RX ORDER — IBUPROFEN 800 MG/1
TABLET ORAL
COMMUNITY
Start: 2022-11-27

## 2022-11-29 RX ORDER — METHYLPREDNISOLONE 4 MG/1
TABLET ORAL
COMMUNITY
Start: 2022-11-02

## 2022-11-29 RX ORDER — SUCRALFATE 1 G/1
TABLET ORAL
COMMUNITY
Start: 2022-11-07

## 2022-11-29 NOTE — PROGRESS NOTES
Chief Complaint   Patient presents with    Neurologic Problem     Hx of POTS, managing physician at [address] [place] [address] Street has retired, reports \"Facial numbness and both hand feeling numb on and off, I feel like I have internal vibrations, and feel tired a lot, just need to get some help on this. \"       Referred by: Dr Barney HARDEN    Mynor Fisher is a 72-year-old woman I saw over 3 years ago for vertigo. Imaging at that time normal.  She was lost to follow-up. Since that time she continues to have these spells of shortness of breath and facial numbness particularly around the nose. No pain, no headache. This occurs once or twice a week lasting days at a time. It is very annoying. She also has concomitant POTS and her previous physician has retired and she is looking for someone new regarding her POTS. She has Sjogren's. 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 Sometimes she feels like she has internal vibrations. Previous MRI brain in 2016 normal and follow-up C-spine normal.            Review of Systems   Eyes:  Negative for double vision. Respiratory:  Positive for shortness of breath. Neurological:  Positive for sensory change. All other systems reviewed and are negative. Past Medical History:   Diagnosis Date    Anemia     taking iron    Family history of skin cancer     sister has melanoma,     Gastroparesis     GERD (gastroesophageal reflux disease)     gastroparesis--h-pylori    H. pylori infection     H/O Clostridium difficile infection 75/1911    Helicobacter pylori (H. pylori) 05/2012    h pylori    Palpitations     2010  - cardiac workup per pt. POTS (postural orthostatic tachycardia syndrome)     2/21/20 [facility] 22.  Reports tx increased fluid and NA intake, betablocker    Sjogren's syndrome (HCC)     Sun-damaged skin     20's    Sunburn, blistering     8-9     Tanning bed exposure     11 years ago      Family History   Problem Relation Age of Onset    Heart Disease Mother     Cancer Mother         Thyroid    Thyroid Disease Mother     Heart Disease Father     High Cholesterol Father     Heart Disease Maternal Grandmother     Diabetes Maternal Grandmother     Other Maternal Grandmother         Heomochromatosis    Thyroid Disease Sister     No Known Problems Brother     Stroke Maternal Grandfather     Diabetes Maternal Grandfather     Hypertension Paternal Grandmother     Heart Disease Paternal Grandfather     Heart Attack Paternal Grandfather      Social History     Socioeconomic History    Marital status: SINGLE     Spouse name: Not on file    Number of children: Not on file    Years of education: Not on file    Highest education level: Not on file   Occupational History    Not on file   Tobacco Use    Smoking status: Former     Types: Cigarettes     Quit date: 2013     Years since quittin.0    Smokeless tobacco: Never   Vaping Use    Vaping Use: Never used   Substance and Sexual Activity    Alcohol use: Yes     Alcohol/week: 0.0 standard drinks     Comment: not monthly    Drug use: No    Sexual activity: Yes     Partners: Male     Birth control/protection: None   Other Topics Concern    Not on file   Social History Narrative    3 kids (16yo - 7mo), lives with boyfriend (but he travels for work)     Social Determinants of Health     Financial Resource Strain: Not on file   Food Insecurity: Not on file   Transportation Needs: Not on file   Physical Activity: Not on file   Stress: Not on file   Social Connections: Not on file   Intimate Partner Violence: Not on file   Housing Stability: Not on file     Current Outpatient Medications   Medication Sig    ibuprofen (MOTRIN) 800 mg tablet     sucralfate (CARAFATE) 1 gram tablet     traMADoL (ULTRAM) 50 mg tablet TAKE 1 TABLET BY MOUTH EVERY 4 TO 8 HOURS AS NEEDED FOR PAIN    promethazine (PHENERGAN) 25 mg tablet Take 1 Tablet by mouth every six (6) hours as needed for Nausea.     ondansetron (ZOFRAN ODT) 4 mg disintegrating tablet Take 1 Tablet by mouth every eight (8) hours as needed for Nausea or Vomiting. cycloSPORINE (RESTASIS) 0.05 % dpet INSTILL 1 DROP IN BOTH EYES TWICE DAILY    cycloSPORINE (RESTASIS) 0.05 % dpet INSTILL 1 DROP IN BOTH EYES TWICE DAILY    fluticasone propionate (FLONASE) 50 mcg/actuation nasal spray 2 Sprays by Both Nostrils route daily as needed for Rhinitis. famotidine (PEPCID) 20 mg tablet Take 1 Tablet by mouth nightly. atenoloL (TENORMIN) 25 mg tablet Take 1 Tablet by mouth daily. cevimeline (EVOXAC) 30 mg capsule Take 1 Capsule by mouth three (3) times daily for 360 days. albuterol (PROVENTIL HFA, VENTOLIN HFA, PROAIR HFA) 90 mcg/actuation inhaler     cetirizine (ZYRTEC) 10 mg tablet Take 1 Tablet by mouth daily. vit B complex no.12/niacin,B3, (VITAMIN B COMPLEX NO.12-NIACIN PO) Take 1 Tab by mouth daily. omeprazole (PRILOSEC) 40 mg capsule Take 40 mg by mouth daily as needed. multivitamin (ONE A DAY) tablet Take 1 Tab by mouth daily. LINZESS 290 mcg cap capsule Take 290 mcg by mouth as needed. amoxicillin (AMOXIL) 500 mg tablet TAKE 1 TABLET BY MOUTH EVERY 8 HOURS UNTIL ALL GONE (Patient not taking: Reported on 11/29/2022)    benzonatate (TESSALON) 100 mg capsule TAKE 1 CAPSULE BY MOUTH THREE TIMES DAILY FOR UP TO 21 DOSES AS NEEDED FOR COUGH. DO NOT CRUSH OR CHEW (Patient not taking: Reported on 11/29/2022)    methylPREDNISolone (MEDROL DOSEPACK) 4 mg tablet FOLLOW PACKAGE DIRECTIONS (Patient not taking: Reported on 11/29/2022)    cycloSPORINE (RESTASIS) 0.05 % dpet Administer 1 Drop to both eyes every twelve (12) hours. (Patient not taking: Reported on 11/29/2022)     No current facility-administered medications for this visit.      Allergies   Allergen Reactions    Celexa [Citalopram] Nausea and Vomiting    Ciprofloxacin Shortness of Breath    Diflucan [Fluconazole] Rash     Burning sensation to skin    Macrobid [Nitrofurantoin Monohyd/M-Cryst] Other (comments)    Sulfa (Sulfonamide Antibiotics) Rash         Neurologic Exam     Mental Status   Oriented to person, place, and time. Cranial Nerves   Cranial nerves II through XII intact. Motor Exam   Muscle bulk: normal    Sensory Exam   Light touch normal.     Gait, Coordination, and Reflexes     Gait  Gait: normal  Physical Exam  Vitals and nursing note reviewed. Constitutional:       Appearance: Normal appearance. She is normal weight. HENT:      Mouth/Throat:      Comments: Scalloping of the tongue and severe midline atrophy and fissures of the tongue. Cardiovascular:      Rate and Rhythm: Normal rate. Pulmonary:      Effort: Pulmonary effort is normal.   Neurological:      Mental Status: She is alert and oriented to person, place, and time. Cranial Nerves: Cranial nerves 2-12 are intact. Gait: Gait is intact. Visit Vitals  /74 (BP 1 Location: Right upper arm, BP Patient Position: Sitting)   Pulse (!) 106   Ht 5' 4\" (1.626 m)   Wt 162 lb (73.5 kg)   SpO2 99%   BMI 27.81 kg/m²       Lab Results   Component Value Date/Time    WBC 6.8 11/17/2022 01:05 PM    HGB 13.4 11/17/2022 01:05 PM    HCT 38.8 11/17/2022 01:05 PM    PLATELET 028 27/56/2419 01:05 PM    MCV 95.1 11/17/2022 01:05 PM     Lab Results   Component Value Date/Time    ALT (SGPT) 20 11/17/2022 01:05 PM    Alk. phosphatase 46 11/17/2022 01:05 PM    Bilirubin, direct 0.10 09/15/2011 09:58 AM    Bilirubin, total 0.4 11/17/2022 01:05 PM    Albumin 3.9 11/17/2022 01:05 PM    Protein, total 7.3 11/17/2022 01:05 PM    PLATELET 447 95/35/5414 01:05 PM        CT Results (maximum last 3): Results from [place] encounter on 10/02/19    CT SINUSES WO CONT    Narrative  EXAM: CT SINUSES WO CONT    INDICATION: Infection, face sinus    COMPARISON: None. CONTRAST: None. TECHNIQUE:  Multislice helical CT of the paranasal sinuses was performed in the axial plane  without intravenous contrast administration.   Coronal and sagittal reformations  were generated. CT dose reduction was achieved through use of a standardized  protocol tailored for this examination and automatic exposure control for dose  modulation. FINDINGS:  The frontal sinuses and frontoethmoidal recesses are clear. The anterior ethmoid air cells are clear. The maxillary sinuses are clear. The maxillary sinus infundibula and ostiomeatal  units are patent. The posterior ethmoid air cells and sphenoid sinus are clear. The nasal cavity is clear. The nasal septum is midline. There is no bone destruction or bone dehiscence of the paranasal sinuses. Impression  IMPRESSION: No evidence of sinus disease. Results from [place] encounter on 10/01/22    CT ABD PELV W CONT    Narrative  EXAM: CT ABD PELV W CONT    INDICATION: Epigastric pain    COMPARISON: December 2021    CONTRAST: 100 mL of Isovue-370. ORAL CONTRAST: None    TECHNIQUE:  Following the uneventful intravenous administration of contrast, thin axial  images were obtained through the abdomen and pelvis. Coronal and sagittal  reconstructions were generated. CT dose reduction was achieved through use of a  standardized protocol tailored for this examination and automatic exposure  control for dose modulation. FINDINGS:  LOWER THORAX: No significant abnormality in the incidentally imaged lower chest.  LIVER: Scattered hepatic cysts of varying sizes. BILIARY TREE: Gallbladder is within normal limits. CBD is not dilated. SPLEEN: within normal limits. PANCREAS: No mass or ductal dilatation. ADRENALS: Unremarkable. KIDNEYS: No mass, calculus, or hydronephrosis. STOMACH: Unremarkable. SMALL BOWEL: No dilatation or wall thickening. COLON: No dilatation or wall thickening. APPENDIX: Normal  PERITONEUM: No ascites or pneumoperitoneum. RETROPERITONEUM: No lymphadenopathy or aortic aneurysm.   REPRODUCTIVE ORGANS: Right ovarian corpus luteum with trace right adnexal free  fluid, likely physiologic. Uterus is within normal limits. URINARY BLADDER: No mass or calculus. BONES: No destructive bone lesion. ABDOMINAL WALL: No mass or hernia. ADDITIONAL COMMENTS: N/A    Impression  1. No acute intra-abdominal pathology. 2.  Incidental hepatic cysts. 3.  Right adnexal corpus luteum with right adnexal free fluid, likely  physiologic. 4.  Normal appendix. 5.  Normal gallbladder. MRI Results (maximum last 3): Results from [place] encounter on 11/18/19    MRI CERV SPINE WO CONT    Narrative  EXAM: MRI CERV SPINE WO CONT    INDICATION: 29-year-old woman with subjective difficulty swallowing and  hyperreflexia. Please do MRI cervical spine evaluating for any central  lesions. . R 13.1, R 29.2. COMPARISON: None    TECHNIQUE: MR imaging of the cervical spine was performed using the following  sequences: sagittal T1, T2, STIR;  axial T2, T1.    CONTRAST:  None. FINDINGS:    Cord signal is normal. The visualized portions of the brainstem and cerebellum  are normal. There is normal vertebral body height and bone signal. There is  anatomic alignment. No paraspinal soft tissue mass is shown. There is an  incidental right lobe thyroid nodule measuring 10 mm in size. C2-C3: Normal disc and facets. C3-C4: Normal disc and facets. C4-C5: Normal disc and facets. C5-C6: Normal disc and facets. C6-C7: Normal disc and facets. C7-T1 and upper thoracic segments: Normal discs and facets. Impression  IMPRESSION:  Normal study. Results from [place] encounter on 04/29/16    MRA BRAIN WO CONT    Narrative  **Final Report**      ICD Codes / Adm. Diagnosis: 780.4  346.70 / Dizziness and giddiness  Chronic  migraine without aura,  Examination:  MR HEAD MRA WO CON  - [id number] - Apr 29 2016 10:57AM  Accession No:  [id number]  Reason:  35F with vertigo. Need MRI brain r/o MS and MRA eval post circ      REPORT:  INDICATION:   35F with vertigo.  Need MRI brain r/o MS and MRA eval post circ    COMPARISON:  None    TECHNIQUE:  3-D time-of-flight MRA of the brain was performed. Multiplanar  reconstructions were obtained. FINDINGS:  The vertebral arteries are codominant. The basilar artery and its branches  are normal. The internal carotid, anterior cerebral, and middle cerebral  arteries are patent. There is no flow-limiting intracranial stenosis. There  is no aneurysm. .    Impression  :  Negative. Signing/Reading Doctor: De Guo ([id number])  Approved: Brown Ditch ([id number])  Apr 29 2016 11:15AM      MRI BRAIN WO CONT    Narrative  **Final Report**      ICD Codes / Adm. Diagnosis: 780.4  346.70 / Dizziness and giddiness  Chronic  migraine without aura,  Examination:  MR BRAIN WO CON  - [id number] - Apr 29 2016 10:57AM  Accession No:  [id number]  Reason:  35F with vertigo. Need MRI brain r/o MS and MRA eval post circ      REPORT:  EXAM:  MR BRAIN WO CON    INDICATION:  35F with vertigo. Need MRI brain r/o MS and MRA eval post circ    COMPARISON: None. CONTRAST:  None. TECHNIQUE: Sagittal T1, axial FLAIR, T2,T1 and gradient echo images as well  as coronal T2 weighted images and axial diffusion weighted images of the  head were obtained. FINDINGS: The ventricular size and configuration are normal. There are no  areas of abnormal signal in the cerebral hemispheres, brainstem or  cerebellum. There is no evidence of mass, hemorrhage, acute infarct or  abnormal extra-axial fluid collection. Normal appearing flow-voids are  present in the vertebral, basilar and carotid artery systems. The  craniocervical junction is normal. The structures at the cranial base  including paranasal sinuses and mastoid air cells are unremarkable. Impression  : Negative          Signing/Reading Doctor: De Guo ([id number])  Approved: Brown Ditch ([id number])  Apr 29 2016 11:14AM      PET Results (maximum last 3):   No results found for this or any previous visit. Assessment and Plan   Diagnoses and all orders for this visit:    1. Facial numbness  -     VITAMIN D, 25 HYDROXY; Future  -     VITAMIN B12; Future  -     MRI BRAIN W WO CONT; Future  -     HEAVY METALS PROFILE I, BLOOD; Future  -     TSH 3RD GENERATION; Future  -     GAMMOPATHY EVAL, SPEP/HUMA, IG QT/FLC; Future    2. Abnormality of tongue  -     VITAMIN D, 25 HYDROXY; Future  -     VITAMIN B12; Future  -     HEAVY METALS PROFILE I, BLOOD; Future  -     TSH 3RD GENERATION; Future  -     GAMMOPATHY EVAL, SPEP/HUMA, IG QT/FLC; Future    47-year-old woman who has a history of POTS and vertigo having persistent waves of facial numbness almost midline with shortness of breath of unexplained etiology. She is already seeing cardiology and pulmonary regarding her shortness of breath which there has been no abnormality identified. I do not have a good answer for the shortness of breath. I do not think she has neuromuscular disease. There is no ptosis or diplopia. The facial numbness is very difficult to localize. Going to repeat an MRI of the brain with contrast to compare to previous. I would check a battery of blood work given the symptoms and that the very abnormal tongue. She has clear atrophy of the tongue suggestive of metabolic disease. Labs been ordered. 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 We will check heavy metals. I discussed with her that I do not have a clear answer right now and I may not. Goal is to eliminate any serious issues such as mass lesion stroke MS, etc.  She understands. I will place a comment to her results in her MyChart once I review them. No follow-up at this time. 45 minutes of time was taken in total today reviewing the medical record to include [facility] notes, review of neuroimaging multiple scans dating back to 2016, face-to-face time with discussion and conversation about plan, and time completing documentation today.     I reviewed and decided to continue the current medications. This clinical note was dictated with an electronic dictation software that can make unintentional errors. If there are any questions, please contact me directly for clarification. A notice of this visit/encounter being completed has been sent electronically to the patient's PCP and/or referring provider.      [address] [place] [address], [facility] [address]  Diplomate BRYNN

## 2022-12-07 ENCOUNTER — E-VISIT (OUTPATIENT)
Dept: FAMILY MEDICINE CLINIC | Age: 42
End: 2022-12-07
Payer: MEDICAID

## 2022-12-07 DIAGNOSIS — R05.3 CHRONIC COUGH: Primary | ICD-10-CM

## 2022-12-07 RX ORDER — ATENOLOL 25 MG/1
25 TABLET ORAL DAILY
Qty: 90 TABLET | Refills: 1 | Status: SHIPPED | OUTPATIENT
Start: 2022-12-07

## 2022-12-07 RX ORDER — PREDNISONE 5 MG/1
TABLET ORAL
Qty: 21 TABLET | Refills: 0 | Status: SHIPPED | OUTPATIENT
Start: 2022-12-07 | End: 2022-12-08 | Stop reason: ALTCHOICE

## 2022-12-07 NOTE — TELEPHONE ENCOUNTER
Request for Atenolol 25 mg daily. Last office visit 9/19/22, next office visit 3/7/23. Refills per verbal order from Dr. Eris Falcon.

## 2022-12-07 NOTE — PROGRESS NOTES
Karthik Gill (1980) initiated an asynchronous digital communication through [address] [place] [address]. HPI: per patient questionnaire     Exam: not applicable    Diagnoses and all orders for this visit:  Diagnoses and all orders for this visit:    1. Chronic cough    Other orders  -     predniSONE (STERAPRED) 5 mg dose pack; See administration instruction per 5mg dose pack          Time: EV2 - 11-20 minutes were spent on the digital evaluation and management of this patient.        Evelin Mayorga MD

## 2022-12-08 RX ORDER — METHYLPREDNISOLONE 4 MG/1
TABLET ORAL
Qty: 1 DOSE PACK | Refills: 0 | Status: SHIPPED | OUTPATIENT
Start: 2022-12-08

## 2022-12-19 RX ORDER — ATENOLOL 25 MG/1
25 TABLET ORAL DAILY
Qty: 90 TABLET | Refills: 1 | Status: SHIPPED | OUTPATIENT
Start: 2022-12-19

## 2022-12-19 NOTE — TELEPHONE ENCOUNTER
Request for Atenolol 25  mg daily. Last office visit 9/19/22, next office visit 3/7/23. Refills per verbal order from Dr. Evelyn Botello.

## 2023-01-12 ENCOUNTER — OFFICE VISIT (OUTPATIENT)
Dept: FAMILY MEDICINE CLINIC | Age: 43
End: 2023-01-12
Payer: MEDICAID

## 2023-01-12 VITALS
RESPIRATION RATE: 18 BRPM | OXYGEN SATURATION: 100 % | BODY MASS INDEX: 27.93 KG/M2 | DIASTOLIC BLOOD PRESSURE: 89 MMHG | HEART RATE: 88 BPM | SYSTOLIC BLOOD PRESSURE: 125 MMHG | HEIGHT: 64 IN | WEIGHT: 163.6 LBS | TEMPERATURE: 98.3 F

## 2023-01-12 DIAGNOSIS — R30.9 URINATION PAIN: ICD-10-CM

## 2023-01-12 DIAGNOSIS — R35.0 URINARY FREQUENCY: Primary | ICD-10-CM

## 2023-01-12 DIAGNOSIS — R39.9 UTI SYMPTOMS: ICD-10-CM

## 2023-01-12 LAB
BILIRUB UR QL STRIP: NEGATIVE
BLD GP AB SCN SERPL QL: NORMAL
GLUCOSE UR-MCNC: NEGATIVE MG/DL
KETONES P FAST UR STRIP-MCNC: NEGATIVE MG/DL
PH UR STRIP: 7.5 [PH] (ref 4.6–8)
PROT UR QL STRIP: NEGATIVE
SP GR UR STRIP: 1.01 (ref 1–1.03)
UA UROBILINOGEN AMB POC: NORMAL (ref 0.2–1)
URINALYSIS CLARITY POC: CLEAR
URINE BLOOD POC: NEGATIVE
URINE LEUKOCYTES POC: NEGATIVE
URINE NITRITES POC: NEGATIVE

## 2023-01-12 PROCEDURE — 99213 OFFICE O/P EST LOW 20 MIN: CPT | Performed by: NURSE PRACTITIONER

## 2023-01-12 PROCEDURE — 81002 URINALYSIS NONAUTO W/O SCOPE: CPT | Performed by: NURSE PRACTITIONER

## 2023-01-12 NOTE — PROGRESS NOTES
Subjective:      Chief Complaint   Patient presents with    Urinary Pain       Patient : This patient is a 38 y.o. female that presents today with a chief complaint of dysuria. The patient admits to accompanying urgency, frequency, and nausea  for the past three days  The patient denies vaginal discharge, vomiting, and fever. The patient has no history of recent UTIs but notes that GYN tells her that she has strep in urine many times. Trying to push water. Denies any concern for STD        Past Medical History:   Diagnosis Date    Anemia     taking iron    Family history of skin cancer     sister has melanoma,     Gastroparesis     GERD (gastroesophageal reflux disease)     gastroparesis--h-pylori    H. pylori infection     H/O Clostridium difficile infection 52/2149    Helicobacter pylori (H. pylori) 05/2012    h pylori    Palpitations     2010  - cardiac workup per pt. POTS (postural orthostatic tachycardia syndrome)     2/21/20 Dr. David Campbell [facility] 22. Reports tx increased fluid and NA intake, betablocker    Sjogren's syndrome (HCC)     Sun-damaged skin     20's    Sunburn, blistering     8-9     Tanning bed exposure     11 years ago      Current Outpatient Medications   Medication Sig    atenoloL (TENORMIN) 25 mg tablet Take 1 Tablet by mouth daily. cycloSPORINE (RESTASIS) 0.05 % dpet INSTILL 1 DROP IN BOTH EYES TWICE DAILY    fluticasone propionate (FLONASE) 50 mcg/actuation nasal spray 2 Sprays by Both Nostrils route daily as needed for Rhinitis. famotidine (PEPCID) 20 mg tablet Take 1 Tablet by mouth nightly. albuterol (PROVENTIL HFA, VENTOLIN HFA, PROAIR HFA) 90 mcg/actuation inhaler     cetirizine (ZYRTEC) 10 mg tablet Take 1 Tablet by mouth daily. vit B complex no.12/niacin,B3, (VITAMIN B COMPLEX NO.12-NIACIN PO) Take 1 Tab by mouth daily. omeprazole (PRILOSEC) 40 mg capsule Take 40 mg by mouth daily as needed. multivitamin (ONE A DAY) tablet Take 1 Tab by mouth daily.     LINZESS 290 mcg cap capsule Take 290 mcg by mouth as needed. cevimeline (EVOXAC) 30 mg capsule Take 1 Capsule by mouth three (3) times daily for 360 days. No current facility-administered medications for this visit. Allergies   Allergen Reactions    Celexa [Citalopram] Nausea and Vomiting    Ciprofloxacin Shortness of Breath    Diflucan [Fluconazole] Rash     Burning sensation to skin    Macrobid [Nitrofurantoin Monohyd/M-Cryst] Other (comments)    Sulfa (Sulfonamide Antibiotics) Rash     Social History     Tobacco Use    Smoking status: Former     Types: Cigarettes     Quit date: 2013     Years since quittin.1    Smokeless tobacco: Never   Vaping Use    Vaping Use: Never used   Substance Use Topics    Alcohol use: Yes     Alcohol/week: 0.0 standard drinks     Comment: not monthly    Drug use: No       ROS per HPI. Objective:     Visit Vitals  /89 (BP 1 Location: Left upper arm, BP Patient Position: Sitting, BP Cuff Size: Adult long)   Pulse 88   Temp 98.3 °F (36.8 °C) (Temporal)   Resp 18   Ht 5' 4\" (1.626 m)   Wt 163 lb 9.6 oz (74.2 kg)   LMP 2022   SpO2 100%   BMI 28.08 kg/m²       Skin: no pallor, normal turgor  HEENT:  Normocephalic, no conjunctival inflammation, pupils equal round and reactive to light, MMM, no oral lesions  Heart: regular rate and rhythm, no murmurs, rubs or gallops  Lungs: no increased respiratory effort, clear to ausculation bilaterally  Abdomen: soft, no suprapubic tenderness, not distended. Normal bowel sounds. No rebound or guarding. No bruits.   Back: no costovertebral angle tenderness  Extremities:no edema or cyanosis  Neuro awake, alert and oriented        Results for orders placed or performed in visit on 23   AMB POC URINALYSIS DIP STICK MANUAL W/O MICRO   Result Value Ref Range    Antibody screen      Clarity (UA POC) Clear     Glucose (UA POC) Negative Negative    Bilirubin (UA POC) Negative Negative    Ketones (UA POC) Negative Negative    Specific gravity (UA POC) 1.015 1.001 - 1.035    Blood (UA POC) Negative Negative    pH (UA POC) 7.5 4.6 - 8.0    Protein (UA POC) Negative Negative    Urobilinogen (UA POC) 0.2 mg/dL 0.2 - 1    Nitrites (UA POC) Negative Negative    Leukocyte esterase (UA POC) Negative Negative           Assessment/Plan:   Differential diagnosis and treatment options reviewed with patient who is in agreement with treatment plan as outlined below. ICD-10-CM ICD-9-CM    1. Urinary frequency  R35.0 788.41 AMB POC URINALYSIS DIP STICK MANUAL W/O MICRO      CULTURE, URINE      CANCELED: CULTURE, URINE      2. Urination pain  R30.9 788.1 AMB POC URINALYSIS DIP STICK MANUAL W/O MICRO      CULTURE, URINE      CANCELED: CULTURE, URINE      3. UTI symptoms  R39.9 788.99 CULTURE, URINE      CULTURE, URINE          General instruction:  UA normal but will send for culture. Treat symptoms, could try external monistat to see if external irritation     1. Drink plenty of fluids. Try and consume one glass of water an hour. Avoid foods and drink that can irritate the bladder such as caffeine, carbonation, alcohol and spicy foods. 2. You can use Uristat or Azostandard to help with symptoms. These medications can turn your urine an orange color. 3.Tylenol may be taken for any discomfort. Do not take more than 4000mg Tylenol a day. 4.Urinate frequently, and use good hand washing after using the rest room. Urinating pre and post intercourse can prevent UTIs. 5.Take showers instead of bubble baths and avoid using scented soaps  6. Follow-up if symptoms not improving in 2-3 days. 7. If you develop fever, abdominal pain, back pain, or nausea and vomiting then return to clinic or go to the emergency room. This could indicate that you have a more serious infection or and infection in the kidneys. Verbal and written instructions (see AVS) provided. Patient expresses understanding of diagnosis and treatment plan.

## 2023-01-12 NOTE — PROGRESS NOTES
1. \"Have you been to the ER, urgent care clinic since your last visit? Hospitalized since your last visit? \" Yes When: A couple of weeks ago Where: [facility] Reason for visit: Cold Symptoms, negative testing for covid and Flu.     2. \"Have you seen or consulted any other health care providers outside of the [address] [place] [address] since your last visit? \" No     3. For patients aged 39-70: Has the patient had a colonoscopy / FIT/ Cologuard? NA - based on age      If the patient is female:    4. For patients aged 41-77: Has the patient had a mammogram within the past 2 years? No      5. For patients aged 21-65: Has the patient had a pap smear? Yes - no Care Gap present    Health Maintenance Due   Topic Date Due    Cervical cancer screen  Never done    COVID-19 Vaccine (3 - Booster for Pfizer series) 10/15/2021    Flu Vaccine (1) 08/01/2022    Lipid Screen  08/24/2022     Chief Complaint   Patient presents with    Dysuria     Pressure, burning, and frequency.       Visit Vitals  /89 (BP 1 Location: Left upper arm, BP Patient Position: Sitting, BP Cuff Size: Adult long)   Pulse 88   Temp 98.3 °F (36.8 °C) (Temporal)   Resp 18   Ht 5' 4\" (1.626 m)   Wt 163 lb 9.6 oz (74.2 kg)   SpO2 100%   BMI 28.08 kg/m²

## 2023-01-14 LAB
BACTERIA SPEC CULT: NORMAL
SERVICE CMNT-IMP: NORMAL

## 2023-01-17 LAB
25(OH)D3+25(OH)D2 SERPL-MCNC: 41.1 NG/ML (ref 30–100)
ALBUMIN SERPL ELPH-MCNC: 3.8 G/DL (ref 2.9–4.4)
ALBUMIN/GLOB SERPL: 1.3 {RATIO} (ref 0.7–1.7)
ALPHA1 GLOB SERPL ELPH-MCNC: 0.3 G/DL (ref 0–0.4)
ALPHA2 GLOB SERPL ELPH-MCNC: 0.7 G/DL (ref 0.4–1)
ARSENIC BLD-MCNC: 2 UG/L (ref 0–9)
B-GLOBULIN SERPL ELPH-MCNC: 1.2 G/DL (ref 0.7–1.3)
GAMMA GLOB SERPL ELPH-MCNC: 0.9 G/DL (ref 0.4–1.8)
GLOBULIN SER-MCNC: 3.1 G/DL (ref 2.2–3.9)
IGA SERPL-MCNC: 215 MG/DL (ref 87–352)
IGG SERPL-MCNC: 780 MG/DL (ref 586–1602)
IGM SERPL-MCNC: 92 MG/DL (ref 26–217)
INTERPRETATION SERPL IEP-IMP: NORMAL
KAPPA LC FREE SER-MCNC: 16.9 MG/L (ref 3.3–19.4)
KAPPA LC FREE/LAMBDA FREE SER: 0.9 {RATIO} (ref 0.26–1.65)
LAMBDA LC FREE SERPL-MCNC: 18.8 MG/L (ref 5.7–26.3)
LEAD BLD-MCNC: <1 UG/DL (ref 0–3.4)
M PROTEIN SERPL ELPH-MCNC: NORMAL G/DL
MERCURY BLD-MCNC: <1 UG/L (ref 0–14.9)
PLEASE NOTE:, 149534: NORMAL
PROT SERPL-MCNC: 6.9 G/DL (ref 6–8.5)
TSH SERPL DL<=0.005 MIU/L-ACNC: 2.52 UIU/ML (ref 0.45–4.5)
VIT B12 SERPL-MCNC: 619 PG/ML (ref 232–1245)

## 2023-02-03 ENCOUNTER — TELEPHONE (OUTPATIENT)
Dept: NEUROLOGY | Age: 43
End: 2023-02-03

## 2023-02-03 DIAGNOSIS — R20.0 FACIAL NUMBNESS: Primary | ICD-10-CM

## 2023-02-03 NOTE — TELEPHONE ENCOUNTER
Called patient. Informed her that the NP stated, \"Given her symptoms it would just give us a better picture of what's going on structurally. Dr Michelle Ceballos wanted to rule out MS, tumor, stroke, and you would need contrast to see that. But if its going to make her not well we can change it. We will still get one without. \" Patient verbalizes understanding but asked if she can receive a order without contrast.

## 2023-02-03 NOTE — TELEPHONE ENCOUNTER
Called patient regarding to phone call and [employer] message stating, \"Sorry haven't been able to have the Mri done yet was having some issues with my insurance. So I see that you ordered MRI with contrast was wondering if I really have to have the contrast or if you think I could do it without. It's just that last time I had contrast it made my pots symptoms worse for like a whole week after. I understand if you think it's  needed I will do it. \" She stated if it was medically necessary to get it with contrast? I informed her that Dr. Brinda Joshi is not here to answer that as her last day was a few days ago. I will inform the next provider.

## 2023-02-03 NOTE — TELEPHONE ENCOUNTER
----- Message from Luis Daniel Martin sent at 10/23/2017 10:08 AM CDT -----  Contact: mom Anaya [phone]  Mom calling in regards to the status of the paperwork she faxed over on Wednesday. Please call mom to advise -----------  mom Anaya [phone]   In just reading the notes, and given her symptoms it would just give us a better picture of what's going on structurally. Dr Reese Lambert wanted to rule out MS, tumor, stroke, and you would need contrast to see that. But if its going to make her not well we can change it. We will still get one without.  Just let me know

## 2023-02-03 NOTE — TELEPHONE ENCOUNTER
Patient is scheduled for an upcoming MRI with contrast and patient would like to know is it medically necessary due to her blood pressure and other concerns. Can she do without?     Please contact

## 2023-02-23 ENCOUNTER — HOSPITAL ENCOUNTER (OUTPATIENT)
Dept: MRI IMAGING | Age: 43
Discharge: HOME | End: 2023-02-23
Payer: MEDICAID

## 2023-02-23 DIAGNOSIS — R20.0 FACIAL NUMBNESS: ICD-10-CM

## 2023-02-23 PROCEDURE — 70551 MRI BRAIN STEM W/O DYE: CPT

## 2023-04-03 ENCOUNTER — TELEPHONE (OUTPATIENT)
Dept: CARDIOLOGY CLINIC | Age: 43
End: 2023-04-03

## 2023-04-03 ENCOUNTER — HOSPITAL ENCOUNTER (EMERGENCY)
Age: 43
Discharge: HOME | End: 2023-04-03
Attending: EMERGENCY MEDICINE
Payer: MEDICAID

## 2023-04-03 DIAGNOSIS — R25.1 SHAKY: Primary | ICD-10-CM

## 2023-04-03 LAB
ALBUMIN SERPL-MCNC: 3.4 G/DL (ref 3.5–5)
ALBUMIN/GLOB SERPL: 1.1 (ref 1.1–2.2)
ALP SERPL-CCNC: 40 U/L (ref 45–117)
ALT SERPL-CCNC: 21 U/L (ref 12–78)
ANION GAP SERPL CALC-SCNC: 4 MMOL/L (ref 5–15)
AST SERPL-CCNC: 18 U/L (ref 15–37)
BASOPHILS # BLD: 0 K/UL (ref 0–0.1)
BASOPHILS NFR BLD: 1 % (ref 0–1)
BILIRUB SERPL-MCNC: 0.3 MG/DL (ref 0.2–1)
BUN SERPL-MCNC: 7 MG/DL (ref 6–20)
BUN/CREAT SERPL: 13 (ref 12–20)
CALCIUM SERPL-MCNC: 8.5 MG/DL (ref 8.5–10.1)
CHLORIDE SERPL-SCNC: 114 MMOL/L (ref 97–108)
CO2 SERPL-SCNC: 23 MMOL/L (ref 21–32)
CREAT SERPL-MCNC: 0.56 MG/DL (ref 0.55–1.02)
DIFFERENTIAL METHOD BLD: NORMAL
EOSINOPHIL # BLD: 0 K/UL (ref 0–0.4)
EOSINOPHIL NFR BLD: 0 % (ref 0–7)
ERYTHROCYTE [DISTWIDTH] IN BLOOD BY AUTOMATED COUNT: 12.6 % (ref 11.5–14.5)
GLOBULIN SER CALC-MCNC: 3.1 G/DL (ref 2–4)
GLUCOSE SERPL-MCNC: 91 MG/DL (ref 65–100)
HCT VFR BLD AUTO: 39.8 % (ref 35–47)
HGB BLD-MCNC: 13.3 G/DL (ref 11.5–16)
IMM GRANULOCYTES # BLD AUTO: 0 K/UL (ref 0–0.04)
IMM GRANULOCYTES NFR BLD AUTO: 0 % (ref 0–0.5)
LYMPHOCYTES # BLD: 1.7 K/UL (ref 0.8–3.5)
LYMPHOCYTES NFR BLD: 26 % (ref 12–49)
MCH RBC QN AUTO: 32.6 PG (ref 26–34)
MCHC RBC AUTO-ENTMCNC: 33.4 G/DL (ref 30–36.5)
MCV RBC AUTO: 97.5 FL (ref 80–99)
MONOCYTES # BLD: 0.3 K/UL (ref 0–1)
MONOCYTES NFR BLD: 5 % (ref 5–13)
NEUTS SEG # BLD: 4.5 K/UL (ref 1.8–8)
NEUTS SEG NFR BLD: 68 % (ref 32–75)
NRBC # BLD: 0 K/UL (ref 0–0.01)
NRBC BLD-RTO: 0 PER 100 WBC
PLATELET # BLD AUTO: 275 K/UL (ref 150–400)
PMV BLD AUTO: 10.4 FL (ref 8.9–12.9)
POTASSIUM SERPL-SCNC: 4.3 MMOL/L (ref 3.5–5.1)
PROT SERPL-MCNC: 6.5 G/DL (ref 6.4–8.2)
RBC # BLD AUTO: 4.08 M/UL (ref 3.8–5.2)
SODIUM SERPL-SCNC: 141 MMOL/L (ref 136–145)
TROPONIN I SERPL HS-MCNC: 4 NG/L (ref 0–51)
TSH SERPL DL<=0.05 MIU/L-ACNC: 0.93 UIU/ML (ref 0.36–3.74)
WBC # BLD AUTO: 6.6 K/UL (ref 3.6–11)

## 2023-04-03 PROCEDURE — 36415 COLL VENOUS BLD VENIPUNCTURE: CPT

## 2023-04-03 PROCEDURE — 80053 COMPREHEN METABOLIC PANEL: CPT

## 2023-04-03 PROCEDURE — 96360 HYDRATION IV INFUSION INIT: CPT

## 2023-04-03 PROCEDURE — 85025 COMPLETE CBC W/AUTO DIFF WBC: CPT

## 2023-04-03 PROCEDURE — 74011250636 HC RX REV CODE- 250/636: Performed by: PHYSICIAN ASSISTANT

## 2023-04-03 PROCEDURE — 96361 HYDRATE IV INFUSION ADD-ON: CPT

## 2023-04-03 PROCEDURE — 99284 EMERGENCY DEPT VISIT MOD MDM: CPT

## 2023-04-03 PROCEDURE — 84443 ASSAY THYROID STIM HORMONE: CPT

## 2023-04-03 PROCEDURE — 93005 ELECTROCARDIOGRAM TRACING: CPT

## 2023-04-03 PROCEDURE — 84484 ASSAY OF TROPONIN QUANT: CPT

## 2023-04-03 RX ADMIN — SODIUM CHLORIDE 1000 ML: 9 INJECTION, SOLUTION INTRAVENOUS at 14:28

## 2023-04-03 NOTE — ED PROVIDER NOTES
[facility] EMERGENCY DEPT  EMERGENCY DEPARTMENT ENCOUNTER       Pt Name: Jorge Cid  MRN: [id number]  [place] 1980  Date of evaluation: 4/3/2023  Provider: Maryellen Lamb PA-C   PCP: Juanita Corley MD  Note Started: 1:56 PM 4/3/23     CHIEF COMPLAINT       Chief Complaint   Patient presents with    Hypertension     Pt reporting she has been having to take her prn atenolol x 1 week d/t HTN. Pt has hx POTS, has been having episodes of dizziness/trembling when standing x 1 week. HISTORY OF PRESENT ILLNESS: 1 or more elements      History From: Patient  HPI Limitations : None     Jorge Cid is a 38 y.o. female who presents to the emergency department with feeling shaky. Has been present for about a week. The patient states that she feels as though she is having adrenaline rushing through her body. Not having any current chest pain. No shortness of breath. Reports nausea. However no vomiting. No other constitutional symptoms reported     Nursing Notes were all reviewed and agreed with or any disagreements were addressed in the HPI. REVIEW OF SYSTEMS      Review of Systems     Positives and Pertinent negatives as per HPI. PAST HISTORY     Past Medical History:  Past Medical History:   Diagnosis Date    Anemia     taking iron    Family history of skin cancer     sister has melanoma,     Gastroparesis     GERD (gastroesophageal reflux disease)     gastroparesis--h-pylori    H. pylori infection     H/O Clostridium difficile infection 15/0307    Helicobacter pylori (H. pylori) 05/2012    h pylori    Palpitations     2010  - cardiac workup per pt. POTS (postural orthostatic tachycardia syndrome)     2/21/20 Dia Kat 22.  Reports tx increased fluid and NA intake, betablocker    Sjogren's syndrome (HCC)     Sun-damaged skin     20's    Sunburn, blistering     8-9     Tanning bed exposure     11 years ago        Past Surgical History:  Past Surgical History:   Procedure Laterality Date    HX COLONOSCOPY      HX DILATION AND CURETTAGE  2012    HX ENDOSCOPY      HX GYN      tubal reversal laparoscopic    HX TUBAL LIGATION      IR DILATE ESOPH STRICT      TILT TABLE EVAL W/WO DRUG  2017            Family History:  Family History   Problem Relation Age of Onset    Heart Disease Mother     Cancer Mother         Thyroid    Thyroid Disease Mother     Heart Disease Father     High Cholesterol Father     Heart Disease Maternal Grandmother     Diabetes Maternal Grandmother     Other Maternal Grandmother         Heomochromatosis    Thyroid Disease Sister     No Known Problems Brother     Stroke Maternal Grandfather     Diabetes Maternal Grandfather     Hypertension Paternal Grandmother     Heart Disease Paternal Grandfather     Heart Attack Paternal Grandfather        Social History:  Social History     Tobacco Use    Smoking status: Former     Types: Cigarettes     Quit date: 2013     Years since quittin.3    Smokeless tobacco: Never   Vaping Use    Vaping Use: Never used   Substance Use Topics    Alcohol use: Yes     Alcohol/week: 0.0 standard drinks     Comment: not monthly    Drug use: No       Allergies: Allergies   Allergen Reactions    Celexa [Citalopram] Nausea and Vomiting    Ciprofloxacin Shortness of Breath    Diflucan [Fluconazole] Rash     Burning sensation to skin    Macrobid [Nitrofurantoin Monohyd/M-Cryst] Other (comments)    Sulfa (Sulfonamide Antibiotics) Rash       CURRENT MEDICATIONS      Previous Medications    ALBUTEROL (PROVENTIL HFA, VENTOLIN HFA, PROAIR HFA) 90 MCG/ACTUATION INHALER        ATENOLOL (TENORMIN) 25 MG TABLET    Take 1 Tablet by mouth daily. CETIRIZINE (ZYRTEC) 10 MG TABLET    Take 1 Tablet by mouth daily. CEVIMELINE (EVOXAC) 30 MG CAPSULE    Take 1 Capsule by mouth three (3) times daily for 360 days.     CYCLOSPORINE (RESTASIS) 0.05 % DPET    INSTILL 1 DROP IN BOTH EYES TWICE DAILY    FAMOTIDINE (PEPCID) 20 MG TABLET    Take 1 Tablet by mouth nightly. FLUTICASONE PROPIONATE (FLONASE) 50 MCG/ACTUATION NASAL SPRAY    2 Sprays by Both Nostrils route daily as needed for Rhinitis. LINZESS 290 MCG CAP CAPSULE    Take 290 mcg by mouth as needed. MULTIVITAMIN (ONE A DAY) TABLET    Take 1 Tab by mouth daily. OMEPRAZOLE (PRILOSEC) 40 MG CAPSULE    Take 40 mg by mouth daily as needed. VIT B COMPLEX NO.12/NIACIN,B3, (VITAMIN B COMPLEX NO.12-NIACIN PO)    Take 1 Tab by mouth daily. PHYSICAL EXAM      ED Triage Vitals [04/03/23 1331]   ED Encounter Vitals Group      /84      Pulse (Heart Rate) 73      Resp Rate 18      Temp 98.6 °F (37 °C)      Temp src       O2 Sat (%) 100 %      Weight 169 lb 12.1 oz      Height 5' 5\"        Physical Exam  Constitutional:       General: She is not in acute distress. Appearance: She is not ill-appearing or toxic-appearing. HENT:      Head: Normocephalic and atraumatic. Cardiovascular:      Rate and Rhythm: Normal rate and regular rhythm. Pulmonary:      Effort: Pulmonary effort is normal. No respiratory distress. Abdominal:      General: There is no distension. Skin:     General: Skin is warm and dry.         DIAGNOSTIC RESULTS   LABS:     Recent Results (from the past 12 hour(s))   EKG, 12 LEAD, INITIAL    Collection Time: 04/03/23  2:05 PM   Result Value Ref Range    Ventricular Rate 88 BPM    Atrial Rate 88 BPM    P-R Interval 148 ms    QRS Duration 86 ms    Q-T Interval 356 ms    QTC Calculation (Bezet) 430 ms    Calculated P Axis 75 degrees    Calculated R Axis 31 degrees    Calculated T Axis 57 degrees    Diagnosis       Normal sinus rhythm  Low voltage QRS  When compared with ECG of 17-NOV-2022 13:23,  Criteria for Septal infarct are no longer present     CBC WITH AUTOMATED DIFF    Collection Time: 04/03/23  2:22 PM   Result Value Ref Range    WBC 6.6 3.6 - 11.0 K/uL    RBC 4.08 3.80 - 5.20 M/uL    HGB 13.3 11.5 - 16.0 g/dL    HCT 39.8 35.0 - 47.0 %    MCV 97.5 80.0 - 99.0 FL    MCH 32.6 26.0 - 34.0 PG    MCHC 33.4 30.0 - 36.5 g/dL    RDW 12.6 11.5 - 14.5 %    PLATELET 593 726 - 140 K/uL    MPV 10.4 8.9 - 12.9 FL    NRBC 0.0 0  WBC    ABSOLUTE NRBC 0.00 0.00 - 0.01 K/uL    NEUTROPHILS 68 32 - 75 %    LYMPHOCYTES 26 12 - 49 %    MONOCYTES 5 5 - 13 %    EOSINOPHILS 0 0 - 7 %    BASOPHILS 1 0 - 1 %    IMMATURE GRANULOCYTES 0 0.0 - 0.5 %    ABS. NEUTROPHILS 4.5 1.8 - 8.0 K/UL    ABS. LYMPHOCYTES 1.7 0.8 - 3.5 K/UL    ABS. MONOCYTES 0.3 0.0 - 1.0 K/UL    ABS. EOSINOPHILS 0.0 0.0 - 0.4 K/UL    ABS. BASOPHILS 0.0 0.0 - 0.1 K/UL    ABS. IMM. GRANS. 0.0 0.00 - 0.04 K/UL    DF AUTOMATED          EKG: When ordered, EKG's are interpreted by the Emergency Department Physician in the absence of a cardiologist.  Please see their note for interpretation of EKG. RADIOLOGY:  Non-plain film images such as CT, Ultrasound and MRI are read by the radiologist. Plain radiographic images are visualized and preliminarily interpreted by the ED Provider with the below findings:       Interpretation per the Radiologist below, if available at the time of this note:     No results found.       PROCEDURES   Unless otherwise noted below, none  Procedures     CRITICAL CARE TIME       EMERGENCY DEPARTMENT COURSE and DIFFERENTIAL DIAGNOSIS/MDM   Vitals:    Vitals:    04/03/23 1331   BP: 128/84   Pulse: 73   Resp: 18   Temp: 98.6 °F (37 °C)   SpO2: 100%   Weight: 77 kg (169 lb 12.1 oz)   Height: 5' 5\" (1.651 m)        Patient was given the following medications:  Medications   sodium chloride 0.9 % bolus infusion 1,000 mL (1,000 mL IntraVENous New Bag 4/3/23 1428)       CONSULTS: (Who and What was discussed)  None      Social Determinants affecting Dx or Tx: None    Records Reviewed (source and summary of external records): None    CC/HPI Summary, DDx, ED Course, and Reassessment: Patient is 51-year-old female past medical history of POTS presenting to the emergency department with feeling shaky and as though she has adrenaline rushing. I have ordered blood work on this patient. This is pending. She states that oftentimes she needs a bolus of IV fluids with her POTS and this makes her feel better. Therefore I ordered IV fluids as well. It is the end of the shift. Patient will be signed out to Alfredo Stacy, [address]           FINAL IMPRESSION     1. Cecille          DISPOSITION/PLAN       Transition care to oncoming PA     PATIENT REFERRED TO:  Follow-up Information    None           DISCHARGE MEDICATIONS:  Current Discharge Medication List            DISCONTINUED MEDICATIONS:  Current Discharge Medication List          ED Attending Involvement : I have seen and evaluated the patient. My supervision physician was available for consultation. I am the Primary Clinician of Record. Ana Zamora PA-C (electronically signed)    (Please note that parts of this dictation were completed with voice recognition software. Quite often unanticipated grammatical, syntax, homophones, and other interpretive errors are inadvertently transcribed by the computer software. Please disregards these errors.  Please excuse any errors that have escaped final proofreading.)

## 2023-04-03 NOTE — TELEPHONE ENCOUNTER
Pt calling because she has been feeling like she has too much adrenaline, shaking, \"feels like she has been scared\", sick to her stomach. Pt has POTS. HR was elevated last week but not this week. The BP has been all over the place as well it will be high one minute and then be normal. Feels much different than, \"feels off\". Informed pt she has upcoming appt with Ludie Ganser on 4/21.      Call back: [phone]

## 2023-04-03 NOTE — Clinical Note
[address]  [facility] EMERGENCY DEPT  [address] [place] [address]  Juan Jose Singleton [address]  [phone]    Work/School Note    Date: 4/3/2023    To Whom It May concern:    Ashlyn Cline was seen and treated today in the emergency room by the following provider(s):  Attending Provider: Kristie Choi MD  Physician Assistant: Luis Fernando Sepulveda PA-C. Ashlyn Cline is excused from work/school on 04/03/23 and 04/04/23. She is medically clear to return to work/school on 4/5/2023.        Sincerely,          CORINNE Dove

## 2023-04-04 ENCOUNTER — TELEPHONE (OUTPATIENT)
Dept: CARDIOLOGY CLINIC | Age: 43
End: 2023-04-04

## 2023-04-04 LAB
ATRIAL RATE: 88 BPM
CALCULATED P AXIS, ECG09: 75 DEGREES
CALCULATED R AXIS, ECG10: 31 DEGREES
CALCULATED T AXIS, ECG11: 57 DEGREES
DIAGNOSIS, 93000: NORMAL
P-R INTERVAL, ECG05: 148 MS
Q-T INTERVAL, ECG07: 356 MS
QRS DURATION, ECG06: 86 MS
QTC CALCULATION (BEZET), ECG08: 430 MS
VENTRICULAR RATE, ECG03: 88 BPM

## 2023-04-04 NOTE — TELEPHONE ENCOUNTER
Verified patient with two types of identifiers. Informed patient of [facility] RAMONITA recommendations. Holter monitor will be sent STAT to have results prior to appointment. Patient verbalized understanding and will call with any other questions.

## 2023-04-04 NOTE — TELEPHONE ENCOUNTER
Please check 72 hour holter to determine whether she's experiencing sinus tach vs other arrhythmia & to assess tachy burden. Recommend that she tries taking atenolol 12.5 mg po bid. Make sure she's hydrating well.

## 2023-04-04 NOTE — TELEPHONE ENCOUNTER
Verified patient with two types of identifiers. Patient states that she has been feeling like she's got a constant adrenaline rush. This makes her feel very shaky, especially when she's standing. Her BP alternates between normal and hypertensive. She has recently started taking her atenolol again, but not as prescribed. She states that a full tablet makes her feel unwell, so she has been taking a quarter to half a tablet. Today her heart rate is in the 70s. She received a fluid bolus at the ER yesterday, but does not feel that it helped as it usually does. Will follow up with any recommendations. Patient verbalized understanding and will call with any other questions.

## 2023-04-04 NOTE — TELEPHONE ENCOUNTER
Enrolled with [employer] - Ordered and being shipped to patient's home address on file. ETA within 5-7 business days. 72 hour holter  Received: Today  Van Flores  Can you please send her a 72 hour holter to assess for tachy burden per Silvia Mary NP. Please send STAT.   Thank you!!

## 2023-04-19 ENCOUNTER — TRANSCRIBE ORDER (OUTPATIENT)
Dept: SCHEDULING | Age: 43
End: 2023-04-19

## 2023-04-19 DIAGNOSIS — D34 THYROID ADENOMA: Primary | ICD-10-CM

## 2023-04-21 ENCOUNTER — OFFICE VISIT (OUTPATIENT)
Dept: CARDIOLOGY CLINIC | Age: 43
End: 2023-04-21

## 2023-04-21 VITALS
OXYGEN SATURATION: 99 % | DIASTOLIC BLOOD PRESSURE: 70 MMHG | WEIGHT: 167.4 LBS | HEIGHT: 65 IN | SYSTOLIC BLOOD PRESSURE: 100 MMHG | HEART RATE: 74 BPM | RESPIRATION RATE: 18 BRPM | BODY MASS INDEX: 27.89 KG/M2

## 2023-04-21 DIAGNOSIS — R25.1 SHAKINESS: ICD-10-CM

## 2023-04-21 DIAGNOSIS — G90.A POTS (POSTURAL ORTHOSTATIC TACHYCARDIA SYNDROME): Primary | ICD-10-CM

## 2023-04-21 DIAGNOSIS — R06.02 SHORTNESS OF BREATH: ICD-10-CM

## 2023-04-21 RX ORDER — ATENOLOL 25 MG/1
TABLET ORAL
Qty: 90 TABLET | Refills: 1 | Status: SHIPPED | OUTPATIENT
Start: 2023-04-21

## 2023-04-24 ENCOUNTER — HOSPITAL ENCOUNTER (OUTPATIENT)
Dept: ULTRASOUND IMAGING | Age: 43
Discharge: HOME | End: 2023-04-24
Attending: OTOLARYNGOLOGY
Payer: MEDICAID

## 2023-04-24 DIAGNOSIS — D34 THYROID ADENOMA: ICD-10-CM

## 2023-04-24 DIAGNOSIS — D34 THYROID ADENOMA: Primary | ICD-10-CM

## 2023-04-24 PROCEDURE — 76536 US EXAM OF HEAD AND NECK: CPT

## 2023-04-25 ENCOUNTER — TELEPHONE (OUTPATIENT)
Dept: CARDIOLOGY CLINIC | Age: 43
End: 2023-04-25

## 2023-04-25 LAB
EST. AVERAGE GLUCOSE BLD GHB EST-MCNC: 105 MG/DL
HBA1C MFR BLD: 5.3 % (ref 4.8–5.6)

## 2023-04-25 NOTE — TELEPHONE ENCOUNTER
----- Message from Bryanna Whitten NP sent at 4/25/2023 11:50 AM EDT -----  Hgb A1c WNL. No diabetes. She may be experiencing hypoglycemic episodes. As discussed in clinic, recommend frequent small meals that incorporate both protein & carbohydrates.

## 2023-04-25 NOTE — PROGRESS NOTES
Hgb A1c WNL. No diabetes. She may be experiencing hypoglycemic episodes. As discussed in clinic, recommend frequent small meals that incorporate both protein & carbohydrates.

## 2023-05-03 ENCOUNTER — TRANSCRIBE ORDER (OUTPATIENT)
Dept: SCHEDULING | Age: 43
End: 2023-05-03

## 2023-05-03 ENCOUNTER — TRANSCRIBE ORDERS (OUTPATIENT)
Facility: HOSPITAL | Age: 43
End: 2023-05-03

## 2023-05-03 ENCOUNTER — ANESTHESIA EVENT (OUTPATIENT)
Dept: ENDOSCOPY | Age: 43
End: 2023-05-03
Payer: MEDICAID

## 2023-05-03 DIAGNOSIS — D34 THYROID ADENOMA: Primary | ICD-10-CM

## 2023-05-05 ENCOUNTER — HOSPITAL ENCOUNTER (OUTPATIENT)
Age: 43
Setting detail: OUTPATIENT SURGERY
Discharge: HOME | End: 2023-05-05
Attending: SPECIALIST | Admitting: SPECIALIST
Payer: MEDICAID

## 2023-05-05 ENCOUNTER — ANESTHESIA (OUTPATIENT)
Dept: ENDOSCOPY | Age: 43
End: 2023-05-05
Payer: MEDICAID

## 2023-05-05 VITALS
HEART RATE: 69 BPM | WEIGHT: 167 LBS | SYSTOLIC BLOOD PRESSURE: 126 MMHG | HEIGHT: 65 IN | RESPIRATION RATE: 12 BRPM | OXYGEN SATURATION: 100 % | DIASTOLIC BLOOD PRESSURE: 80 MMHG | BODY MASS INDEX: 27.82 KG/M2 | TEMPERATURE: 97.5 F

## 2023-05-05 LAB — HCG UR QL: NEGATIVE

## 2023-05-05 PROCEDURE — 76040000019: Performed by: SPECIALIST

## 2023-05-05 PROCEDURE — 2709999900 HC NON-CHARGEABLE SUPPLY: Performed by: SPECIALIST

## 2023-05-05 PROCEDURE — 77030019988 HC FCPS ENDOSC DISP BSC -B: Performed by: SPECIALIST

## 2023-05-05 PROCEDURE — 74011250636 HC RX REV CODE- 250/636: Performed by: SPECIALIST

## 2023-05-05 PROCEDURE — 74011250636 HC RX REV CODE- 250/636: Performed by: ANESTHESIOLOGY

## 2023-05-05 PROCEDURE — 81025 URINE PREGNANCY TEST: CPT

## 2023-05-05 PROCEDURE — 76060000031 HC ANESTHESIA FIRST 0.5 HR: Performed by: SPECIALIST

## 2023-05-05 PROCEDURE — 74011000250 HC RX REV CODE- 250: Performed by: ANESTHESIOLOGY

## 2023-05-05 RX ORDER — SODIUM CHLORIDE 9 MG/ML
50 INJECTION, SOLUTION INTRAVENOUS CONTINUOUS
Status: DISCONTINUED | OUTPATIENT
Start: 2023-05-05 | End: 2023-05-05 | Stop reason: HOSPADM

## 2023-05-05 RX ORDER — SODIUM CHLORIDE 0.9 % (FLUSH) 0.9 %
5-40 SYRINGE (ML) INJECTION EVERY 8 HOURS
Status: DISCONTINUED | OUTPATIENT
Start: 2023-05-05 | End: 2023-05-05 | Stop reason: HOSPADM

## 2023-05-05 RX ORDER — PROPOFOL 10 MG/ML
INJECTION, EMULSION INTRAVENOUS AS NEEDED
Status: DISCONTINUED | OUTPATIENT
Start: 2023-05-05 | End: 2023-05-05 | Stop reason: HOSPADM

## 2023-05-05 RX ORDER — LIDOCAINE HYDROCHLORIDE 20 MG/ML
INJECTION, SOLUTION EPIDURAL; INFILTRATION; INTRACAUDAL; PERINEURAL AS NEEDED
Status: DISCONTINUED | OUTPATIENT
Start: 2023-05-05 | End: 2023-05-05 | Stop reason: HOSPADM

## 2023-05-05 RX ORDER — SODIUM CHLORIDE 0.9 % (FLUSH) 0.9 %
5-40 SYRINGE (ML) INJECTION AS NEEDED
Status: DISCONTINUED | OUTPATIENT
Start: 2023-05-05 | End: 2023-05-05 | Stop reason: HOSPADM

## 2023-05-05 RX ORDER — DEXTROMETHORPHAN/PSEUDOEPHED 2.5-7.5/.8
1.2 DROPS ORAL
Status: DISCONTINUED | OUTPATIENT
Start: 2023-05-05 | End: 2023-05-05 | Stop reason: HOSPADM

## 2023-05-05 RX ADMIN — SODIUM CHLORIDE 50 ML/HR: 9 INJECTION, SOLUTION INTRAVENOUS at 07:13

## 2023-05-05 RX ADMIN — PROPOFOL 300 MG: 10 INJECTION, EMULSION INTRAVENOUS at 07:56

## 2023-05-05 RX ADMIN — LIDOCAINE HYDROCHLORIDE 60 MG: 20 INJECTION, SOLUTION EPIDURAL; INFILTRATION; INTRACAUDAL; PERINEURAL at 07:42

## 2023-05-09 ENCOUNTER — HOSPITAL ENCOUNTER (EMERGENCY)
Facility: HOSPITAL | Age: 43
Discharge: HOME | End: 2023-05-09
Attending: EMERGENCY MEDICINE
Payer: COMMERCIAL

## 2023-05-09 VITALS
DIASTOLIC BLOOD PRESSURE: 90 MMHG | SYSTOLIC BLOOD PRESSURE: 136 MMHG | HEART RATE: 75 BPM | HEIGHT: 65 IN | OXYGEN SATURATION: 100 % | BODY MASS INDEX: 28.06 KG/M2 | WEIGHT: 168.43 LBS | TEMPERATURE: 98.1 F | RESPIRATION RATE: 18 BRPM

## 2023-05-09 DIAGNOSIS — K12.2 UVULITIS: ICD-10-CM

## 2023-05-09 DIAGNOSIS — K12.0 APHTHOUS ULCER: ICD-10-CM

## 2023-05-09 DIAGNOSIS — J02.9 ACUTE PHARYNGITIS, UNSPECIFIED ETIOLOGY: Primary | ICD-10-CM

## 2023-05-09 PROCEDURE — 6370000000 HC RX 637 (ALT 250 FOR IP): Performed by: EMERGENCY MEDICINE

## 2023-05-09 PROCEDURE — 99283 EMERGENCY DEPT VISIT LOW MDM: CPT

## 2023-05-09 RX ADMIN — ALUMINUM HYDROXIDE, MAGNESIUM HYDROXIDE, AND SIMETHICONE 40 ML: 200; 200; 20 SUSPENSION ORAL at 03:49

## 2023-05-09 ASSESSMENT — PAIN SCALES - GENERAL: PAINLEVEL_OUTOF10: 2

## 2023-05-09 ASSESSMENT — LIFESTYLE VARIABLES
HOW OFTEN DO YOU HAVE A DRINK CONTAINING ALCOHOL: NEVER
HOW MANY STANDARD DRINKS CONTAINING ALCOHOL DO YOU HAVE ON A TYPICAL DAY: PATIENT DOES NOT DRINK

## 2023-05-09 ASSESSMENT — PAIN DESCRIPTION - LOCATION: LOCATION: THROAT

## 2023-05-09 NOTE — ED NOTES
Verbal and written discharge instructions reviewed with pt, pt verbalized understanding.  Pt alert and ambulatory at time of discharge, d/c without issue or complaint      Jesus Crimes, ANAI  05/09/23 [id number]

## 2023-05-09 NOTE — ED NOTES
Had scope on Friday and since Saturday having increased throat burning, difficulty in eating/drinking and noticed sore at back of throat.       Christi Zarate RN  05/09/23 [id number]

## 2023-05-09 NOTE — ED PROVIDER NOTES
Reports tx increased fluid and NA intake, betablocker    Sjogren's syndrome (HCC)     Sun-damaged skin     20's    Sunburn, blistering     8-9     Tanning bed exposure     11 years ago          Past Surgical History:  Past Surgical History:   Procedure Laterality Date    COLONOSCOPY      DILATION AND CURETTAGE OF UTERUS  2012    GYN      tubal reversal laparoscopic    IR ESOPHAGEAL DILITATION      TILT TABLE EVAL W/WO DRUG  2017         TUBAL LIGATION      UPPER GASTROINTESTINAL ENDOSCOPY         Family History:  Family History   Problem Relation Age of Onset    Other Maternal Grandmother         Heomochromatosis    Cancer Mother         Thyroid    Heart Attack Paternal Grandfather     Heart Disease Paternal Grandfather     Hypertension Paternal Grandmother     Thyroid Disease Sister     High Cholesterol Father     No Known Problems Brother     Heart Disease Mother     Heart Disease Maternal Grandmother     Heart Disease Father     Thyroid Disease Mother     Diabetes Maternal Grandmother     Diabetes Maternal Grandfather     Stroke Maternal Grandfather        Social History:  Social History     Tobacco Use    Smoking status: Former     Types: Cigarettes     Quit date: 2013     Years since quittin.4    Smokeless tobacco: Never   Substance Use Topics    Alcohol use: Yes    Drug use: No       Allergies:   Allergies   Allergen Reactions    Ciprofloxacin Shortness Of Breath    Nitrofurantoin Other (See Comments)    Citalopram Nausea And Vomiting    Fluconazole Rash     Burning sensation to skin    Sulfa Antibiotics Rash       CURRENT MEDICATIONS      Discharge Medication List as of 2023  4:09 AM        CONTINUE these medications which have NOT CHANGED    Details   albuterol sulfate HFA (PROVENTIL;VENTOLIN;PROAIR) 108 (90 Base) MCG/ACT inhaler ceived the following from [facility]: Outside name: albuterol (PROVENTIL HFA, VENTOLIN HFA, PROAIR HFA) 90 mcg/actuation inhalerHistorical Med

## 2023-05-22 RX ORDER — CETIRIZINE HYDROCHLORIDE 10 MG/1
TABLET ORAL
Qty: 90 TABLET | Refills: 3 | Status: SHIPPED | OUTPATIENT
Start: 2023-05-22

## 2023-05-30 RX ORDER — ATENOLOL 25 MG/1
TABLET ORAL
Qty: 90 TABLET | Refills: 1 | Status: SHIPPED | OUTPATIENT
Start: 2023-05-30

## 2023-05-30 NOTE — TELEPHONE ENCOUNTER
Request for Atenolol take 25 mg in the AM and 12.5 mg in PM.  Last office visit 4/21/23, next office visit not yet scheduled . Refills per verbal order from Dr. Robyn Chilel.

## 2023-07-18 ENCOUNTER — OFFICE VISIT (OUTPATIENT)
Age: 43
End: 2023-07-18
Payer: COMMERCIAL

## 2023-07-18 VITALS
DIASTOLIC BLOOD PRESSURE: 77 MMHG | HEART RATE: 81 BPM | HEIGHT: 65 IN | WEIGHT: 161.2 LBS | BODY MASS INDEX: 26.86 KG/M2 | SYSTOLIC BLOOD PRESSURE: 118 MMHG

## 2023-07-18 DIAGNOSIS — R25.1 SHAKINESS: Primary | ICD-10-CM

## 2023-07-18 PROCEDURE — 99203 OFFICE O/P NEW LOW 30 MIN: CPT | Performed by: GENERAL ACUTE CARE HOSPITAL

## 2023-07-18 NOTE — PATIENT INSTRUCTIONS
Please complete fasting blood test at 8 AM and based on the results will decide on the next steps in management.

## 2023-07-18 NOTE — PROGRESS NOTES
CHIEF COMPLAINT: evaluation for shakiness    HISTORY OF PRESENT ILLNESS:   Michael Way is a 41 y.o. female with a PMHx as noted below who presents to the endocrinology clinic for evaluation of shakiness. Patient previously following with dr Vinicio Moore for thyroid nodule and also symptoms of tremors and the hormonal evaluation at that time in 2019 were negative. Thyroid function normal, both thyroid antibodies negative, HbA1c was 4.9, lyme disease test normal, serum metanephrines were normal     Indicates her medical history includes:  POTS, aristeo week before menstrual cycle has more symptoms  Sicca syndrome  Gastroparesis  Weeks she feels her energy is above normal and then weeks she feels extremely tired  No salt craving  Menses regular   She has been trying to lose weight, goes to gym, fluctuates +/-15 lbs on average  Denies sleep habit changes  Denies headaches/vision changes  Drinks 1 -2 cups of coffee per day    Fhx: DM2 - mother, thyroid nodules - mother had thyroid Ca, sister     9/23/19: Thyroid ultrasound:               Thyroid gland is only mildly enlarged              Right 1.2 x 0.8 x 0.8 cm solid-cystic nodule, slight increase in cystic portion              Right 3x5 mm stable nodule   3/26/19: Thyroid FNA biopsy: Right dominant nodule: suggestively benign   Reports feeling her tongue is thick at times with difficulty swallowing,   She admits to chronic reflux with gastroparesis,     4/25/2023  Thyroid nodules  Background thyroid parenchymal echotexture is normal. Vascularity is normal. In  the right lobe, there is a poorly circumscribed hypoechoic nodule measuring up  to 6 mm and a separate 6 mm solid/cystic nodule. The right lobe measures 4.2 x 1.1 x 1.1 cm and the left lobe measures 4.0 x 1.4  x 0.8 cm. The isthmus measures 0.2 cm. IMPRESSION:  Subcentimeter right lobe thyroid nodules. The more peripheral right lobe nodule  is now solid and cystic, TR 2.   Hypoechoic medial 6 mm nodule is

## 2023-07-31 RX ORDER — CYCLOSPORINE 0.5 MG/ML
EMULSION OPHTHALMIC
Qty: 30 EACH | Refills: 3 | Status: SHIPPED | OUTPATIENT
Start: 2023-07-31

## 2023-07-31 NOTE — TELEPHONE ENCOUNTER
Last visit 07/26/22  Lab Results   Component Value Date     04/03/2023    K 4.3 04/03/2023     (H) 04/03/2023    CO2 23 04/03/2023    BUN 7 04/03/2023    CREATININE 0.56 04/03/2023    GLUCOSE 91 04/03/2023    CALCIUM 8.5 04/03/2023    PROT 6.5 04/03/2023    LABALBU 3.4 (L) 04/03/2023    BILITOT 0.3 04/03/2023    ALKPHOS 40 (L) 04/03/2023    AST 18 04/03/2023    ALT 21 04/03/2023    GFRAA >60 10/01/2022    AGRATIO 1.1 04/03/2023    GLOB 3.1 04/03/2023     Lab Results   Component Value Date    WBC 6.6 04/03/2023    HGB 13.3 04/03/2023    HCT 39.8 04/03/2023    MCV 97.5 04/03/2023     04/03/2023

## 2023-08-03 RX ORDER — CYCLOSPORINE 0.5 MG/ML
EMULSION OPHTHALMIC
Qty: 30 EACH | Refills: 3 | OUTPATIENT
Start: 2023-08-03

## 2023-10-16 RX ORDER — FLUTICASONE PROPIONATE 50 MCG
SPRAY, SUSPENSION (ML) NASAL
Qty: 16 G | Refills: 5 | Status: SHIPPED | OUTPATIENT
Start: 2023-10-16

## 2023-11-15 RX ORDER — ATENOLOL 25 MG/1
TABLET ORAL
Qty: 90 TABLET | Refills: 1 | Status: SHIPPED | OUTPATIENT
Start: 2023-11-15

## 2023-11-15 NOTE — TELEPHONE ENCOUNTER
Request for atenolol 25 mg. Last office visit 4/21/23, next office visit to be scheduled. Refills per verbal order from Dr. Bree Degroot.

## 2023-11-20 ENCOUNTER — OFFICE VISIT (OUTPATIENT)
Age: 43
End: 2023-11-20
Payer: COMMERCIAL

## 2023-11-20 VITALS
OXYGEN SATURATION: 99 % | WEIGHT: 165 LBS | BODY MASS INDEX: 27.49 KG/M2 | RESPIRATION RATE: 16 BRPM | HEIGHT: 65 IN | SYSTOLIC BLOOD PRESSURE: 120 MMHG | HEART RATE: 89 BPM | DIASTOLIC BLOOD PRESSURE: 85 MMHG | TEMPERATURE: 98 F

## 2023-11-20 DIAGNOSIS — R42 DIZZINESS: Primary | ICD-10-CM

## 2023-11-20 DIAGNOSIS — G90.A POTS (POSTURAL ORTHOSTATIC TACHYCARDIA SYNDROME): ICD-10-CM

## 2023-11-20 DIAGNOSIS — R20.0 FACIAL NUMBNESS: ICD-10-CM

## 2023-11-20 PROCEDURE — 99214 OFFICE O/P EST MOD 30 MIN: CPT | Performed by: NURSE PRACTITIONER

## 2023-11-20 RX ORDER — MECLIZINE HYDROCHLORIDE 25 MG/1
TABLET ORAL
Qty: 30 TABLET | Refills: 1 | Status: SHIPPED | OUTPATIENT
Start: 2023-11-20

## 2023-11-20 ASSESSMENT — PATIENT HEALTH QUESTIONNAIRE - PHQ9
2. FEELING DOWN, DEPRESSED OR HOPELESS: 0
SUM OF ALL RESPONSES TO PHQ QUESTIONS 1-9: 0
1. LITTLE INTEREST OR PLEASURE IN DOING THINGS: 0
SUM OF ALL RESPONSES TO PHQ9 QUESTIONS 1 & 2: 0
SUM OF ALL RESPONSES TO PHQ QUESTIONS 1-9: 0

## 2023-11-20 ASSESSMENT — ENCOUNTER SYMPTOMS
EYES NEGATIVE: 1
ABDOMINAL PAIN: 1

## 2023-11-20 NOTE — PROGRESS NOTES
[facility]  [address] [facility] [place] [address] [place] [address]  Tel: [phone]  Fax: [phone]      Date:  23     Name:  Rocky Garcia  :  1980  MRN:  [id number]     PCP:  Delores Sanders MD    Chief Complaint   Patient presents with    Follow-up     New patient to this clinic from [facility], continue dizziness, SOB, and numbness in face       HISTORY OF PRESENT ILLNESS:  Patient presents today for regular follow up, last seen 2022 by Dr Vikram Joy, pt has POTs and gastroparesis. Patient still continues to experience frequent episodes of a dizzy like feeling. She notes that most commonly it occurs in the early morning, she will be moving around, she will sit down and that is when she experiences the dizziness. The best way she can explain it is that she doesn't feel like she is getting enough blood to her head, she gets very dizzy. It happens daily- to every other day, can last about 20 minutes, it will gradually improved. More often than it used to. Patient is just uncertain what is causing this. Her muscles in her back/chest feel tight when she is having the symptoms to  She also notes that around the sinus area, she has numbness that comes and goes, it can last up to a week. Then she will not experience it again for a couple of months. She sees Cardiology, did a treadmill test and the tilt tablet testing. Saw Pulmonology for SOB but was cleared. She has seen Rheumatology, history of sjorgens. She has seen EN as well. Sees Dr Jonathan Almodovar for gastroparesis. She also has GERD as well. Sees Dr Janie Velazquez for thyroid nodules. Drinks propel and a lot water,coconut water. No Smoking. Tries to do regular exercise, it makes her feel better. Cleans houses for work. At last visit Dr. Vikram Joy ordered updated brain MRI as well as labs, these were all found to be normal.  Patient did have MRA 2016, to be normal.  Patient is also had cervical MRI.         Recap from

## 2023-12-02 RX ORDER — FAMOTIDINE 20 MG/1
20 TABLET, FILM COATED ORAL NIGHTLY
Qty: 90 TABLET | Refills: 1 | Status: SHIPPED | OUTPATIENT
Start: 2023-12-02

## 2023-12-06 RX ORDER — CYCLOSPORINE 0.5 MG/ML
EMULSION OPHTHALMIC
Qty: 180 EACH | Refills: 1 | Status: SHIPPED | OUTPATIENT
Start: 2023-12-06

## 2023-12-06 RX ORDER — FLUTICASONE PROPIONATE 50 MCG
SPRAY, SUSPENSION (ML) NASAL
Qty: 48 G | Refills: 5 | Status: SHIPPED | OUTPATIENT
Start: 2023-12-06

## 2023-12-06 NOTE — TELEPHONE ENCOUNTER
Last office visit 7/26/2022  Lab Results   Component Value Date    WBC 6.6 04/03/2023    HGB 13.3 04/03/2023    HCT 39.8 04/03/2023    MCV 97.5 04/03/2023     04/03/2023     Lab Results   Component Value Date     04/03/2023    K 4.3 04/03/2023     (H) 04/03/2023    CO2 23 04/03/2023    BUN 7 04/03/2023    CREATININE 0.56 04/03/2023    GLUCOSE 91 04/03/2023    CALCIUM 8.5 04/03/2023    PROT 6.5 04/03/2023    LABALBU 3.4 (L) 04/03/2023    BILITOT 0.3 04/03/2023    ALKPHOS 40 (L) 04/03/2023    AST 18 04/03/2023    ALT 21 04/03/2023    GFRAA >60 10/01/2022    AGRATIO 1.1 04/03/2023    GLOB 3.1 04/03/2023

## 2023-12-08 RX ORDER — CYCLOSPORINE 0.5 MG/ML
EMULSION OPHTHALMIC
Qty: 60 EACH | OUTPATIENT
Start: 2023-12-08

## 2023-12-08 RX ORDER — METHOCARBAMOL 500 MG/1
500 TABLET, FILM COATED ORAL 3 TIMES DAILY
Qty: 90 TABLET | Refills: 1 | Status: SHIPPED | OUTPATIENT
Start: 2023-12-08

## 2023-12-16 ENCOUNTER — HOSPITAL ENCOUNTER (EMERGENCY)
Facility: HOSPITAL | Age: 43
Discharge: HOME | End: 2023-12-16

## 2023-12-16 VITALS
HEIGHT: 64 IN | OXYGEN SATURATION: 100 % | BODY MASS INDEX: 28.27 KG/M2 | TEMPERATURE: 98.8 F | WEIGHT: 165.57 LBS | SYSTOLIC BLOOD PRESSURE: 134 MMHG | HEART RATE: 79 BPM | RESPIRATION RATE: 20 BRPM | DIASTOLIC BLOOD PRESSURE: 86 MMHG

## 2023-12-16 DIAGNOSIS — U07.1 COVID: Primary | ICD-10-CM

## 2023-12-16 RX ORDER — METHYLPREDNISOLONE 4 MG/1
TABLET ORAL
Qty: 1 KIT | Refills: 0 | Status: SHIPPED | OUTPATIENT
Start: 2023-12-16 | End: 2023-12-22

## 2023-12-16 ASSESSMENT — PAIN SCALES - GENERAL: PAINLEVEL_OUTOF10: 4

## 2023-12-16 ASSESSMENT — PAIN DESCRIPTION - DESCRIPTORS: DESCRIPTORS: TIGHTNESS

## 2023-12-16 NOTE — DISCHARGE INSTRUCTIONS
Thank You! It was a pleasure taking care of you in our Emergency Department today. We know that when you come to our Emergency Department, you are entrusting us with your health, comfort, and safety. Our physicians and nurses honor that trust, and truly appreciate the opportunity to care for you and your loved ones. We also value your feedback. If you receive a survey about your Emergency Department experience today, please fill it out. We care about our patients' feedback, and we listen to what you have to say. Thank you. JOSS Chowdhury      ________________________________________________________________________  I have included a copy of your lab results and/or radiologic studies from today's visit so you can have them easily available at your follow-up visit. We hope you feel better and please do not hesitate to contact the ED if you have any questions at all! No results found for this or any previous visit (from the past 12 hour(s)). No orders to display       The exam and treatment you received in the Emergency Department were for an urgent problem and are not intended as complete care. It is important that you follow up with a doctor, nurse practitioner, or physician assistant for ongoing care. If your symptoms become worse or you do not improve as expected and you are unable to reach your usual health care provider, you should return to the Emergency Department. We are available 24 hours a day. Please take your discharge instructions with you when you go to your follow-up appointment. If a prescription has been provided, please have it filled as soon as possible to prevent a delay in treatment. Read the entire medication instruction sheet provided to you by the pharmacy.  If you have any questions or reservations about taking the medication due to side effects or interactions with other medications, please call your primary care physician or contact the ER to speak with the charge

## 2023-12-16 NOTE — ED PROVIDER NOTES
[facility] EMERGENCY DEPT  EMERGENCY DEPARTMENT ENCOUNTER       Pt Name: Imogene Duverney  MRN: [id number]  [address] [facility], [place] 1980  Date of evaluation: 12/16/2023  Provider: JOSS Sanchez   PCP: Shagufta Beckwith MD  Note Started: 7:30 AM EST 12/16/23     CHIEF COMPLAINT       Chief Complaint   Patient presents with    Cough     Last Saturday began feeling \"drained\" began coughing Tuesday. Went to [address] [place] [address] ER yesterday . Her coughing feels tight and airway feels blocked. \"My xray looked good\" Covid positive yesterday, with work exposure-she took a mucinex and inhaler this morning along with her pepcid and her omeprazole this morning and  apiece of my beta blocker      HISTORY OF PRESENT ILLNESS: 1 or more elements      History From: Patient  HPI Limitations: None     Imogene Duverney is a 41 y.o. female who presents by POV with upper respiratory complaints. She started feeling ill last weekend. The URI complaints started on Tuesday. She has a dry cough, congestion, generalized malaise, fatigue, headache, subjective fevers. She was seen at another ED yesterday and diagnosed with COVID. She was placed on Mucinex and inhaler, which is helping but symptoms have not resolved. She reports an exposure to COVID at work. Nursing Notes were all reviewed and agreed with or any disagreements were addressed in the HPI. REVIEW OF SYSTEMS      Review of Systems     Positives and Pertinent negatives as per HPI. PAST HISTORY     Past Medical History:  Past Medical History:   Diagnosis Date    Anemia     taking iron    Family history of skin cancer     sister has melanoma,     Gastroparesis     GERD (gastroesophageal reflux disease)     gastroparesis--h-pylori    H. pylori infection     H/O Clostridium difficile infection 62/2453    Helicobacter pylori (H. pylori) 05/2012    h pylori    Palpitations     2010  - cardiac workup per pt. POTS (postural orthostatic tachycardia syndrome)     2/21/20 Dr. Sita Diego, [facility].  Reports

## 2023-12-26 ENCOUNTER — TELEPHONE (OUTPATIENT)
Age: 43
End: 2023-12-26

## 2023-12-26 ENCOUNTER — TELEMEDICINE (OUTPATIENT)
Age: 43
End: 2023-12-26
Payer: COMMERCIAL

## 2023-12-26 DIAGNOSIS — U07.1 COVID: Primary | ICD-10-CM

## 2023-12-26 PROCEDURE — 99213 OFFICE O/P EST LOW 20 MIN: CPT | Performed by: FAMILY MEDICINE

## 2023-12-26 RX ORDER — METHYLPREDNISOLONE 4 MG/1
TABLET ORAL
Qty: 21 TABLET | Refills: 0 | Status: SHIPPED | OUTPATIENT
Start: 2023-12-26 | End: 2024-01-01

## 2023-12-26 RX ORDER — BENZONATATE 200 MG/1
200 CAPSULE ORAL 3 TIMES DAILY PRN
Qty: 30 CAPSULE | Refills: 0 | Status: SHIPPED | OUTPATIENT
Start: 2023-12-26 | End: 2024-01-05

## 2023-12-26 NOTE — PROGRESS NOTES
Chief Complaint   Patient presents with    Cough    Congestion    Sinus Problem     Pt was seen via virtual video visit. Pt was diagnosed with COVID 12/15, cough has lingered. Feels better in general.     Cough occurs in spells. Pt has been using albuterol. Some wheezing, improved with albuterol. 2023    TELEHEALTH EVALUATION -- Audio/Visual    HPI:    Kelly To (:  1980) has requested an audio/video evaluation for the following concern(s):    As above    Review of Systems   Constitutional:  Negative for fever. Respiratory:  Positive for cough. Prior to Visit Medications    Medication Sig Taking? Authorizing Provider   benzonatate (TESSALON) 200 MG capsule Take 1 capsule by mouth 3 times daily as needed for Cough Yes Gadiel Irby MD   methylPREDNISolone (MEDROL DOSEPACK) 4 MG tablet Please follow dose pack directions. Take by mouth.  Yes Gadiel Irby MD   fluticasone (FLONASE) 50 MCG/ACT nasal spray SHAKE LIQUID AND USE 2 SPRAYS IN EACH NOSTRIL DAILY AS NEEDED FOR RHINITIS Yes Gadiel Irby MD   RESTASIS 0.05 % ophthalmic emulsion INSTILL 1 DROP IN BOTH EYES TWICE DAILY Yes Ivana Bashir MD   famotidine (PEPCID) 20 MG tablet TAKE 1 TABLET BY MOUTH EVERY NIGHT Yes Gadiel Irby MD   meclizine (ANTIVERT) 25 MG tablet 1/2-1 po BID PRN dizziness Yes Marilyn Ramon, TEREZA - NP   atenolol (TENORMIN) 25 MG tablet TAKE 1 TABLET BY MOUTH IN THE MORNING AND 1/2 TABLET IN THE EVENING Yes Raulito Lafleur MD   cetirizine (ZYRTEC) 10 MG tablet TAKE 1 TABLET BY MOUTH DAILY Yes Gadiel Irby MD   albuterol sulfate HFA (PROVENTIL;VENTOLIN;PROAIR) 108 (90 Base) MCG/ACT inhaler ceived the following from [facility]: Outside name: albuterol (PROVENTIL HFA, VENTOLIN HFA, PROAIR HFA) 90 mcg/actuation inhaler Yes Automatic Reconciliation, Ar   linaclotide (LINZESS) 290 MCG CAPS capsule Take by mouth as needed Yes Automatic Reconciliation, Ar   omeprazole

## 2023-12-26 NOTE — TELEPHONE ENCOUNTER
verified. Informed pt of message from provider, pt also states she has been having congestion, and sinus issues. Scheduled pt an vv appointment for today with provider to further discuss.

## 2023-12-26 NOTE — TELEPHONE ENCOUNTER
Pt is requesting a cough syrup    Pt had Covid 12/15 and cannot shake the cough.     Pt stated she is testing negative for Covid    Bianca in [address]

## 2023-12-26 NOTE — TELEPHONE ENCOUNTER
Please advise pt to take OTC Delsym, we could call in cough tablets, no cough medication other than over the counter is recommended.

## 2024-01-04 ENCOUNTER — E-VISIT (OUTPATIENT)
Age: 44
End: 2024-01-04

## 2024-01-04 DIAGNOSIS — R05.3 POST-COVID CHRONIC COUGH: ICD-10-CM

## 2024-01-04 DIAGNOSIS — R09.81 SINUS CONGESTION: Primary | ICD-10-CM

## 2024-01-04 DIAGNOSIS — U09.9 POST-COVID CHRONIC COUGH: ICD-10-CM

## 2024-01-04 PROCEDURE — 99422 OL DIG E/M SVC 11-20 MIN: CPT | Performed by: FAMILY MEDICINE

## 2024-01-05 RX ORDER — AZITHROMYCIN 250 MG/1
TABLET, FILM COATED ORAL
Qty: 1 PACKET | Refills: 0 | Status: SHIPPED | OUTPATIENT
Start: 2024-01-05 | End: 2024-01-10

## 2024-01-05 ASSESSMENT — LIFESTYLE VARIABLES: SMOKING_STATUS: NO, I'VE NEVER SMOKED

## 2024-01-05 NOTE — PROGRESS NOTES
Cinthia Judd (1980) initiated an asynchronous digital communication through [employer].    HPI: per patient questionnaire     Exam: not applicable    Diagnoses and all orders for this visit:  Diagnoses and all orders for this visit:    Sinus congestion    Post-COVID chronic cough    Other orders  -     azithromycin (ZITHROMAX) 250 MG tablet; Take 2 tabs (500 mg) on Day 1, and take 1 tab (250 mg) on days 2 through 5.          Time: EV2 - 11-20 minutes were spent on the digital evaluation and management of this patient.     Gadiel Irby MD

## 2024-02-07 ENCOUNTER — E-VISIT (OUTPATIENT)
Age: 44
End: 2024-02-07

## 2024-02-07 DIAGNOSIS — T14.8XXA MUSCLE STRAIN: Primary | ICD-10-CM

## 2024-02-07 PROCEDURE — 99422 OL DIG E/M SVC 11-20 MIN: CPT | Performed by: FAMILY MEDICINE

## 2024-02-07 RX ORDER — PREDNISONE 20 MG/1
20 TABLET ORAL 2 TIMES DAILY
Qty: 10 TABLET | Refills: 0 | Status: SHIPPED | OUTPATIENT
Start: 2024-02-07 | End: 2024-02-12

## 2024-02-07 RX ORDER — METHOCARBAMOL 750 MG/1
750 TABLET, FILM COATED ORAL 4 TIMES DAILY
Qty: 40 TABLET | Refills: 0 | Status: SHIPPED | OUTPATIENT
Start: 2024-02-07 | End: 2024-02-17

## 2024-02-07 NOTE — PROGRESS NOTES
Cinthia Judd (1980) initiated an asynchronous digital communication through [employer].    HPI: per patient questionnaire     Exam: not applicable    Diagnoses and all orders for this visit:  Diagnoses and all orders for this visit:    Muscle strain  -     predniSONE (DELTASONE) 20 MG tablet; Take 1 tablet by mouth 2 times daily for 5 days  -     methocarbamol (ROBAXIN-750) 750 MG tablet; Take 1 tablet by mouth 4 times daily for 10 days          Time: EV2 - 11-20 minutes were spent on the digital evaluation and management of this patient.     Gadiel Irby MD

## 2024-03-06 RX ORDER — FAMOTIDINE 20 MG/1
20 TABLET, FILM COATED ORAL NIGHTLY
Qty: 90 TABLET | Refills: 1 | Status: SHIPPED | OUTPATIENT
Start: 2024-03-06

## 2024-03-07 NOTE — ED NOTES
Pt ambulatory to and from restroom w/o difficulty. Pt tolerated well, back in bed at this time.  UA sent to lab
Report received from [address] Kessler rn now assuming care. Bedside shift report done and patient resting with no complaints.
Shift change report given to [address] [place] [address] (oncoming nurse) by Marci Floyd (offgoing nurse). Report included the following information SBAR, Kardex, ED Summary, Intake/Output, MAR and Recent Results.
77

## 2024-04-09 ENCOUNTER — HOSPITAL ENCOUNTER (EMERGENCY)
Facility: HOSPITAL | Age: 44
Discharge: HOME | End: 2024-04-09
Attending: STUDENT IN AN ORGANIZED HEALTH CARE EDUCATION/TRAINING PROGRAM
Payer: MEDICAID

## 2024-04-09 ENCOUNTER — APPOINTMENT (OUTPATIENT)
Facility: HOSPITAL | Age: 44
End: 2024-04-09
Payer: MEDICAID

## 2024-04-09 VITALS
HEART RATE: 82 BPM | RESPIRATION RATE: 23 BRPM | OXYGEN SATURATION: 100 % | BODY MASS INDEX: 27.31 KG/M2 | WEIGHT: 160 LBS | DIASTOLIC BLOOD PRESSURE: 77 MMHG | TEMPERATURE: 99 F | SYSTOLIC BLOOD PRESSURE: 123 MMHG | HEIGHT: 64 IN

## 2024-04-09 DIAGNOSIS — H53.8 BLURRY VISION: Primary | ICD-10-CM

## 2024-04-09 DIAGNOSIS — R42 LIGHTHEADEDNESS: ICD-10-CM

## 2024-04-09 LAB
ALBUMIN SERPL-MCNC: 4 G/DL (ref 3.5–5)
ALBUMIN/GLOB SERPL: 1.1 (ref 1.1–2.2)
ALP SERPL-CCNC: 50 U/L (ref 45–117)
ALT SERPL-CCNC: 19 U/L (ref 12–78)
ANION GAP SERPL CALC-SCNC: 4 MMOL/L (ref 5–15)
AST SERPL-CCNC: 18 U/L (ref 15–37)
BASOPHILS # BLD: 0.1 K/UL (ref 0–0.1)
BASOPHILS NFR BLD: 1 % (ref 0–1)
BILIRUB SERPL-MCNC: 0.3 MG/DL (ref 0.2–1)
BUN SERPL-MCNC: 8 MG/DL (ref 6–20)
BUN/CREAT SERPL: 10 (ref 12–20)
CALCIUM SERPL-MCNC: 9.4 MG/DL (ref 8.5–10.1)
CHLORIDE SERPL-SCNC: 105 MMOL/L (ref 97–108)
CO2 SERPL-SCNC: 27 MMOL/L (ref 21–32)
COMMENT:: NORMAL
CREAT SERPL-MCNC: 0.8 MG/DL (ref 0.55–1.02)
DIFFERENTIAL METHOD BLD: ABNORMAL
EKG ATRIAL RATE: 88 BPM
EKG DIAGNOSIS: NORMAL
EKG P AXIS: 68 DEGREES
EKG P-R INTERVAL: 148 MS
EKG Q-T INTERVAL: 360 MS
EKG QRS DURATION: 90 MS
EKG QTC CALCULATION (BAZETT): 435 MS
EKG R AXIS: 10 DEGREES
EKG T AXIS: 52 DEGREES
EKG VENTRICULAR RATE: 88 BPM
EOSINOPHIL # BLD: 0 K/UL (ref 0–0.4)
EOSINOPHIL NFR BLD: 0 % (ref 0–7)
ERYTHROCYTE [DISTWIDTH] IN BLOOD BY AUTOMATED COUNT: 12.4 % (ref 11.5–14.5)
GLOBULIN SER CALC-MCNC: 3.6 G/DL (ref 2–4)
GLUCOSE SERPL-MCNC: 113 MG/DL (ref 65–100)
HCG UR QL: NEGATIVE
HCT VFR BLD AUTO: 42 % (ref 35–47)
HGB BLD-MCNC: 13.8 G/DL (ref 11.5–16)
IMM GRANULOCYTES # BLD AUTO: 0 K/UL (ref 0–0.04)
IMM GRANULOCYTES NFR BLD AUTO: 0 % (ref 0–0.5)
LYMPHOCYTES # BLD: 1.1 K/UL (ref 0.8–3.5)
LYMPHOCYTES NFR BLD: 12 % (ref 12–49)
MAGNESIUM SERPL-MCNC: 2.2 MG/DL (ref 1.6–2.4)
MCH RBC QN AUTO: 32.1 PG (ref 26–34)
MCHC RBC AUTO-ENTMCNC: 32.9 G/DL (ref 30–36.5)
MCV RBC AUTO: 97.7 FL (ref 80–99)
MONOCYTES # BLD: 0.3 K/UL (ref 0–1)
MONOCYTES NFR BLD: 3 % (ref 5–13)
NEUTS SEG # BLD: 7.8 K/UL (ref 1.8–8)
NEUTS SEG NFR BLD: 84 % (ref 32–75)
NRBC # BLD: 0 K/UL (ref 0–0.01)
NRBC BLD-RTO: 0 PER 100 WBC
PLATELET # BLD AUTO: 352 K/UL (ref 150–400)
PMV BLD AUTO: 10.2 FL (ref 8.9–12.9)
POTASSIUM SERPL-SCNC: 3.3 MMOL/L (ref 3.5–5.1)
PROT SERPL-MCNC: 7.6 G/DL (ref 6.4–8.2)
RBC # BLD AUTO: 4.3 M/UL (ref 3.8–5.2)
SODIUM SERPL-SCNC: 136 MMOL/L (ref 136–145)
SPECIMEN HOLD: NORMAL
WBC # BLD AUTO: 9.3 K/UL (ref 3.6–11)

## 2024-04-09 PROCEDURE — 93005 ELECTROCARDIOGRAM TRACING: CPT | Performed by: STUDENT IN AN ORGANIZED HEALTH CARE EDUCATION/TRAINING PROGRAM

## 2024-04-09 PROCEDURE — 2580000003 HC RX 258: Performed by: STUDENT IN AN ORGANIZED HEALTH CARE EDUCATION/TRAINING PROGRAM

## 2024-04-09 PROCEDURE — 99285 EMERGENCY DEPT VISIT HI MDM: CPT

## 2024-04-09 PROCEDURE — 85025 COMPLETE CBC W/AUTO DIFF WBC: CPT

## 2024-04-09 PROCEDURE — 83735 ASSAY OF MAGNESIUM: CPT

## 2024-04-09 PROCEDURE — 80053 COMPREHEN METABOLIC PANEL: CPT

## 2024-04-09 PROCEDURE — 81025 URINE PREGNANCY TEST: CPT

## 2024-04-09 PROCEDURE — 36415 COLL VENOUS BLD VENIPUNCTURE: CPT

## 2024-04-09 PROCEDURE — 70551 MRI BRAIN STEM W/O DYE: CPT

## 2024-04-09 RX ORDER — MIDAZOLAM HYDROCHLORIDE 1 MG/ML
1 INJECTION, SOLUTION INTRAMUSCULAR; INTRAVENOUS ONCE
Status: DISCONTINUED | OUTPATIENT
Start: 2024-04-09 | End: 2024-04-09 | Stop reason: HOSPADM

## 2024-04-09 RX ORDER — SODIUM CHLORIDE, SODIUM LACTATE, POTASSIUM CHLORIDE, AND CALCIUM CHLORIDE .6; .31; .03; .02 G/100ML; G/100ML; G/100ML; G/100ML
1000 INJECTION, SOLUTION INTRAVENOUS ONCE
Status: COMPLETED | OUTPATIENT
Start: 2024-04-09 | End: 2024-04-09

## 2024-04-09 RX ADMIN — SODIUM CHLORIDE, POTASSIUM CHLORIDE, SODIUM LACTATE AND CALCIUM CHLORIDE 1000 ML: 600; 310; 30; 20 INJECTION, SOLUTION INTRAVENOUS at 14:30

## 2024-04-09 ASSESSMENT — PAIN - FUNCTIONAL ASSESSMENT: PAIN_FUNCTIONAL_ASSESSMENT: NONE - DENIES PAIN

## 2024-04-09 NOTE — ED TRIAGE NOTES
Pt to er via ems for c/o episode of double vision and dizziness while at the grocery store today. Pt states it lasted about 10 minutes and she started to feel better after she drank a lemonade. Denies any symptoms upon arrival.   Pmh pots, gastroparesis

## 2024-04-09 NOTE — ED PROVIDER NOTES
SHAKE LIQUID AND USE 2 SPRAYS IN EACH NOSTRIL DAILY AS NEEDED FOR RHINITIS    LINACLOTIDE (LINZESS) 290 MCG CAPS CAPSULE    Take by mouth as needed    MECLIZINE (ANTIVERT) 25 MG TABLET    1/2-1 po BID PRN dizziness    METHOCARBAMOL (ROBAXIN) 500 MG TABLET    TAKE 1 TABLET BY MOUTH THREE TIMES DAILY    OMEPRAZOLE (PRILOSEC) 40 MG DELAYED RELEASE CAPSULE    Take by mouth daily as needed    RESTASIS 0.05 % OPHTHALMIC EMULSION    INSTILL 1 DROP IN BOTH EYES TWICE DAILY       SCREENINGS               No data recorded         PHYSICAL EXAM      ED Triage Vitals [04/09/24 1102]   Enc Vitals Group      /77      Pulse 89      Respirations 16      Temp 99 °F (37.2 °C)      Temp Source Oral      SpO2 99 %      Weight - Scale 72.6 kg (160 lb)      Height 1.626 m (5' 4\")      Head Circumference       Peak Flow       Pain Score       Pain Loc       Pain Edu?       Excl. in GC?         Physical Exam  Vitals and nursing note reviewed.   Constitutional:       Appearance: Normal appearance.   HENT:      Head: Normocephalic.   Eyes:      Extraocular Movements: Extraocular movements intact.      Pupils: Pupils are equal, round, and reactive to light.   Cardiovascular:      Rate and Rhythm: Normal rate.   Pulmonary:      Effort: Pulmonary effort is normal.   Abdominal:      General: Abdomen is flat.   Musculoskeletal:         General: No deformity.      Cervical back: Normal range of motion.   Skin:     General: Skin is dry.   Neurological:      General: No focal deficit present.      Mental Status: She is alert and oriented to person, place, and time.      Cranial Nerves: No cranial nerve deficit.      Sensory: No sensory deficit.      Motor: No weakness.      Coordination: Coordination normal.   Psychiatric:         Mood and Affect: Mood normal.         Behavior: Behavior normal.          DIAGNOSTIC RESULTS   LABS:     Recent Results (from the past 24 hour(s))   EKG 12 Lead    Collection Time: 04/09/24 11:11 AM   Result Value

## 2024-04-09 NOTE — ED NOTES
Pt's IV removed. Pt provided D/C instructions and states understanding of D/C teaching. Pt has all belongings. Pt ambulates self out of ED to personal vehicle at this time.  Patient is A&Ox4, on RA, VSS, and is in NAD at the time of discharge.

## 2024-04-09 NOTE — PROGRESS NOTES
MRI PENDING    Completion of MRI Screening Sheet    Fax to [phone] when completed    Please call [phone]  When this is done    Thank You

## 2024-04-11 ENCOUNTER — TELEMEDICINE (OUTPATIENT)
Age: 44
End: 2024-04-11

## 2024-04-11 DIAGNOSIS — G90.A POTS (POSTURAL ORTHOSTATIC TACHYCARDIA SYNDROME): Primary | ICD-10-CM

## 2024-04-11 DIAGNOSIS — Z13.220 SCREENING CHOLESTEROL LEVEL: ICD-10-CM

## 2024-04-11 NOTE — PROGRESS NOTES
[facility]  [facility]  [address] [place] [address]  [address]  [facility]  [address]  [phone] (phone)   [phone] (fax)  Tele-health Visit      Date:  24     Name:  ICNTHIA JUDD  :  1980  MRN:  [id number]     PCP:  Gadiel Irby MD    Cinthia Judd is a 43 y.o. female who was seen by synchronous (real-time) audio-video technology on 2024 for Follow-Up from Hospital (Follow on ER visit for dizziness)    Assessment & Plan:   1. POTS (postural orthostatic tachycardia syndrome)  Assessment & Plan:   Patient with a past medical history significant for POTS previously seen at [facility] and most recently by Marilyn Alexander NP in neurology clinic.  Patient presented to the emergency department  due to sudden onset of vision changes described as blurry vision like everything was blending together, she also felt like she had tiles of vision.  She also noted a sense of brain fog as well as weakness in the upper extremities.  She states the symptoms are consistent with her previous POTS symptoms however the symptoms were more profound and she had a visual disturbance which has not happened before.  Fortunately patient's symptoms have now improved.    -MRI Brain: Negative for acute findings.  No evidence of previous stroke or small vessel disease.    Suspect that patient's symptoms were likely related to POTS.  Due to warmer weather and patient being outside she may have been dehydrated (she states they did have a hard time getting an IV in her to hydrate her) in combination with the horrific amount of pollen in the air.  Fortunately patient's symptoms are improved.  Patient's MRI did not show evidence of stroke.  Suspicion for TIA low due to the duration and severity of her symptoms and negative MRI imaging.    I advised her to continue to stay well-hydrated  Wear LARRY hose when needed  She would also like to be referred back to the POTS clinic at [facility]  Patient's

## 2024-04-11 NOTE — ASSESSMENT & PLAN NOTE
Patient with a past medical history significant for POTS previously seen at [facility] and most recently by Marilyn Alexander NP in neurology clinic.  Patient presented to the emergency department April 9 due to sudden onset of vision changes described as blurry vision like everything was blending together, she also felt like she had tiles of vision.  She also noted a sense of brain fog as well as weakness in the upper extremities.  She states the symptoms are consistent with her previous POTS symptoms however the symptoms were more profound and she had a visual disturbance which has not happened before.  Fortunately patient's symptoms have now improved.    -MRI Brain: Negative for acute findings.  No evidence of previous stroke or small vessel disease.    Suspect that patient's symptoms were likely related to POTS.  Due to warmer weather and patient being outside she may have been dehydrated (she states they did have a hard time getting an IV in her to hydrate her) in combination with the horrific amount of pollen in the air.  Fortunately patient's symptoms are improved.  Patient's MRI did not show evidence of stroke.  Suspicion for TIA low due to the duration and severity of her symptoms and negative MRI imaging.    I advised her to continue to stay well-hydrated  Wear LARRY hose when needed  She would also like to be referred back to the POTS clinic at [facility]  Patient's meclizine number.  If her PCP would prefer to have neurology to continue to renew the meclizine, I would be happy to see patent in follow-up, otherwise she can be seen on a as needed basis.

## 2024-04-24 RX ORDER — CYCLOSPORINE 0.5 MG/ML
EMULSION OPHTHALMIC
Qty: 30 EACH | OUTPATIENT
Start: 2024-04-24

## 2024-05-11 PROBLEM — Z13.220 SCREENING CHOLESTEROL LEVEL: Status: RESOLVED | Noted: 2024-04-11 | Resolved: 2024-05-11

## 2024-05-30 RX ORDER — CETIRIZINE HYDROCHLORIDE 10 MG/1
TABLET ORAL
Qty: 90 TABLET | Refills: 3 | Status: SHIPPED | OUTPATIENT
Start: 2024-05-30

## 2024-06-18 ENCOUNTER — TRANSCRIBE ORDERS (OUTPATIENT)
Facility: HOSPITAL | Age: 44
End: 2024-06-18

## 2024-06-18 DIAGNOSIS — Z12.31 VISIT FOR SCREENING MAMMOGRAM: Primary | ICD-10-CM

## 2024-06-26 ENCOUNTER — HOSPITAL ENCOUNTER (OUTPATIENT)
Facility: HOSPITAL | Age: 44
Discharge: HOME | End: 2024-06-29
Attending: OTOLARYNGOLOGY
Payer: MEDICAID

## 2024-06-26 DIAGNOSIS — D34 THYROID ADENOMA: ICD-10-CM

## 2024-06-26 PROCEDURE — 76536 US EXAM OF HEAD AND NECK: CPT

## 2024-06-27 LAB
CHOLEST SERPL-MCNC: 239 MG/DL (ref 100–199)
HDLC SERPL-MCNC: 69 MG/DL
LDLC SERPL CALC-MCNC: 146 MG/DL (ref 0–99)
TRIGL SERPL-MCNC: 137 MG/DL (ref 0–149)
VLDLC SERPL CALC-MCNC: 24 MG/DL (ref 5–40)

## 2024-07-10 ENCOUNTER — HOSPITAL ENCOUNTER (OUTPATIENT)
Facility: HOSPITAL | Age: 44
Discharge: HOME | End: 2024-07-13
Payer: MEDICAID

## 2024-07-10 VITALS — HEIGHT: 65 IN | BODY MASS INDEX: 26.67 KG/M2 | WEIGHT: 160.05 LBS

## 2024-07-10 DIAGNOSIS — Z12.31 VISIT FOR SCREENING MAMMOGRAM: ICD-10-CM

## 2024-07-10 PROCEDURE — 77067 SCR MAMMO BI INCL CAD: CPT

## 2024-08-14 ENCOUNTER — OFFICE VISIT (OUTPATIENT)
Age: 44
End: 2024-08-14
Payer: MEDICAID

## 2024-08-14 VITALS
OXYGEN SATURATION: 98 % | SYSTOLIC BLOOD PRESSURE: 124 MMHG | DIASTOLIC BLOOD PRESSURE: 86 MMHG | BODY MASS INDEX: 26.66 KG/M2 | HEART RATE: 96 BPM | TEMPERATURE: 98.6 F | WEIGHT: 160 LBS

## 2024-08-14 DIAGNOSIS — Z00.00 ROUTINE GENERAL MEDICAL EXAMINATION AT A HEALTH CARE FACILITY: Primary | ICD-10-CM

## 2024-08-14 DIAGNOSIS — E78.2 MIXED HYPERLIPIDEMIA: ICD-10-CM

## 2024-08-14 PROCEDURE — 99396 PREV VISIT EST AGE 40-64: CPT | Performed by: FAMILY MEDICINE

## 2024-08-14 RX ORDER — AMPICILLIN TRIHYDRATE 250 MG
600 CAPSULE ORAL DAILY
COMMUNITY
Start: 2024-08-14

## 2024-08-14 SDOH — ECONOMIC STABILITY: FOOD INSECURITY: WITHIN THE PAST 12 MONTHS, YOU WORRIED THAT YOUR FOOD WOULD RUN OUT BEFORE YOU GOT MONEY TO BUY MORE.: NEVER TRUE

## 2024-08-14 SDOH — ECONOMIC STABILITY: FOOD INSECURITY: WITHIN THE PAST 12 MONTHS, THE FOOD YOU BOUGHT JUST DIDN'T LAST AND YOU DIDN'T HAVE MONEY TO GET MORE.: NEVER TRUE

## 2024-08-14 SDOH — ECONOMIC STABILITY: INCOME INSECURITY: HOW HARD IS IT FOR YOU TO PAY FOR THE VERY BASICS LIKE FOOD, HOUSING, MEDICAL CARE, AND HEATING?: NOT HARD AT ALL

## 2024-08-14 ASSESSMENT — PATIENT HEALTH QUESTIONNAIRE - PHQ9
SUM OF ALL RESPONSES TO PHQ QUESTIONS 1-9: 0
2. FEELING DOWN, DEPRESSED OR HOPELESS: NOT AT ALL
SUM OF ALL RESPONSES TO PHQ9 QUESTIONS 1 & 2: 0
1. LITTLE INTEREST OR PLEASURE IN DOING THINGS: NOT AT ALL
SUM OF ALL RESPONSES TO PHQ QUESTIONS 1-9: 0

## 2024-08-14 NOTE — PROGRESS NOTES
Chief Complaint   Patient presents with    Annual Exam         Health Maintenance Due   Topic Date Due    Varicella vaccine (1 of 2 - 13+ 2-dose series) Never done    Hepatitis C screen  Never done    Hepatitis B vaccine (1 of 3 - 19+ 3-dose series) Never done    Cervical cancer screen  Never done    COVID-19 Vaccine (3 - 2023-24 season) 09/01/2023    Flu vaccine (1) 08/01/2024         \"Have you been to the ER, urgent care clinic since your last visit?  Hospitalized since your last visit?\"    NO    “Have you seen or consulted any other health care providers outside of [facility] since your last visit?”    no        “Have you had a pap smear?”    Yes [facility]24 [facility]    No cervical cancer screening on file

## 2024-08-14 NOTE — PROGRESS NOTES
Chief Complaint   Patient presents with    Annual Exam     Pt reports that she had her cholesterol run, was elevated.     Pt would like to stay off of medication as long as possible.     Subjective: (As above and below)     Chief Complaint   Patient presents with    Annual Exam     she is a 44 y.o. year old female who presents for evaluation.    Reviewed PmHx, RxHx, FmHx, SocHx, AllgHx and updated in chart.    Review of Systems - negative except as listed above    Objective:     Vitals:    08/14/24 1041   BP: 124/86   Site: Left Upper Arm   Position: Sitting   Cuff Size: Large Adult   Pulse: 96   Temp: 98.6 °F (37 °C)   TempSrc: Temporal   SpO2: 98%   Weight: 72.6 kg (160 lb)     Physical Examination: General appearance - alert, well appearing, and in no distress  Mental status - normal mood, behavior, speech, dress, motor activity, and thought processes  Ears - bilateral TM's and external ear canals normal  Chest - clear to auscultation, no wheezes, rales or rhonchi, symmetric air entry  Heart - normal rate, regular rhythm, normal S1, S2, no murmurs, rubs, clicks or gallops  Musculoskeletal - no joint tenderness, deformity or swelling  Extremities - peripheral pulses normal, no pedal edema, no clubbing or cyanosis    Assessment/ Plan:   1. Routine general medical examination at a health care facility  -discussed preventive health issues    2. Mixed hyperlipidemia  -will start with red yeast rice  -diet change  -recheck in 6 months       Return in about 1 year (around 8/14/2025), or if symptoms worsen or fail to improve.    I have discussed the diagnosis with the patient and the intended plan as seen in the above orders.  The patient has received an after-visit summary and questions were answered concerning future plans.     Medication Side Effects and Warnings were discussed with patient: yes  Patient Labs were reviewed: yes  Patient Past Records were reviewed:  yes    Gadiel Irby M.D.

## 2024-09-09 RX ORDER — ATENOLOL 25 MG/1
TABLET ORAL
Qty: 135 TABLET | Refills: 0 | Status: SHIPPED | OUTPATIENT
Start: 2024-09-09

## 2024-09-17 ENCOUNTER — OFFICE VISIT (OUTPATIENT)
Age: 44
End: 2024-09-17
Payer: MEDICAID

## 2024-09-17 VITALS
DIASTOLIC BLOOD PRESSURE: 70 MMHG | RESPIRATION RATE: 18 BRPM | HEART RATE: 99 BPM | SYSTOLIC BLOOD PRESSURE: 124 MMHG | TEMPERATURE: 98.2 F | HEIGHT: 65 IN | OXYGEN SATURATION: 99 % | BODY MASS INDEX: 27.26 KG/M2 | WEIGHT: 163.6 LBS

## 2024-09-17 DIAGNOSIS — N92.6 LATE PERIOD: Primary | ICD-10-CM

## 2024-09-17 DIAGNOSIS — K64.4 EXTERNAL HEMORRHOID: ICD-10-CM

## 2024-09-17 PROBLEM — K62.89 ANAL OR RECTAL PAIN: Status: ACTIVE | Noted: 2024-09-17

## 2024-09-17 LAB
HCG, PREGNANCY, URINE, POC: NEGATIVE
VALID INTERNAL CONTROL, POC: YES

## 2024-09-17 PROCEDURE — 81025 URINE PREGNANCY TEST: CPT | Performed by: NURSE PRACTITIONER

## 2024-09-17 PROCEDURE — 99213 OFFICE O/P EST LOW 20 MIN: CPT | Performed by: NURSE PRACTITIONER

## 2024-09-17 ASSESSMENT — ENCOUNTER SYMPTOMS
VOMITING: 0
ABDOMINAL PAIN: 0
ABDOMINAL DISTENTION: 0
NAUSEA: 0
BLOOD IN STOOL: 1
CONSTIPATION: 1
RECTAL PAIN: 1
ANAL BLEEDING: 1

## 2024-09-19 ENCOUNTER — TELEPHONE (OUTPATIENT)
Age: 44
End: 2024-09-19

## 2024-10-02 ENCOUNTER — HOSPITAL ENCOUNTER (OUTPATIENT)
Facility: HOSPITAL | Age: 44
Discharge: HOME | End: 2024-10-05
Payer: MEDICAID

## 2024-10-02 ENCOUNTER — TRANSCRIBE ORDERS (OUTPATIENT)
Facility: HOSPITAL | Age: 44
End: 2024-10-02

## 2024-10-02 DIAGNOSIS — R06.02 SHORTNESS OF BREATH: Primary | ICD-10-CM

## 2024-10-02 DIAGNOSIS — R06.02 SHORTNESS OF BREATH: ICD-10-CM

## 2024-10-02 PROCEDURE — 71046 X-RAY EXAM CHEST 2 VIEWS: CPT

## 2024-10-10 ENCOUNTER — TELEPHONE (OUTPATIENT)
Age: 44
End: 2024-10-10

## 2024-10-10 NOTE — TELEPHONE ENCOUNTER
LVM requesting a return call to schedule and Echocardiogram.    Ordered by: Chance Rosas MD  Dx: SOB    Order scanned by me on 10/10/2024.    Any  can assist patient in scheduling this test.  Thanks

## 2024-10-10 NOTE — TELEPHONE ENCOUNTER
Patient returned call and ID Verified by two Identifiers.    Coordinated an Echo with patient on 11/25/2024 at 8:00am.    Future Appointments   Date Time Provider Department Center   11/25/2024  8:00 AM ELHAM LONDONO ECHO [address] [facility]

## 2024-10-20 ENCOUNTER — E-VISIT (OUTPATIENT)
Age: 44
End: 2024-10-20

## 2024-10-20 DIAGNOSIS — R09.81 SINUS CONGESTION: Primary | ICD-10-CM

## 2024-10-20 RX ORDER — ALBUTEROL SULFATE 90 UG/1
1 INHALANT RESPIRATORY (INHALATION) EVERY 4 HOURS PRN
Qty: 18 G | Refills: 1 | Status: SHIPPED | OUTPATIENT
Start: 2024-10-20

## 2024-10-20 RX ORDER — AMOXICILLIN 875 MG
875 TABLET ORAL 2 TIMES DAILY
Qty: 14 TABLET | Refills: 0 | Status: SHIPPED | OUTPATIENT
Start: 2024-10-20 | End: 2024-10-27

## 2024-10-20 ASSESSMENT — LIFESTYLE VARIABLES: SMOKING_STATUS: NO, I'VE NEVER SMOKED

## 2024-11-05 ENCOUNTER — ANESTHESIA EVENT (OUTPATIENT)
Facility: HOSPITAL | Age: 44
End: 2024-11-05
Payer: MEDICAID

## 2024-11-05 NOTE — ANESTHESIA PRE PROCEDURE
Department of Anesthesiology  Preprocedure Note       Name:  Cinthia Judd   Age:  44 y.o.  :  1980                                          MRN:  [id number]         Date:  2024      Surgeon: Surgeon(s):  Jose Raul Zuniga MD    Procedure: Procedure(s):  ESOPHAGOGASTRODUODENOSCOPY    Medications prior to admission:   Prior to Admission medications    Medication Sig Start Date End Date Taking? Authorizing Provider   albuterol sulfate HFA (PROVENTIL;VENTOLIN;PROAIR) 108 (90 Base) MCG/ACT inhaler Inhale 1 puff into the lungs every 4 hours as needed for Wheezing ceived the following from [facility]: Outside name: albuterol (PROVENTIL HFA, VENTOLIN HFA, PROAIR HFA) 90 mcg/actuation inhaler 10/20/24  Yes Gadiel Irby MD   atenolol (TENORMIN) 25 MG tablet TAKE 1 TABLET BY MOUTH IN THE MORNING AND 1/2 TABLET IN THE EVENING Please call to schedule follow up prior to further refills 24  Yes Cj Bautista MD   cetirizine (ZYRTEC) 10 MG tablet TAKE 1 TABLET BY MOUTH DAILY 24  Yes Gadiel Irby MD   famotidine (PEPCID) 20 MG tablet TAKE 1 TABLET BY MOUTH EVERY NIGHT 3/6/24  Yes Gadiel Irby MD   fluticasone (FLONASE) 50 MCG/ACT nasal spray SHAKE LIQUID AND USE 2 SPRAYS IN EACH NOSTRIL DAILY AS NEEDED FOR RHINITIS 23  Yes Gadiel Irby MD   RESTASIS 0.05 % ophthalmic emulsion INSTILL 1 DROP IN BOTH EYES TWICE DAILY 23  Yes Gabo Chiang MD   meclizine (ANTIVERT) 25 MG tablet 1/2-1 po BID PRN dizziness 23  Yes Marilyn Ramon APRN - NP   linaclotide (LINZESS) 290 MCG CAPS capsule Take by mouth as needed 11/10/15  Yes Automatic Reconciliation, Ar   omeprazole (PRILOSEC) 40 MG delayed release capsule Take by mouth daily as needed   Yes Automatic Reconciliation, Ar       Current medications:    No current facility-administered medications for this encounter.     Current Outpatient Medications   Medication Sig Dispense Refill   • albuterol sulfate HFA

## 2024-11-06 ENCOUNTER — HOSPITAL ENCOUNTER (OUTPATIENT)
Facility: HOSPITAL | Age: 44
Setting detail: OUTPATIENT SURGERY
Discharge: HOME | End: 2024-11-06
Attending: SPECIALIST | Admitting: SPECIALIST
Payer: MEDICAID

## 2024-11-06 ENCOUNTER — ANESTHESIA (OUTPATIENT)
Facility: HOSPITAL | Age: 44
End: 2024-11-06
Payer: MEDICAID

## 2024-11-06 VITALS
RESPIRATION RATE: 21 BRPM | DIASTOLIC BLOOD PRESSURE: 67 MMHG | SYSTOLIC BLOOD PRESSURE: 126 MMHG | HEIGHT: 64 IN | HEART RATE: 81 BPM | WEIGHT: 160 LBS | OXYGEN SATURATION: 100 % | BODY MASS INDEX: 27.31 KG/M2 | TEMPERATURE: 97.6 F

## 2024-11-06 LAB — HCG UR QL: NEGATIVE

## 2024-11-06 PROCEDURE — 3700000001 HC ADD 15 MINUTES (ANESTHESIA): Performed by: SPECIALIST

## 2024-11-06 PROCEDURE — 7100000010 HC PHASE II RECOVERY - FIRST 15 MIN: Performed by: SPECIALIST

## 2024-11-06 PROCEDURE — 3600007502: Performed by: SPECIALIST

## 2024-11-06 PROCEDURE — 3600007512: Performed by: SPECIALIST

## 2024-11-06 PROCEDURE — 2500000003 HC RX 250 WO HCPCS: Performed by: NURSE ANESTHETIST, CERTIFIED REGISTERED

## 2024-11-06 PROCEDURE — 88305 TISSUE EXAM BY PATHOLOGIST: CPT

## 2024-11-06 PROCEDURE — 6360000002 HC RX W HCPCS: Performed by: NURSE ANESTHETIST, CERTIFIED REGISTERED

## 2024-11-06 PROCEDURE — 3700000000 HC ANESTHESIA ATTENDED CARE: Performed by: SPECIALIST

## 2024-11-06 PROCEDURE — 2709999900 HC NON-CHARGEABLE SUPPLY: Performed by: SPECIALIST

## 2024-11-06 PROCEDURE — 2580000003 HC RX 258: Performed by: SPECIALIST

## 2024-11-06 PROCEDURE — 6370000000 HC RX 637 (ALT 250 FOR IP): Performed by: NURSE ANESTHETIST, CERTIFIED REGISTERED

## 2024-11-06 PROCEDURE — 81025 URINE PREGNANCY TEST: CPT

## 2024-11-06 RX ORDER — LIDOCAINE HYDROCHLORIDE 20 MG/ML
INJECTION, SOLUTION EPIDURAL; INFILTRATION; INTRACAUDAL; PERINEURAL
Status: DISCONTINUED | OUTPATIENT
Start: 2024-11-06 | End: 2024-11-06 | Stop reason: SDUPTHER

## 2024-11-06 RX ORDER — SODIUM CHLORIDE 9 MG/ML
INJECTION, SOLUTION INTRAVENOUS PRN
Status: DISCONTINUED | OUTPATIENT
Start: 2024-11-06 | End: 2024-11-06 | Stop reason: HOSPADM

## 2024-11-06 RX ORDER — SODIUM CHLORIDE 0.9 % (FLUSH) 0.9 %
5-40 SYRINGE (ML) INJECTION EVERY 12 HOURS SCHEDULED
Status: DISCONTINUED | OUTPATIENT
Start: 2024-11-06 | End: 2024-11-06 | Stop reason: HOSPADM

## 2024-11-06 RX ORDER — SODIUM CHLORIDE 0.9 % (FLUSH) 0.9 %
5-40 SYRINGE (ML) INJECTION PRN
Status: DISCONTINUED | OUTPATIENT
Start: 2024-11-06 | End: 2024-11-06 | Stop reason: HOSPADM

## 2024-11-06 RX ADMIN — PROPOFOL 100 MG: 10 INJECTION, EMULSION INTRAVENOUS at 07:35

## 2024-11-06 RX ADMIN — LIDOCAINE HYDROCHLORIDE 80 MG: 20 INJECTION, SOLUTION EPIDURAL; INFILTRATION; INTRACAUDAL; PERINEURAL at 07:35

## 2024-11-06 RX ADMIN — PROPOFOL 50 MG: 10 INJECTION, EMULSION INTRAVENOUS at 07:39

## 2024-11-06 RX ADMIN — PROPOFOL 50 MG: 10 INJECTION, EMULSION INTRAVENOUS at 07:36

## 2024-11-06 RX ADMIN — BENZOCAINE 3 EACH: 200 SPRAY DENTAL; ORAL; PERIODONTAL at 07:31

## 2024-11-06 RX ADMIN — PROPOFOL 50 MG: 10 INJECTION, EMULSION INTRAVENOUS at 07:44

## 2024-11-06 RX ADMIN — PROPOFOL 50 MG: 10 INJECTION, EMULSION INTRAVENOUS at 07:42

## 2024-11-06 RX ADMIN — SODIUM CHLORIDE: 9 INJECTION, SOLUTION INTRAVENOUS at 07:12

## 2024-11-06 ASSESSMENT — PAIN - FUNCTIONAL ASSESSMENT: PAIN_FUNCTIONAL_ASSESSMENT: 0-10

## 2024-11-06 NOTE — H&P
Gastroenterology Outpatient History and Physical    Patient: Cinthia Judd    Physician: Jose Raul Zuniga MD    Vital Signs: Blood pressure 139/81, pulse (!) 103, temperature 98.3 °F (36.8 °C), resp. rate 13, height 1.626 m (5' 4\"), weight 72.6 kg (160 lb), SpO2 100%.    Allergies:   Allergies   Allergen Reactions    Ciprofloxacin Shortness Of Breath    Nitrofurantoin Other (See Comments)    Citalopram Nausea And Vomiting    Fluconazole Rash     Burning sensation to skin    Sulfa Antibiotics Rash       Chief Complaint: Gastroparesis, Epigastric pain    History of Present Illness: above    Justification for Procedure: above    History:  Past Medical History:   Diagnosis Date    Anemia     taking iron    Family history of skin cancer     sister has melanoma,     Gastroparesis     GERD (gastroesophageal reflux disease)     gastroparesis--h-pylori    H. pylori infection     H/O Clostridium difficile infection 06/2012    Helicobacter pylori (H. pylori) 05/2012    h pylori    Palpitations     2010  - cardiac workup per pt.     POTS (postural orthostatic tachycardia syndrome)     2/21/20 Dr. Mercado, [facility]. Reports tx increased fluid and NA intake, betablocker    Sjogren's syndrome (HCC)     Sun-damaged skin     20's    Sunburn, blistering     8-9     Tanning bed exposure     11 years ago       Past Surgical History:   Procedure Laterality Date    COLONOSCOPY      DILATION AND CURETTAGE OF UTERUS  05/2012    GYN      tubal reversal laparoscopic    IR ESOPHAGEAL DILITATION      TILT TABLE EVAL W/WO DRUG  1/21/2017         TUBAL LIGATION      UPPER GASTROINTESTINAL ENDOSCOPY        Social History     Socioeconomic History    Marital status: Single     Spouse name: None    Number of children: None    Years of education: None    Highest education level: None   Tobacco Use    Smoking status: Former     Current packs/day: 0.00     Types: Cigarettes     Quit date: 11/21/2013     Years since quitting: 10.9     Passive exposure: Past

## 2024-11-06 NOTE — DISCHARGE INSTRUCTIONS
Cinthia ABHILASH Judd  [id number]  1980    EGD DISCHARGE INSTRUCTIONS  Discomfort:  Sore throat- throat lozenges or warm salt water gargle  redness at IV site- apply warm compress to area; if redness or soreness persist- contact your physician  Gaseous discomfort- walking, belching will help relieve any discomfort  You may not operate a vehicle for 12 hours  You may not engage in an occupation involving machinery or appliances for rest of today.  You may not drink alcoholic beverages for at least 12 hours  Avoid making any critical decisions for at least 24 hour  DIET  You may resume your regular diet - however -  remember your colon is empty and a heavy meal will produce gas.   Avoid these foods:  vegetables, fried / greasy foods, carbonated drinks  MEDICATIONS   [unfilled]   Regarding Aspirin or Nonsteroidal medications specifically, please see below.  ACTIVITY  You may resume your normal daily activities.   Spend the remainder of the day resting -  avoid any strenuous activity.    CALL M.D.  ANY SIGN OF   Increasing pain, nausea, vomiting  Abdominal distension (swelling)  New increased bleeding (oral or rectal)  Fever (chills)  Pain in chest area  Bloody discharge from nose or mouth  Shortness of breath  Tylenol as needed for pain.    Follow-up Instructions:   Call Dr. Zuniga for results of procedure / biopsy in 4-5 days at telephone #  [phone]              Continue current medications    Findings:    Mild narrowing in upper esophagus post dilation    Patient Education on Sedation / Analgesia Administered for Procedure      For 24 hours after general anesthesia or intravenous analgesia / sedation:  Have someone responsible help you with your care  Limit your activities  Do not drive and operate hazardous machinery  Do not make important personal, legal or business decisions  Do not drink alcoholic beverages  If you have not urinated within 8 hours after discharge, please contact your physician  Resume  contact your doctor if:    You have new or worse symptoms.     You are losing weight.     You do not get better as expected.   Where can you learn more?  Go to https://www.[employer].net/patientEd and enter Z536 to learn more about \"Gastritis: Care Instructions.\"  Current as of: October 19, 2023  Content Version: 14.2  © 2024 [employer].   Care instructions adapted under license by [employer]. If you have questions about a medical condition or this instruction, always ask your healthcare professional. [employer] disclaims any warranty or liability for your use of this information.

## 2024-11-06 NOTE — PERIOP NOTE
The risks and benefits of the bite block have been explained to patient.  Patient verbalizes understanding.     Scope pre cleaned by Skye returned to patient

## 2024-11-06 NOTE — PROGRESS NOTES
ARRIVAL INFORMATION:  Verified patient name and date of birth, scheduled procedure, and informed consent.   lude:  Clothing,glasses 3 rings    GI FOCUSED ASSESSMENT:  Neuro: Awake, alert, oriented x4  Respiratory: even and unlabored   GI: soft and non-distended  EKG Rhythm: sinus tachycardia    Education:Reviewed general discharge instructions and  information. The risks and benefits of the bite block have been explained to patient.  Patient verbalizes understanding.

## 2024-11-06 NOTE — OP NOTE
Esophagogastroduodenoscopy Procedure Note      Cinthia Judd  1980  [id number]    Indication:  Gastroparesis with intermittent dysphagia      Endoscopist: Jose Raul Zuniga MD    Referring Provider:  Gadiel Irby MD    Sedation:  MAC anesthesia Propofol    Procedure Details:  After infomed consent was obtained for the procedure, with all risks and benefits of procedure explained the patient was taken to the endoscopy suite and placed in the left lateral decubitus position.  Following sequential administration of sedation as per above, the endoscope was inserted into the mouth and advanced under direct vision to second portion of the duodenum.  A careful inspection was made as the gastroscope was withdrawn, including a retroflexed view of the proximal stomach; findings and interventions are described below.      Findings:     Esophagus:   + Mild narrowing at the UES c/w cricopharyngeal hypertrophy s/p dilation with 54 FR Savary over wire.  + Z line with irregular borders at 38 cm s/p Bx  Stomach:   + Diffuse erythema c/w nonerosive gastritis s/p Bx.  Wide open pylorus.    Duodenum:   - The bulb and post bulbar mucosa is normal in appearance to the second portion. The duodenal folds appeared normal.  Cold forceps biopsies obtained to r/o celiac.     Therapies:    esophageal dilation with savary sizes 54 FR  biopsy of esophagus  biopsy of stomach   biopsy of duodenal     Specimen:  Specimens were collected as described and send to the laboratory.           Complications:   None were encountered during the procedure.    EBL: <10ml.          Recommendations:   -continue gastroparesis, GERD diets  -continue current meds  -f/u post procedure    Jose Raul Zuniga MD  11/6/2024  7:52 AM

## 2024-11-06 NOTE — ANESTHESIA POSTPROCEDURE EVALUATION
Department of Anesthesiology  Postprocedure Note    Patient: Cinthia Judd  MRN: [id number]  YOB: 1980  Date of evaluation: 11/6/2024    Procedure Summary       Date: 11/06/24 Room / Location: [facility] ENDO 01 / [facility] ENDOSCOPY    Anesthesia Start: 0729 Anesthesia Stop: 0755    Procedure: ESOPHAGOGASTRODUODENOSCOPY (Upper GI Region) Diagnosis:       Constipation, unspecified constipation type      Non-ulcer dyspepsia      Gastroesophageal reflux disease, unspecified whether esophagitis present      Irritable bowel syndrome with constipation      (Constipation, unspecified constipation type [K59.00])      (Non-ulcer dyspepsia [K30])      (Gastroesophageal reflux disease, unspecified whether esophagitis present [K21.9])      (Irritable bowel syndrome with constipation [K58.1])    Surgeons: Jose Raul Zuniga MD Responsible Provider: ADONIS Donovan MD    Anesthesia Type: TIVA ASA Status: 2            Anesthesia Type: No value filed.    Vikram Phase I: Vikram Score: 10    Vikram Phase II: Vikram Score: 10    Anesthesia Post Evaluation    Patient location during evaluation: bedside  Patient participation: complete - patient participated  Level of consciousness: responsive to verbal stimuli and awake and alert  Pain score: 2  Nausea & Vomiting: no nausea  Cardiovascular status: blood pressure returned to baseline  Respiratory status: acceptable  Hydration status: euvolemic  Multimodal analgesia pain management approach  Pain management: adequate    No notable events documented.

## 2024-11-12 RX ORDER — ATENOLOL 25 MG/1
TABLET ORAL
Qty: 45 TABLET | Refills: 0 | Status: SHIPPED | OUTPATIENT
Start: 2024-11-12

## 2024-11-12 NOTE — TELEPHONE ENCOUNTER
Request for atenolol 25 mg.  Last office visit 4/21/23, next office visit to be scheduled. Refills per verbal order from Rocio Rob NP.

## 2024-12-31 ENCOUNTER — OFFICE VISIT (OUTPATIENT)
Age: 44
End: 2024-12-31
Payer: MEDICAID

## 2024-12-31 VITALS
HEART RATE: 89 BPM | WEIGHT: 156.8 LBS | RESPIRATION RATE: 17 BRPM | DIASTOLIC BLOOD PRESSURE: 88 MMHG | OXYGEN SATURATION: 92 % | SYSTOLIC BLOOD PRESSURE: 128 MMHG | HEIGHT: 64 IN | TEMPERATURE: 97.9 F | BODY MASS INDEX: 26.77 KG/M2

## 2024-12-31 DIAGNOSIS — J18.9 WALKING PNEUMONIA: Primary | ICD-10-CM

## 2024-12-31 DIAGNOSIS — J45.20 MILD INTERMITTENT ASTHMA WITHOUT COMPLICATION: ICD-10-CM

## 2024-12-31 PROCEDURE — 99213 OFFICE O/P EST LOW 20 MIN: CPT | Performed by: FAMILY MEDICINE

## 2024-12-31 RX ORDER — ALBUTEROL SULFATE 0.83 MG/ML
2.5 SOLUTION RESPIRATORY (INHALATION) ONCE
Status: SHIPPED | OUTPATIENT
Start: 2024-12-31

## 2024-12-31 RX ORDER — ALBUTEROL SULFATE 0.83 MG/ML
2.5 SOLUTION RESPIRATORY (INHALATION) EVERY 4 HOURS PRN
Qty: 100 EACH | Refills: 11 | Status: SHIPPED | OUTPATIENT
Start: 2024-12-31

## 2024-12-31 NOTE — PROGRESS NOTES
The patient identity was confirmed with  and First/Last Name. Medications and Allergies reviewed with patient, as well as any new diagnosis/procedures.     Chief Complaint   Patient presents with    Follow-up     ED Follow Up        Vitals:    24 1332   BP: 128/88   Pulse: 89   Resp: 17   Temp: 97.9 °F (36.6 °C)   SpO2: 92%       Health Maintenance Due   Topic Date Due    Varicella vaccine (1 of 2 - 13+ 2-dose series) Never done    Hepatitis C screen  Never done    Hepatitis B vaccine (1 of 3 - 19+ 3-dose series) Never done    Cervical cancer screen  Never done    Flu vaccine (1) 2024    COVID-19 Vaccine (3 - 2023-24 season) 2024          \"Have you been to the ER, urgent care clinic since your last visit?  Hospitalized since your last visit?\"    YES - When: approximately 8 days ago.  Where and Why: Cough and Congestion, also seen by urgent care days prior to  ED visit.    “Have you seen or consulted any other health care providers outside our system since your last visit?”    NO     “Have you had a pap smear?”    NO    No cervical cancer screening on file

## 2024-12-31 NOTE — PROGRESS NOTES
HPI  Cinthia Judd is a 44 y.o. female who presents for emergency room follow-up.        Daughter was diagnosed with pneumonia 12/19.  Was seen in urgent care 12/21 and given a Medrol Dosepak.  Few days later felt like she could not breathe went to the emergency room 12/23 diagnosed with bronchitis but was given the treatment for walking pneumonia Augmentin and azithromycin.  Does feel like it is helping but is still feeling pretty sick.  Feels weak, out of it, feels like her chest is tight shortness of breath and cough.  She has not been able to work because she feels shortness of breath with exertion      PMHx:  Past Medical History:   Diagnosis Date    Anemia     taking iron    Family history of skin cancer     sister has melanoma,     Gastroparesis     GERD (gastroesophageal reflux disease)     gastroparesis--h-pylori    H. pylori infection     H/O Clostridium difficile infection 06/2012    Helicobacter pylori (H. pylori) 05/2012    h pylori    Palpitations     2010  - cardiac workup per pt.     POTS (postural orthostatic tachycardia syndrome)     2/21/20 Dr. Mercado, [facility]. Reports tx increased fluid and NA intake, betablocker    Sjogren's syndrome (HCC)     Sun-damaged skin     20's    Sunburn, blistering     8-9     Tanning bed exposure     11 years ago        Meds:   Current Outpatient Medications   Medication Sig Dispense Refill    albuterol (PROVENTIL) (2.5 MG/3ML) 0.083% nebulizer solution Take 3 mLs by nebulization every 4 hours as needed for Wheezing 100 each 11    atenolol (TENORMIN) 25 MG tablet TAKE 1 TABLET BY MOUTH EVERY MORNING AND 1/2 TABLET EVERY EVENING 45 tablet 0    albuterol sulfate HFA (PROVENTIL;VENTOLIN;PROAIR) 108 (90 Base) MCG/ACT inhaler Inhale 1 puff into the lungs every 4 hours as needed for Wheezing ceived the following from [facility]: Outside name: albuterol (PROVENTIL HFA, VENTOLIN HFA, PROAIR HFA) 90 mcg/actuation inhaler 18 g 1    cetirizine (ZYRTEC) 10 MG tablet

## 2025-01-13 ENCOUNTER — OFFICE VISIT (OUTPATIENT)
Age: 45
End: 2025-01-13
Payer: MEDICAID

## 2025-01-13 VITALS
WEIGHT: 161 LBS | HEART RATE: 88 BPM | DIASTOLIC BLOOD PRESSURE: 83 MMHG | RESPIRATION RATE: 15 BRPM | OXYGEN SATURATION: 98 % | HEIGHT: 64 IN | BODY MASS INDEX: 27.49 KG/M2 | TEMPERATURE: 98.7 F | SYSTOLIC BLOOD PRESSURE: 128 MMHG

## 2025-01-13 DIAGNOSIS — R05.9 COUGH, UNSPECIFIED TYPE: Primary | ICD-10-CM

## 2025-01-13 PROCEDURE — 99213 OFFICE O/P EST LOW 20 MIN: CPT | Performed by: FAMILY MEDICINE

## 2025-01-13 RX ORDER — METHYLPREDNISOLONE 4 MG/1
4 TABLET ORAL SEE ADMIN INSTRUCTIONS
COMMUNITY
Start: 2025-01-08

## 2025-01-13 RX ORDER — AZITHROMYCIN 250 MG/1
TABLET, FILM COATED ORAL
Qty: 1 PACKET | Refills: 0 | Status: SHIPPED | OUTPATIENT
Start: 2025-01-13 | End: 2025-01-18

## 2025-01-13 RX ORDER — LINACLOTIDE 145 UG/1
145 CAPSULE, GELATIN COATED ORAL
COMMUNITY
Start: 2024-10-17

## 2025-01-13 RX ORDER — FLUTICASONE PROPIONATE AND SALMETEROL 250; 50 UG/1; UG/1
1 POWDER RESPIRATORY (INHALATION) EVERY 12 HOURS
Qty: 60 EACH | Refills: 3 | Status: SHIPPED | OUTPATIENT
Start: 2025-01-13

## 2025-01-13 NOTE — PROGRESS NOTES
Chief Complaint   Patient presents with    ED Follow-up     [facility] LEV  12/23/24 FLU turned to pneumonia     Cough    Wheezing     Patient has been on antibiotic steroid packs, patient still not much better.  Mucus still has yellow with blood in it.      Pt reports she was diagnosed with flu, then turned into pneumonia.     Pt has been on Zpak with Augmentin, several steroid packs.     Pt reports she can not handle doxy.     Subjective: (As above and below)     Chief Complaint   Patient presents with    ED Follow-up     [facility] LEV UC 12/23/24 FLU turned to pneumonia     Cough    Wheezing     Patient has been on antibiotic steroid packs, patient still not much better.  Mucus still has yellow with blood in it.      she is a 44 y.o. year old female who presents for evaluation.    Reviewed PmHx, RxHx, FmHx, SocHx, AllgHx and updated in chart.    Review of Systems - negative except as listed above    Objective:     Vitals:    01/13/25 0735   BP: 128/83   Site: Left Upper Arm   Pulse: 88   Resp: 15   Temp: 98.7 °F (37.1 °C)   SpO2: 98%   Weight: 73 kg (161 lb)   Height: 1.626 m (5' 4\")     Physical Examination: General appearance - alert, well appearing, and in no distress  Mental status - normal mood, behavior, speech, dress, motor activity, and thought processes  Chest - clear to auscultation, no wheezes, rales or rhonchi, symmetric air entry  Heart - normal rate, regular rhythm, normal S1, S2, no murmurs, rubs, clicks or gallops  Musculoskeletal - no joint tenderness, deformity or swelling  Extremities - peripheral pulses normal, no pedal edema, no clubbing or cyanosis    Assessment/ Plan:   1. Cough, unspecified type  -repeat zpak, add advair inflammation  - fluticasone-salmeterol (ADVAIR) 250-50 MCG/ACT AEPB diskus inhaler; Inhale 1 puff into the lungs in the morning and 1 puff in the evening.  Dispense: 60 each; Refill: 3  - azithromycin (ZITHROMAX) 250 MG tablet; Take 2 tabs (500 mg) on Day 1, and take 1 tab (250 mg)

## 2025-01-13 NOTE — PROGRESS NOTES
Cinthia Judd is a 44 y.o. female    Chief Complaint   Patient presents with    ED Follow-up     [facility] LEV  12/23/24 FLU turned to pneumonia     Cough    Wheezing     Patient has been on antibiotic steroid packs, patient still not much better.  Mucus still has yellow with blood in it.      Vitals:    01/13/25 0735   BP: 128/83   Site: Left Upper Arm   Pulse: 88   Resp: 15   Temp: 98.7 °F (37.1 °C)   SpO2: 98%   Weight: 73 kg (161 lb)   Height: 1.626 m (5' 4\")         Health Maintenance Due   Topic Date Due    Varicella vaccine (1 of 2 - 13+ 2-dose series) Never done    Hepatitis C screen  Never done    Hepatitis B vaccine (1 of 3 - 19+ 3-dose series) Never done    Cervical cancer screen  Never done    Flu vaccine (1) 08/01/2024    COVID-19 Vaccine (3 - 2023-24 season) 09/01/2024         \"Have you been to the ER, urgent care clinic since your last visit?  Hospitalized since your last visit?\"    NO    “Have you seen or consulted any other health care providers outside of [facility] since your last visit?”    NO        “Have you had a pap smear?”    YES - [facility]    No cervical cancer screening on file

## 2025-02-11 RX ORDER — FLUTICASONE PROPIONATE 50 MCG
SPRAY, SUSPENSION (ML) NASAL
Qty: 48 G | Refills: 5 | Status: SHIPPED | OUTPATIENT
Start: 2025-02-11

## 2025-02-20 ENCOUNTER — APPOINTMENT (OUTPATIENT)
Facility: HOSPITAL | Age: 45
End: 2025-02-20
Payer: MEDICAID

## 2025-02-20 ENCOUNTER — HOSPITAL ENCOUNTER (EMERGENCY)
Facility: HOSPITAL | Age: 45
Discharge: HOME | End: 2025-02-20
Payer: MEDICAID

## 2025-02-20 VITALS
HEART RATE: 88 BPM | DIASTOLIC BLOOD PRESSURE: 81 MMHG | SYSTOLIC BLOOD PRESSURE: 148 MMHG | RESPIRATION RATE: 20 BRPM | BODY MASS INDEX: 27.17 KG/M2 | OXYGEN SATURATION: 100 % | HEIGHT: 64 IN | TEMPERATURE: 97.9 F | WEIGHT: 159.17 LBS

## 2025-02-20 DIAGNOSIS — N39.0 URINARY TRACT INFECTION WITH HEMATURIA, SITE UNSPECIFIED: Primary | ICD-10-CM

## 2025-02-20 DIAGNOSIS — R31.9 URINARY TRACT INFECTION WITH HEMATURIA, SITE UNSPECIFIED: Primary | ICD-10-CM

## 2025-02-20 DIAGNOSIS — R42 DIZZINESS: ICD-10-CM

## 2025-02-20 LAB
ALBUMIN SERPL-MCNC: 4 G/DL (ref 3.5–5)
ALBUMIN/GLOB SERPL: 1.1 (ref 1.1–2.2)
ALP SERPL-CCNC: 48 U/L (ref 45–117)
ALT SERPL-CCNC: 22 U/L (ref 12–78)
ANION GAP SERPL CALC-SCNC: 6 MMOL/L (ref 2–12)
APPEARANCE UR: CLEAR
AST SERPL-CCNC: 16 U/L (ref 15–37)
BACTERIA URNS QL MICRO: NEGATIVE /HPF
BASOPHILS # BLD: 0.06 K/UL (ref 0–0.1)
BASOPHILS NFR BLD: 0.5 % (ref 0–1)
BILIRUB SERPL-MCNC: 0.6 MG/DL (ref 0.2–1)
BILIRUB UR QL: NEGATIVE
BUN SERPL-MCNC: 9 MG/DL (ref 6–20)
BUN/CREAT SERPL: 11 (ref 12–20)
CALCIUM SERPL-MCNC: 9.2 MG/DL (ref 8.5–10.1)
CHLORIDE SERPL-SCNC: 103 MMOL/L (ref 97–108)
CO2 SERPL-SCNC: 27 MMOL/L (ref 21–32)
COLOR UR: ABNORMAL
CREAT SERPL-MCNC: 0.83 MG/DL (ref 0.55–1.02)
DIFFERENTIAL METHOD BLD: ABNORMAL
EOSINOPHIL # BLD: 0 K/UL (ref 0–0.4)
EOSINOPHIL NFR BLD: 0 % (ref 0–7)
EPITH CASTS URNS QL MICRO: ABNORMAL /LPF
ERYTHROCYTE [DISTWIDTH] IN BLOOD BY AUTOMATED COUNT: 13.8 % (ref 11.5–14.5)
GLOBULIN SER CALC-MCNC: 3.5 G/DL (ref 2–4)
GLUCOSE SERPL-MCNC: 116 MG/DL (ref 65–100)
GLUCOSE UR STRIP.AUTO-MCNC: NEGATIVE MG/DL
HCG UR QL: NEGATIVE
HCT VFR BLD AUTO: 40.4 % (ref 35–47)
HGB BLD-MCNC: 13.4 G/DL (ref 11.5–16)
HGB UR QL STRIP: NEGATIVE
IMM GRANULOCYTES # BLD AUTO: 0.05 K/UL (ref 0–0.04)
IMM GRANULOCYTES NFR BLD AUTO: 0.4 % (ref 0–0.5)
KETONES UR QL STRIP.AUTO: NEGATIVE MG/DL
LEUKOCYTE ESTERASE UR QL STRIP.AUTO: ABNORMAL
LYMPHOCYTES # BLD: 1.09 K/UL (ref 0.8–3.5)
LYMPHOCYTES NFR BLD: 8.5 % (ref 12–49)
MCH RBC QN AUTO: 31.7 PG (ref 26–34)
MCHC RBC AUTO-ENTMCNC: 33.2 G/DL (ref 30–36.5)
MCV RBC AUTO: 95.5 FL (ref 80–99)
MONOCYTES # BLD: 0.27 K/UL (ref 0–1)
MONOCYTES NFR BLD: 2.1 % (ref 5–13)
NEUTS SEG # BLD: 11.34 K/UL (ref 1.8–8)
NEUTS SEG NFR BLD: 88.5 % (ref 32–75)
NITRITE UR QL STRIP.AUTO: NEGATIVE
NRBC # BLD: 0 K/UL (ref 0–0.01)
NRBC BLD-RTO: 0 PER 100 WBC
PH UR STRIP: 6.5 (ref 5–8)
PLATELET # BLD AUTO: 314 K/UL (ref 150–400)
PMV BLD AUTO: 10.4 FL (ref 8.9–12.9)
POTASSIUM SERPL-SCNC: 3.6 MMOL/L (ref 3.5–5.1)
PROT SERPL-MCNC: 7.5 G/DL (ref 6.4–8.2)
PROT UR STRIP-MCNC: NEGATIVE MG/DL
RBC # BLD AUTO: 4.23 M/UL (ref 3.8–5.2)
RBC #/AREA URNS HPF: ABNORMAL /HPF (ref 0–5)
SODIUM SERPL-SCNC: 136 MMOL/L (ref 136–145)
SP GR UR REFRACTOMETRY: <1.005
TROPONIN I SERPL HS-MCNC: <4 NG/L (ref 0–51)
URINE CULTURE IF INDICATED: ABNORMAL
UROBILINOGEN UR QL STRIP.AUTO: 0.2 EU/DL (ref 0.2–1)
WBC # BLD AUTO: 12.8 K/UL (ref 3.6–11)
WBC URNS QL MICRO: ABNORMAL /HPF (ref 0–4)

## 2025-02-20 PROCEDURE — 93005 ELECTROCARDIOGRAM TRACING: CPT | Performed by: STUDENT IN AN ORGANIZED HEALTH CARE EDUCATION/TRAINING PROGRAM

## 2025-02-20 PROCEDURE — 81001 URINALYSIS AUTO W/SCOPE: CPT

## 2025-02-20 PROCEDURE — 2580000003 HC RX 258

## 2025-02-20 PROCEDURE — 80053 COMPREHEN METABOLIC PANEL: CPT

## 2025-02-20 PROCEDURE — 84484 ASSAY OF TROPONIN QUANT: CPT

## 2025-02-20 PROCEDURE — 36415 COLL VENOUS BLD VENIPUNCTURE: CPT

## 2025-02-20 PROCEDURE — 6360000002 HC RX W HCPCS

## 2025-02-20 PROCEDURE — 99284 EMERGENCY DEPT VISIT MOD MDM: CPT

## 2025-02-20 PROCEDURE — 96374 THER/PROPH/DIAG INJ IV PUSH: CPT

## 2025-02-20 PROCEDURE — 85025 COMPLETE CBC W/AUTO DIFF WBC: CPT

## 2025-02-20 PROCEDURE — 6370000000 HC RX 637 (ALT 250 FOR IP)

## 2025-02-20 PROCEDURE — 70450 CT HEAD/BRAIN W/O DYE: CPT

## 2025-02-20 PROCEDURE — 81025 URINE PREGNANCY TEST: CPT

## 2025-02-20 PROCEDURE — 96361 HYDRATE IV INFUSION ADD-ON: CPT

## 2025-02-20 RX ORDER — 0.9 % SODIUM CHLORIDE 0.9 %
1000 INTRAVENOUS SOLUTION INTRAVENOUS ONCE
Status: COMPLETED | OUTPATIENT
Start: 2025-02-20 | End: 2025-02-20

## 2025-02-20 RX ORDER — ONDANSETRON 2 MG/ML
4 INJECTION INTRAMUSCULAR; INTRAVENOUS
Status: COMPLETED | OUTPATIENT
Start: 2025-02-20 | End: 2025-02-20

## 2025-02-20 RX ORDER — MECLIZINE HYDROCHLORIDE 25 MG/1
TABLET ORAL
Qty: 30 TABLET | Refills: 1 | Status: SHIPPED | OUTPATIENT
Start: 2025-02-20

## 2025-02-20 RX ORDER — MECLIZINE HCL 12.5 MG 12.5 MG/1
25 TABLET ORAL ONCE
Status: COMPLETED | OUTPATIENT
Start: 2025-02-20 | End: 2025-02-20

## 2025-02-20 RX ORDER — CEPHALEXIN 500 MG/1
500 CAPSULE ORAL 2 TIMES DAILY
Qty: 14 CAPSULE | Refills: 0 | Status: SHIPPED | OUTPATIENT
Start: 2025-02-20 | End: 2025-02-27

## 2025-02-20 RX ADMIN — ONDANSETRON 4 MG: 2 INJECTION, SOLUTION INTRAMUSCULAR; INTRAVENOUS at 12:43

## 2025-02-20 RX ADMIN — MECLIZINE 25 MG: 12.5 TABLET ORAL at 12:43

## 2025-02-20 RX ADMIN — SODIUM CHLORIDE 1000 ML: 0.9 INJECTION, SOLUTION INTRAVENOUS at 12:44

## 2025-02-20 ASSESSMENT — PAIN SCALES - GENERAL: PAINLEVEL_OUTOF10: 0

## 2025-02-20 NOTE — ED PROVIDER NOTES
[facility] EMERGENCY DEPARTMENT  EMERGENCY DEPARTMENT ENCOUNTER       Pt Name: Cinthia Judd  MRN: [id number]  Birthdate 1980  Date of evaluation: 2/20/2025  Provider: Tabitha Silverio PA-C   PCP: Gadiel Irby MD  Note Started: 12:30 PM EST 2/20/25     CHIEF COMPLAINT       Chief Complaint   Patient presents with    Dizziness     Patient ambulatory to triage w c/o dizziness and feeling off balance x 1 week denies any numbness or tingling.        HISTORY OF PRESENT ILLNESS: 1 or more elements      History From: Patient  HPI Limitations: None     Cinthia Judd is a 44 y.o. female with PMHx POTS, GERD, fibromyositis who presents complaining of \"feeling off\" x 1 week.  Patient reports that she has been feeling intermittent dizziness, \"brain fog\", nausea, ear fullness for 1 week.  Patient describes it as feeling off balance.  Patient reports that it occurs intermittently, unsure if there is a pattern to this.  Unsure if it is related to movement.  She is unsure how long the last for. Unsure if it is related to positional movements.  Patient reports that symptoms started last week when her daughter was sick, patient reports that she was fine, she did not feel sick.  Patient reports that she did have some ear fullness, she did report take some prednisone but this did not provide any relief.  Patient denies any associated fevers, chills, vomiting, chest pain, abdominal pain, bowel/bladder changes.  She denies any dysuria, hematuria, urinary frequency, urgency.  She denies any extremity numbness, extremity weakness, extremity tingling.  She denies any loss of vision, double vision.  She denies any syncope.  She denies any head injury.     Nursing Notes were all reviewed and agreed with or any disagreements were addressed in the HPI.     REVIEW OF SYSTEMS      Review of Systems     Positives and Pertinent negatives as per HPI.    PAST HISTORY     Past Medical History:  Past Medical History:   Diagnosis Date

## 2025-02-22 LAB
EKG ATRIAL RATE: 86 BPM
EKG DIAGNOSIS: NORMAL
EKG P AXIS: 85 DEGREES
EKG P-R INTERVAL: 138 MS
EKG Q-T INTERVAL: 358 MS
EKG QRS DURATION: 84 MS
EKG QTC CALCULATION (BAZETT): 428 MS
EKG R AXIS: 29 DEGREES
EKG T AXIS: 24 DEGREES
EKG VENTRICULAR RATE: 86 BPM

## 2025-03-24 RX ORDER — FAMOTIDINE 20 MG/1
20 TABLET, FILM COATED ORAL NIGHTLY
Qty: 90 TABLET | Refills: 1 | Status: SHIPPED | OUTPATIENT
Start: 2025-03-24

## 2025-03-25 RX ORDER — ATENOLOL 25 MG/1
TABLET ORAL
Qty: 135 TABLET | OUTPATIENT
Start: 2025-03-25

## 2025-03-25 RX ORDER — ATENOLOL 25 MG/1
TABLET ORAL
Qty: 45 TABLET | Refills: 0 | Status: SHIPPED | OUTPATIENT
Start: 2025-03-25

## 2025-03-25 NOTE — TELEPHONE ENCOUNTER
Request for atenolol 25 mg.  Last office visit 4/21/23, next office visit 3/28/25. Refills per verbal order from Rocio Rob NP.

## 2025-03-26 NOTE — PROGRESS NOTES
arrhythmia.      Reports compliance with compression stockings.  She denies syncope.        Previous:  - Evaluated at [facility] in 01/2020.  CT chest with contrast WNL in 12/2019.  PFT reportedly \"ok\".    - No previous syncope, but many near syncopal episodes.  Activity & exercise are triggers for tachy episodes.  - Metoprolol caused fatigue.  Disliked Inderal.  - Abdominal & CXR, barium swallow normal in 11/2018.  Underwent esophageal dilation 02/2020.  - Diagnosed & treated for Sjogren's by Dr. Hair.  - Neurologist is Dr. Keating.  - Normal thyroid biopsy in 2017.      Review of Systems:   12 point review of systems was performed. All negative except for HPI    Past Medical History:   Diagnosis Date    Anemia     taking iron    Family history of skin cancer     sister has melanoma,     Gastroparesis     GERD (gastroesophageal reflux disease)     gastroparesis--h-pylori    H. pylori infection     H/O Clostridium difficile infection 06/2012    Helicobacter pylori (H. pylori) 05/2012    h pylori    Palpitations     2010  - cardiac workup per pt.     POTS (postural orthostatic tachycardia syndrome)     2/21/20 Dr. Mercado, [facility]. Reports tx increased fluid and NA intake, betablocker    Sjogren's syndrome     Sun-damaged skin     20's    Sunburn, blistering     8-9     Tanning bed exposure     11 years ago      Past Surgical History:   Procedure Laterality Date    COLONOSCOPY      DILATION AND CURETTAGE OF UTERUS  05/2012    GYN      tubal reversal laparoscopic    IR ESOPHAGEAL DILITATION      TILT TABLE EVAL W/WO DRUG  1/21/2017         TUBAL LIGATION      UPPER GASTROINTESTINAL ENDOSCOPY      UPPER GASTROINTESTINAL ENDOSCOPY N/A 11/6/2024    ESOPHAGOGASTRODUODENOSCOPY performed by Jose Raul Zuniga MD at [facility] ENDOSCOPY     Social Hx:  reports that she quit smoking about 11 years ago. Her smoking use included cigarettes. She has been exposed to tobacco smoke. She has never used smokeless tobacco. She reports

## 2025-03-28 ENCOUNTER — ANCILLARY PROCEDURE (OUTPATIENT)
Age: 45
End: 2025-03-28
Payer: MEDICAID

## 2025-03-28 ENCOUNTER — OFFICE VISIT (OUTPATIENT)
Age: 45
End: 2025-03-28
Payer: MEDICAID

## 2025-03-28 VITALS
SYSTOLIC BLOOD PRESSURE: 124 MMHG | HEIGHT: 64 IN | HEART RATE: 68 BPM | WEIGHT: 158 LBS | OXYGEN SATURATION: 99 % | DIASTOLIC BLOOD PRESSURE: 72 MMHG | BODY MASS INDEX: 26.98 KG/M2

## 2025-03-28 VITALS — HEIGHT: 64 IN | BODY MASS INDEX: 26.98 KG/M2 | WEIGHT: 158 LBS

## 2025-03-28 DIAGNOSIS — R06.02 SHORTNESS OF BREATH: ICD-10-CM

## 2025-03-28 DIAGNOSIS — R25.1 TREMOR, UNSPECIFIED: ICD-10-CM

## 2025-03-28 DIAGNOSIS — R06.02 SOB (SHORTNESS OF BREATH): ICD-10-CM

## 2025-03-28 DIAGNOSIS — R00.0 TACHYCARDIA, UNSPECIFIED: ICD-10-CM

## 2025-03-28 DIAGNOSIS — R06.02 SOB (SHORTNESS OF BREATH): Primary | ICD-10-CM

## 2025-03-28 DIAGNOSIS — G90.A POSTURAL ORTHOSTATIC TACHYCARDIA SYNDROME (POTS): ICD-10-CM

## 2025-03-28 DIAGNOSIS — I10 HTN (HYPERTENSION): ICD-10-CM

## 2025-03-28 PROCEDURE — 99213 OFFICE O/P EST LOW 20 MIN: CPT

## 2025-03-28 PROCEDURE — 93010 ELECTROCARDIOGRAM REPORT: CPT

## 2025-03-28 PROCEDURE — 93005 ELECTROCARDIOGRAM TRACING: CPT

## 2025-03-28 PROCEDURE — 93306 TTE W/DOPPLER COMPLETE: CPT | Performed by: INTERNAL MEDICINE

## 2025-03-28 ASSESSMENT — PATIENT HEALTH QUESTIONNAIRE - PHQ9
1. LITTLE INTEREST OR PLEASURE IN DOING THINGS: NOT AT ALL
SUM OF ALL RESPONSES TO PHQ QUESTIONS 1-9: 0
2. FEELING DOWN, DEPRESSED OR HOPELESS: NOT AT ALL

## 2025-03-29 LAB
ECHO AO ASC DIAM: 2.4 CM
ECHO AO ASCENDING AORTA INDEX: 1.36 CM/M2
ECHO AO ROOT DIAM: 2.8 CM
ECHO AO ROOT INDEX: 1.58 CM/M2
ECHO AV AREA PEAK VELOCITY: 2 CM2
ECHO AV AREA VTI: 2.2 CM2
ECHO AV AREA/BSA PEAK VELOCITY: 1.1 CM2/M2
ECHO AV AREA/BSA VTI: 1.2 CM2/M2
ECHO AV MEAN GRADIENT: 3 MMHG
ECHO AV MEAN VELOCITY: 0.9 M/S
ECHO AV PEAK GRADIENT: 7 MMHG
ECHO AV PEAK VELOCITY: 1.3 M/S
ECHO AV VELOCITY RATIO: 0.62
ECHO AV VTI: 25.3 CM
ECHO BSA: 1.8 M2
ECHO LA DIAMETER INDEX: 2.09 CM/M2
ECHO LA DIAMETER: 3.7 CM
ECHO LA TO AORTIC ROOT RATIO: 1.32
ECHO LA VOL A-L A2C: 47 ML (ref 22–52)
ECHO LA VOL A-L A4C: 41 ML (ref 22–52)
ECHO LA VOL BP: 45 ML (ref 22–52)
ECHO LA VOL MOD A2C: 45 ML (ref 22–52)
ECHO LA VOL MOD A4C: 38 ML (ref 22–52)
ECHO LA VOL/BSA BIPLANE: 25 ML/M2 (ref 16–34)
ECHO LA VOLUME AREA LENGTH: 48 ML
ECHO LA VOLUME INDEX A-L A2C: 27 ML/M2 (ref 16–34)
ECHO LA VOLUME INDEX A-L A4C: 23 ML/M2 (ref 16–34)
ECHO LA VOLUME INDEX AREA LENGTH: 27 ML/M2 (ref 16–34)
ECHO LA VOLUME INDEX MOD A2C: 25 ML/M2 (ref 16–34)
ECHO LA VOLUME INDEX MOD A4C: 21 ML/M2 (ref 16–34)
ECHO LV E' LATERAL VELOCITY: 12.27 CM/S
ECHO LV E' SEPTAL VELOCITY: 9.35 CM/S
ECHO LV EDV A2C: 65 ML
ECHO LV EDV A4C: 77 ML
ECHO LV EDV BP: 72 ML (ref 56–104)
ECHO LV EDV INDEX A4C: 44 ML/M2
ECHO LV EDV INDEX BP: 41 ML/M2
ECHO LV EDV NDEX A2C: 37 ML/M2
ECHO LV EF PHYSICIAN: 60 %
ECHO LV EJECTION FRACTION A2C: 56 %
ECHO LV EJECTION FRACTION A4C: 60 %
ECHO LV EJECTION FRACTION BIPLANE: 57 % (ref 55–100)
ECHO LV ESV A2C: 29 ML
ECHO LV ESV A4C: 31 ML
ECHO LV ESV BP: 31 ML (ref 19–49)
ECHO LV ESV INDEX A2C: 16 ML/M2
ECHO LV ESV INDEX A4C: 18 ML/M2
ECHO LV ESV INDEX BP: 18 ML/M2
ECHO LV FRACTIONAL SHORTENING: 38 % (ref 28–44)
ECHO LV INTERNAL DIMENSION DIASTOLE INDEX: 2.37 CM/M2
ECHO LV INTERNAL DIMENSION DIASTOLIC: 4.2 CM (ref 3.9–5.3)
ECHO LV INTERNAL DIMENSION SYSTOLIC INDEX: 1.47 CM/M2
ECHO LV INTERNAL DIMENSION SYSTOLIC: 2.6 CM
ECHO LV IVSD: 1 CM (ref 0.6–0.9)
ECHO LV MASS 2D: 157.1 G (ref 67–162)
ECHO LV MASS INDEX 2D: 88.7 G/M2 (ref 43–95)
ECHO LV POSTERIOR WALL DIASTOLIC: 1.2 CM (ref 0.6–0.9)
ECHO LV RELATIVE WALL THICKNESS RATIO: 0.57
ECHO LVOT AREA: 3.1 CM2
ECHO LVOT AV VTI INDEX: 0.7
ECHO LVOT DIAM: 2 CM
ECHO LVOT MEAN GRADIENT: 1 MMHG
ECHO LVOT PEAK GRADIENT: 2 MMHG
ECHO LVOT PEAK VELOCITY: 0.8 M/S
ECHO LVOT STROKE VOLUME INDEX: 31.4 ML/M2
ECHO LVOT SV: 55.6 ML
ECHO LVOT VTI: 17.7 CM
ECHO MV A VELOCITY: 0.56 M/S
ECHO MV E DECELERATION TIME (DT): 262.4 MS
ECHO MV E VELOCITY: 0.72 M/S
ECHO MV E/A RATIO: 1.29
ECHO MV E/E' LATERAL: 5.87
ECHO MV E/E' RATIO (AVERAGED): 6.78
ECHO MV E/E' SEPTAL: 7.7
ECHO PV MAX VELOCITY: 1.1 M/S
ECHO PV PEAK GRADIENT: 5 MMHG
ECHO RV TAPSE: 2.1 CM (ref 1.7–?)
ECHO TV REGURGITANT MAX VELOCITY: 2.36 M/S
ECHO TV REGURGITANT PEAK GRADIENT: 22 MMHG

## 2025-03-29 PROCEDURE — 93306 TTE W/DOPPLER COMPLETE: CPT | Performed by: INTERNAL MEDICINE

## 2025-03-31 ENCOUNTER — TELEPHONE (OUTPATIENT)
Age: 45
End: 2025-03-31

## 2025-03-31 NOTE — TELEPHONE ENCOUNTER
----- Message from Dr. Cj Bautista MD sent at 3/29/2025 10:51 AM EDT -----  Echo 3/2025  Normal     Future Appointments  3/31/2026  9:40 AM    Cj Bautista MD [facility]

## 2025-04-06 ENCOUNTER — E-VISIT (OUTPATIENT)
Age: 45
End: 2025-04-06
Payer: MEDICAID

## 2025-04-06 DIAGNOSIS — R09.81 SINUS CONGESTION: Primary | ICD-10-CM

## 2025-04-06 PROCEDURE — 99422 OL DIG E/M SVC 11-20 MIN: CPT | Performed by: FAMILY MEDICINE

## 2025-04-06 ASSESSMENT — LIFESTYLE VARIABLES
SMOKING_STATUS: NO, I'M A FORMER SMOKER
SMOKING_YEARS: 5
PACKS_PER_DAY: 1

## 2025-04-07 NOTE — PROGRESS NOTES
Cinthia Judd (1980) initiated an asynchronous digital communication through [employer].    HPI: per patient questionnaire     Exam: not applicable    Diagnoses and all orders for this visit:  Diagnoses and all orders for this visit:    Sinus congestion  -     amoxicillin-clavulanate (AUGMENTIN) 875-125 MG per tablet; Take 1 tablet by mouth 2 times daily for 7 days          11 minutes were spent on the digital evaluation and management of this patient.    Gadiel Irby MD

## 2025-06-09 RX ORDER — ATENOLOL 25 MG/1
TABLET ORAL
Qty: 135 TABLET | Refills: 1 | Status: SHIPPED | OUTPATIENT
Start: 2025-06-09

## 2025-06-09 NOTE — TELEPHONE ENCOUNTER
Med refilled per VO by MD.    Future Appointments   Date Time Provider Department Center   6/30/2025  7:30 AM Gadiel Irby MD [facility]   3/31/2026  9:40 AM Cj Bautista MD [facility]

## 2025-07-03 ENCOUNTER — TRANSCRIBE ORDERS (OUTPATIENT)
Facility: HOSPITAL | Age: 45
End: 2025-07-03

## 2025-07-03 DIAGNOSIS — D34 THYROID ADENOMA: Primary | ICD-10-CM

## 2025-07-14 ENCOUNTER — HOSPITAL ENCOUNTER (OUTPATIENT)
Facility: HOSPITAL | Age: 45
Discharge: HOME | End: 2025-07-17
Attending: OTOLARYNGOLOGY
Payer: MEDICAID

## 2025-07-14 DIAGNOSIS — D34 THYROID ADENOMA: ICD-10-CM

## 2025-07-14 PROCEDURE — 76536 US EXAM OF HEAD AND NECK: CPT

## 2025-07-28 ENCOUNTER — OFFICE VISIT (OUTPATIENT)
Age: 45
End: 2025-07-28
Payer: MEDICAID

## 2025-07-28 VITALS
RESPIRATION RATE: 17 BRPM | DIASTOLIC BLOOD PRESSURE: 83 MMHG | TEMPERATURE: 97.9 F | OXYGEN SATURATION: 98 % | WEIGHT: 157.6 LBS | HEART RATE: 79 BPM | HEIGHT: 64 IN | SYSTOLIC BLOOD PRESSURE: 120 MMHG | BODY MASS INDEX: 26.91 KG/M2

## 2025-07-28 DIAGNOSIS — Z00.00 ROUTINE GENERAL MEDICAL EXAMINATION AT A HEALTH CARE FACILITY: ICD-10-CM

## 2025-07-28 DIAGNOSIS — G25.3 MUSCLE JERKS DURING SLEEP: ICD-10-CM

## 2025-07-28 DIAGNOSIS — E78.2 MIXED HYPERLIPIDEMIA: Primary | ICD-10-CM

## 2025-07-28 PROCEDURE — 99396 PREV VISIT EST AGE 40-64: CPT | Performed by: FAMILY MEDICINE

## 2025-07-28 RX ORDER — FLUTICASONE PROPIONATE 50 MCG
2 SPRAY, SUSPENSION (ML) NASAL DAILY
Qty: 48 G | Refills: 5 | Status: SHIPPED | OUTPATIENT
Start: 2025-07-28

## 2025-07-28 RX ORDER — CETIRIZINE HYDROCHLORIDE 10 MG/1
10 TABLET ORAL DAILY
Qty: 90 TABLET | Refills: 3 | Status: SHIPPED | OUTPATIENT
Start: 2025-07-28

## 2025-07-28 SDOH — ECONOMIC STABILITY: FOOD INSECURITY: WITHIN THE PAST 12 MONTHS, THE FOOD YOU BOUGHT JUST DIDN'T LAST AND YOU DIDN'T HAVE MONEY TO GET MORE.: NEVER TRUE

## 2025-07-28 SDOH — ECONOMIC STABILITY: INCOME INSECURITY: IN THE LAST 12 MONTHS, WAS THERE A TIME WHEN YOU WERE NOT ABLE TO PAY THE MORTGAGE OR RENT ON TIME?: NO

## 2025-07-28 SDOH — ECONOMIC STABILITY: TRANSPORTATION INSECURITY
IN THE PAST 12 MONTHS, HAS LACK OF TRANSPORTATION KEPT YOU FROM MEETINGS, WORK, OR FROM GETTING THINGS NEEDED FOR DAILY LIVING?: NO

## 2025-07-28 SDOH — ECONOMIC STABILITY: FOOD INSECURITY: WITHIN THE PAST 12 MONTHS, YOU WORRIED THAT YOUR FOOD WOULD RUN OUT BEFORE YOU GOT MONEY TO BUY MORE.: NEVER TRUE

## 2025-07-28 SDOH — ECONOMIC STABILITY: TRANSPORTATION INSECURITY
IN THE PAST 12 MONTHS, HAS THE LACK OF TRANSPORTATION KEPT YOU FROM MEDICAL APPOINTMENTS OR FROM GETTING MEDICATIONS?: NO

## 2025-07-28 NOTE — PROGRESS NOTES
Health Maintenance Due   Topic Date Due    Varicella vaccine (1 of 2 - 13+ 2-dose series) Never done    Hepatitis C screen  Never done    Hepatitis B vaccine (1 of 3 - 19+ 3-dose series) Never done    Cervical cancer screen  Never done    COVID-19 Vaccine (3 - 2024-25 season) 09/01/2024     Pap smear - [facility] - pt authorize record release

## 2025-07-28 NOTE — PROGRESS NOTES
Chief Complaint   Patient presents with    Cholesterol Problem     Subjective: (As above and below)     Chief Complaint   Patient presents with    Cholesterol Problem     she is a 45 y.o. year old female who presents for evaluation.  History of Present Illness  The patient presents for evaluation of right ear popping, nasal congestion, and medication management.    She has been experiencing a popping sensation in her right ear for several weeks, particularly noticeable in the mornings. There is no associated pain. She has not been swimming recently.    She has been dealing with nasal congestion due to allergies, which has been more pronounced over the past week. She has not been taking any medication for this as she ran out of her supply. She is uncertain about the availability of refills for her Zyrtec prescription.    She is on atenolol and has enough supply.    Reviewed PmHx, RxHx, FmHx, SocHx, AllgHx and updated in chart.    Review of Systems - negative except as listed above    Objective:     Vitals:    07/28/25 0718   BP: 120/83   BP Site: Right Upper Arm   Patient Position: Sitting   BP Cuff Size: Large Adult   Pulse: 79   Resp: 17   Temp: 97.9 °F (36.6 °C)   TempSrc: Temporal   SpO2: 98%   Weight: 71.5 kg (157 lb 9.6 oz)   Height: 1.626 m (5' 4\")     Physical Examination: General appearance - alert, well appearing, and in no distress  Mental status - normal mood, behavior, speech, dress, motor activity, and thought processes  Ears - bilateral TM's and external ear canals normal  Nose - normal and patent, no erythema, discharge or polyps  Chest - clear to auscultation, no wheezes, rales or rhonchi, symmetric air entry  Heart - normal rate, regular rhythm, normal S1, S2, no murmurs, rubs, clicks or gallops  Musculoskeletal - no joint tenderness, deformity or swelling  Extremities - peripheral pulses normal, no pedal edema, no clubbing or cyanosis    Assessment/ Plan:   1. Mixed hyperlipidemia  -check labs  -

## 2025-07-29 LAB
25(OH)D3 SERPL-MCNC: 62.9 NG/ML (ref 30–100)
ALBUMIN SERPL-MCNC: 4.1 G/DL (ref 3.5–5)
ALBUMIN/GLOB SERPL: 1.3 (ref 1.1–2.2)
ALP SERPL-CCNC: 44 U/L (ref 45–117)
ALT SERPL-CCNC: 24 U/L (ref 12–78)
ANION GAP SERPL CALC-SCNC: 9 MMOL/L (ref 2–12)
AST SERPL-CCNC: 26 U/L (ref 15–37)
BASOPHILS # BLD: 0.07 K/UL (ref 0–0.1)
BASOPHILS NFR BLD: 1.2 % (ref 0–1)
BILIRUB SERPL-MCNC: 0.7 MG/DL (ref 0.2–1)
BUN SERPL-MCNC: 6 MG/DL (ref 6–20)
BUN/CREAT SERPL: 8 (ref 12–20)
CALCIUM SERPL-MCNC: 9.5 MG/DL (ref 8.5–10.1)
CHLORIDE SERPL-SCNC: 107 MMOL/L (ref 97–108)
CHOLEST SERPL-MCNC: 282 MG/DL
CO2 SERPL-SCNC: 24 MMOL/L (ref 21–32)
CREAT SERPL-MCNC: 0.77 MG/DL (ref 0.55–1.02)
DIFFERENTIAL METHOD BLD: ABNORMAL
EOSINOPHIL # BLD: 0.1 K/UL (ref 0–0.4)
EOSINOPHIL NFR BLD: 1.8 % (ref 0–7)
ERYTHROCYTE [DISTWIDTH] IN BLOOD BY AUTOMATED COUNT: 14.6 % (ref 11.5–14.5)
EST. AVERAGE GLUCOSE BLD GHB EST-MCNC: 97 MG/DL
GLOBULIN SER CALC-MCNC: 3.1 G/DL (ref 2–4)
GLUCOSE SERPL-MCNC: 90 MG/DL (ref 65–100)
HBA1C MFR BLD: 5 % (ref 4–5.6)
HCT VFR BLD AUTO: 41 % (ref 35–47)
HCV AB SER IA-ACNC: 0.04 INDEX
HCV AB SERPL QL IA: NONREACTIVE
HDLC SERPL-MCNC: 85 MG/DL
HDLC SERPL: 3.3 (ref 0–5)
HGB BLD-MCNC: 12.8 G/DL (ref 11.5–16)
IMM GRANULOCYTES # BLD AUTO: 0.01 K/UL (ref 0–0.04)
IMM GRANULOCYTES NFR BLD AUTO: 0.2 % (ref 0–0.5)
LDLC SERPL CALC-MCNC: 174.8 MG/DL (ref 0–100)
LYMPHOCYTES # BLD: 1.98 K/UL (ref 0.8–3.5)
LYMPHOCYTES NFR BLD: 35 % (ref 12–49)
MCH RBC QN AUTO: 31 PG (ref 26–34)
MCHC RBC AUTO-ENTMCNC: 31.2 G/DL (ref 30–36.5)
MCV RBC AUTO: 99.3 FL (ref 80–99)
MONOCYTES # BLD: 0.46 K/UL (ref 0–1)
MONOCYTES NFR BLD: 8.1 % (ref 5–13)
NEUTS SEG # BLD: 3.04 K/UL (ref 1.8–8)
NEUTS SEG NFR BLD: 53.7 % (ref 32–75)
NRBC # BLD: 0 K/UL (ref 0–0.01)
NRBC BLD-RTO: 0 PER 100 WBC
PLATELET # BLD AUTO: 325 K/UL (ref 150–400)
PMV BLD AUTO: 11.3 FL (ref 8.9–12.9)
POTASSIUM SERPL-SCNC: 4.4 MMOL/L (ref 3.5–5.1)
PROT SERPL-MCNC: 7.2 G/DL (ref 6.4–8.2)
RBC # BLD AUTO: 4.13 M/UL (ref 3.8–5.2)
SODIUM SERPL-SCNC: 140 MMOL/L (ref 136–145)
TRIGL SERPL-MCNC: 111 MG/DL
TSH SERPL DL<=0.05 MIU/L-ACNC: 1.54 UIU/ML (ref 0.36–3.74)
VLDLC SERPL CALC-MCNC: 22.2 MG/DL
WBC # BLD AUTO: 5.7 K/UL (ref 3.6–11)

## 2025-07-30 RX ORDER — SIMVASTATIN 10 MG
10 TABLET ORAL NIGHTLY
Qty: 90 TABLET | Refills: 1 | Status: SHIPPED | OUTPATIENT
Start: 2025-07-30

## 2025-08-03 ENCOUNTER — E-VISIT (OUTPATIENT)
Age: 45
End: 2025-08-03

## 2025-08-03 DIAGNOSIS — H92.09 OTALGIA, UNSPECIFIED LATERALITY: Primary | ICD-10-CM

## 2025-08-03 ASSESSMENT — LIFESTYLE VARIABLES
SMOKING_YEARS: 3
SMOKING_STATUS: NO, I'M A FORMER SMOKER
PACKS_PER_DAY: 1

## 2025-08-14 RX ORDER — ATENOLOL 25 MG/1
TABLET ORAL
Qty: 135 TABLET | Refills: 1 | Status: SHIPPED | OUTPATIENT
Start: 2025-08-14

## (undated) DEVICE — ENDO CARRY-ON PROCEDURE KIT INCLUDES ENZYMATIC SPONGE, GAUZE, BIOHAZARD LABEL, TRAY, LUBRICANT, DIRTY SCOPE LABEL, WATER LABEL, TRAY, DRAWSTRING PAD, AND DEFENDO 4-PIECE KIT.: Brand: ENDO CARRY-ON PROCEDURE KIT

## (undated) DEVICE — FORCEPS BX L160CM DIA8MM GRSP DISECT CUP TIP NONLOCKING ROT

## (undated) DEVICE — SINGLE USE GRASPING FORCEPS: Brand: SINGLE USE GRASPING FORCEPS

## (undated) DEVICE — BLOCK BITE ENDOSCP AD 21 MM W/ DIL BLU LF DISP

## (undated) DEVICE — SET GRAV CK VLV NEEDLESS ST 3 GANGED 4WAY STPCOCK HI FLO 10

## (undated) DEVICE — SOLUTION LACTATED RINGERS INJECTION USP

## (undated) DEVICE — IV START KIT: Brand: MEDLINE

## (undated) DEVICE — NEEDLE HYPO 18GA L1.5IN PNK S STL HUB POLYPR SHLD REG BVL

## (undated) DEVICE — Device

## (undated) DEVICE — FILTER IV 5UM L1.75IN FLX STRW FOR FLD ASPIR FR GLS AMP

## (undated) DEVICE — SOLUTION IRRIGATION NACL 0.9% 1000 ML FLX CONTAINER

## (undated) DEVICE — Z DISCONTINUED PER MEDLINE LINE GAS SAMPLING O2/CO2 LNG AD 13 FT NSL W/ TBNG FILTERLINE

## (undated) DEVICE — BASIN EMSIS 16OZ GRAPHITE PLAS KID SHP MOLD GRAD FOR ORAL

## (undated) DEVICE — CONTAINER SPEC 20 ML LID NEUT BUFF FORMALIN 10 % POLYPR STS

## (undated) DEVICE — NEONATAL-ADULT SPO2 SENSOR: Brand: NELLCOR

## (undated) DEVICE — TOWEL 4 PLY TISS 19X30 SUE WHT

## (undated) DEVICE — CATH IV AUTOGRD BC PNK 20GA 25 -- INSYTE

## (undated) DEVICE — SYR 10ML LUER LOK 1/5ML GRAD --

## (undated) DEVICE — FORCEP BX LG CAP 2.4 MMX120 CM W/ NDL YEL RADIAL JAW 4 DISP

## (undated) DEVICE — BAG SPEC BIOHZRD 10 X 10 IN --

## (undated) DEVICE — ELECTRODE,RADIOTRANSLUCENT,FOAM,5PK: Brand: MEDLINE

## (undated) DEVICE — KENDALL DL ECG CABLE AND LEAD WIRE SYSTEM, 3-LEAD, SINGLE PATIENT USE: Brand: KENDALL

## (undated) DEVICE — SYR ASSEMB INFL BLLN 60ML --

## (undated) DEVICE — SYR 3ML LL TIP 1/10ML GRAD --

## (undated) DEVICE — SET ADMIN 16ML TBNG L100IN 2 Y INJ SITE IV PIGGY BK DISP

## (undated) DEVICE — BITE BLK ENDOSCP AD 54FR GRN POLYETH ENDOSCP W STRP SLD

## (undated) DEVICE — COVIDIEN KENDALL DL DISPOSABLE 3 LEAD SY: Brand: MEDLINE RENEWAL

## (undated) DEVICE — YANKAUER,TAPERED BULBOUS TIP,W/O VENT: Brand: MEDLINE

## (undated) DEVICE — 1200 GUARD II KIT W/5MM TUBE W/O VAC TUBE: Brand: GUARDIAN

## (undated) DEVICE — 3M™ TEGADERM™ TRANSPARENT FILM DRESSING FRAME STYLE, 1624W, 2-3/8 IN X 2-3/4 IN (6 CM X 7 CM), 100/CT 4CT/CASE: Brand: 3M™ TEGADERM™

## (undated) DEVICE — CONTINU-FLO SOLUTION SET, 2 INJECTION SITES, MALE LUER LOCK ADAPTER WITH RETRACTABLE COLLAR, LARGE BORE STOPCOCK WITH ROTATING MALE LUER LOCK EXTENSION SET, 2 INJECTION SITES, MALE LUER LOCK ADAPTER WITH RETRACTABLE COLLAR: Brand: INTERLINK/CONTINU-FLO

## (undated) DEVICE — SYRINGE 50ML E/T

## (undated) DEVICE — SET ADMIN 16ML TBNG L100IN 2 Y INJ SITE IV PIGGY BK DISP (ORDER IN MULIPLES OF 48)

## (undated) DEVICE — HYPODERMIC SAFETY NEEDLE: Brand: MAGELLAN

## (undated) DEVICE — CATHETER IV 20GA L1.16IN OD1.0414-1.1176MM ID0.762-0.8382MM

## (undated) DEVICE — SOLIDIFIER FLD 2OZ 1500CC N DISINF IN BTL DISP SAFESORB

## (undated) DEVICE — KIT,1200CC CANISTER,3/16"X6' TUBING: Brand: MEDLINE INDUSTRIES, INC.